# Patient Record
Sex: FEMALE | Race: WHITE | Employment: UNEMPLOYED | URBAN - METROPOLITAN AREA
[De-identification: names, ages, dates, MRNs, and addresses within clinical notes are randomized per-mention and may not be internally consistent; named-entity substitution may affect disease eponyms.]

---

## 2017-01-30 ENCOUNTER — TELEPHONE (OUTPATIENT)
Dept: OBGYN CLINIC | Age: 47
End: 2017-01-30

## 2017-01-30 NOTE — TELEPHONE ENCOUNTER
Pt called to obtain pap results from 7/27/2016. Results given per MD documentation. She verbalized understanding and asked to be connected with billing due to her receiving two bills for this procedure.

## 2017-03-20 ENCOUNTER — OFFICE VISIT (OUTPATIENT)
Dept: MIDWIFE SERVICES | Age: 47
End: 2017-03-20

## 2017-03-20 VITALS
HEIGHT: 62 IN | BODY MASS INDEX: 36.71 KG/M2 | HEART RATE: 90 BPM | WEIGHT: 199.5 LBS | SYSTOLIC BLOOD PRESSURE: 129 MMHG | DIASTOLIC BLOOD PRESSURE: 84 MMHG

## 2017-03-20 DIAGNOSIS — N93.0 POSTCOITAL BLEEDING: Primary | ICD-10-CM

## 2017-03-20 RX ORDER — WARFARIN 10 MG/1
TABLET ORAL
COMMUNITY
Start: 2015-03-13 | End: 2017-04-20
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
## 2017-04-17 ENCOUNTER — HOSPITAL ENCOUNTER (EMERGENCY)
Age: 47
Discharge: HOME | End: 2017-04-17
Attending: STUDENT IN AN ORGANIZED HEALTH CARE EDUCATION/TRAINING PROGRAM | Admitting: STUDENT IN AN ORGANIZED HEALTH CARE EDUCATION/TRAINING PROGRAM
Payer: MEDICAID

## 2017-04-17 ENCOUNTER — APPOINTMENT (OUTPATIENT)
Dept: GENERAL RADIOLOGY | Age: 47
End: 2017-04-17
Attending: NURSE PRACTITIONER
Payer: MEDICAID

## 2017-04-17 ENCOUNTER — APPOINTMENT (OUTPATIENT)
Dept: CT IMAGING | Age: 47
End: 2017-04-17
Attending: NURSE PRACTITIONER
Payer: MEDICAID

## 2017-04-17 VITALS
SYSTOLIC BLOOD PRESSURE: 103 MMHG | DIASTOLIC BLOOD PRESSURE: 64 MMHG | HEART RATE: 97 BPM | BODY MASS INDEX: 34.96 KG/M2 | RESPIRATION RATE: 18 BRPM | HEIGHT: 62 IN | OXYGEN SATURATION: 98 % | TEMPERATURE: 97.9 F | WEIGHT: 190 LBS

## 2017-04-17 DIAGNOSIS — S09.93XA FACIAL INJURY, INITIAL ENCOUNTER: ICD-10-CM

## 2017-04-17 DIAGNOSIS — R07.89 CHEST WALL PAIN: ICD-10-CM
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
## 2017-04-17 LAB
ALBUMIN SERPL BCP-MCNC: 3.9 G/DL (ref 3.5–5)
ALBUMIN/GLOB SERPL: 1.1 {RATIO} (ref 1.1–2.2)
ALP SERPL-CCNC: 49 U/L (ref 45–117)
ALT SERPL-CCNC: 22 U/L (ref 12–78)
ANION GAP BLD CALC-SCNC: 16 MMOL/L (ref 5–15)
AST SERPL W P-5'-P-CCNC: 19 U/L (ref 15–37)
BASOPHILS # BLD AUTO: 0 K/UL (ref 0–0.1)
BASOPHILS # BLD: 0 % (ref 0–1)
BILIRUB SERPL-MCNC: 0.3 MG/DL (ref 0.2–1)
BUN SERPL-MCNC: 11 MG/DL (ref 6–20)
BUN/CREAT SERPL: 15 (ref 12–20)
CALCIUM SERPL-MCNC: 8.5 MG/DL (ref 8.5–10.1)
CHLORIDE SERPL-SCNC: 105 MMOL/L (ref 97–108)
CO2 SERPL-SCNC: 20 MMOL/L (ref 21–32)
CREAT SERPL-MCNC: 0.72 MG/DL (ref 0.55–1.02)
EOSINOPHIL # BLD: 0.1 K/UL (ref 0–0.4)
EOSINOPHIL NFR BLD: 1 % (ref 0–7)
ERYTHROCYTE [DISTWIDTH] IN BLOOD BY AUTOMATED COUNT: 13.2 % (ref 11.5–14.5)
GLOBULIN SER CALC-MCNC: 3.7 G/DL (ref 2–4)
GLUCOSE SERPL-MCNC: 94 MG/DL (ref 65–100)
HCG UR QL: NEGATIVE
HCT VFR BLD AUTO: 40.3 % (ref 35–47)
HGB BLD-MCNC: 13.5 G/DL (ref 11.5–16)
INR PPP: 1.6 (ref 0.9–1.1)
LYMPHOCYTES # BLD AUTO: 24 % (ref 12–49)
LYMPHOCYTES # BLD: 2.5 K/UL (ref 0.8–3.5)
MCH RBC QN AUTO: 32.1 PG (ref 26–34)
MCHC RBC AUTO-ENTMCNC: 33.5 G/DL (ref 30–36.5)
MCV RBC AUTO: 95.7 FL (ref 80–99)
MONOCYTES # BLD: 0.8 K/UL (ref 0–1)
MONOCYTES NFR BLD AUTO: 8 % (ref 5–13)
NEUTS SEG # BLD: 7.1 K/UL (ref 1.8–8)
NEUTS SEG NFR BLD AUTO: 67 % (ref 32–75)
PLATELET # BLD AUTO: 495 K/UL (ref 150–400)
POTASSIUM SERPL-SCNC: 3.7 MMOL/L (ref 3.5–5.1)
PROT SERPL-MCNC: 7.6 G/DL (ref 6.4–8.2)
PROTHROMBIN TIME: 16.4 SEC (ref 9–11.1)
RBC # BLD AUTO: 4.21 M/UL (ref 3.8–5.2)
SODIUM SERPL-SCNC: 141 MMOL/L (ref 136–145)
TROPONIN I SERPL-MCNC: <0.04 NG/ML
WBC # BLD AUTO: 10.6 K/UL (ref 3.6–11)

## 2017-04-17 PROCEDURE — 85610 PROTHROMBIN TIME: CPT | Performed by: NURSE PRACTITIONER

## 2017-04-17 PROCEDURE — 74011250636 HC RX REV CODE- 250/636: Performed by: NURSE PRACTITIONER

## 2017-04-17 PROCEDURE — 85025 COMPLETE CBC W/AUTO DIFF WBC: CPT | Performed by: NURSE PRACTITIONER

## 2017-04-17 PROCEDURE — 80053 COMPREHEN METABOLIC PANEL: CPT | Performed by: NURSE PRACTITIONER

## 2017-04-17 PROCEDURE — 93005 ELECTROCARDIOGRAM TRACING: CPT

## 2017-04-17 PROCEDURE — 70486 CT MAXILLOFACIAL W/O DYE: CPT

## 2017-04-17 PROCEDURE — 72050 X-RAY EXAM NECK SPINE 4/5VWS: CPT

## 2017-04-17 PROCEDURE — 90471 IMMUNIZATION ADMIN: CPT

## 2017-04-17 PROCEDURE — 99284 EMERGENCY DEPT VISIT MOD MDM: CPT

## 2017-04-17 PROCEDURE — 84484 ASSAY OF TROPONIN QUANT: CPT | Performed by: NURSE PRACTITIONER

## 2017-04-17 PROCEDURE — 70450 CT HEAD/BRAIN W/O DYE: CPT

## 2017-04-17 PROCEDURE — 90715 TDAP VACCINE 7 YRS/> IM: CPT | Performed by: NURSE PRACTITIONER

## 2017-04-17 PROCEDURE — 36415 COLL VENOUS BLD VENIPUNCTURE: CPT | Performed by: NURSE PRACTITIONER

## 2017-04-17 PROCEDURE — 81025 URINE PREGNANCY TEST: CPT

## 2017-04-17 PROCEDURE — 71020 XR CHEST PA LAT: CPT

## 2017-04-17 RX ORDER — HYDROCODONE BITARTRATE AND ACETAMINOPHEN 5; 325 MG/1; MG/1
1 TABLET ORAL
Qty: 12 TAB | Refills: 0 | Status: SHIPPED | OUTPATIENT
Start: 2017-04-17 | End: 2017-04-20

## 2017-04-17 RX ADMIN — TETANUS TOXOID, REDUCED DIPHTHERIA TOXOID AND ACELLULAR PERTUSSIS VACCINE, ADSORBED 0.5 ML: 5; 2.5; 8; 8; 2.5 SUSPENSION INTRAMUSCULAR at 19:55
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
## 2017-04-18 LAB
ATRIAL RATE: 84 BPM
CALCULATED P AXIS, ECG09: -6 DEGREES
CALCULATED R AXIS, ECG10: -31 DEGREES
CALCULATED T AXIS, ECG11: 0 DEGREES
DIAGNOSIS, 93000: NORMAL
P-R INTERVAL, ECG05: 154 MS
Q-T INTERVAL, ECG07: 364 MS
QRS DURATION, ECG06: 72 MS
QTC CALCULATION (BEZET), ECG08: 430 MS
VENTRICULAR RATE, ECG03: 84 BPM
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
## 2017-04-20 ENCOUNTER — OFFICE VISIT (OUTPATIENT)
Dept: FAMILY MEDICINE CLINIC | Age: 47
End: 2017-04-20

## 2017-04-20 VITALS
WEIGHT: 199.8 LBS | DIASTOLIC BLOOD PRESSURE: 75 MMHG | BODY MASS INDEX: 36.77 KG/M2 | RESPIRATION RATE: 20 BRPM | SYSTOLIC BLOOD PRESSURE: 114 MMHG | HEART RATE: 78 BPM | TEMPERATURE: 98.4 F | HEIGHT: 62 IN

## 2017-04-20 DIAGNOSIS — R42 DIZZINESS: Primary | ICD-10-CM

## 2017-04-20 DIAGNOSIS — E03.9 ACQUIRED HYPOTHYROIDISM: ICD-10-CM

## 2017-04-20 DIAGNOSIS — E55.9 VITAMIN D DEFICIENCY: ICD-10-CM
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
## 2017-04-20 RX ORDER — WARFARIN SODIUM 5 MG/1
5 TABLET ORAL DAILY
COMMUNITY
End: 2017-06-27

## 2017-04-20 RX ORDER — WARFARIN 10 MG/1
10 TABLET ORAL DAILY
COMMUNITY
End: 2017-04-20

## 2017-04-20 RX ORDER — BUTALBITAL, ACETAMINOPHEN AND CAFFEINE 300; 40; 50 MG/1; MG/1; MG/1
CAPSULE ORAL AS NEEDED
COMMUNITY
Start: 2017-04-12 | End: 2017-06-27
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
## 2017-04-20 NOTE — PROGRESS NOTES
Chief Complaint   Patient presents with        on 4/17/17-ED visit follow up.     Dizziness     unsteady on feet

## 2017-04-20 NOTE — PATIENT INSTRUCTIONS
Vertigo: Care Instructions  Your Care Instructions  Vertigo is the feeling that you or your surroundings are moving when there is no actual movement. It is often described as a feeling of spinning, whirling, falling, or tilting. Vertigo may make you vomit or feel nauseated. You may have trouble standing or walking and may lose your balance. Vertigo is often related to an inner ear problem, but it can have other more serious causes. If vertigo continues, you may need more tests to find its cause. Follow-up care is a key part of your treatment and safety. Be sure to make and go to all appointments, and call your doctor if you are having problems. Its also a good idea to know your test results and keep a list of the medicines you take. How can you care for yourself at home? · Do not lie flat on your back. Prop yourself up slightly. This may reduce the spinning feeling. Keep your eyes open. · Move slowly so that you do not fall. · If your doctor recommends medicine, take it exactly as directed. · Do not drive while you are having vertigo. Certain exercises, called Stanford-Daroff exercises, can help decrease vertigo. To do Stanford-Daroff exercises:  · Sit on the edge of a bed or sofa and quickly lie down on the side that causes the worst vertigo. Lie on your side with your ear down. · Stay in this position for at least 30 seconds or until the vertigo goes away. · Sit up. If this causes vertigo, wait for it to stop. · Repeat the procedure on the other side. · Repeat this 10 times. Do these exercises 2 times a day until the vertigo is gone. When should you call for help? Call 911 anytime you think you may need emergency care. For example, call if:  · You passed out (lost consciousness). · You have symptoms of a stroke. These may include:  ¨ Sudden numbness, tingling, weakness, or loss of movement in your face, arm, or leg, especially on only one side of your body. ¨ Sudden vision changes.   ¨ Sudden trouble speaking. ¨ Sudden confusion or trouble understanding simple statements. ¨ Sudden problems with walking or balance. ¨ A sudden, severe headache that is different from past headaches. Call your doctor now or seek immediate medical care if:  · Vertigo occurs with a fever, a headache, or ringing in your ears. · You have new or increased nausea and vomiting. Watch closely for changes in your health, and be sure to contact your doctor if:  · Vertigo gets worse or happens more often. · Vertigo has not gotten better after 2 weeks. Where can you learn more? Go to http://guera-samreen.info/. Enter C426 in the search box to learn more about \"Vertigo: Care Instructions. \"  Current as of: July 29, 2016  Content Version: 11.2  © 8687-4907 [employer]. Care instructions adapted under license by [employer] (which disclaims liability or warranty for this information). If you have questions about a medical condition or this instruction, always ask your healthcare professional. [place] [address] any warranty or liability for your use of this information. Cawthorne Exercises for Vertigo: Care Instructions  Your Care Instructions  Simple exercises can help you regain your balance when you have vertigo. If you have Ménière's disease, benign paroxysmal positional vertigo (BPPV), or another inner ear problem, you may have vertigo off and on. Do these exercises first thing in the morning and before you go to bed. You might get dizzy when you first start them. If this happens, try to do them for at least 5 minutes. Do a group of exercises at a time, starting at the top of the list. It may take several weeks before you can do all the exercises without feeling dizzy. Follow-up care is a key part of your treatment and safety. Be sure to make and go to all appointments, and call your doctor if you are having problems.  It's also a good idea to know your test results and keep a list of the medicines you take. How can you care for yourself at home? Exercise 1  While sitting on the side of the bed and holding your head still:  · Look up as far as you can. · Look down as far as you can. · Look from side to side as far as you can. · Stretch your arm straight out in front of you. Focus on your index finger. Continue to focus on your finger while you bring it to your nose. Exercise 2  While sitting on the side of the bed:  · Bring your head as far back as you can. · Bring your head forward to touch your chin to your chest.  · Turn your head from side to side. · Do these exercises first with your eyes open. Then try with your eyes closed. Exercise 3  While sitting on the side of the bed:  · Shrug your shoulders straight upward, then relax them. · Bend over and try to touch the ground with your fingers. Then go back to a sitting position. · Toss a small ball from one hand to the other. Throw the ball higher than your eyes so you have to look up. Exercise 4  While standing (with someone close by if you feel uncomfortable):  · Repeat Exercise 1.  · Repeat Exercise 2.  · Pass a ball between your legs and above your head. · Sit down and then stand up. Repeat. Turn around in a [place] a different way each time you stand. · With someone close by to help you, try the above exercises with your eyes closed. Exercise 5  In a room that is cleared of obstacles:  · Walk to a corner of the room, turn to your right, and walk back to the starting point. Now, repeat and turn left. · Walk up and down a slope. Now try stairs. · While holding on to someone's arm, try these exercises with your eyes closed. When should you call for help? Watch closely for changes in your health, and be sure to contact your doctor if:  · Your vertigo gets worse. · You want more information about vertigo. · You want more information about exercises for vertigo. Where can you learn more?   Go to http://guera-samreen.info/. Enter E894 in the search box to learn more about \"Cawthorne Exercises for Vertigo: Care Instructions. \"  Current as of: July 29, 2016  Content Version: 11.2  © 0651-4969 Healthwise[employer]. Care instructions adapted under license by [employer] (which disclaims liability or warranty for this information). If you have questions about a medical condition or this instruction, always ask your healthcare professional. [place] [address] any warranty or liability for your use of this information.
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
## 2017-04-25 LAB
25(OH)D3+25(OH)D2 SERPL-MCNC: 28.6 NG/ML (ref 30–100)
T4 FREE SERPL DIALY-MCNC: 0.81 NG/DL
TSH SERPL-ACNC: 5.2 UU/ML

## 2017-04-29 ENCOUNTER — APPOINTMENT (OUTPATIENT)
Dept: CT IMAGING | Age: 47
End: 2017-04-29
Attending: EMERGENCY MEDICINE
Payer: MEDICAID

## 2017-04-29 ENCOUNTER — HOSPITAL ENCOUNTER (EMERGENCY)
Age: 47
Discharge: HOME | End: 2017-04-29
Attending: EMERGENCY MEDICINE
Payer: MEDICAID

## 2017-04-29 VITALS
TEMPERATURE: 97.8 F | HEIGHT: 62 IN | WEIGHT: 190 LBS | HEART RATE: 79 BPM | OXYGEN SATURATION: 99 % | DIASTOLIC BLOOD PRESSURE: 63 MMHG | BODY MASS INDEX: 34.96 KG/M2 | RESPIRATION RATE: 16 BRPM | SYSTOLIC BLOOD PRESSURE: 112 MMHG

## 2017-04-29 DIAGNOSIS — R51.9 ACUTE INTRACTABLE HEADACHE, UNSPECIFIED HEADACHE TYPE: Primary | ICD-10-CM

## 2017-04-29 LAB
HCG UR QL: NEGATIVE
INR BLD: 1.1 (ref 0.9–1.2)

## 2017-04-29 PROCEDURE — 96374 THER/PROPH/DIAG INJ IV PUSH: CPT

## 2017-04-29 PROCEDURE — 99284 EMERGENCY DEPT VISIT MOD MDM: CPT

## 2017-04-29 PROCEDURE — 70450 CT HEAD/BRAIN W/O DYE: CPT

## 2017-04-29 PROCEDURE — 74011250636 HC RX REV CODE- 250/636: Performed by: EMERGENCY MEDICINE

## 2017-04-29 PROCEDURE — 96375 TX/PRO/DX INJ NEW DRUG ADDON: CPT

## 2017-04-29 PROCEDURE — 81025 URINE PREGNANCY TEST: CPT

## 2017-04-29 PROCEDURE — 96361 HYDRATE IV INFUSION ADD-ON: CPT

## 2017-04-29 PROCEDURE — 85610 PROTHROMBIN TIME: CPT

## 2017-04-29 RX ORDER — PROCHLORPERAZINE EDISYLATE 5 MG/ML
10 INJECTION INTRAMUSCULAR; INTRAVENOUS
Status: COMPLETED | OUTPATIENT
Start: 2017-04-29 | End: 2017-04-29

## 2017-04-29 RX ORDER — KETOROLAC TROMETHAMINE 30 MG/ML
15 INJECTION, SOLUTION INTRAMUSCULAR; INTRAVENOUS
Status: COMPLETED | OUTPATIENT
Start: 2017-04-29 | End: 2017-04-29

## 2017-04-29 RX ORDER — DIPHENHYDRAMINE HYDROCHLORIDE 50 MG/ML
50 INJECTION, SOLUTION INTRAMUSCULAR; INTRAVENOUS
Status: COMPLETED | OUTPATIENT
Start: 2017-04-29 | End: 2017-04-29

## 2017-04-29 RX ADMIN — PROCHLORPERAZINE EDISYLATE 10 MG: 5 INJECTION INTRAMUSCULAR; INTRAVENOUS at 17:11

## 2017-04-29 RX ADMIN — SODIUM CHLORIDE 1000 ML: 900 INJECTION, SOLUTION INTRAVENOUS at 17:10

## 2017-04-29 RX ADMIN — DIPHENHYDRAMINE HYDROCHLORIDE 50 MG: 50 INJECTION, SOLUTION INTRAMUSCULAR; INTRAVENOUS at 17:11

## 2017-04-29 RX ADMIN — KETOROLAC TROMETHAMINE 15 MG: 30 INJECTION, SOLUTION INTRAMUSCULAR at 18:53

## 2017-04-29 NOTE — DISCHARGE INSTRUCTIONS
We hope that we have addressed all of your medical concerns. The examination and treatment you received in the Emergency Department were for an emergent problem and were not intended as complete care. It is important that you follow up with your healthcare provider(s) for ongoing care. If your symptoms worsen or do not improve as expected, and you are unable to reach your usual health care provider(s), you should return to the Emergency Department. Today's healthcare is undergoing tremendous change, and patient satisfaction surveys are one of the many tools to assess the quality of medical care. You may receive a survey from the [employer] organization regarding your experience in the Emergency Department. I hope that your experience has been completely positive, particularly the medical care that I provided. As such, please participate in the survey; anything less than excellent does not meet my expectations or intentions. [facility] and [facility] participate in nationally recognized quality of care measures. If your blood pressure is greater than 120/80, as reported below, we urge that you seek medical care to address the potential of high blood pressure, commonly known as hypertension. Hypertension can be hereditary or can be caused by certain medical conditions, pain, stress, or \"white coat syndrome. \"       Please make an appointment with your health care provider(s) for follow up of your Emergency Department visit. VITALS:   Patient Vitals for the past 8 hrs:   Temp Pulse Resp BP SpO2   04/29/17 1900 - - - 112/63 99 %   04/29/17 1830 - - - 110/66 98 %   04/29/17 1816 - 79 16 111/59 96 %   04/29/17 1608 97.8 °F (36.6 °C) 96 18 124/73 98 %          Thank you for allowing us to provide you with medical care today. We realize that you have many choices for your emergency care needs.   Please choose us in the future for any continued health care needs. Raymond Cosem DO    [facility], [facility].   Office: [phone]            Recent Results (from the past 24 hour(s))   POC INR    Collection Time: 04/29/17  5:14 PM   Result Value Ref Range    INR (POC) 1.1 <1.2     HCG URINE, QL. - POC    Collection Time: 04/29/17  6:13 PM   Result Value Ref Range    Pregnancy test,urine (POC) NEGATIVE  NEG         Ct Head Wo Cont    Result Date: 4/29/2017  INDICATION: HA.  on coumadin. EXAM: CT HEAD without contrast. CT dose reduction was achieved through use of a standardized protocol tailored for this examination and automatic exposure control for dose modulation. FINDINGS: Unenhanced CT Head is performed. The brain parenchyma is unremarkable in appearance for age, without evidence for infarct. There is no bleed, mass, shift, hydrocephalus or extra-axial fluid collection. Bone windows are unremarkable. IMPRESSION: No Intracranial Disease Evident on Head CT. Recurring Migraine Headache: Care Instructions  Your Care Instructions  Migraines are painful, throbbing headaches. They often start on one side of the head. They may cause nausea and vomiting and make you sensitive to light, sound, or smell. Some people may have only a few migraines throughout life. Others have them as often as several times a month. The goal of treatment is to reduce the number of migraines you have and relieve your symptoms. Even with treatment, you may continue to have migraines. You play an important role in dealing with your headaches. Work on avoiding things that seem to trigger your migraines. When you feel a headache coming on, act quickly to stop it before it gets worse. Follow-up care is a key part of your treatment and safety. Be sure to make and go to all appointments, and call your doctor if you are having problems. It's also a good idea to know your test results and keep a list of the medicines you take.   How can you care for yourself at home? · Do not drive if you have taken a prescription pain medicine. · Rest in a quiet, dark room until your headache is gone. Close your eyes and try to relax or go to sleep. Do not watch TV or read. · Put a cold, moist cloth or cold pack on the painful area for 10 to 20 minutes at a time. Put a thin cloth between the cold pack and your skin. · Have someone gently massage your neck and shoulders. · Take your medicines exactly as prescribed. Call your doctor if you think you are having a problem with your medicine. You will get more details on the specific medicines your doctor prescribes. To prevent migraines  · Keep a headache diary so you can figure out what triggers your headaches. Avoiding triggers may help you prevent headaches. Record when each headache began, how long it lasted, and what the pain was like. Use words like throbbing, aching, stabbing, or dull. Write down any other symptoms you had with the headache. These may include nausea, flashing lights or dark spots, or sensitivity to bright light or loud noise. Note if the headache occurred near your period. List anything that might have triggered the headache. Triggers may include certain foods (chocolate, cheese, wine) or odors, smoke, bright light, stress, or lack of sleep. · If your doctor has prescribed medicine for your migraines, take it as directed. You may have medicine that you take only when you get a migraine and medicine that you take all the time to help prevent migraines. ¨ If your doctor has prescribed medicine for when you get a headache, take it at the first sign of a migraine, unless your doctor has given you other instructions. ¨ If your doctor has prescribed medicine to prevent migraines, take it exactly as prescribed. Call your doctor if you think you are having a problem with your medicine. · Find healthy ways to deal with stress. Migraines are most common during or right after stressful times.  Take time to relax before and after you do something that has caused a migraine in the past.  · Try to keep your muscles relaxed by keeping good posture. Check your jaw, face, neck, and shoulder muscles for tension. Try to relax them. When sitting at a desk, change positions often. Stretch for 30 seconds each hour. · Get regular sleep and exercise. · Eat regular meals, and avoid foods and drinks that often trigger migraines. These include chocolate and alcohol, especially red wine and port. Chemicals used in food, such as aspartame and monosodium glutamate (MSG), also can trigger migraines. So can some food additives, such as those found in hot dogs, boone, cold cuts, aged cheeses, and pickled foods. · Limit caffeine by not drinking too much coffee, tea, or soda. Do not quit caffeine suddenly, because that can also give you migraines. · Do not smoke or allow others to smoke around you. If you need help quitting, talk to your doctor about stop-smoking programs and medicines. These can increase your chances of quitting for good. · If you are taking birth control pills or hormone therapy, talk to your doctor about whether they are triggering your migraines. When should you call for help? Call 911 anytime you think you may need emergency care. For example, call if:  · You have symptoms of a stroke. These may include:  ¨ Sudden numbness, tingling, weakness, or loss of movement in your face, arm, or leg, especially on only one side of your body. ¨ Sudden vision changes. ¨ Sudden trouble speaking. ¨ Sudden confusion or trouble understanding simple statements. ¨ Sudden problems with walking or balance. ¨ A sudden, severe headache that is different from past headaches. Call your doctor now or seek immediate medical care if:  · You develop a fever and a stiff neck. · You have new nausea and vomiting, or you cannot keep down food or liquids.   Watch closely for changes in your health, and be sure to contact your doctor if:  · You have a headache that does not get better within 1 or 2 days. · Your headaches get worse or happen more often. Where can you learn more? Go to http://guera-samreen.info/. Enter V975 in the search box to learn more about \"Recurring Migraine Headache: Care Instructions. \"  Current as of: October 14, 2016  Content Version: 11.2  © 2122-6601 "[employer]". Care instructions adapted under license by [employer] (which disclaims liability or warranty for this information). If you have questions about a medical condition or this instruction, always ask your healthcare professional. [address] any warranty or liability for your use of this information.
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
## 2017-04-30 DIAGNOSIS — E55.9 VITAMIN D DEFICIENCY: Primary | ICD-10-CM

## 2017-04-30 DIAGNOSIS — E03.9 ACQUIRED HYPOTHYROIDISM: ICD-10-CM

## 2017-04-30 RX ORDER — ERGOCALCIFEROL 1.25 MG/1
50000 CAPSULE ORAL 2 TIMES WEEKLY
Qty: 12 CAP | Refills: 0 | Status: SHIPPED | OUTPATIENT
Start: 2017-05-01 | End: 2017-06-10

## 2017-04-30 RX ORDER — LEVOTHYROXINE SODIUM 25 UG/1
25 TABLET ORAL
Qty: 30 TAB | Refills: 1 | Status: SHIPPED | OUTPATIENT
Start: 2017-04-30 | End: 2017-07-02 | Stop reason: SDUPTHER

## 2017-05-01 ENCOUNTER — PATIENT OUTREACH (OUTPATIENT)
Dept: FAMILY MEDICINE CLINIC | Age: 47
End: 2017-05-01

## 2017-05-01 NOTE — PROGRESS NOTES
[facility]  ED  Discharge Follow-Up        Patient listed on discharge [facility]) report on 17. Patient discharged from intractable Headache for intractable Headache. Panel Manager contacted the patient by telephone to perform post ED discharge assessment. Verified  and address with patient as identifiers. Provided introduction to self, and explanation of the Panel Manger role. Patient stated that she is doing better. Patient denies any headaches at this time. Patient also stated that she is follow up with hematology for her PT INR. Medication:  No new medication at discharge. Performed medication reconciliation with patient, and patient verbalizes understanding of administration of home medications. Discharge Instructions :  Reviewed discharge instructions with patient. Patient verbalizes understanding of discharge instructions and follow-up care. Goals        Post ED     Reduce ED Utilization            -Patient will make appt with PCP for non emergency appt. PCP/Specialist follow up: Patient is following up with Hematology for PT INR. Patient given an opportunity to ask questions. No other clinical/social/functional needs noted. The patient agrees to contact the PCP office for questions related to their healthcare. The patient expressed thanks, offered no additional questions and ended the call.

## 2017-05-17 ENCOUNTER — PATIENT OUTREACH (OUTPATIENT)
Dept: FAMILY MEDICINE CLINIC | Age: 47
End: 2017-05-17

## 2017-05-17 NOTE — PROGRESS NOTES
NN Follow-Up          Follow up call placed to patient. Patient discharged from [facility] on 17 for Headache. Panel Manager contacted the patient by telephone to perform post ED discharge follow up. Verified  and address with patient as identifiers. Provided introduction to self, and reason for calling. Patient stated that she is doing well. Patient denies any headaches at this time.

## 2017-05-31 ENCOUNTER — PATIENT OUTREACH (OUTPATIENT)
Dept: FAMILY MEDICINE CLINIC | Age: 47
End: 2017-05-31

## 2017-05-31 NOTE — PROGRESS NOTES
Panel Manager will resolve 4/29/17 ED/Hospital Episode. Patient has been contacted but decline  Follow up with PCP. No sign that patient has return to ED/Hospital in the last 30 days.

## 2017-05-31 NOTE — PROGRESS NOTES
Chief Complaint   Patient presents with    Vaginal Bleeding     c/o bleeding w/ intercourse last week. Only happened once but it was \"alot\"     Visit Vitals    /84    Pulse 90    Ht 5' 2\" (1.575 m)    Wt 199 lb 8 oz (90.5 kg)    LMP 02/28/2017 (Exact Date)    BMI 36.49 kg/m2     1. Have you been to the ER, urgent care clinic since your last visit? Hospitalized since your last visit? No    2. Have you seen or consulted any other health care providers outside of the [address] [place] [address] since your last visit? Include any pap smears or colon screening.  No Interval/Overnight Events:  Patient developed fever of 103.8 at 8pm, required pain meds (morphine at 11pm), feedings started- tolerating well.     VITAL SIGNS:  T(C): 38.1, Max: 39.9 (05-30 @ 20:00)  HR: 137 (98 - 138)  BP: 87/39 (70/31 - 108/66)  RR: 26 (14 - 46)  SpO2: 95% (94% - 100%)      ==============================RESPIRATORY===============================  [x ] Mechanical Ventilation: Mode: SIMV with PS, RR (14) , FiO2: 21, PEEP: 5, PS: 16, ITime: 0.5, MAP: 8, PIP: 21    Respiratory Medications:  buDESOnide   for Nebulization - Peds 0.25milliGRAM(s) Nebulizer every 12 hours  ALBUTerol  Intermittent Nebulization - Peds 2.5milliGRAM(s) Nebulizer every 4 hours PRN    [ ] Extubation Readiness Assessed  Comments: no acute events    ============================CARDIOVASCULAR=============================  Comments: mildly tachycardic during pain     ========================HEMATOLOGIC/ONCOLOGIC=========================  No issues   ===========================INFECTIOUS DISEASE============================  Antimicrobials/Immunologic Medications:  Febrile during the night   ceFAZolin  IV Intermittent - Peds 320milliGRAM(s) IV Intermittent every 8 hours      =====================FLUIDS/ELECTROLYTES/NUTRITION======================  I&O's Summary    I & Os for current day (as of 31 May 2017 06:55)  =============================================  IN: 694 ml / OUT: 347 ml / NET: 347 ml    Daily Weight k.5 (30 May 2017 10:07)        Diet:	[ ] Regular	[ ] Soft		[ ] Clears	[ ] NPO  .	[ ] Other:  .	[ ] NGT		[ ] NDT		[x ] GT		[ ] GJT    Gastrointestinal Medications:  vitamin A, D and C Oral Drops - Peds 1milliLiter(s) Oral daily  glycopyrrolate  Oral Liquid - Peds 160MICROGram(s) Oral every 12 hours    Comments: home diet: Enfamil 22kcal 145cc q4h (5 x day)    ===============================NEUROLOGY==============================  Facial paralysis-baseline    Neurologic Medications:  acetaminophen   Oral Liquid - Peds. 120milliGRAM(s) Oral every 6 hours PRN  oxyCODONE   Oral Liquid - Peds 0.95milliGRAM(s) Oral every 4 hours PRN  morphine  IV Intermittent - Peds 0.95milliGRAM(s) IV Intermittent every 4 hours PRN    Comments:    OTHER MEDICATIONS:  Endocrine/Metabolic Medications:     Genitourinary Medications:    Topical/Other Medications:  petrolatum, white/mineral oil Ophthalmic Ointment - Peds 1Application(s) Both EYES five times a day  polyvinyl alcohol 1.4%/povidone 0.6% Ophthalmic Solution - Peds 1Drop(s) Both EYES four times a day      ========================PATIENT CARE ACCESS DEVICES======================  [x] Peripheral IV right hand.       =============================PHYSICAL EXAM=============================  GENERAL: In no acute distress  RESPIRATORY: Lungs clear to auscultation bilaterally. Good aeration. No rales, rhonchi, retractions or wheezing. Effort even and unlabored.  CARDIOVASCULAR: Regular rate and rhythm. Normal S1/S2. No murmurs, rubs, or gallop. Capillary refill < 2 seconds. Distal pulses 2+ and equal.  ABDOMEN: Soft, non-distended. Bowel sounds present. No palpable hepatosplenomegaly.  SKIN: No rash.  EXTREMITIES: Warm and well perfused. No gross extremity deformities.  NEUROLOGIC: Alert and oriented. No acute change from baseline exam.

## 2017-06-13 DIAGNOSIS — E55.9 VITAMIN D DEFICIENCY: ICD-10-CM

## 2017-06-13 DIAGNOSIS — E03.9 ACQUIRED HYPOTHYROIDISM: ICD-10-CM

## 2017-06-19 ENCOUNTER — HOSPITAL ENCOUNTER (EMERGENCY)
Age: 47
Discharge: HOME | End: 2017-06-19
Attending: EMERGENCY MEDICINE
Payer: MEDICAID

## 2017-06-19 ENCOUNTER — APPOINTMENT (OUTPATIENT)
Dept: GENERAL RADIOLOGY | Age: 47
End: 2017-06-19
Attending: EMERGENCY MEDICINE
Payer: MEDICAID

## 2017-06-19 VITALS
HEIGHT: 62 IN | OXYGEN SATURATION: 99 % | BODY MASS INDEX: 35.33 KG/M2 | SYSTOLIC BLOOD PRESSURE: 136 MMHG | DIASTOLIC BLOOD PRESSURE: 83 MMHG | HEART RATE: 83 BPM | TEMPERATURE: 97.9 F | WEIGHT: 192 LBS | RESPIRATION RATE: 16 BRPM

## 2017-06-19 DIAGNOSIS — R79.1 ELEVATED INR: ICD-10-CM

## 2017-06-19 DIAGNOSIS — M25.561 ACUTE PAIN OF RIGHT KNEE: Primary | ICD-10-CM

## 2017-06-19 LAB — INR BLD: >6 (ref 0.9–1.2)

## 2017-06-19 PROCEDURE — 73562 X-RAY EXAM OF KNEE 3: CPT

## 2017-06-19 PROCEDURE — L1830 KO IMMOB CANVAS LONG PRE OTS: HCPCS

## 2017-06-19 PROCEDURE — 85610 PROTHROMBIN TIME: CPT

## 2017-06-19 PROCEDURE — 99283 EMERGENCY DEPT VISIT LOW MDM: CPT

## 2017-06-19 NOTE — ED PROVIDER NOTES
HPI Comments: Josette Lopez is a 56 y.o. female  who presents by private vehicle to ER with c/o Patient presents with:  Fall  Knee Pain  Patient reports right knee pain after fall at home. Patient is on coumadin, denies head injury or LOC. Patient reports she is scheduled to get a knee replacement next month. She specifically denies any fevers, chills, nausea, vomiting, chest pain, shortness of breath, headache, rash, diarrhea, abdominal pain, urinary/bowel changes, sweating or weight loss. PCP: Graciela Rivera MD   PMHx significant for: Past Medical History:  4/20/2017: Acquired hypothyroidism  8/10/2011: Advanced maternal age in pregnancy  No date: Anemia NEC      Comment: Taking Iron  9/18/2014: Bilateral knee pain  10/1/2011: Depression  No date: DVT (deep vein thrombosis) in pregnancy ([facility]  No date: Factor V Leiden ([facility]  11/9/2011: Genital herpes complicating pregnancy  84/1/2904: Genital herpes complicating pregnancy  40/1/8382: Genital herpes complicating pregnancy  No date: Genital herpes, unspecified  11/9/2011: [place] filter in place  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 ([facility]  11/9/2011: History of GBS (group B streptococcus) UTI, cu*  8/10/2011: History of pulmonary embolism  No date: HX OTHER MEDICAL      Comment: DVT  No date: HX OTHER MEDICAL      Comment: pulmonary emboli-[place] filter in place  No date: HX OTHER MEDICAL      Comment: Palpitations  No date: HX OTHER MEDICAL      Comment: Restless Leg Syndrome  No date: HX OTHER MEDICAL      Comment: Hyperemisis  No date: HX OTHER MEDICAL      Comment: MRSA-Right Arm, suprapubic region  8/10/2011: Maternal DVT (deep vein thrombosis), history of  No date: Migraine  No date: Migraine  2/8/2012: Migraine headache  No date:  Other ill-defined conditions      Comment: PE, DVT  4/27/12 neg HPV NEG: Pap smear for cervical cancer screening  No date: Postpartum depression  No date: Psychiatric problem      Comment: Depression  10/12/2011: Reflex sympathetic dystrophy of the leg  10/12/2011: Reflex sympathetic dystrophy of the leg  10/12/2011: Reflex sympathetic dystrophy of the leg  8/10/2011: Supervision of high-risk pregnancy with grand *  No date: Unspecified breast disorder      Comment: Mastitis  4/20/2017: Vitamin D deficiency   PSHx significant for: Past Surgical History:  No date: CARDIAC SURG PROCEDURE UNLIST      Comment: [place] filter. 2003: [facility] DIL [place] ENTRY  2006: HX CHOLECYSTECTOMY  Social Hx: Tobacco use: Smoking status: Never Smoker                                                              Smokeless status: Never Used                      ; EtOH use: The patient states she drinks 0 per week.; Illicit Drug use: Allergies:   -- Imitrex (Sumatriptan) -- Other (comments)    --  cramping   -- Reglan (Metoclopramide) -- Nausea Only and Swelling    There are no other complaints, changes or physical findings at this time. Patient is a 56 y.o. female presenting with fall and knee pain. The history is provided by the patient. Fall   The accident occurred 1 to 2 hours ago. The fall occurred while walking. She fell from a height of ground level. She landed on hard floor. The point of impact was the right knee. The pain is present in the right knee. The pain is at a severity of 4/10. The pain is mild. She was ambulatory at the scene. There was no entrapment after the fall. There was no drug use involved in the accident. There was no alcohol use involved in the accident. Pertinent negatives include no visual change, no fever, no numbness, no abdominal pain, no bowel incontinence, no nausea, no vomiting, no hematuria, no headaches, no extremity weakness, no hearing loss, no loss of consciousness, no tingling and no laceration. The symptoms are aggravated by standing and use of injured limb. She has tried nothing for the symptoms. Knee Pain    Pertinent negatives include no numbness and no tingling.         Past Medical History:   Diagnosis Date    Acquired hypothyroidism 4/20/2017    Advanced maternal age in pregnancy 8/10/2011    Anemia NEC     Taking Iron    Bilateral knee pain 9/18/2014    Depression 10/1/2011    DVT (deep vein thrombosis) [facility] ([facility]     [facility]     Genital herpes complicating pregnancy 86/0/7962    Genital herpes complicating pregnancy 21/7/9044    Genital herpes complicating pregnancy 63/8/4458    Genital herpes, unspecified     [place] filter in place 11/9/2011    Hereditary thrombophilia ([facility] 8/10/2011    History of GBS (group B streptococcus) UTI, currently pregnant 11/9/2011    History of pulmonary embolism 8/10/2011    HX OTHER MEDICAL     DVT    HX OTHER MEDICAL     pulmonary emboli-[place] filter in place    HX OTHER MEDICAL     Palpitations    HX OTHER MEDICAL     Restless Leg Syndrome    HX OTHER MEDICAL     Hyperemisis    HX OTHER MEDICAL     MRSA-Right Arm, suprapubic region    Maternal DVT (deep vein thrombosis), history of 8/10/2011    Migraine     Migraine     Migraine headache 2/8/2012    Other ill-defined conditions     PE, DVT    Pap smear for cervical cancer screening 4/27/12 neg HPV NEG    Postpartum depression     Psychiatric problem     Depression    Reflex sympathetic dystrophy of the leg 10/12/2011    Reflex sympathetic dystrophy of the leg 10/12/2011    Reflex sympathetic dystrophy of the leg 10/12/2011    Supervision of high-risk pregnancy with grand multiparity 8/10/2011    Unspecified breast disorder     Mastitis    Vitamin D deficiency 4/20/2017       Past Surgical History:   Procedure Laterality Date    CARDIAC SURG PROCEDURE UNLIST      [place] filter.     [facility] DIL [place] ENTRY  2003    HX CHOLECYSTECTOMY  2006         Family History:   Problem Relation Age of Onset    Stroke Father     Dementia Father     Hypertension Father     Seizures Father     Other Father      factor V mutation       Social History Social History    Marital status:      Spouse name: N/A    Number of children: N/A    Years of education: N/A     Occupational History    Not on file. Social History Main Topics    Smoking status: Never Smoker    Smokeless tobacco: Never Used    Alcohol use No    Drug use: No    Sexual activity: Yes     Partners: Male     Birth control/ protection: None     Other Topics Concern    Not on file     Social History Narrative         ALLERGIES: Imitrex [sumatriptan] and Reglan [metoclopramide]    Review of Systems   Constitutional: Negative. Negative for fever. HENT: Negative. Eyes: Negative. Respiratory: Negative. Cardiovascular: Negative. Gastrointestinal: Negative. Negative for abdominal pain, bowel incontinence, nausea and vomiting. Endocrine: Negative. Genitourinary: Negative. Negative for hematuria. Musculoskeletal: Positive for arthralgias. Negative for extremity weakness. Skin: Positive for color change. Allergic/Immunologic: Negative. Neurological: Negative. Negative for tingling, loss of consciousness, numbness and headaches. Hematological: Negative. Psychiatric/Behavioral: Negative. All other systems reviewed and are negative. Vitals:    06/19/17 1836   BP: 136/83   Pulse: 83   Resp: 16   Temp: 97.9 °F (36.6 °C)   SpO2: 99%   Weight: 87.1 kg (192 lb)   Height: 5' 2\" (1.575 m)            Physical Exam   Constitutional: She is oriented to person, place, and time. She appears well-developed and well-nourished. HENT:   Head: Normocephalic and atraumatic. Right Ear: External ear normal.   Left Ear: External ear normal.   Mouth/Throat: Oropharynx is clear and moist. No oropharyngeal exudate. Eyes: Conjunctivae and EOM are normal. Pupils are equal, round, and reactive to light. Right eye exhibits no discharge. Left eye exhibits no discharge. No scleral icterus. Neck: Normal range of motion. No tracheal deviation present.  No thyromegaly present. Cardiovascular: Normal rate, regular rhythm and normal heart sounds. No murmur heard. Pulmonary/Chest: Effort normal and breath sounds normal. No respiratory distress. She has no wheezes. She has no rales. She exhibits no tenderness. Abdominal: Soft. Bowel sounds are normal. She exhibits no distension. There is no tenderness. There is no rebound and no guarding. Musculoskeletal: Normal range of motion. She exhibits no edema. Right knee: She exhibits ecchymosis. Tenderness found. Legs:  Lymphadenopathy:     She has no cervical adenopathy. Neurological: She is alert and oriented to person, place, and time. No cranial nerve deficit. Coordination normal.   Skin: Skin is warm. No laceration noted. No erythema. Psychiatric: She has a normal mood and affect. Her behavior is normal. Judgment and thought content normal.   Nursing note and vitals reviewed. MDM  Number of Diagnoses or Management Options  Acute pain of right knee:   Elevated INR:   Diagnosis management comments: Assesment/Plan- 52 year old female with knee pain. Differential includes sprain vs fracture. Imaging reviewed with no acute findings. Patients INR is 6.0. Recommended patient hold coumadin for 2 days and follow up with PCP for recheck. Follow up with ortho for knee pain.        Amount and/or Complexity of Data Reviewed  Tests in the radiology section of CPT®: ordered and reviewed      ED Course       Procedures

## 2017-06-19 NOTE — ED NOTES
Bedside and Verbal shift change report given to [facility] [address] (oncoming nurse) by Silvio Fernandez (offgoing nurse). Report included the following information SBAR, Kardex, ED Summary and MAR.

## 2017-06-19 NOTE — DISCHARGE INSTRUCTIONS
High INR Test Result: Care Instructions  Your Care Instructions  You had a blood test to check how long it takes your blood to clot. This test is called a PT or prothrombin time test. The result of the test is called the INR level. A high INR level can happen when you take warfarin (Coumadin). Warfarin helps prevent blood clots. To do this, it slows the amount of time it takes for your blood to clot. This raises your INR level. The INR goal for people who take warfarin is usually from 2 to 3. A value higher than 3.5 increases the risk of bleeding problems. Many things can affect the way warfarin works. Some natural health products and other medicines can make warfarin work too well. That can raise the risk of bleeding. If you drink a lot of alcohol, that may raise your INR. And severe diarrhea or vomiting can also raise your INR. The best way to lower your INR will depend on several things. In some cases, the doctor may have you stop taking warfarin for a few days. You may also be given other medicines to take. You will need to be tested often to make sure your INR level is going down. You will also need to watch for signs of bleeding. The doctor has checked you carefully, but problems can develop later. If you notice any problems or new symptoms, get medical treatment right away. Follow-up care is a key part of your treatment and safety. Be sure to make and go to all appointments, and call your doctor if you are having problems. It's also a good idea to know your test results and keep a list of the medicines you take. How can you care for yourself at home? Be careful with medicines and foods  · Don't start or stop taking any medicines, vitamins, or natural remedies unless you first talk to your doctor. · Keep the amount of vitamin K in your diet about the same from day to day. Do not suddenly eat a lot more or a lot less food that is rich in vitamin K than you usually do.  Vitamin K affects how warfarin works and how your blood clots. · Limit your use of alcohol. Avoid bleeding by preventing falls  · Wear slippers or shoes with nonskid soles. · Remove throw rugs and clutter. · Rearrange furniture and electrical cords to keep them out of walking paths. · Keep stairways, porches, and outside walkways well lit. Use night-lights in hallways and bathrooms. · Be extra careful when you work with sharp tools or knives. When should you call for help? Call 911 anytime you think you may need emergency care. For example, call if:  · You have a sudden, severe headache that is different from past headaches. Call your doctor now or seek immediate medical care if:  · You have any abnormal bleeding, such as:  ¨ Nosebleeds. ¨ Vaginal bleeding that is different (heavier, more frequent, at a different time of the month) than what you are used to. ¨ Bloody or black stools, or rectal bleeding. ¨ Bloody or pink urine. Watch closely for changes in your health, and be sure to contact your doctor if you have any problems. Where can you learn more? Go to http://gueraBunchsamreen.info/. Enter C178 in the search box to learn more about \"High INR Test Result: Care Instructions. \"  Current as of: October 11, 2016  Content Version: 11.3  © 9775-8530 [employer]. Care instructions adapted under license by [employer] (which disclaims liability or warranty for this information). If you have questions about a medical condition or this instruction, always ask your healthcare professional. [place] [address] any warranty or liability for your use of this information. Knee Pain or Injury: Care Instructions  Your Care Instructions    Injuries are a common cause of knee problems. Sudden (acute) injuries may be caused by a direct blow to the knee. They can also be caused by abnormal twisting, bending, or falling on the knee.  Pain, bruising, or swelling may be severe, and may start within minutes of the injury. Overuse is another cause of knee pain. Other causes are climbing stairs, kneeling, and other activities that use the knee. Everyday wear and tear, especially as you get older, also can cause knee pain. Rest, along with home treatment, often relieves pain and allows your knee to heal. If you have a serious knee injury, you may need tests and treatment. Follow-up care is a key part of your treatment and safety. Be sure to make and go to all appointments, and call your doctor if you are having problems. It's also a good idea to know your test results and keep a list of the medicines you take. How can you care for yourself at home? · Be safe with medicines. Read and follow all instructions on the label. ¨ If the doctor gave you a prescription medicine for pain, take it as prescribed. ¨ If you are not taking a prescription pain medicine, ask your doctor if you can take an over-the-counter medicine. · Rest and protect your knee. Take a break from any activity that may cause pain. · Put ice or a cold pack on your knee for 10 to 20 minutes at a time. Put a thin cloth between the ice and your skin. · Prop up a sore knee on a pillow when you ice it or anytime you sit or lie down for the next 3 days. Try to keep it above the level of your heart. This will help reduce swelling. · If your knee is not swollen, you can put moist heat, a heating pad, or a warm cloth on your knee. · If your doctor recommends an elastic bandage, sleeve, or other type of support for your knee, wear it as directed. · Follow your doctor's instructions about how much weight you can put on your leg. Use a cane, crutches, or a walker as instructed. · Follow your doctor's instructions about activity during your healing process. If you can do mild exercise, slowly increase your activity. · Reach and stay at a healthy weight.  Extra weight can strain the joints, especially the knees and hips, and make the pain worse. Losing even a few pounds may help. When should you call for help? Call 911 anytime you think you may need emergency care. For example, call if:  · You have symptoms of a blood clot in your lung (called a pulmonary embolism). These may include:  ¨ Sudden chest pain. ¨ Trouble breathing. ¨ Coughing up blood. Call your doctor now or seek immediate medical care if:  · You have severe or increasing pain. · Your leg or foot turns cold or changes color. · You cannot stand or put weight on your knee. · Your knee looks twisted or bent out of shape. · You cannot move your knee. · You have signs of infection, such as:  ¨ Increased pain, swelling, warmth, or redness. ¨ Red streaks leading from the knee. ¨ Pus draining from a place on your knee. ¨ A fever. · You have signs of a blood clot in your leg (called a deep vein thrombosis), such as:  ¨ Pain in your calf, back of the knee, thigh, or groin. ¨ Redness and swelling in your leg or groin. Watch closely for changes in your health, and be sure to contact your doctor if:  · You have tingling, weakness, or numbness in your knee. · You have any new symptoms, such as swelling. · You have bruises from a knee injury that last longer than 2 weeks. · You do not get better as expected. Where can you learn more? Go to http://guera-samreen.info/. Enter K195 in the search box to learn more about \"Knee Pain or Injury: Care Instructions. \"  Current as of: March 20, 2017  Content Version: 11.3  © 1407-3120 [employer]. Care instructions adapted under license by [employer] (which disclaims liability or warranty for this information). If you have questions about a medical condition or this instruction, always ask your healthcare professional. [address] any warranty or liability for your use of this information. We hope that we have addressed all of your medical concerns.  The examination and treatment you received in the Emergency Department were for an emergent problem and were not intended as complete care. It is important that you follow up with your healthcare provider(s) for ongoing care. If your symptoms worsen or do not improve as expected, and you are unable to reach your usual health care provider(s), you should return to the Emergency Department. Today's healthcare is undergoing tremendous change, and patient satisfaction surveys are one of the many tools to assess the quality of medical care. You may receive a survey from the [facility] regarding your experience in the Emergency Department. I hope that your experience has been completely positive, particularly the medical care that I provided. As such, please participate in the survey; anything less than excellent does not meet my expectations or intentions. [facility] and [facility] participate in nationally recognized quality of care measures. If your blood pressure is greater than 120/80, as reported below, we urge that you seek medical care to address the potential of high blood pressure, commonly known as hypertension. Hypertension can be hereditary or can be caused by certain medical conditions, pain, stress, or \"white coat syndrome. \"       Please make an appointment with your health care provider(s) for follow up of your Emergency Department visit. VITALS:   Patient Vitals for the past 8 hrs:   Temp Pulse Resp BP SpO2   06/19/17 1836 97.9 °F (36.6 °C) 83 16 136/83 99 %          Thank you for allowing us to provide you with medical care today. We realize that you have many choices for your emergency care needs. Please choose us in the future for any continued health care needs. Darnell Queen, [address]: [phone]            Recent Results (from the past 24 hour(s))   POC INR    Collection Time: 06/19/17  7:27 PM   Result Value Ref Range    INR (POC) >6.0 (H) <1.2       Xr Knee Rt 3 V    Result Date: 6/19/2017  INDICATION: Ground-level fall with pain to right knee. Exam: AP, lateral, oblique views of the right knee. FINDINGS: There is no acute fracture-dislocation. There is moderate to severe narrowing of the medial tibiofemoral joint space. There are tricompartmental osteophytes. Bones are well-mineralized. Soft tissues are normal.     IMPRESSION: No acute fracture or dislocation.

## 2017-06-20 ENCOUNTER — PATIENT OUTREACH (OUTPATIENT)
Dept: FAMILY MEDICINE CLINIC | Age: 47
End: 2017-06-20

## 2017-06-20 NOTE — PROGRESS NOTES
[facility]  ED  Discharge Follow-Up        Patient listed on discharge [facility]) report on 6/19/17. Patient discharged from [facility] for right knee pain. Panel Manager contacted the patient by telephone to perform post ED discharge assessment. No answer, Message left requesting return call. Will continue to reach out to patient regarding ED visit.

## 2017-06-27 ENCOUNTER — OFFICE VISIT (OUTPATIENT)
Dept: FAMILY MEDICINE CLINIC | Age: 47
End: 2017-06-27

## 2017-06-27 VITALS
HEART RATE: 73 BPM | DIASTOLIC BLOOD PRESSURE: 85 MMHG | BODY MASS INDEX: 36.51 KG/M2 | TEMPERATURE: 98.4 F | WEIGHT: 198.4 LBS | RESPIRATION RATE: 20 BRPM | HEIGHT: 62 IN | SYSTOLIC BLOOD PRESSURE: 122 MMHG

## 2017-06-27 DIAGNOSIS — G89.29 CHRONIC LLQ PAIN: Primary | ICD-10-CM

## 2017-06-27 DIAGNOSIS — E03.9 ACQUIRED HYPOTHYROIDISM: ICD-10-CM

## 2017-06-27 DIAGNOSIS — E55.9 VITAMIN D DEFICIENCY: ICD-10-CM

## 2017-06-27 DIAGNOSIS — D68.2 FACTOR V DEFICIENCY (HCC): ICD-10-CM

## 2017-06-27 DIAGNOSIS — R10.32 CHRONIC LLQ PAIN: Primary | ICD-10-CM

## 2017-06-27 RX ORDER — WARFARIN 10 MG/1
10 TABLET ORAL DAILY
COMMUNITY
Start: 2017-06-19 | End: 2017-07-27

## 2017-06-27 RX ORDER — ERGOCALCIFEROL 1.25 MG/1
CAPSULE ORAL
COMMUNITY
Start: 2017-06-19 | End: 2017-07-05 | Stop reason: DRUGHIGH

## 2017-06-27 RX ORDER — VALACYCLOVIR HYDROCHLORIDE 1 G/1
TABLET, FILM COATED ORAL AS NEEDED
COMMUNITY
End: 2018-03-04

## 2017-06-27 NOTE — PROGRESS NOTES
Chief Complaint   Patient presents with    Thyroid Problem     hypothroid follow up    Vitamin D Deficiency     follow up

## 2017-06-27 NOTE — PROGRESS NOTES
HISTORY OF PRESENT ILLNESS  George Aldrich is a 56 y.o. female. HPI Comments: George Aldrich is 56y.o. year old female who presents today for follow up after being seen in the ER the 19th for knee pain due to a fall. Her INR was 6.0. Also, she is due to have her thyroid and vitamin D checked. She is scheduled with hematology about her INR and has seen her orthopedist and she is scheduled for bilateral knee replacements in July. The knee is better, but she still has knee pain due to her osteoarthritis. Thyroid Problem   The history is provided by the patient (see comments). Associated symptoms include abdominal pain. Abdominal Pain    The history is provided by the patient (LLQ). This is a chronic problem. Episode onset: about a year after her C/S in 8/2013. The problem occurs constantly. The problem has been gradually worsening. Associated with: previous C/S. Pain scale: up to a 7/10. Pertinent negatives include no fever, no diarrhea, no hematochezia, no melena, no nausea, no vomiting, no constipation, no dysuria and no frequency. Associated symptoms comments: Pain at the scar. . Exacerbated by: sitting up, sneezing, coughing. The pain is relieved by nothing. Past workup includes ultrasound. The patient's surgical history includes cholecystectomy. Review of Systems   Constitutional: Negative for chills and fever. Gastrointestinal: Positive for abdominal pain. Negative for blood in stool, constipation, diarrhea, hematochezia, melena, nausea and vomiting. Genitourinary: Negative for dysuria, frequency and urgency. Visit Vitals    /85 (BP 1 Location: Left arm, BP Patient Position: Sitting)    Pulse 73    Temp 98.4 °F (36.9 °C) (Oral)    Resp 20    Ht 5' 2\" (1.575 m)    Wt 198 lb 6.4 oz (90 kg)    LMP 06/19/2017    BMI 36.29 kg/m2     Physical Exam   Constitutional: She is oriented to person, place, and time. She appears well-developed and well-nourished. No distress. Cardiovascular: Normal rate, regular rhythm and normal heart sounds. Exam reveals no gallop and no friction rub. No murmur heard. Pulmonary/Chest: Effort normal and breath sounds normal. No respiratory distress. She has no wheezes. She has no rales. Abdominal: Soft. Normal appearance and bowel sounds are normal. She exhibits no distension. There is no hepatosplenomegaly. There is tenderness in the left upper quadrant. There is no rebound and no guarding. Neurological: She is alert and oriented to person, place, and time. Skin: Skin is warm and dry. She is not diaphoretic. Nursing note and vitals reviewed. ASSESSMENT and PLAN    ICD-10-CM ICD-9-CM    1. Chronic LLQ pain R10.32 789.04 CT ABD PELV W WO CONT    G89.29 338.29    2. Acquired hypothyroidism E03.9 244.9    3. Vitamin D deficiency E55.9 268.9    4. Factor V deficiency ([facility] D68.2 286.3         CT of abdomen and pelvis  She has lab slips for her vitamin D and thyroid and will get these done today  Anticoagulated due to Factor V deficiency, has follow up on this scheduled later today    Follow-up Disposition:  Return if symptoms worsen or fail to improve. Reviewed plan of care. Patient has provided input and agrees with goals.

## 2017-06-28 LAB
25(OH)D3+25(OH)D2 SERPL-MCNC: 43.3 NG/ML (ref 30–100)
TSH SERPL DL<=0.005 MIU/L-ACNC: 5.44 UIU/ML (ref 0.45–4.5)

## 2017-07-02 DIAGNOSIS — E03.9 ACQUIRED HYPOTHYROIDISM: ICD-10-CM

## 2017-07-02 RX ORDER — LEVOTHYROXINE SODIUM 50 UG/1
50 TABLET ORAL
Qty: 30 TAB | Refills: 1 | Status: SHIPPED | OUTPATIENT
Start: 2017-07-02 | End: 2017-09-29 | Stop reason: SDUPTHER

## 2017-07-03 ENCOUNTER — TELEPHONE (OUTPATIENT)
Dept: FAMILY MEDICINE CLINIC | Age: 47
End: 2017-07-03

## 2017-07-03 DIAGNOSIS — E55.9 VITAMIN D DEFICIENCY: Primary | ICD-10-CM

## 2017-07-03 NOTE — TELEPHONE ENCOUNTER
----- Message from Raven Watts sent at 7/3/2017 10:33 AM EDT -----  Regarding: Dr. Shiv Vargas telephone  Contact: [phone]  Pt is requesting a call back. Pt was suppose to receive a prescription for vitamin d but [facility] has not received it.  Pt's best contact number is [phone]

## 2017-07-05 ENCOUNTER — PATIENT OUTREACH (OUTPATIENT)
Dept: FAMILY MEDICINE CLINIC | Age: 47
End: 2017-07-05

## 2017-07-05 ENCOUNTER — HOSPITAL ENCOUNTER (OUTPATIENT)
Dept: PREADMISSION TESTING | Age: 47
Discharge: HOME | End: 2017-07-05
Payer: MEDICAID

## 2017-07-05 VITALS
TEMPERATURE: 98.7 F | RESPIRATION RATE: 18 BRPM | WEIGHT: 201.06 LBS | HEART RATE: 96 BPM | DIASTOLIC BLOOD PRESSURE: 80 MMHG | BODY MASS INDEX: 37 KG/M2 | OXYGEN SATURATION: 97 % | HEIGHT: 62 IN | SYSTOLIC BLOOD PRESSURE: 130 MMHG

## 2017-07-05 LAB
ABO + RH BLD: NORMAL
ALBUMIN SERPL BCP-MCNC: 4.1 G/DL (ref 3.5–5)
ALBUMIN/GLOB SERPL: 1.2 {RATIO} (ref 1.1–2.2)
ALP SERPL-CCNC: 53 U/L (ref 45–117)
ALT SERPL-CCNC: 19 U/L (ref 12–78)
ANION GAP BLD CALC-SCNC: 7 MMOL/L (ref 5–15)
APPEARANCE UR: CLEAR
APTT PPP: 49.5 SEC (ref 22.1–32.5)
AST SERPL W P-5'-P-CCNC: 13 U/L (ref 15–37)
BACTERIA URNS QL MICRO: NEGATIVE /HPF
BASOPHILS # BLD AUTO: 0 K/UL (ref 0–0.1)
BASOPHILS # BLD: 0 % (ref 0–1)
BILIRUB SERPL-MCNC: 0.2 MG/DL (ref 0.2–1)
BILIRUB UR QL: NEGATIVE
BLOOD GROUP ANTIBODIES SERPL: NORMAL
BUN SERPL-MCNC: 15 MG/DL (ref 6–20)
BUN/CREAT SERPL: 21 (ref 12–20)
CALCIUM SERPL-MCNC: 8.6 MG/DL (ref 8.5–10.1)
CHLORIDE SERPL-SCNC: 104 MMOL/L (ref 97–108)
CO2 SERPL-SCNC: 27 MMOL/L (ref 21–32)
COLOR UR: ABNORMAL
CREAT SERPL-MCNC: 0.7 MG/DL (ref 0.55–1.02)
CRP SERPL-MCNC: <0.29 MG/DL
EOSINOPHIL # BLD: 0.1 K/UL (ref 0–0.4)
EOSINOPHIL NFR BLD: 1 % (ref 0–7)
EPITH CASTS URNS QL MICRO: ABNORMAL /LPF
ERYTHROCYTE [DISTWIDTH] IN BLOOD BY AUTOMATED COUNT: 13.5 % (ref 11.5–14.5)
EST. AVERAGE GLUCOSE BLD GHB EST-MCNC: 111 MG/DL
GLOBULIN SER CALC-MCNC: 3.5 G/DL (ref 2–4)
GLUCOSE SERPL-MCNC: 84 MG/DL (ref 65–100)
GLUCOSE UR STRIP.AUTO-MCNC: NEGATIVE MG/DL
HBA1C MFR BLD: 5.5 % (ref 4.2–6.3)
HCT VFR BLD AUTO: 37.9 % (ref 35–47)
HGB BLD-MCNC: 12.9 G/DL (ref 11.5–16)
HGB UR QL STRIP: ABNORMAL
HYALINE CASTS URNS QL MICRO: ABNORMAL /LPF (ref 0–5)
INR PPP: 3.9 (ref 0.9–1.1)
KETONES UR QL STRIP.AUTO: NEGATIVE MG/DL
LEUKOCYTE ESTERASE UR QL STRIP.AUTO: NEGATIVE
LYMPHOCYTES # BLD AUTO: 37 % (ref 12–49)
LYMPHOCYTES # BLD: 3.4 K/UL (ref 0.8–3.5)
MCH RBC QN AUTO: 32.2 PG (ref 26–34)
MCHC RBC AUTO-ENTMCNC: 34 G/DL (ref 30–36.5)
MCV RBC AUTO: 94.5 FL (ref 80–99)
MONOCYTES # BLD: 0.9 K/UL (ref 0–1)
MONOCYTES NFR BLD AUTO: 9 % (ref 5–13)
NEUTS SEG # BLD: 5 K/UL (ref 1.8–8)
NEUTS SEG NFR BLD AUTO: 53 % (ref 32–75)
NITRITE UR QL STRIP.AUTO: NEGATIVE
PH UR STRIP: 7 [PH] (ref 5–8)
PLATELET # BLD AUTO: 456 K/UL (ref 150–400)
POTASSIUM SERPL-SCNC: 4.5 MMOL/L (ref 3.5–5.1)
PROT SERPL-MCNC: 7.6 G/DL (ref 6.4–8.2)
PROT UR STRIP-MCNC: NEGATIVE MG/DL
PROTHROMBIN TIME: 41.3 SEC (ref 9–11.1)
RBC # BLD AUTO: 4.01 M/UL (ref 3.8–5.2)
RBC #/AREA URNS HPF: ABNORMAL /HPF (ref 0–5)
SODIUM SERPL-SCNC: 138 MMOL/L (ref 136–145)
SP GR UR REFRACTOMETRY: 1.01 (ref 1–1.03)
SPECIMEN EXP DATE BLD: NORMAL
THERAPEUTIC RANGE,PTTT: ABNORMAL SECS (ref 58–77)
UA: UC IF INDICATED,UAUC: ABNORMAL
UROBILINOGEN UR QL STRIP.AUTO: 0.2 EU/DL (ref 0.2–1)
WBC # BLD AUTO: 9.4 K/UL (ref 3.6–11)
WBC URNS QL MICRO: ABNORMAL /HPF (ref 0–4)

## 2017-07-05 PROCEDURE — 81001 URINALYSIS AUTO W/SCOPE: CPT | Performed by: ORTHOPAEDIC SURGERY

## 2017-07-05 PROCEDURE — 85610 PROTHROMBIN TIME: CPT | Performed by: ORTHOPAEDIC SURGERY

## 2017-07-05 PROCEDURE — 36415 COLL VENOUS BLD VENIPUNCTURE: CPT | Performed by: ORTHOPAEDIC SURGERY

## 2017-07-05 PROCEDURE — 85730 THROMBOPLASTIN TIME PARTIAL: CPT | Performed by: ORTHOPAEDIC SURGERY

## 2017-07-05 PROCEDURE — 86140 C-REACTIVE PROTEIN: CPT | Performed by: ORTHOPAEDIC SURGERY

## 2017-07-05 PROCEDURE — 83036 HEMOGLOBIN GLYCOSYLATED A1C: CPT | Performed by: ORTHOPAEDIC SURGERY

## 2017-07-05 PROCEDURE — 80053 COMPREHEN METABOLIC PANEL: CPT | Performed by: ORTHOPAEDIC SURGERY

## 2017-07-05 PROCEDURE — 84466 ASSAY OF TRANSFERRIN: CPT | Performed by: ORTHOPAEDIC SURGERY

## 2017-07-05 PROCEDURE — 86900 BLOOD TYPING SEROLOGIC ABO: CPT | Performed by: ORTHOPAEDIC SURGERY

## 2017-07-05 PROCEDURE — 85025 COMPLETE CBC W/AUTO DIFF WBC: CPT | Performed by: ORTHOPAEDIC SURGERY

## 2017-07-05 RX ORDER — ACETAMINOPHEN 500 MG
2000 TABLET ORAL DAILY
COMMUNITY
End: 2018-03-04

## 2017-07-05 NOTE — TELEPHONE ENCOUNTER
Please let her know her vitamin D is great, so she does not need to take anymore prescription vitamin D. I would like for her to take 2000 units of over the counter vitamin D a day.

## 2017-07-05 NOTE — TELEPHONE ENCOUNTER
Pt stopped by office to check on status of refill if needed for vitamin D and stated she had vitamin D labs drawn at last visit but has not heard back regarding results. Please call to advise.

## 2017-07-05 NOTE — PERIOP NOTES
[facility]  PREOPERATIVE INSTRUCTIONS    Surgery Date:   07/24/17    Surgery arrival time given by surgeon: NO  (If [facility] staff will call you between 4pm - 8pm the day before surgery with your arrival time. If your surgery is on a Monday, we will call you the preceding Friday. Please call [phone] after 7pm if you did not receive your arrival time.)    1. Report  to the 2nd Floor Admitting Desk at the time you were told the night before surgery. Bring your insurance card, photo identification, and any copayment (if applicable). 2. You must have a responsible adult to drive you home and stay with you the first 24 hours after surgery if you are going home the same day of your surgery. 3. Nothing to eat or drink after midnight the night before surgery. This means NO water, gum, mints, coffee, juice, etc.    4. No alcoholic beverages 24 hours before and after your surgery. 5. If you are being admitted to the hospital,please leave personal belongings/luggage in your car until you have an assigned hospital room number. 6. The hospital discharge time is 12pm NOON. Your adult  should be here prior to the 12pm NOON discharge time. 7. NO Aspirin and/or any non-steroidal anti-inflammatory drugs (i.e. Ibuprofen, Naproxen, Advil, Aleve) as directed by your surgeon. You may take Tylenol. Stop herbal supplements 1 week prior to  surgery. 8. If you are currently taking Plavix, Coumadin,or any other blood-thinning/ anticoagulant medication contact your surgeon for instructions. 9. Wear comfortable clothes. Wear your glasses instead of contacts. Please leave all money, jewelry and valuables at home. No make up, particularly mascara or finger nail polish, the day of surgery. 10.  REMOVE ALL body piercings, rings,and jewelry and leave at home. Wear your hair loose or down.; no pony-tails, buns, or any metal hair clips.    11. If you shower the morning of surgery, please do not apply any lotions, powders, or deodorants afterwards. Do not shave any body area within 24 hours of your surgery. 12. Please follow all instructions to avoid any potential surgical cancellation. 13. Should your physical condition change, (i.e. fever, cold, flu, etc.) please notify your surgeon as soon as possible. 14. It is important to be on time. If a situation occurs where you may be delayed, please call:  [phone] / [phone] on the day of surgery. 15. The Preadmission Testing staff can be reached at [phone]. MEDICATIONS TO TAKE THE MORNING OF SURGERY WITH A SIP OF WATER:  NONE  Special instructions:  Use Chlorhexidine Care Fusion wash and sponges 3 days prior to surgery as instructed. Incentive spirometer given with instructions to practice at home and bring back to the hospital on the day of surgery. [facility] will contact you if your Hemoglobin A1C is greater than 7.5. Ensure/Glucerna  sample, nutritional information, and Ensure/Glucerna coupon given. Pain pamphlet and Call Don't Fall reminder reviewed with patient.  parking is complimentary Monday - Friday 7 am - 5 pm  Bring PTA Medication list day of surgery with the last doses taken documented   16. Do not bring medication bottles the day of surgery    The patient and spouse was contacted  in person. She  verbalize  understanding of all instructions does not  need reinforcement.
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
## 2017-07-05 NOTE — PROGRESS NOTES
NN Follow-Up          Follow up call placed to patient. Patient discharged from [facility] on 6/19/17 for right knee pain. Panel Manager contacted the patient by telephone to perform post ED discharge follow up. No answer, message left requesting return call. Patient has followed up with Dr. Dirk Gonzalez.

## 2017-07-06 ENCOUNTER — DOCUMENTATION ONLY (OUTPATIENT)
Dept: FAMILY MEDICINE CLINIC | Age: 47
End: 2017-07-06

## 2017-07-06 LAB
BACTERIA SPEC CULT: NORMAL
BACTERIA SPEC CULT: NORMAL
SERVICE CMNT-IMP: NORMAL

## 2017-07-06 NOTE — PROGRESS NOTES
Problem: Patient Education: Go to Patient Education Activity  Goal: Patient/Family Education  Outcome: Progressing Towards Goal  The patient attended the pre-operative joint replacement class. The content of the class, using a power-point presentation as well as visual demonstration was specific for patients undergoing total knee and total hip replacements. A pre-operative education booklet was provided to all patients. At the conclusion of class an opportunity was offered for any question or concerns the patient or family may have regarding their upcoming scheduled procedure. Patient and  attended class on 7/5/17, pt scheduled for bilat knee procedure. Specific education provided.

## 2017-07-07 LAB — TRANSFERRIN SERPL-MCNC: 281 MG/DL (ref 200–370)

## 2017-07-19 ENCOUNTER — PATIENT OUTREACH (OUTPATIENT)
Dept: FAMILY MEDICINE CLINIC | Age: 47
End: 2017-07-19

## 2017-07-19 NOTE — PROGRESS NOTES
Panel Manager will resolve 6/19/17 GITA. Patient has been contacted and has followed up with PCP. No sign that patient has return to ED/Hospital in the last 30 days.

## 2017-07-21 ENCOUNTER — ANESTHESIA EVENT (OUTPATIENT)
Dept: SURGERY | Age: 47
DRG: 302 | End: 2017-07-21
Payer: MEDICAID

## 2017-07-23 RX ORDER — ENOXAPARIN SODIUM 100 MG/ML
40 INJECTION SUBCUTANEOUS DAILY
Status: CANCELLED | OUTPATIENT
Start: 2017-07-24

## 2017-07-24 ENCOUNTER — HOSPITAL ENCOUNTER (INPATIENT)
Age: 47
LOS: 3 days | Discharge: NURSING, REHAB OR LONG-TERM CARE | DRG: 302 | End: 2017-07-27
Attending: ORTHOPAEDIC SURGERY | Admitting: ORTHOPAEDIC SURGERY
Payer: MEDICAID

## 2017-07-24 ENCOUNTER — APPOINTMENT (OUTPATIENT)
Dept: GENERAL RADIOLOGY | Age: 47
DRG: 302 | End: 2017-07-24
Attending: PHYSICIAN ASSISTANT
Payer: MEDICAID

## 2017-07-24 ENCOUNTER — ANESTHESIA (OUTPATIENT)
Dept: SURGERY | Age: 47
DRG: 302 | End: 2017-07-24
Payer: MEDICAID

## 2017-07-24 PROBLEM — M17.0 PRIMARY LOCALIZED OSTEOARTHRITIS OF KNEES, BILATERAL: Status: ACTIVE | Noted: 2017-07-24

## 2017-07-24 LAB
ABO + RH BLD: NORMAL
BLOOD GROUP ANTIBODIES SERPL: NORMAL
HCG UR QL: NEGATIVE
INR BLD: 1 (ref 0.9–1.2)
SPECIMEN EXP DATE BLD: NORMAL

## 2017-07-24 PROCEDURE — 77030031139 HC SUT VCRL2 J&J -A: Performed by: ORTHOPAEDIC SURGERY

## 2017-07-24 PROCEDURE — 77030033067 HC SUT PDO STRATFX SPIR J&J -B: Performed by: ORTHOPAEDIC SURGERY

## 2017-07-24 PROCEDURE — 81025 URINE PREGNANCY TEST: CPT

## 2017-07-24 PROCEDURE — 74011250636 HC RX REV CODE- 250/636: Performed by: ANESTHESIOLOGY

## 2017-07-24 PROCEDURE — 74011250636 HC RX REV CODE- 250/636

## 2017-07-24 PROCEDURE — 73560 X-RAY EXAM OF KNEE 1 OR 2: CPT

## 2017-07-24 PROCEDURE — 74011250636 HC RX REV CODE- 250/636: Performed by: ORTHOPAEDIC SURGERY

## 2017-07-24 PROCEDURE — 77030000032 HC CUF TRNQT ZIMM -B: Performed by: ORTHOPAEDIC SURGERY

## 2017-07-24 PROCEDURE — 77030020782 HC GWN BAIR PAWS FLX 3M -B

## 2017-07-24 PROCEDURE — 74011000272 HC RX REV CODE- 272: Performed by: ORTHOPAEDIC SURGERY

## 2017-07-24 PROCEDURE — 77030010507 HC ADH SKN DERMBND J&J -B: Performed by: ORTHOPAEDIC SURGERY

## 2017-07-24 PROCEDURE — 77030028907 HC WRP KNEE WO BGS SOLM -B

## 2017-07-24 PROCEDURE — 76060000067 HC AMB SURG ANES 3.5 TO 4 HR: Performed by: ORTHOPAEDIC SURGERY

## 2017-07-24 PROCEDURE — 74011250636 HC RX REV CODE- 250/636: Performed by: PHYSICIAN ASSISTANT

## 2017-07-24 PROCEDURE — 0SRD0J9 REPLACEMENT OF LEFT KNEE JOINT WITH SYNTHETIC SUBSTITUTE, CEMENTED, OPEN APPROACH: ICD-10-PCS | Performed by: ORTHOPAEDIC SURGERY

## 2017-07-24 PROCEDURE — 77030018822 HC SLV COMPR FT COVD -B

## 2017-07-24 PROCEDURE — 51798 US URINE CAPACITY MEASURE: CPT

## 2017-07-24 PROCEDURE — 77030003601 HC NDL NRV BLK BBMI -A

## 2017-07-24 PROCEDURE — 74011000250 HC RX REV CODE- 250

## 2017-07-24 PROCEDURE — 64447 NJX AA&/STRD FEMORAL NRV IMG: CPT

## 2017-07-24 PROCEDURE — 65270000029 HC RM PRIVATE

## 2017-07-24 PROCEDURE — C1713 ANCHOR/SCREW BN/BN,TIS/BN: HCPCS | Performed by: ORTHOPAEDIC SURGERY

## 2017-07-24 PROCEDURE — 74011000250 HC RX REV CODE- 250: Performed by: ANESTHESIOLOGY

## 2017-07-24 PROCEDURE — 74011250637 HC RX REV CODE- 250/637: Performed by: ORTHOPAEDIC SURGERY

## 2017-07-24 PROCEDURE — 0SRC0J9 REPLACEMENT OF RIGHT KNEE JOINT WITH SYNTHETIC SUBSTITUTE, CEMENTED, OPEN APPROACH: ICD-10-PCS | Performed by: ORTHOPAEDIC SURGERY

## 2017-07-24 PROCEDURE — 86900 BLOOD TYPING SEROLOGIC ABO: CPT | Performed by: ORTHOPAEDIC SURGERY

## 2017-07-24 PROCEDURE — 77030007866 HC KT SPN ANES BBMI -B

## 2017-07-24 PROCEDURE — 77030011943

## 2017-07-24 PROCEDURE — 74011250637 HC RX REV CODE- 250/637: Performed by: PHYSICIAN ASSISTANT

## 2017-07-24 PROCEDURE — 77030011265 HC ELECTRD BLD HEX COVD -A: Performed by: ORTHOPAEDIC SURGERY

## 2017-07-24 PROCEDURE — 77030018883 HC BLD SAW SAG4 STRY -B: Performed by: ORTHOPAEDIC SURGERY

## 2017-07-24 PROCEDURE — C1776 JOINT DEVICE (IMPLANTABLE): HCPCS | Performed by: ORTHOPAEDIC SURGERY

## 2017-07-24 PROCEDURE — 36415 COLL VENOUS BLD VENIPUNCTURE: CPT | Performed by: ORTHOPAEDIC SURGERY

## 2017-07-24 PROCEDURE — 77030020788: Performed by: ORTHOPAEDIC SURGERY

## 2017-07-24 PROCEDURE — 77030002933 HC SUT MCRYL J&J -A: Performed by: ORTHOPAEDIC SURGERY

## 2017-07-24 PROCEDURE — 64445 NJX AA&/STRD SCIATIC NRV IMG: CPT

## 2017-07-24 PROCEDURE — 77030018836 HC SOL IRR NACL ICUM -A: Performed by: ORTHOPAEDIC SURGERY

## 2017-07-24 PROCEDURE — 85610 PROTHROMBIN TIME: CPT

## 2017-07-24 PROCEDURE — 74011000258 HC RX REV CODE- 258: Performed by: ORTHOPAEDIC SURGERY

## 2017-07-24 PROCEDURE — 76210000035 HC AMBSU PH I REC 1 TO 1.5 HR: Performed by: ORTHOPAEDIC SURGERY

## 2017-07-24 PROCEDURE — 74011000250 HC RX REV CODE- 250: Performed by: ORTHOPAEDIC SURGERY

## 2017-07-24 PROCEDURE — 74011250637 HC RX REV CODE- 250/637: Performed by: ANESTHESIOLOGY

## 2017-07-24 PROCEDURE — 3E0T3CZ INTRODUCE REGIONAL ANESTH IN PERIPH NRV, PLEXI, PERC: ICD-10-PCS | Performed by: ANESTHESIOLOGY

## 2017-07-24 PROCEDURE — 77030011640 HC PAD GRND REM COVD -A: Performed by: ORTHOPAEDIC SURGERY

## 2017-07-24 PROCEDURE — 76030000024 HC AMB SURG 3.5 TO 4 HR INTENSV-TIER 1: Performed by: ORTHOPAEDIC SURGERY

## 2017-07-24 PROCEDURE — 77030010785: Performed by: ORTHOPAEDIC SURGERY

## 2017-07-24 DEVICE — CEMENT BNE SIMPLEX W/O GENT -- PK/10 ONLY: Type: IMPLANTABLE DEVICE | Site: KNEE | Status: FUNCTIONAL

## 2017-07-24 DEVICE — COMPONENT TOT KNEE HYBRID POROUS X3 TRIATHLON: Type: IMPLANTABLE DEVICE | Status: FUNCTIONAL

## 2017-07-24 RX ORDER — FENTANYL CITRATE 50 UG/ML
INJECTION, SOLUTION INTRAMUSCULAR; INTRAVENOUS AS NEEDED
Status: DISCONTINUED | OUTPATIENT
Start: 2017-07-24 | End: 2017-07-24 | Stop reason: HOSPADM

## 2017-07-24 RX ORDER — ACETAMINOPHEN 325 MG/1
650 TABLET ORAL EVERY 6 HOURS
Status: DISCONTINUED | OUTPATIENT
Start: 2017-07-24 | End: 2017-07-24 | Stop reason: SDUPTHER

## 2017-07-24 RX ORDER — SODIUM CHLORIDE 0.9 % (FLUSH) 0.9 %
5-10 SYRINGE (ML) INJECTION AS NEEDED
Status: DISCONTINUED | OUTPATIENT
Start: 2017-07-24 | End: 2017-07-24 | Stop reason: HOSPADM

## 2017-07-24 RX ORDER — SODIUM CHLORIDE 0.9 % (FLUSH) 0.9 %
5-10 SYRINGE (ML) INJECTION EVERY 8 HOURS
Status: DISCONTINUED | OUTPATIENT
Start: 2017-07-24 | End: 2017-07-24 | Stop reason: HOSPADM

## 2017-07-24 RX ORDER — AMOXICILLIN 250 MG
1 CAPSULE ORAL 2 TIMES DAILY
Status: DISCONTINUED | OUTPATIENT
Start: 2017-07-24 | End: 2017-07-27 | Stop reason: HOSPADM

## 2017-07-24 RX ORDER — DEXAMETHASONE SODIUM PHOSPHATE 4 MG/ML
10 INJECTION, SOLUTION INTRA-ARTICULAR; INTRALESIONAL; INTRAMUSCULAR; INTRAVENOUS; SOFT TISSUE ONCE
Status: DISCONTINUED | OUTPATIENT
Start: 2017-07-24 | End: 2017-07-24 | Stop reason: HOSPADM

## 2017-07-24 RX ORDER — NALOXONE HYDROCHLORIDE 0.4 MG/ML
0.4 INJECTION, SOLUTION INTRAMUSCULAR; INTRAVENOUS; SUBCUTANEOUS AS NEEDED
Status: DISCONTINUED | OUTPATIENT
Start: 2017-07-24 | End: 2017-07-27 | Stop reason: HOSPADM

## 2017-07-24 RX ORDER — OXYCODONE HYDROCHLORIDE 5 MG/1
5 TABLET ORAL
Status: DISCONTINUED | OUTPATIENT
Start: 2017-07-24 | End: 2017-07-26

## 2017-07-24 RX ORDER — MIDAZOLAM HYDROCHLORIDE 1 MG/ML
INJECTION, SOLUTION INTRAMUSCULAR; INTRAVENOUS AS NEEDED
Status: DISCONTINUED | OUTPATIENT
Start: 2017-07-24 | End: 2017-07-24 | Stop reason: HOSPADM

## 2017-07-24 RX ORDER — OXYCODONE HYDROCHLORIDE 5 MG/1
10 TABLET ORAL
Status: DISCONTINUED | OUTPATIENT
Start: 2017-07-24 | End: 2017-07-24 | Stop reason: HOSPADM

## 2017-07-24 RX ORDER — ACETAMINOPHEN 325 MG/1
975 TABLET ORAL ONCE
Status: COMPLETED | OUTPATIENT
Start: 2017-07-24 | End: 2017-07-24

## 2017-07-24 RX ORDER — OXYCODONE AND ACETAMINOPHEN 7.5; 325 MG/1; MG/1
1-2 TABLET ORAL
Qty: 70 TAB | Refills: 0 | Status: SHIPPED | OUTPATIENT
Start: 2017-07-24 | End: 2018-03-04

## 2017-07-24 RX ORDER — ONDANSETRON 2 MG/ML
4 INJECTION INTRAMUSCULAR; INTRAVENOUS
Status: ACTIVE | OUTPATIENT
Start: 2017-07-24 | End: 2017-07-25

## 2017-07-24 RX ORDER — LEVOTHYROXINE SODIUM 50 UG/1
50 TABLET ORAL
Status: DISCONTINUED | OUTPATIENT
Start: 2017-07-25 | End: 2017-07-27 | Stop reason: HOSPADM

## 2017-07-24 RX ORDER — ROPIVACAINE HYDROCHLORIDE 2 MG/ML
INJECTION, SOLUTION EPIDURAL; INFILTRATION; PERINEURAL AS NEEDED
Status: DISCONTINUED | OUTPATIENT
Start: 2017-07-24 | End: 2017-07-24 | Stop reason: HOSPADM

## 2017-07-24 RX ORDER — KETOROLAC TROMETHAMINE 30 MG/ML
15 INJECTION, SOLUTION INTRAMUSCULAR; INTRAVENOUS
Status: DISCONTINUED | OUTPATIENT
Start: 2017-07-24 | End: 2017-07-24 | Stop reason: HOSPADM

## 2017-07-24 RX ORDER — SODIUM CHLORIDE 9 MG/ML
125 INJECTION, SOLUTION INTRAVENOUS CONTINUOUS
Status: DISPENSED | OUTPATIENT
Start: 2017-07-24 | End: 2017-07-25

## 2017-07-24 RX ORDER — ENOXAPARIN SODIUM 100 MG/ML
30 INJECTION SUBCUTANEOUS EVERY 12 HOURS
Qty: 60 SYRINGE | Refills: 0 | Status: SHIPPED | OUTPATIENT
Start: 2017-07-24 | End: 2017-07-26

## 2017-07-24 RX ORDER — ONDANSETRON 2 MG/ML
4 INJECTION INTRAMUSCULAR; INTRAVENOUS AS NEEDED
Status: DISCONTINUED | OUTPATIENT
Start: 2017-07-24 | End: 2017-07-24 | Stop reason: HOSPADM

## 2017-07-24 RX ORDER — OXYCODONE HYDROCHLORIDE 5 MG/1
5 TABLET ORAL
Qty: 60 TAB | Refills: 0 | Status: SHIPPED | OUTPATIENT
Start: 2017-07-24 | End: 2018-03-04

## 2017-07-24 RX ORDER — CEFAZOLIN SODIUM IN 0.9 % NACL 2 G/50 ML
2 INTRAVENOUS SOLUTION, PIGGYBACK (ML) INTRAVENOUS EVERY 8 HOURS
Status: DISCONTINUED | OUTPATIENT
Start: 2017-07-24 | End: 2017-07-24 | Stop reason: SDUPTHER

## 2017-07-24 RX ORDER — LIDOCAINE HYDROCHLORIDE 10 MG/ML
0.1 INJECTION, SOLUTION EPIDURAL; INFILTRATION; INTRACAUDAL; PERINEURAL AS NEEDED
Status: DISCONTINUED | OUTPATIENT
Start: 2017-07-24 | End: 2017-07-24 | Stop reason: HOSPADM

## 2017-07-24 RX ORDER — DEXAMETHASONE SODIUM PHOSPHATE 4 MG/ML
10 INJECTION, SOLUTION INTRA-ARTICULAR; INTRALESIONAL; INTRAMUSCULAR; INTRAVENOUS; SOFT TISSUE ONCE
Status: COMPLETED | OUTPATIENT
Start: 2017-07-24 | End: 2017-07-24

## 2017-07-24 RX ORDER — PROPOFOL 10 MG/ML
INJECTION, EMULSION INTRAVENOUS
Status: DISCONTINUED | OUTPATIENT
Start: 2017-07-24 | End: 2017-07-24 | Stop reason: HOSPADM

## 2017-07-24 RX ORDER — ENOXAPARIN SODIUM 100 MG/ML
30 INJECTION SUBCUTANEOUS EVERY 12 HOURS
Status: DISCONTINUED | OUTPATIENT
Start: 2017-07-24 | End: 2017-07-26

## 2017-07-24 RX ORDER — CELECOXIB 100 MG/1
200 CAPSULE ORAL EVERY 12 HOURS
Status: DISCONTINUED | OUTPATIENT
Start: 2017-07-24 | End: 2017-07-27 | Stop reason: HOSPADM

## 2017-07-24 RX ORDER — FENTANYL CITRATE 50 UG/ML
25 INJECTION, SOLUTION INTRAMUSCULAR; INTRAVENOUS
Status: DISCONTINUED | OUTPATIENT
Start: 2017-07-24 | End: 2017-07-24 | Stop reason: HOSPADM

## 2017-07-24 RX ORDER — SODIUM CHLORIDE, SODIUM LACTATE, POTASSIUM CHLORIDE, CALCIUM CHLORIDE 600; 310; 30; 20 MG/100ML; MG/100ML; MG/100ML; MG/100ML
125 INJECTION, SOLUTION INTRAVENOUS CONTINUOUS
Status: DISCONTINUED | OUTPATIENT
Start: 2017-07-24 | End: 2017-07-24 | Stop reason: HOSPADM

## 2017-07-24 RX ORDER — ACETAMINOPHEN 325 MG/1
650 TABLET ORAL EVERY 6 HOURS
Status: DISCONTINUED | OUTPATIENT
Start: 2017-07-24 | End: 2017-07-27 | Stop reason: HOSPADM

## 2017-07-24 RX ORDER — CEFAZOLIN SODIUM IN 0.9 % NACL 2 G/50 ML
2 INTRAVENOUS SOLUTION, PIGGYBACK (ML) INTRAVENOUS EVERY 8 HOURS
Status: COMPLETED | OUTPATIENT
Start: 2017-07-24 | End: 2017-07-25

## 2017-07-24 RX ORDER — FACIAL-BODY WIPES
10 EACH TOPICAL DAILY PRN
Status: DISCONTINUED | OUTPATIENT
Start: 2017-07-25 | End: 2017-07-27 | Stop reason: HOSPADM

## 2017-07-24 RX ORDER — LIDOCAINE HYDROCHLORIDE 20 MG/ML
INJECTION, SOLUTION EPIDURAL; INFILTRATION; INTRACAUDAL; PERINEURAL AS NEEDED
Status: DISCONTINUED | OUTPATIENT
Start: 2017-07-24 | End: 2017-07-24 | Stop reason: HOSPADM

## 2017-07-24 RX ORDER — ONDANSETRON 8 MG/1
4 TABLET, ORALLY DISINTEGRATING ORAL
Qty: 30 TAB | Refills: 0 | Status: SHIPPED | OUTPATIENT
Start: 2017-07-24 | End: 2018-03-04

## 2017-07-24 RX ORDER — TRAMADOL HYDROCHLORIDE 50 MG/1
50 TABLET ORAL
Status: DISCONTINUED | OUTPATIENT
Start: 2017-07-24 | End: 2017-07-25

## 2017-07-24 RX ORDER — OXYCODONE HCL 20 MG/1
20 TABLET, FILM COATED, EXTENDED RELEASE ORAL EVERY 12 HOURS
Status: COMPLETED | OUTPATIENT
Start: 2017-07-24 | End: 2017-07-25

## 2017-07-24 RX ORDER — PROPOFOL 10 MG/ML
INJECTION, EMULSION INTRAVENOUS AS NEEDED
Status: DISCONTINUED | OUTPATIENT
Start: 2017-07-24 | End: 2017-07-24 | Stop reason: HOSPADM

## 2017-07-24 RX ORDER — ONDANSETRON 2 MG/ML
INJECTION INTRAMUSCULAR; INTRAVENOUS AS NEEDED
Status: DISCONTINUED | OUTPATIENT
Start: 2017-07-24 | End: 2017-07-24 | Stop reason: HOSPADM

## 2017-07-24 RX ORDER — PREGABALIN 75 MG/1
75 CAPSULE ORAL ONCE
Status: COMPLETED | OUTPATIENT
Start: 2017-07-24 | End: 2017-07-24

## 2017-07-24 RX ORDER — ENOXAPARIN SODIUM 100 MG/ML
140 INJECTION SUBCUTANEOUS DAILY
Status: ON HOLD | COMMUNITY
Start: 2017-07-21 | End: 2017-07-26

## 2017-07-24 RX ORDER — POLYETHYLENE GLYCOL 3350 17 G/17G
17 POWDER, FOR SOLUTION ORAL DAILY
Status: DISCONTINUED | OUTPATIENT
Start: 2017-07-25 | End: 2017-07-27 | Stop reason: HOSPADM

## 2017-07-24 RX ORDER — HYDROMORPHONE HYDROCHLORIDE 1 MG/ML
.5-1 INJECTION, SOLUTION INTRAMUSCULAR; INTRAVENOUS; SUBCUTANEOUS
Status: DISCONTINUED | OUTPATIENT
Start: 2017-07-24 | End: 2017-07-24 | Stop reason: HOSPADM

## 2017-07-24 RX ORDER — ACETAMINOPHEN 325 MG/1
650 TABLET ORAL
Status: DISCONTINUED | OUTPATIENT
Start: 2017-07-24 | End: 2017-07-27 | Stop reason: HOSPADM

## 2017-07-24 RX ORDER — SODIUM CHLORIDE 0.9 % (FLUSH) 0.9 %
5-10 SYRINGE (ML) INJECTION AS NEEDED
Status: DISCONTINUED | OUTPATIENT
Start: 2017-07-24 | End: 2017-07-27 | Stop reason: HOSPADM

## 2017-07-24 RX ORDER — DIPHENHYDRAMINE HYDROCHLORIDE 50 MG/ML
12.5 INJECTION, SOLUTION INTRAMUSCULAR; INTRAVENOUS
Status: ACTIVE | OUTPATIENT
Start: 2017-07-24 | End: 2017-07-25

## 2017-07-24 RX ORDER — OXYCODONE HYDROCHLORIDE 5 MG/1
10 TABLET ORAL
Status: DISCONTINUED | OUTPATIENT
Start: 2017-07-24 | End: 2017-07-26

## 2017-07-24 RX ORDER — FACIAL-BODY WIPES
10 EACH TOPICAL DAILY
Qty: 2 SUPPOSITORY | Refills: 0 | Status: SHIPPED | OUTPATIENT
Start: 2017-07-24 | End: 2018-03-04

## 2017-07-24 RX ORDER — HYDROMORPHONE HYDROCHLORIDE 1 MG/ML
0.5 INJECTION, SOLUTION INTRAMUSCULAR; INTRAVENOUS; SUBCUTANEOUS
Status: ACTIVE | OUTPATIENT
Start: 2017-07-24 | End: 2017-07-25

## 2017-07-24 RX ORDER — SODIUM CHLORIDE 0.9 % (FLUSH) 0.9 %
5-10 SYRINGE (ML) INJECTION EVERY 8 HOURS
Status: DISCONTINUED | OUTPATIENT
Start: 2017-07-24 | End: 2017-07-27 | Stop reason: HOSPADM

## 2017-07-24 RX ORDER — BUPIVACAINE HYDROCHLORIDE 7.5 MG/ML
INJECTION, SOLUTION EPIDURAL; RETROBULBAR AS NEEDED
Status: DISCONTINUED | OUTPATIENT
Start: 2017-07-24 | End: 2017-07-24 | Stop reason: HOSPADM

## 2017-07-24 RX ORDER — TRAMADOL HYDROCHLORIDE 50 MG/1
50 TABLET ORAL
Qty: 60 TAB | Refills: 0 | Status: SHIPPED | OUTPATIENT
Start: 2017-07-24 | End: 2018-03-04

## 2017-07-24 RX ORDER — CEFAZOLIN SODIUM IN 0.9 % NACL 2 G/50 ML
2 INTRAVENOUS SOLUTION, PIGGYBACK (ML) INTRAVENOUS ONCE
Status: COMPLETED | OUTPATIENT
Start: 2017-07-24 | End: 2017-07-24

## 2017-07-24 RX ADMIN — CELECOXIB 200 MG: 100 CAPSULE ORAL at 09:32

## 2017-07-24 RX ADMIN — BUPIVACAINE HYDROCHLORIDE 2.6 ML: 7.5 INJECTION, SOLUTION EPIDURAL; RETROBULBAR at 10:12

## 2017-07-24 RX ADMIN — CELECOXIB 200 MG: 100 CAPSULE ORAL at 20:48

## 2017-07-24 RX ADMIN — OXYCODONE HYDROCHLORIDE 10 MG: 5 TABLET ORAL at 21:03

## 2017-07-24 RX ADMIN — Medication 10 ML: at 20:49

## 2017-07-24 RX ADMIN — PROPOFOL 50 MCG/KG/MIN: 10 INJECTION, EMULSION INTRAVENOUS at 10:30

## 2017-07-24 RX ADMIN — CEFAZOLIN 2 G: 1 INJECTION, POWDER, FOR SOLUTION INTRAMUSCULAR; INTRAVENOUS; PARENTERAL at 17:48

## 2017-07-24 RX ADMIN — SODIUM CHLORIDE, SODIUM LACTATE, POTASSIUM CHLORIDE, AND CALCIUM CHLORIDE: 600; 310; 30; 20 INJECTION, SOLUTION INTRAVENOUS at 12:30

## 2017-07-24 RX ADMIN — MIDAZOLAM HYDROCHLORIDE 1 MG: 1 INJECTION, SOLUTION INTRAMUSCULAR; INTRAVENOUS at 10:09

## 2017-07-24 RX ADMIN — FENTANYL CITRATE 50 MCG: 50 INJECTION, SOLUTION INTRAMUSCULAR; INTRAVENOUS at 10:11

## 2017-07-24 RX ADMIN — PREGABALIN 75 MG: 75 CAPSULE ORAL at 09:31

## 2017-07-24 RX ADMIN — PROPOFOL 30 MG: 10 INJECTION, EMULSION INTRAVENOUS at 11:45

## 2017-07-24 RX ADMIN — ENOXAPARIN SODIUM 30 MG: 30 INJECTION SUBCUTANEOUS at 20:49

## 2017-07-24 RX ADMIN — CEFAZOLIN 2 G: 1 INJECTION, POWDER, FOR SOLUTION INTRAMUSCULAR; INTRAVENOUS; PARENTERAL at 10:37

## 2017-07-24 RX ADMIN — SODIUM CHLORIDE, SODIUM LACTATE, POTASSIUM CHLORIDE, AND CALCIUM CHLORIDE 125 ML/HR: 600; 310; 30; 20 INJECTION, SOLUTION INTRAVENOUS at 09:39

## 2017-07-24 RX ADMIN — LIDOCAINE HYDROCHLORIDE 40 MG: 20 INJECTION, SOLUTION EPIDURAL; INFILTRATION; INTRACAUDAL; PERINEURAL at 10:31

## 2017-07-24 RX ADMIN — FENTANYL CITRATE 50 MCG: 50 INJECTION, SOLUTION INTRAMUSCULAR; INTRAVENOUS at 09:40

## 2017-07-24 RX ADMIN — OXYCODONE HYDROCHLORIDE 20 MG: 20 TABLET, FILM COATED, EXTENDED RELEASE ORAL at 09:32

## 2017-07-24 RX ADMIN — ONDANSETRON 4 MG: 2 INJECTION INTRAMUSCULAR; INTRAVENOUS at 13:22

## 2017-07-24 RX ADMIN — FENTANYL CITRATE 50 MCG: 50 INJECTION, SOLUTION INTRAMUSCULAR; INTRAVENOUS at 09:42

## 2017-07-24 RX ADMIN — ROPIVACAINE HYDROCHLORIDE 20 ML: 2 INJECTION, SOLUTION EPIDURAL; INFILTRATION; PERINEURAL at 09:51

## 2017-07-24 RX ADMIN — MIDAZOLAM HYDROCHLORIDE 2 MG: 1 INJECTION, SOLUTION INTRAMUSCULAR; INTRAVENOUS at 09:40

## 2017-07-24 RX ADMIN — OXYCODONE HYDROCHLORIDE 10 MG: 5 TABLET ORAL at 17:56

## 2017-07-24 RX ADMIN — DOCUSATE SODIUM -SENNOSIDES 1 TABLET: 50; 8.6 TABLET, COATED ORAL at 17:48

## 2017-07-24 RX ADMIN — ROPIVACAINE HYDROCHLORIDE 30 ML: 2 INJECTION, SOLUTION EPIDURAL; INFILTRATION; PERINEURAL at 09:55

## 2017-07-24 RX ADMIN — LIDOCAINE HYDROCHLORIDE 0.1 ML: 10 INJECTION, SOLUTION EPIDURAL; INFILTRATION; INTRACAUDAL; PERINEURAL at 09:40

## 2017-07-24 RX ADMIN — MIDAZOLAM HYDROCHLORIDE 1 MG: 1 INJECTION, SOLUTION INTRAMUSCULAR; INTRAVENOUS at 09:42

## 2017-07-24 RX ADMIN — ACETAMINOPHEN 650 MG: 325 TABLET ORAL at 23:20

## 2017-07-24 RX ADMIN — SODIUM CHLORIDE 125 ML/HR: 900 INJECTION, SOLUTION INTRAVENOUS at 17:47

## 2017-07-24 RX ADMIN — ROPIVACAINE HYDROCHLORIDE 20 ML: 2 INJECTION, SOLUTION EPIDURAL; INFILTRATION; PERINEURAL at 10:00

## 2017-07-24 RX ADMIN — ROPIVACAINE HYDROCHLORIDE 30 ML: 2 INJECTION, SOLUTION EPIDURAL; INFILTRATION; PERINEURAL at 09:47

## 2017-07-24 RX ADMIN — PROPOFOL 10 MG: 10 INJECTION, EMULSION INTRAVENOUS at 10:34

## 2017-07-24 RX ADMIN — PROPOFOL 20 MG: 10 INJECTION, EMULSION INTRAVENOUS at 10:31

## 2017-07-24 RX ADMIN — MIDAZOLAM HYDROCHLORIDE 1 MG: 1 INJECTION, SOLUTION INTRAMUSCULAR; INTRAVENOUS at 09:53

## 2017-07-24 RX ADMIN — SODIUM CHLORIDE, SODIUM LACTATE, POTASSIUM CHLORIDE, AND CALCIUM CHLORIDE: 600; 310; 30; 20 INJECTION, SOLUTION INTRAVENOUS at 10:49

## 2017-07-24 RX ADMIN — ACETAMINOPHEN 975 MG: 325 TABLET ORAL at 09:31

## 2017-07-24 RX ADMIN — OXYCODONE HYDROCHLORIDE 20 MG: 20 TABLET, FILM COATED, EXTENDED RELEASE ORAL at 20:48

## 2017-07-24 RX ADMIN — ACETAMINOPHEN 650 MG: 325 TABLET ORAL at 17:48

## 2017-07-24 RX ADMIN — DEXAMETHASONE SODIUM PHOSPHATE 10 MG: 4 INJECTION, SOLUTION INTRAMUSCULAR; INTRAVENOUS at 10:22

## 2017-07-24 RX ADMIN — PROPOFOL 20 MG: 10 INJECTION, EMULSION INTRAVENOUS at 12:07

## 2017-07-24 NOTE — ANESTHESIA PROCEDURE NOTES
Peripheral Block    Start time: 7/24/2017 9:40 AM  End time: 7/24/2017 10:00 AM  Performed by: Gurjit Seo  Authorized by: Gurjit Seo       Pre-procedure: Indications: at surgeon's request and post-op pain management    Preanesthetic Checklist: patient identified, risks and benefits discussed, site marked, timeout performed, anesthesia consent given and patient being monitored    Timeout Time: 09:40          Block Type:   Block Type:  Popliteal and femoral single shot  Laterality:  Left and right  Monitoring:  Continuous pulse ox, frequent vital sign checks, heart rate and responsive to questions  Injection Technique:  Single shot  Procedures: ultrasound guided and nerve stimulator    Patient Position: supine  Prep: chlorhexidine    Location:  Lower thigh  Needle Type:  Stimuplex  Needle Gauge:  22 G  Needle Localization:  Nerve stimulator and ultrasound guidance  Medication Injected:  0.2%  ropivacaine  Volume (mL):  30    Assessment:  Number of attempts:  1  Injection Assessment:  Incremental injection every 5 mL, local visualized surrounding nerve on ultrasound, negative aspiration for blood, no paresthesia and no intravascular symptoms  Patient tolerance:  Patient tolerated the procedure well with no immediate complications  Popliteal block done under ultrasound guidance and nerve stimulation with incremental injection of Ropivacaine 0.2% 20 cc using a 21 G Stimuplex needle.

## 2017-07-24 NOTE — PERIOP NOTES
Preop:  Patient states that she tripped/fell 4 weeks ago, seen in ER for R knee pain bruising. States that she could \"feel her vein\" R medial calf/shin area, saw Hematologist who felt that she looked find for surgery, but suggested that Dr. Moy Evangelista be informed. Since then, \"vein has gone down\", shadow of a bruise noted, non tender, good perfusion/sensation RLE. Update to Dr. Moy Evangelista and to Dr. Ned Knapp, Anesthesiology.
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
## 2017-07-24 NOTE — OP NOTES
OPERATIVE REPORT     Admit Date: 7/24/2017  Admit Diagnosis: BILATERAL KNEE ARTHRITIS  Primary localized osteoarthritis of knees, bilateral  Date of Procedure: 7/24/2017   Preoperative Diagnosis: BILATERAL KNEE ARTHRITIS  Postoperative Diagnosis: BILATERAL KNEE ARTHRITIS    Procedure: Procedure(s):  BILATERAL TOTAL KNEE ARTHROPLASTY  (SPINAL FEMORAL SCIATIC)  Surgeon: Olga Washington MD  Assistant(s): Gareth Aguilar PA-C  Anesthesia: Spinal   Estimated Blood Loss:300cc  Specimens:   ID Type Source Tests Collected by Time Destination   1 : Bone and tissue from right and left total knee arthroplasty Knee Knee   Olga Washington MD 7/24/2017 1146 Discarded      Complications: None           INDICATIONS:   The patient is a 56 y.o., female who has complained of a long history of bilateral knee pain. The patient  has failed conservative treatment and presents for definitive operative care. Informed consent obtained including a discussion of the risks and benefits, which include, but are not limited to, bleeding, infection, neurovascular damage, wound complications, pain and stiffness in the knee, periprosthetic loosening, fracture dislocation and DVT, the patient consented for the procedure. DESCRIPTION OF PROCEDURE:   The proper limb was identified and signed in the preoperative holding area. All questions were answered. After adequate anesthesia, the left lower extremity was prepped and draped in sterile fashion. The patient was positioned supine on the operating room table. A mid-line parapatellar incision was used along with medial arthrotomy. Soft tissue were removed from the patellar fat pad and notch. The MCL was mobilized and the tibia subluxed. Using the tibial cutting guide set perpendicular to the mechanical axis and 0-3 degrees of slope the tibial cut was performed. The baseplate was sized with the appropriate rotation. The drill then keel were used through the guide for tibial preparation.  Residual osteophytes were removed. The femoral entry hole was drilled and the distal cutting block secured with 6 degrees of valgus. Soft tissues were protected and the distal cut was performed. A posterior referencing guide was used to establish external rotation based on the extension and flexion gaps. The 4-in-1 cutting guide was applied in the appropriate external rotation and sized appropriately. The posterior, champfer and anterior cuts were performed. Excess bone was removed and a lamina  was used to remove any posterior osteophytes from the medial and lateral recess. The posterior capsule and cut surfaces were injected with Marcaine 0.5%. The notch guide was applied and the notch was cut. Trials were placed and varus / valgus stability along with flexion and extension were evaluated. Finally, the patella was cut, peg holes drilled. Final trialing was performed with stable ligamentous exam, full extension / flexion and normal patellar tracking. Trial components were removed and the bone was copiously irrigated. A bone plug was fashioned and inserted in the entry reamer hole. The final implants were placed in the order or femur, cemented tibia and patella. The incision was irrigated and all excess debri removed from the knee. Cement was meticulously removed from around the tibia. The capsulotomy was closed w/ #2 Quill suture in a running fashion. The sub-cutaneous tissue was closed with a 0-Vicryl, 2-0 Vicryl and Staples. A sterile dressing was applied. The same procedure was performed on the right side. There were variables in the cuting guides and implants as noted below. The patient was awoken from anesthesia and taken to the recovery room in a stable condiition. OPERATIVE FINDINGS : Severe OA of L>R knees, Large osteophyte of the left knee    IMPLANTS :     Implant Name Type Inv.  Item Serial No.  Lot No. LRB No. Used Action   Triathlon x3 tibial bearing insert-ps   N/A [employer] [id number] Left 1 Implanted   Triathlon posterior stabilized femoral   N/A [employer] [id number] Left 1 Implanted   Triathlon total knee universal tibial baseplate   N/A [employer] [id number] Left 1 Implanted   Triathlon tritanium asymmetric patella    N/A [employer] [id number] Left 1 Implanted   CEMENT BNE SIMPLEX W/O GENT -- PK/10 ONLY - SN/A  CEMENT BNE SIMPLEX W/O GENT -- PK/10 ONLY N/A [employer] HOW [id number] Left 1 Implanted   CEMENT BNE SIMPLEX W/O GENT -- PK/10 ONLY - SN/A  CEMENT BNE SIMPLEX W/O GENT -- PK/10 ONLY N/A [employer] HOW [id number] Right 1 Implanted   Triathlon total knee universal tibial baseplate   N/A [employer] [id number] Right 1 Implanted   Triathlon posterior stabilized femoral   N/A [employer] [id number] Right 1 Implanted   Triathlon x3 tibial bearing insert-ps   N/A [employer] [id number] Right 1 Implanted   Triathlon tritanium asymmetric patella     N/A [employer] [id number] Right 1 Implanted       JUSTIFICATION FOR SURGICAL ASSISTANT:   Surgical Assistant, was requried and necessary in this case, to help with soft tissue retraction, extremity positioning, equiment management, implant management, and wound closure.     Meom Reyes MD

## 2017-07-24 NOTE — ANESTHESIA POSTPROCEDURE EVALUATION
Post-Anesthesia Evaluation and Assessment    Patient: Lyndon Aden MRN: [id number]  SSN: [id number]    YOB: 1970  Age: 56 y.o. Sex: female       Cardiovascular Function/Vital Signs  Visit Vitals    /62    Pulse 87    Temp 36.8 °C (98.3 °F)    Resp 13    Ht 5' 2\" (1.575 m)    Wt 89.7 kg (197 lb 12 oz)    SpO2 99%    BMI 36.17 kg/m2       Patient is status post spinal, regional anesthesia for Procedure(s):  BILATERAL TOTAL KNEE ARTHROPLASTY  (SPINAL FEMORAL SCIATIC). Nausea/Vomiting: None    Postoperative hydration reviewed and adequate. Pain:  Pain Scale 1: Numeric (0 - 10) (07/24/17 1445)  Pain Intensity 1: 0 (07/24/17 1445)   Managed    Neurological Status:   Neuro (WDL): Exceptions to WDL (07/24/17 1409)  Neuro  Neurologic State: Drowsy; Eyes open to stimulus (07/24/17 1409)  LUE Motor Response: Purposeful (07/24/17 1409)  LLE Motor Response: Pharmacologically paralyzed (07/24/17 1409)  RUE Motor Response: Purposeful (07/24/17 1409)  RLE Motor Response: Pharmacologically paralyzed (07/24/17 1409)   At baseline    Mental Status and Level of Consciousness: Arousable    Pulmonary Status:   O2 Device: Room air (07/24/17 1443)   Adequate oxygenation and airway patent    Complications related to anesthesia: None    Post-anesthesia assessment completed.  No concerns    Signed By: Sebastián Carnes MD     July 24, 2017

## 2017-07-24 NOTE — PERIOP NOTES
1507  Pt awake, VSS. Pt on bed, HOB 30. Pt off monitors for transport to room 424. Family informed. All belongings sent. TRANSFER - OUT REPORT:    Verbal report given to Darío Perez RN on Leobardo Pete  being transferred to  [phone] for routine post - op       Report consisted of patients Situation, Background, Assessment and   Recommendations(SBAR). Information from the following report(s) SBAR, OR Summary, Procedure Summary and MAR was reviewed with the receiving nurse. Lines:   Peripheral IV 07/24/17 Left Hand (Active)   Site Assessment Clean, dry, & intact 7/24/2017  2:06 PM   Phlebitis Assessment 0 7/24/2017  2:06 PM   Infiltration Assessment 0 7/24/2017  2:06 PM   Dressing Status Clean, dry, & intact 7/24/2017  2:06 PM   Dressing Type Transparent 7/24/2017  2:06 PM   Hub Color/Line Status Pink; Infusing 7/24/2017  2:06 PM        Opportunity for questions and clarification was provided.       Patient transported with:   Registered Nurse

## 2017-07-24 NOTE — PROGRESS NOTES
Physical Therapy:  Orders received and chart reviewed. Attempted to see patient at bedside. Patient currently receiving nursing care at bedside (straight cath) and unable to be seen. Patient with absent motor to bilateral LE. We will continue to follow and re-attempt later as able.   Thank you  Leanne Sagastume PT,DPT,NCS

## 2017-07-24 NOTE — IP AVS SNAPSHOT
Charla Cornell 
 
 
 [address] [facility] 
[phone] Patient: Pattie Aiken MRN: [id number] OPZ:[id number] You are allergic to the following Allergen Reactions Imitrex (Sumatriptan) Other (comments) Face flushed- felt like \" needles in face\" Reglan (Metoclopramide) Nausea Only Other (comments) Cramping \"everywhere\" Recent Documentation Height Weight Breastfeeding? BMI OB Status Smoking Status 1.575 m 93.7 kg No 37.77 kg/m2 Having regular periods Never Smoker Emergency Contacts Name Discharge Info Relation Home Work Mobile DEVAUGHN Chiang DISCHARGE CAREGIVER [3] Spouse [3] [phone] About your hospitalization You were admitted on:  July 24, 2017 You last received care in the:  [facility] 4M POST SURG ORT 2 You were discharged on:  July 27, 2017 Unit phone number:  [phone] Why you were hospitalized Your primary diagnosis was:  Not on File Your diagnoses also included:  Primary Localized Osteoarthritis Of Knees, Bilateral  
  
  
 
  
  
Providers Seen During Your Hospitalizations Provider Role Specialty Primary office phone Boom Rivera MD Attending Provider Orthopedic Surgery [phone] Your Primary Care Physician (PCP) Primary Care Physician Office Phone Office Fax Cosyforyou [phone] Follow-up Information Follow up With Details Comments Contact Info Zulema Victor MD   [address] [address] [facility] 
[phone] Current Discharge Medication List  
  
START taking these medications Dose & Instructions Dispensing Information Comments Morning Noon Evening Bedtime  
 bisacodyl 10 mg suppository Commonly known as:  DULCOLAX (BISACODYL) Your last dose was: Your next dose is:    
   
   
 Dose:  10 mg Insert 10 mg into rectum daily. Quantity:  2 Suppository Refills:  0 HYDROmorphone 2 mg tablet Commonly known as:  DILAUDID Your last dose was: Your next dose is:    
   
   
 Dose:  2-4 mg Take 1-2 Tabs by mouth every four (4) hours as needed for Pain. Max Daily Amount: 24 mg. Quantity:  80 Tab Refills:  0  
     
   
   
   
  
 ondansetron 8 mg disintegrating tablet Commonly known as:  ZOFRAN ODT Your last dose was: Your next dose is:    
   
   
 Dose:  4 mg Take 0.5 Tabs by mouth every eight (8) hours as needed for Nausea. Quantity:  30 Tab Refills:  0  
     
   
   
   
  
 oxyCODONE IR 5 mg immediate release tablet Commonly known as:  Kena Weston Your last dose was: Your next dose is:    
   
   
 Dose:  5 mg Take 1 Tab by mouth every eight (8) hours as needed for Pain. Max Daily Amount: 15 mg. Quantity:  60 Tab Refills:  0  
     
   
   
   
  
 oxyCODONE-acetaminophen 7.5-325 mg per tablet Commonly known as:  PERCOCET 7.5 Your last dose was: Your next dose is:    
   
   
 Dose:  1-2 Tab Take 1-2 Tabs by mouth every four (4) hours as needed for Pain. Max Daily Amount: 12 Tabs. For Post-op Pain Control Quantity:  70 Tab Refills:  0  
     
   
   
   
  
 traMADol 50 mg tablet Commonly known as:  ULTRAM  
   
Your last dose was: Your next dose is:    
   
   
 Dose:  50 mg Take 1 Tab by mouth every six (6) hours as needed for Pain. Max Daily Amount: 200 mg. Quantity:  60 Tab Refills:  0 CONTINUE these medications which have CHANGED Dose & Instructions Dispensing Information Comments Morning Noon Evening Bedtime  
 enoxaparin 100 mg/mL Commonly known as:  LOVENOX What changed:   
- how much to take - when to take this Your last dose was: Your next dose is:    
   
   
 Dose:  60 mg  
60 mg by SubCUTAneous route every twelve (12) hours. Quantity:  1 Syringe Refills:  0  
     
   
   
   
  
 warfarin 2 mg tablet Commonly known as:  COUMADIN What changed:   
- medication strength 
- how much to take 
- how to take this - when to take this 
- additional instructions Your last dose was: Your next dose is:    
   
   
 Provider at rehab facility to dose coumadin daily based on PT/INR level. INR Goal 1.8-2.2. Continue Lovenox until therapeutic. Quantity:  50 Tab Refills:  0 CONTINUE these medications which have NOT CHANGED Dose & Instructions Dispensing Information Comments Morning Noon Evening Bedtime  
 levothyroxine 50 mcg tablet Commonly known as:  synthroid Your last dose was: Your next dose is:    
   
   
 Dose:  50 mcg Take 1 Tab by mouth Daily (before breakfast). Quantity:  30 Tab Refills:  1 TYLENOL PM EXTRA STRENGTH PO Your last dose was: Your next dose is:    
   
   
 Dose:  1 Tab Take 1 Tab by mouth as needed. Refills:  0 VALTREX 1 gram tablet Generic drug:  valACYclovir Your last dose was: Your next dose is: Take  by mouth as needed. Refills:  0  
     
   
   
   
  
 VITAMIN D3 2,000 unit Cap capsule Generic drug:  Cholecalciferol (Vitamin D3) Your last dose was: Your next dose is:    
   
   
 Dose:  2000 Units Take 2,000 Units by mouth daily. Refills:  0 Where to Get Your Medications These medications were sent to [facility], [address] [address] Phone:  [phone]  
  enoxaparin 100 mg/mL Information on where to get these meds will be given to you by the nurse or doctor. ! Ask your nurse or doctor about these medications  
  bisacodyl 10 mg suppository HYDROmorphone 2 mg tablet ondansetron 8 mg disintegrating tablet  
 oxyCODONE IR 5 mg immediate release tablet  
 oxyCODONE-acetaminophen 7.5-325 mg per tablet  
 traMADol 50 mg tablet  
 warfarin 2 mg tablet Discharge Instructions Patient: Adrienne Pickard MRN: [id number]  SSN: [id number] PARTIAL KNEE REPLACEMENT DISCHARGE INSTRUCTIONS IMPORTANT NUMBERS  
 
OFFICE  
 [phone] or [phone]  
(Dr Chris Haley Emergency Contact) - [phone] SPECIAL INSTRUCTIONS :  
1. Full extension at the knee is the most important aspect of your range of motion. Avoid placing a pillow or bump behind the knee. Rather, place the heel up on a bump or pillow and allow gravity to help straighten the knee. 2. You may weight bear as tolerated on the knee and during the day you should bend the knee as much as possible. 3. Drainage from the incision more than 4 days from surgery is concerning. Contact my office if there is any question [phone] / [phone]. There may be some drainage from the pin sites over the first day. Wound Care/ Dressing Changes: DRESSING :  You may change your dressings after the second post-operative day as needed following discharge from the hospital.  It isnt necessary to apply antibiotic ointment to your incisions. Avoid scrubbing over the staples or cloth which may catch on the staples. Showering/ Bathing: If your incision is dry without drainage you may shower the third day following your discharge home. Your dressing may be removed for showering. You may get your incisions wet in the shower. Do not vigorously scrub your incision. Apply a clean, dry dressing after you have dried your incisions. Do not take a bath or get into a swimming pool or Woodpecker Educationuzzi until you follow up with Dr. Chris Haley. Do not soak your incision under water. If there is continued drainage or you are concerned contact Dr Becker Free office prior to showering.   
 
 
Diet: 
 You may advance to your regular diet as tolerated. Increase your clear liquid intake for the next 2-3 days. Medication: 
 
 
1. You will be given prescriptions for pain medication when you are discharged from the hospital.  Please use the medications as prescribed. Pain medications may cause constipation- Colace or Milk of Magnesia may be used as needed. Other possible side effects of pain medication are dizziness, headache, nausea, vomiting, and urinary retention. Discontinue the pain medication if you develop itching, rash, shortness of breath, or difficulties swallowing. If these symptoms become severe or arent relieved by discontinuing the medication, you should seek immediate medical attention. 2. Refills of pain medication are authorized during office hours only (8 AM- 5 PM  Monday thru Friday) 3. If you were prescribed Percocet/Oxycodone, Hydrocodone / Cindia Lund / Ed Genera or Dilaudid / Hydromorphone you must have a written prescription. These medications cannot be legally called into the pharmacy. 4. Do not take Tylenol/Acetaminophen in addition to your pain medication as most pain medications already contain acetaminophen. Do not exceed 4000mg of Tylenol/Acetaminophen per day. 5. You may resume the medication you were taking prior to your surgery. Pain medication may change the effects of any antidepressant medication you may be taking. If you have any questions about possible interactions between your regular medications and the discharge medications, you should consult the physician who prescribes your regular medications. 6. You will be discharged with prescriptions for additional pain medications (Tramadol or Toradol) and a medication for nausea and vomiting (Zofran or Phenergan). You only need to fill these prescriptions if the primary pain medication is not working or you experiencing post-op nausea.   
7. Continue the Aspirin (or other agent) for DVT prophylaxis for a total of 30 days from the day of surgery. 8. If you have constipation not improved by oral stool softeners then a Ducolax suppository should be purchased over the counter. This will be more effective than any oral alternative. Follow up appointment: 
 
Elisha Rosas should be seen 5-7 days following your surgery to ensure you are improving as scheduled. If you do not already have an appointment please call our office at [phone] for your follow up appointment. Physical Therapy / Nursing: 
 
Physical Therapy following surgery will be arranged at home the day following your discharge. They have specific instructions for rehab and wound care. You can begin outpatient therapy as soon as you feel comfortable leaving the home, usually at 2 weeks following surgery. Call my office for questions or concerns. Important Signs and Symptoms: 
 
If any of the following signs or symptoms occurs, you should contact Dr. Raz Echols office. Please be advised if a problem arises which you feel requires immediate medical attention or you are unable to contact Dr. Rza Echols office you should seek immediate medical attention at the ER or other health care facility you have access to. 
 
1. A sudden increase in swelling and/or redness or warmth at the area your surgery was performed which isnt relieved by rest, ice, and elevation. 2. Oral temperature greater than 101 degrees for 12 hours or more which isnt relieved by an increase in fluid intake and taking 2 Tylenol every 4-6 hours. 3. Excessive drainage from your incisions, or drainage which hasnt stopped by 72 hours after your surgery. 4. Fever, chills, shortness of breath, chest pain, nausea, vomiting or other signs and symptoms which are of concern to you. Call Dr Anu Hancock at [phone] (cell) after hours if there are any issues. During business hours contact the office at the above listed number. frequently asked questions ? What should I take for pain? o In general you will be discharged with three medications for pain (Percocet 7.5 / 325mg, Oxycodone 5mg and Tramadol). This may vary slightly depending on what you were taking in the hospital.  
? 1st Line  Percocet 1-2 tablets every 4-6 hrs (Or as directed). ? This is the first and only medication you need to take. Initially you may need 2 tablets every 4 hours, but as your pain subsides, this will taper to 1 tablet every 6 hours. ? 2nd Line  Oxycodone 1 every 8 hours (Or as directed), take these between Percocet doses if your pain is not below 4 / 10. This may be needed only for several days following your discharge. ? Oxycodone may be taken more frequently if needed 1-2 tablets every 4-6 hours if the pain is severe between Percocet doses. ? 3rd Line  Tramadol  Take this medication as directed if the Percocet and Oxycodone are not working. Once you taper off Percocet, this medication may also be used. ? 4th Line  Add Alleve 220mg every 12 hours or Motrin 400mg (200mg x 2) every 8 hours ? When should I call for advice regarding my pain? 
o After 12 hours on the above regimen, if nothing is working call the office ([phone] or [phone]) or call my cell after hours [phone]. 
? Can I get refills? 
o Narcotic refills are provided for the first 6 weeks following surgery. ? I will generally try to taper down to a single narcotic medication by your two week appointment. o Try Tylenol 650mg along with Alleve 220mg or Motrin 200mg during the majority of the day. ? Save the narcotic pain medications for physical therapy (1 hour prior) and before sleeping at night. ? Keep in mind you need to discontinue these medications prior to returning to driving. ? Is swelling normal? 
o Almost everyone has some degree of swelling following surgery. o Following hip and knee replacement surgery, swelling can be normal below the incision for the first 1-2 weeks. ? This swelling peaks around 5-7 days after surgery. ? You may have some bruising around the back of the thigh, calf and even into the foot. ? What should I do for the swelling? 
o Keep the limb elevated. o Apply compression socks (knee high for total knees and up to the mid-thigh for total hips.  
o Heat or ice may be applied, choose the modality that makes you the most comfortable. ? How long should I remain on blood thinners following surgery? 
o Thirty days ? When can I drive? 
o Once you have stopped using regular narcotic pain medications (Percocet, Lortab, etc.) and can safely apply the brakes without hesitation. ? When can I shower? o 72hours following surgery if the incision is dry. 
o No submersion of the incision, bathing or swimming for 14 days following surgery or until cleared by Dr Prasanna Valdez. ? What do I do with the dressing when I shower? 
o The ACE wrap can be removed in 3 days and the dressing taken down 
o The incision is sealed with Dermabond (Biologic glue) and except for wounds which are draining should be watertight. o A new dressing that covers the incision should be applied ? How active should I be following surgery? o Progress activities in moderation at your own pace.  
o Walk each day and set progressive goals with small increments (1st week  ½ block of walking, 2nd week  1 block, 3rd week  2 blocks, etc.) Please do not hesitate to call me at [phone] (cell phone) for questions following surgery - Lupe Lieberman MD 
 
 
 
Discharge Orders None Introducing [facility]! Caesar Holcomb introduces [employer] patient portal. Now you can access parts of your medical record, email your doctor's office, and request medication refills online. 1. In your internet browser, go to [email or web address] 2. Click on the First Time User? Click Here link in the Sign In box. You will see the New Member Sign Up page. 3. Enter your [facility] Access Code exactly as it appears below. You will not need to use this code after youve completed the sign-up process. If you do not sign up before the expiration date, you must request a new code. · [facility] Access Code: [email or web address] Expires: 9/17/2017  6:47 PM 
 
4. Enter the last four digits of your Social Security Number (xxxx) and Date of Birth (mm/dd/yyyy) as indicated and click Submit. You will be taken to the next sign-up page. 5. Create a [facility] ID. This will be your [facility] login ID and cannot be changed, so think of one that is secure and easy to remember. 6. Create a [facility] password. You can change your password at any time. 7. Enter your Password Reset Question and Answer. This can be used at a later time if you forget your password. 8. Enter your e-mail address. You will receive e-mail notification when new information is available in [address] 9. Click Sign Up. You can now view and download portions of your medical record. 10. Click the Download Summary menu link to download a portable copy of your medical information. If you have questions, please visit the Frequently Asked Questions section of the [employer] website. Remember, [facility] is NOT to be used for urgent needs. For medical emergencies, dial 911. Now available from your iPhone and Android! General Information Please provide this summary of care documentation to your next provider. Patient Signature:  ____________________________________________________________ Date:  ____________________________________________________________  
  
Zonia Miller Provider Signature:  ____________________________________________________________ Date:  ____________________________________________________________

## 2017-07-24 NOTE — ANESTHESIA PROCEDURE NOTES
Spinal Block    Start time: 7/24/2017 10:05 AM  End time: 7/24/2017 10:12 AM  Performed by: Hector Dumont  Authorized by: Ed Bergman     Pre-procedure:   Indications: at surgeon's request and primary anesthetic  Preanesthetic Checklist: patient identified, risks and benefits discussed, anesthesia consent, site marked, patient being monitored and timeout performed    Timeout Time: 10:05          Spinal Block:   Patient Position:  Seated  Prep Region:  Lumbar  Prep: DuraPrep      Location:  L3-4  Technique:  Single shot        Needle:   Needle Type:  Pencan  Needle Gauge:  25 G  Attempts:  2      Events: CSF confirmed, no blood with aspiration and no paresthesia        Assessment:  Insertion:  Uncomplicated  Patient tolerance:  Patient tolerated the procedure well with no immediate complications

## 2017-07-24 NOTE — ANESTHESIA PREPROCEDURE EVALUATION
Anesthetic History   No history of anesthetic complications            Review of Systems / Medical History  Patient summary reviewed and pertinent labs reviewed    Pulmonary  Within defined limits                 Neuro/Psych         Psychiatric history     Cardiovascular  Within defined limits                Exercise tolerance: >4 METS     GI/Hepatic/Renal     GERD           Endo/Other      Hypothyroidism  Arthritis     Other Findings   Comments: Factor V leiden def           Physical Exam    Airway  Mallampati: II  TM Distance: 4 - 6 cm  Neck ROM: normal range of motion   Mouth opening: Normal     Cardiovascular  Regular rate and rhythm,  S1 and S2 normal,  no murmur, click, rub, or gallop  Rhythm: regular  Rate: normal         Dental  No notable dental hx       Pulmonary  Breath sounds clear to auscultation               Abdominal  GI exam deferred       Other Findings            Anesthetic Plan    ASA: 2  Anesthesia type: spinal and regional - femoral single shot and popliteal fossa block      Post-op pain plan if not by surgeon: peripheral nerve block single      Anesthetic plan and risks discussed with: Patient

## 2017-07-24 NOTE — IP AVS SNAPSHOT
Tba Mir 
 
 
 [address] [place] [address] 
[phone] Patient: Ksenia Cooley MRN: [id number] NPC:5/95/1630 Current Discharge Medication List  
  
START taking these medications Dose & Instructions Dispensing Information Comments Morning Noon Evening Bedtime  
 bisacodyl 10 mg suppository Commonly known as:  DULCOLAX (BISACODYL) Your last dose was: Your next dose is:    
   
   
 Dose:  10 mg Insert 10 mg into rectum daily. Quantity:  2 Suppository Refills:  0 HYDROmorphone 2 mg tablet Commonly known as:  DILAUDID Your last dose was: Your next dose is:    
   
   
 Dose:  2-4 mg Take 1-2 Tabs by mouth every four (4) hours as needed for Pain. Max Daily Amount: 24 mg. Quantity:  80 Tab Refills:  0  
     
   
   
   
  
 ondansetron 8 mg disintegrating tablet Commonly known as:  ZOFRAN ODT Your last dose was: Your next dose is:    
   
   
 Dose:  4 mg Take 0.5 Tabs by mouth every eight (8) hours as needed for Nausea. Quantity:  30 Tab Refills:  0  
     
   
   
   
  
 oxyCODONE IR 5 mg immediate release tablet Commonly known as:  Dunnell Rice Your last dose was: Your next dose is:    
   
   
 Dose:  5 mg Take 1 Tab by mouth every eight (8) hours as needed for Pain. Max Daily Amount: 15 mg. Quantity:  60 Tab Refills:  0  
     
   
   
   
  
 oxyCODONE-acetaminophen 7.5-325 mg per tablet Commonly known as:  PERCOCET 7.5 Your last dose was: Your next dose is:    
   
   
 Dose:  1-2 Tab Take 1-2 Tabs by mouth every four (4) hours as needed for Pain. Max Daily Amount: 12 Tabs. For Post-op Pain Control Quantity:  70 Tab Refills:  0  
     
   
   
   
  
 traMADol 50 mg tablet Commonly known as:  ULTRAM  
   
Your last dose was:     
   
Your next dose is:    
   
   
 Dose:  50 mg  
 Take 1 Tab by mouth every six (6) hours as needed for Pain. Max Daily Amount: 200 mg. Quantity:  60 Tab Refills:  0 CONTINUE these medications which have CHANGED Dose & Instructions Dispensing Information Comments Morning Noon Evening Bedtime  
 enoxaparin 100 mg/mL Commonly known as:  LOVENOX What changed:   
- how much to take - when to take this Your last dose was: Your next dose is:    
   
   
 Dose:  60 mg  
60 mg by SubCUTAneous route every twelve (12) hours. Quantity:  1 Syringe Refills:  0  
     
   
   
   
  
 warfarin 2 mg tablet Commonly known as:  COUMADIN What changed:   
- medication strength 
- how much to take 
- how to take this - when to take this 
- additional instructions Your last dose was: Your next dose is:    
   
   
 Provider at rehab facility to dose coumadin daily based on PT/INR level. INR Goal 1.8-2.2. Continue Lovenox until therapeutic. Quantity:  50 Tab Refills:  0 CONTINUE these medications which have NOT CHANGED Dose & Instructions Dispensing Information Comments Morning Noon Evening Bedtime  
 levothyroxine 50 mcg tablet Commonly known as:  synthroid Your last dose was: Your next dose is:    
   
   
 Dose:  50 mcg Take 1 Tab by mouth Daily (before breakfast). Quantity:  30 Tab Refills:  1 TYLENOL PM EXTRA STRENGTH PO Your last dose was: Your next dose is:    
   
   
 Dose:  1 Tab Take 1 Tab by mouth as needed. Refills:  0 VALTREX 1 gram tablet Generic drug:  valACYclovir Your last dose was: Your next dose is: Take  by mouth as needed. Refills:  0  
     
   
   
   
  
 VITAMIN D3 2,000 unit Cap capsule Generic drug:  Cholecalciferol (Vitamin D3) Your last dose was: Your next dose is:    
   
   
 Dose:  2000 Units Take 2,000 Units by mouth daily. Refills:  0 Where to Get Your Medications These medications were sent to [facility], [address]  [address] [place] [address] Phone:  [phone]  
  enoxaparin 100 mg/mL Information on where to get these meds will be given to you by the nurse or doctor. ! Ask your nurse or doctor about these medications  
  bisacodyl 10 mg suppository HYDROmorphone 2 mg tablet  
 ondansetron 8 mg disintegrating tablet  
 oxyCODONE IR 5 mg immediate release tablet  
 oxyCODONE-acetaminophen 7.5-325 mg per tablet  
 traMADol 50 mg tablet  
 warfarin 2 mg tablet

## 2017-07-25 LAB
ANION GAP BLD CALC-SCNC: 9 MMOL/L (ref 5–15)
BUN SERPL-MCNC: 13 MG/DL (ref 6–20)
BUN/CREAT SERPL: 21 (ref 12–20)
CALCIUM SERPL-MCNC: 8.1 MG/DL (ref 8.5–10.1)
CHLORIDE SERPL-SCNC: 107 MMOL/L (ref 97–108)
CO2 SERPL-SCNC: 25 MMOL/L (ref 21–32)
CREAT SERPL-MCNC: 0.62 MG/DL (ref 0.55–1.02)
GLUCOSE SERPL-MCNC: 127 MG/DL (ref 65–100)
HGB BLD-MCNC: 9.4 G/DL (ref 11.5–16)
POTASSIUM SERPL-SCNC: 4.1 MMOL/L (ref 3.5–5.1)
SODIUM SERPL-SCNC: 141 MMOL/L (ref 136–145)

## 2017-07-25 PROCEDURE — 36415 COLL VENOUS BLD VENIPUNCTURE: CPT | Performed by: PHYSICIAN ASSISTANT

## 2017-07-25 PROCEDURE — 74011250637 HC RX REV CODE- 250/637: Performed by: PHYSICIAN ASSISTANT

## 2017-07-25 PROCEDURE — 97535 SELF CARE MNGMENT TRAINING: CPT

## 2017-07-25 PROCEDURE — 97110 THERAPEUTIC EXERCISES: CPT

## 2017-07-25 PROCEDURE — 97165 OT EVAL LOW COMPLEX 30 MIN: CPT

## 2017-07-25 PROCEDURE — 74011250636 HC RX REV CODE- 250/636: Performed by: PHYSICIAN ASSISTANT

## 2017-07-25 PROCEDURE — 74011250637 HC RX REV CODE- 250/637: Performed by: ANESTHESIOLOGY

## 2017-07-25 PROCEDURE — 97161 PT EVAL LOW COMPLEX 20 MIN: CPT

## 2017-07-25 PROCEDURE — 97530 THERAPEUTIC ACTIVITIES: CPT

## 2017-07-25 PROCEDURE — 80048 BASIC METABOLIC PNL TOTAL CA: CPT | Performed by: PHYSICIAN ASSISTANT

## 2017-07-25 PROCEDURE — 85018 HEMOGLOBIN: CPT | Performed by: PHYSICIAN ASSISTANT

## 2017-07-25 PROCEDURE — 97116 GAIT TRAINING THERAPY: CPT

## 2017-07-25 PROCEDURE — 74011250636 HC RX REV CODE- 250/636: Performed by: NURSE PRACTITIONER

## 2017-07-25 PROCEDURE — 74011250637 HC RX REV CODE- 250/637: Performed by: ORTHOPAEDIC SURGERY

## 2017-07-25 PROCEDURE — 65270000029 HC RM PRIVATE

## 2017-07-25 RX ORDER — TRAMADOL HYDROCHLORIDE 50 MG/1
50 TABLET ORAL
Status: DISCONTINUED | OUTPATIENT
Start: 2017-07-25 | End: 2017-07-27 | Stop reason: HOSPADM

## 2017-07-25 RX ADMIN — OXYCODONE HYDROCHLORIDE 10 MG: 5 TABLET ORAL at 19:42

## 2017-07-25 RX ADMIN — OXYCODONE HYDROCHLORIDE 10 MG: 5 TABLET ORAL at 12:48

## 2017-07-25 RX ADMIN — Medication 10 ML: at 17:58

## 2017-07-25 RX ADMIN — SODIUM CHLORIDE 500 ML: 900 INJECTION, SOLUTION INTRAVENOUS at 16:24

## 2017-07-25 RX ADMIN — LEVOTHYROXINE SODIUM 50 MCG: 0.05 TABLET ORAL at 06:13

## 2017-07-25 RX ADMIN — OXYCODONE HYDROCHLORIDE 20 MG: 20 TABLET, FILM COATED, EXTENDED RELEASE ORAL at 08:59

## 2017-07-25 RX ADMIN — CEFAZOLIN 2 G: 1 INJECTION, POWDER, FOR SOLUTION INTRAMUSCULAR; INTRAVENOUS; PARENTERAL at 08:59

## 2017-07-25 RX ADMIN — OXYCODONE HYDROCHLORIDE 10 MG: 5 TABLET ORAL at 03:15

## 2017-07-25 RX ADMIN — ACETAMINOPHEN 650 MG: 325 TABLET ORAL at 03:14

## 2017-07-25 RX ADMIN — ENOXAPARIN SODIUM 30 MG: 30 INJECTION SUBCUTANEOUS at 08:59

## 2017-07-25 RX ADMIN — SODIUM CHLORIDE 500 ML: 900 INJECTION, SOLUTION INTRAVENOUS at 10:12

## 2017-07-25 RX ADMIN — OXYCODONE HYDROCHLORIDE 20 MG: 20 TABLET, FILM COATED, EXTENDED RELEASE ORAL at 19:42

## 2017-07-25 RX ADMIN — Medication 10 ML: at 23:21

## 2017-07-25 RX ADMIN — DOCUSATE SODIUM -SENNOSIDES 1 TABLET: 50; 8.6 TABLET, COATED ORAL at 08:59

## 2017-07-25 RX ADMIN — TRAMADOL HYDROCHLORIDE 50 MG: 50 TABLET, FILM COATED ORAL at 17:57

## 2017-07-25 RX ADMIN — CELECOXIB 200 MG: 100 CAPSULE ORAL at 08:59

## 2017-07-25 RX ADMIN — Medication 10 ML: at 13:54

## 2017-07-25 RX ADMIN — CELECOXIB 200 MG: 100 CAPSULE ORAL at 19:41

## 2017-07-25 RX ADMIN — POLYETHYLENE GLYCOL 3350 17 G: 17 POWDER, FOR SOLUTION ORAL at 08:59

## 2017-07-25 RX ADMIN — CEFAZOLIN 2 G: 1 INJECTION, POWDER, FOR SOLUTION INTRAMUSCULAR; INTRAVENOUS; PARENTERAL at 00:25

## 2017-07-25 RX ADMIN — OXYCODONE HYDROCHLORIDE 10 MG: 5 TABLET ORAL at 22:56

## 2017-07-25 RX ADMIN — OXYCODONE HYDROCHLORIDE 10 MG: 5 TABLET ORAL at 00:08

## 2017-07-25 RX ADMIN — OXYCODONE HYDROCHLORIDE 5 MG: 5 TABLET ORAL at 15:47

## 2017-07-25 RX ADMIN — ACETAMINOPHEN 650 MG: 325 TABLET ORAL at 09:22

## 2017-07-25 RX ADMIN — Medication 10 ML: at 06:13

## 2017-07-25 RX ADMIN — ACETAMINOPHEN 650 MG: 325 TABLET ORAL at 15:47

## 2017-07-25 RX ADMIN — ACETAMINOPHEN 650 MG: 325 TABLET ORAL at 22:56

## 2017-07-25 RX ADMIN — Medication 10 ML: at 03:15

## 2017-07-25 RX ADMIN — ENOXAPARIN SODIUM 30 MG: 30 INJECTION SUBCUTANEOUS at 20:51

## 2017-07-25 RX ADMIN — OXYCODONE HYDROCHLORIDE 10 MG: 5 TABLET ORAL at 09:22

## 2017-07-25 RX ADMIN — DOCUSATE SODIUM -SENNOSIDES 1 TABLET: 50; 8.6 TABLET, COATED ORAL at 17:57

## 2017-07-25 RX ADMIN — OXYCODONE HYDROCHLORIDE 10 MG: 5 TABLET ORAL at 06:13
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
## 2017-07-25 NOTE — PROGRESS NOTES
Called Satya Ramirez NP concerning patient's hypotension after her afternoon physical therapy session. Bernabe Bucio is aware and has ordered via telephone 500cc bolus and to continue to monitor patient.

## 2017-07-25 NOTE — INTERDISCIPLINARY ROUNDS
ORTHO/MEDICAL INTERDISCIPLINARY ROUNDS    Patient Information    Name: Rj Daugherty  Age: 56 y.o. Admission Date: 7/24/2017  Surgery/Procedure: Procedure(s):  BILATERAL TOTAL KNEE ARTHROPLASTY  (SPINAL FEMORAL SCIATIC)  Attending Provider: Maria Del Carmen Chau MD  Surgeon: Jodie Sharma   Problem List: Active Problems:    Primary localized osteoarthritis of knees, bilateral (7/24/2017)      During rounds the following quality care indicators and evidence based practices were addressed :       PT/OT: Patient mobility - walked 5' with min to mod assist of 2. Bed Mobility Training  Supine to Sit: Modified independent, Additional time  Sit to Supine: Minimum assistance, Assist x2  Transfer Training  Sit to Stand: Moderate assistance, Additional time, Assist x2  Stand to Sit: Minimum assistance, Assist x2  Bed to Chair: Minimum assistance, Additional time, Assist x2      Gait Training  Assistive Device: Gait belt, Walker, rolling  Ambulation - Level of Assistance: Minimal assistance, Moderate assistance, Assist x2  Distance (ft): 5 Feet (ft)   Weight Bearing Status  Right Side Weight Bearing: As tolerated  Left Side Weight Bearing: As tolerated      Pain Assessment  Pain Intensity 1: 0 (07/25/17 0721)  Pain Location 1: Knee  Pain Intervention(s) 1: Medication (see MAR)  Patient Stated Pain Goal: 2    Pain meds: Oxycodone and OxyContin  Antibiotics completed:  Yes  Anticoagulation:  Coumadin and Lovenox    SCDs: in place  Bowels:     RRAT Score:      Readmit Risk Tool  Support Systems: Spouse/Significant Other/Partner, Child(deshawn)  Relationship with Primary Physician Group: Seen at least one time within the past 6 months    Discharge Management/Planning:    Readmit Risk Assessment Information:   Medium Risk            14       Total Score        3 Relationship with PCP    2 Patient Living Status    4 More than 1 Admission in calendar year    4 Patient Insurance is Medicare, Medicaid or Self Pay    1 Charlson Comorbidity Score        Criteria that do not apply:    Patient Length of Stay > 5         Readmit Risk Tool  Support Systems: Spouse/Significant Other/Partner, Child(deshawn)  Relationship with Primary Physician Group: Seen at least one time within the past 6 months    [place]: Azam Gonzalez    Anticipated Date of Discharge: Thursday    Interdisciplinary team rounds were held with the following team members: Nurse Practitioner, Case Management, Nursing, Pharmacy, and Rehab. See clinical pathway and/or care plan for interventions and desired outcomes.

## 2017-07-25 NOTE — PROGRESS NOTES
7/25/2017   CARE MANAGEMENT NOTE:  CM is following pt for initial discharge planning. EMR reviewed. CM met with pt and  ([phone]) at bedside to obtain hx for this needs assessment. Reportedly, pt resides with her  and children in a one story home with a total of five steps. PTA, pt was using a cane for more than a few steps, was indepn with ADLs, and drives although her car is inoperable and she can't drive a manual. 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 She does not have home healthcare currently. DME - has a cane. PCP is Dr. Tammie Manrique. CM notified Nurse Navigator, Justin Mathew of hospital admission. Pt desires to go to [facility] acute rehab. Await PT/OT evals and recommendations re: appropriate level of rehab.   Erica

## 2017-07-25 NOTE — PROGRESS NOTES
Problem: Mobility Impaired (Adult and Pediatric)  Goal: *Acute Goals and Plan of Care (Insert Text)  Physical Therapy Goals  Initiated 7/25/2017    1. Patient will move from supine to sit and sit to supine in bed with independence within 4 days. 2. Patient will perform sit to stand with minimal assistance/contact guard assist within 4 days. 3. Patient will ambulate with minimal assistance/contact guard assist for 25 feet with the least restrictive device within 4 days. 4. Patient will ascend/descend 4 stairs with 2 handrail(s) with minimal assistance/contact guard assist within 4 days. 5. Patient will perform home exercise program per protocol with independence within 4 days. 6. Patient will demonstrate AROM 0-90 degrees in operative joint within 4 days. PHYSICAL THERAPY TREATMENT  Patient: Sergo Nagel (65 y.o. female)  Date: 7/25/2017  Diagnosis: BILATERAL KNEE ARTHRITIS  Primary localized osteoarthritis of knees, bilateral <principal problem not specified>  Procedure(s) (LRB):  BILATERAL TOTAL KNEE ARTHROPLASTY  (SPINAL FEMORAL SCIATIC) (Bilateral) 1 Day Post-Op  Precautions: WBAT, Fall      ASSESSMENT:  Patient received in bed ready for PT. Several family and friends present. Patient performed all bed exercises including SAQ and SLR, 10 reps each one, min assist to LLE mostly. Left leg slightly weaker than RLE. Patient stood and ambulated 15 feet to bathroom, then 15 feet back with rolling walker +2 min assist. Patient sat in chair but BP with slow decline and patient reported dizziness (see doc flow sheets for values). Returned to bed +2 min assist for safety. Nursing notified. Patient is very motivated and good candidate for rehab.    Progression toward goals:  [X]      Improving appropriately and progressing toward goals  [ ]      Improving slowly and progressing toward goals  [ ]      Not making progress toward goals and plan of care will be adjusted       PLAN:  Patient continues to benefit from skilled intervention to address the above impairments. Continue treatment per established plan of care. Discharge Recommendations:  Rehab  Further Equipment Recommendations for Discharge:  None new. SUBJECTIVE:   \" I guess I am ready. \"      OBJECTIVE DATA SUMMARY:   Range of Motion:  AROM: Generally decreased, functional        RLE AROM  R Knee Flexion: 70  R Knee Extension: 5        LLE AROM  L Knee Flexion: 75  L Knee Extension: 10     Functional Mobility Training:  Bed Mobility:     Supine to Sit: Additional time;Modified independent  Sit to Supine: Additional time;Minimum assistance           Transfers:  Sit to Stand: Assist x2; Additional time; Moderate assistance  Stand to Sit: Additional time;Assist x2;Minimum assistance        Bed to Chair: Assist x2;Minimum assistance                    Ambulation/Gait Training:  Distance (ft): 30 Feet (ft) (seated rest care home)  Assistive Device: Gait belt;Walker, rolling  Ambulation - Level of Assistance: Minimal assistance (2nd person to handle IV)        Gait Abnormalities: Step to gait  Right Side Weight Bearing: As tolerated  Left Side Weight Bearing: As tolerated  Base of Support: Widened     Speed/Kymberly: Pace decreased (<100 feet/min)  Step Length: Left shortened;Right shortened                                        Therapeutic Exercises:   Exercises performed per protocol. See morning treatment note for description. Pain:  Pain Scale 1: Numeric (0 - 10)  Pain Intensity 1: 0  Pain Location 1: Knee  Pain Orientation 1: Left;Right  Activity Tolerance:   Decreased blood pressure and dizziness when up. Please refer to the flowsheet for vital signs taken during this treatment.   After treatment:   [ ] Patient left in no apparent distress sitting up in chair  [X] Patient left in no apparent distress in bed  [X] Call bell left within reach  [X] Nursing notified  [X] Caregiver present  [X] Bed alarm activated      COMMUNICATION/COLLABORATION:   The patients plan of care was discussed with: Occupational Therapist and Registered Nurse     Alexus Gillespie, PT   Time Calculation: 40 mins

## 2017-07-25 NOTE — PROGRESS NOTES
Bedside and Verbal shift change report given to April (oncoming nurse) by Bulmaro Paez (offgoing nurse). Report included the following information SBAR, Kardex and Recent Results.

## 2017-07-25 NOTE — PROGRESS NOTES
Bedside and Verbal shift change report given to Kofi Carpenter (oncoming nurse) by Annita Hurt (offgoing nurse). Report included the following information SBAR, Kardex, OR Summary, Intake/Output, MAR, Recent Results and Med Rec Status.

## 2017-07-25 NOTE — PROGRESS NOTES
Primary Nurse Jenni Noel RN and Fay Parmar RN performed a dual skin assessment on this patient. No impairment noted except dressings to bilateral knees. Kumar score is 18.

## 2017-07-25 NOTE — PROGRESS NOTES
TOTAL KNEE ARTHROPLASTY DAILY NOTE     ASSESSMENT / PLAN :   1. Pain Control : Good this am  2. Overnight Issues : none  3. Wound or incisional issue : WHSS  4. Therapy / Weight Bearing Recommendations : WBAT  5. DVT Prophylaxis : Mechanical and Lovenox 30mg BID, may restart Coumadin in [facility], continue Lovenox until therapeutic  6. Disposition : [facility] placement  7. Medical Concerns : History of PE / DVT  8. Comments : Stable this am       POD  1 Day Post-Op s/p Procedure(s):  BILATERAL TOTAL KNEE ARTHROPLASTY  (SPINAL FEMORAL SCIATIC)     SUBJECTIVE :     Patient notes the following issues : none. OBJECTIVE :     Patient Vitals for the past 12 hrs:   BP Temp Pulse Resp SpO2   07/25/17 0506 96/63 98.6 °F (37 °C) 77 15 97 %   07/24/17 2320 111/73 98 °F (36.7 °C) (!) 111 16 97 %   07/24/17 1932 113/77 98.2 °F (36.8 °C) (!) 112 16 97 %       Alert and oriented x3. LEFT KNEE  Examination of the left knee reveals that the dressing is clean, dry and intact. Motor Exam is intact and normal. Pt is not able to perform a straight leg raise. Sensation is intact to light touch. No calf pain. RIGHT KNEE  Examination of the right knee reveals that the dressing is clean, dry and intact. Motor Exam is intact and normal. Pt is not able to perform a straight leg raise. Sensation is intact to light touch. No calf pain.        Labs:  Recent Labs      07/25/17   0150  07/24/17   0918   HGB  9.4*   --    INR   --   1.0   NA  141   --    K  4.1   --    CL  107   --    CO2  25   --    BUN  13   --    CREA  0.62   --    GLU  127*   --       Patient mobility           Sarah Zaman MD  Cell [phone]  Nurse [address] [place] [address] [phone]  Medical Staff : Susan Choe / Vilma Chanel  Office : [phone] ext  [phone]

## 2017-07-25 NOTE — PROGRESS NOTES
Problem: Knee Replacement: Post-Op Day 1  Goal: *Optimal pain control at patients stated goal  Outcome: Progressing Towards Goal  Pt tolerating prn analgesia regime; stated pain goal = 3/4. Goal: *Discharge plan identified  Outcome: Progressing Towards Goal  Await Rehab recommendations for [facility] vs home;  Discharge potential for Thursday or Friday depending on disposition.

## 2017-07-26 LAB
ANION GAP BLD CALC-SCNC: 5 MMOL/L (ref 5–15)
BUN SERPL-MCNC: 16 MG/DL (ref 6–20)
BUN/CREAT SERPL: 28 (ref 12–20)
CALCIUM SERPL-MCNC: 7.7 MG/DL (ref 8.5–10.1)
CHLORIDE SERPL-SCNC: 106 MMOL/L (ref 97–108)
CO2 SERPL-SCNC: 28 MMOL/L (ref 21–32)
CREAT SERPL-MCNC: 0.58 MG/DL (ref 0.55–1.02)
GLUCOSE SERPL-MCNC: 127 MG/DL (ref 65–100)
HGB BLD-MCNC: 8.5 G/DL (ref 11.5–16)
POTASSIUM SERPL-SCNC: 3.8 MMOL/L (ref 3.5–5.1)
SODIUM SERPL-SCNC: 139 MMOL/L (ref 136–145)

## 2017-07-26 PROCEDURE — 97116 GAIT TRAINING THERAPY: CPT

## 2017-07-26 PROCEDURE — 85018 HEMOGLOBIN: CPT | Performed by: PHYSICIAN ASSISTANT

## 2017-07-26 PROCEDURE — 74011250637 HC RX REV CODE- 250/637: Performed by: NURSE PRACTITIONER

## 2017-07-26 PROCEDURE — 36415 COLL VENOUS BLD VENIPUNCTURE: CPT | Performed by: PHYSICIAN ASSISTANT

## 2017-07-26 PROCEDURE — 74011250637 HC RX REV CODE- 250/637: Performed by: PHYSICIAN ASSISTANT

## 2017-07-26 PROCEDURE — 65270000029 HC RM PRIVATE

## 2017-07-26 PROCEDURE — 97535 SELF CARE MNGMENT TRAINING: CPT

## 2017-07-26 PROCEDURE — 74011250637 HC RX REV CODE- 250/637: Performed by: ANESTHESIOLOGY

## 2017-07-26 PROCEDURE — 80048 BASIC METABOLIC PNL TOTAL CA: CPT | Performed by: PHYSICIAN ASSISTANT

## 2017-07-26 PROCEDURE — 74011250637 HC RX REV CODE- 250/637: Performed by: ORTHOPAEDIC SURGERY

## 2017-07-26 PROCEDURE — 97530 THERAPEUTIC ACTIVITIES: CPT

## 2017-07-26 PROCEDURE — 74011250636 HC RX REV CODE- 250/636: Performed by: NURSE PRACTITIONER

## 2017-07-26 PROCEDURE — 74011250636 HC RX REV CODE- 250/636: Performed by: ORTHOPAEDIC SURGERY

## 2017-07-26 PROCEDURE — 97110 THERAPEUTIC EXERCISES: CPT

## 2017-07-26 RX ORDER — OXYCODONE HYDROCHLORIDE 5 MG/1
10 TABLET ORAL
Status: DISCONTINUED | OUTPATIENT
Start: 2017-07-26 | End: 2017-07-27 | Stop reason: HOSPADM

## 2017-07-26 RX ORDER — HYDROMORPHONE HYDROCHLORIDE 2 MG/1
2 TABLET ORAL
Status: DISCONTINUED | OUTPATIENT
Start: 2017-07-26 | End: 2017-07-26

## 2017-07-26 RX ORDER — OXYCODONE HYDROCHLORIDE 5 MG/1
15 TABLET ORAL
Status: DISCONTINUED | OUTPATIENT
Start: 2017-07-26 | End: 2017-07-27 | Stop reason: HOSPADM

## 2017-07-26 RX ORDER — ENOXAPARIN SODIUM 100 MG/ML
60 INJECTION SUBCUTANEOUS EVERY 12 HOURS
Status: DISCONTINUED | OUTPATIENT
Start: 2017-07-26 | End: 2017-07-27 | Stop reason: HOSPADM

## 2017-07-26 RX ORDER — OXYCODONE HYDROCHLORIDE 5 MG/1
5 TABLET ORAL
Status: DISCONTINUED | OUTPATIENT
Start: 2017-07-26 | End: 2017-07-27

## 2017-07-26 RX ORDER — KETOROLAC TROMETHAMINE 30 MG/ML
30 INJECTION, SOLUTION INTRAMUSCULAR; INTRAVENOUS ONCE
Status: COMPLETED | OUTPATIENT
Start: 2017-07-26 | End: 2017-07-26

## 2017-07-26 RX ORDER — HYDROMORPHONE HYDROCHLORIDE 2 MG/1
4 TABLET ORAL
Status: DISCONTINUED | OUTPATIENT
Start: 2017-07-26 | End: 2017-07-26

## 2017-07-26 RX ORDER — ENOXAPARIN SODIUM 100 MG/ML
60 INJECTION SUBCUTANEOUS EVERY 12 HOURS
Qty: 1 SYRINGE | Refills: 0 | Status: SHIPPED | OUTPATIENT
Start: 2017-07-26 | End: 2018-03-04

## 2017-07-26 RX ORDER — SODIUM CHLORIDE 9 MG/ML
100 INJECTION, SOLUTION INTRAVENOUS CONTINUOUS
Status: DISCONTINUED | OUTPATIENT
Start: 2017-07-26 | End: 2017-07-27 | Stop reason: HOSPADM

## 2017-07-26 RX ORDER — HYDROMORPHONE HYDROCHLORIDE 2 MG/1
2-4 TABLET ORAL
Qty: 80 TAB | Refills: 0 | Status: SHIPPED
Start: 2017-07-26 | End: 2018-03-04

## 2017-07-26 RX ORDER — WARFARIN 2 MG/1
TABLET ORAL
Qty: 50 TAB | Refills: 0 | Status: SHIPPED
Start: 2017-07-26 | End: 2018-03-04

## 2017-07-26 RX ADMIN — DOCUSATE SODIUM -SENNOSIDES 1 TABLET: 50; 8.6 TABLET, COATED ORAL at 08:42

## 2017-07-26 RX ADMIN — Medication 10 ML: at 14:49

## 2017-07-26 RX ADMIN — HYDROMORPHONE HYDROCHLORIDE 4 MG: 2 TABLET ORAL at 19:43

## 2017-07-26 RX ADMIN — Medication 10 ML: at 05:59

## 2017-07-26 RX ADMIN — HYDROMORPHONE HYDROCHLORIDE 4 MG: 2 TABLET ORAL at 11:26

## 2017-07-26 RX ADMIN — CELECOXIB 200 MG: 100 CAPSULE ORAL at 08:42

## 2017-07-26 RX ADMIN — POLYETHYLENE GLYCOL 3350 17 G: 17 POWDER, FOR SOLUTION ORAL at 08:42

## 2017-07-26 RX ADMIN — ACETAMINOPHEN 650 MG: 325 TABLET ORAL at 05:59

## 2017-07-26 RX ADMIN — OXYCODONE HYDROCHLORIDE 10 MG: 5 TABLET ORAL at 23:17

## 2017-07-26 RX ADMIN — CELECOXIB 200 MG: 100 CAPSULE ORAL at 21:56

## 2017-07-26 RX ADMIN — ACETAMINOPHEN 650 MG: 325 TABLET ORAL at 10:20

## 2017-07-26 RX ADMIN — TRAMADOL HYDROCHLORIDE 50 MG: 50 TABLET, FILM COATED ORAL at 00:05

## 2017-07-26 RX ADMIN — LEVOTHYROXINE SODIUM 50 MCG: 0.05 TABLET ORAL at 05:59

## 2017-07-26 RX ADMIN — DOCUSATE SODIUM -SENNOSIDES 1 TABLET: 50; 8.6 TABLET, COATED ORAL at 18:17

## 2017-07-26 RX ADMIN — OXYCODONE HYDROCHLORIDE 10 MG: 5 TABLET ORAL at 02:20

## 2017-07-26 RX ADMIN — OXYCODONE HYDROCHLORIDE 10 MG: 5 TABLET ORAL at 05:59

## 2017-07-26 RX ADMIN — ACETAMINOPHEN 650 MG: 325 TABLET ORAL at 21:56

## 2017-07-26 RX ADMIN — ACETAMINOPHEN 650 MG: 325 TABLET ORAL at 15:29

## 2017-07-26 RX ADMIN — Medication 10 ML: at 21:56

## 2017-07-26 RX ADMIN — ENOXAPARIN SODIUM 60 MG: 60 INJECTION SUBCUTANEOUS at 21:56

## 2017-07-26 RX ADMIN — OXYCODONE HYDROCHLORIDE 10 MG: 5 TABLET ORAL at 08:42

## 2017-07-26 RX ADMIN — KETOROLAC TROMETHAMINE 30 MG: 30 INJECTION, SOLUTION INTRAMUSCULAR at 14:49

## 2017-07-26 RX ADMIN — SODIUM CHLORIDE 100 ML/HR: 900 INJECTION, SOLUTION INTRAVENOUS at 11:26

## 2017-07-26 RX ADMIN — ENOXAPARIN SODIUM 60 MG: 60 INJECTION SUBCUTANEOUS at 08:42

## 2017-07-26 RX ADMIN — TRAMADOL HYDROCHLORIDE 50 MG: 50 TABLET, FILM COATED ORAL at 18:17

## 2017-07-26 RX ADMIN — TRAMADOL HYDROCHLORIDE 50 MG: 50 TABLET, FILM COATED ORAL at 10:22

## 2017-07-26 RX ADMIN — HYDROMORPHONE HYDROCHLORIDE 4 MG: 2 TABLET ORAL at 15:29

## 2017-07-26 NOTE — PROGRESS NOTES
TOTAL KNEE ARTHROPLASTY DAILY NOTE     ASSESSMENT / PLAN :   1. Pain Control : Stable, some posterior pain in both knees  2. Overnight Issues : stable, moderate pain  3. Wound or incisional issue : WHSS both sides  4. Therapy / Weight Bearing Recommendations : WBAT  5. DVT Prophylaxis : Mechanical and Lovenox 30mg BID, will check Dr Farzana Coreas note, hematology consult if there is a question. She was on 135mg every day prior to surgery, at this point it is fine to increase to 60mg BID. 6. Disposition : [facility]  7. Medical Concerns : Stable  8. Comments : Mobilizing slowly        POD  2 Days Post-Op s/p Procedure(s):  BILATERAL TOTAL KNEE ARTHROPLASTY  (SPINAL FEMORAL SCIATIC)     SUBJECTIVE :     Patient notes the following issues : None. OBJECTIVE :     Patient Vitals for the past 12 hrs:   BP Temp Pulse Resp SpO2   07/26/17 0228 111/63 98 °F (36.7 °C) 88 18 -   07/25/17 2321 90/60 98 °F (36.7 °C) 91 20 98 %   07/25/17 2258 106/63 - 91 - -   07/25/17 2050 93/63 - - - -   07/25/17 1940 97/62 - - - 98 %       Alert and oriented x3. LEFT KNEE  Examination of the left knee reveals that the dressing is clean, dry and intact. Motor Exam is intact and normal. Pt is able to perform a straight leg raise. Sensation is intact to light touch. No calf pain. RIGHT KNEE  Examination of the right knee reveals that the dressing is clean, dry and intact. Motor Exam is intact and normal. Pt is able to perform a straight leg raise. Sensation is intact to light touch. No calf pain. Labs:  Recent Labs      07/26/17   0009   07/24/17   0918   HGB  8.5*   < >   --    INR   --    --   1.0   NA  139   < >   --    K  3.8   < >   --    CL  106   < >   --    CO2  28   < >   --    BUN  16   < >   --    CREA  0.58   < >   --    GLU  127*   < >   --     < > = values in this interval not displayed.       Patient mobility  Gait  Base of Support: Widened  Speed/Kymberly: Pace decreased (<100 feet/min)  Step Length: Left shortened, Right shortened  Gait Abnormalities: Step to gait  Ambulation - Level of Assistance: Minimal assistance (2nd person to handle IV)  Distance (ft): 30 Feet (ft) (seated rest skilled nursing)  Assistive Device: Gait belt, WalkerMelvin MD  Cell [phone]  Nurse [address] [place] [address] [phone]  Medical Staff : Tam Velez / Barbra Blanca  Office : [phone] ext  [phone]

## 2017-07-26 NOTE — PROGRESS NOTES
Problem: Mobility Impaired (Adult and Pediatric)  Goal: *Acute Goals and Plan of Care (Insert Text)  Physical Therapy Goals  Initiated 7/25/2017    1. Patient will move from supine to sit and sit to supine in bed with independence within 4 days. 2. Patient will perform sit to stand with minimal assistance/contact guard assist within 4 days. 3. Patient will ambulate with minimal assistance/contact guard assist for 25 feet with the least restrictive device within 4 days. 4. Patient will ascend/descend 4 stairs with 2 handrail(s) with minimal assistance/contact guard assist within 4 days. 5. Patient will perform home exercise program per protocol with independence within 4 days. 6. Patient will demonstrate AROM 0-90 degrees in operative joint within 4 days. PHYSICAL THERAPY TREATMENT  Patient: Regina Gibson (89 y.o. female)  Date: 7/26/2017  Diagnosis: BILATERAL KNEE ARTHRITIS  Primary localized osteoarthritis of knees, bilateral <principal problem not specified>  Procedure(s) (LRB):  BILATERAL TOTAL KNEE ARTHROPLASTY  (SPINAL FEMORAL SCIATIC) (Bilateral) 2 Days Post-Op  Precautions: WBAT, Fall      ASSESSMENT:  Pt received in bed, reporting 7/10 pain, decling OOB activity,secondary to recently returning to bed from [facility] transfer. Agreeable to perform thera ex. Review of HEP, x 10 reps each with active assist for SLR, heel slides and SAQ. Pt tearful throughout. Slightly improved flexion ROM vs. This morning session. Progression toward goals:  [ ]      Improving appropriately and progressing toward goals  [ ]      Improving slowly and progressing toward goals  [ ]      Not making progress toward goals and plan of care will be adjusted       PLAN:  Patient continues to benefit from skilled intervention to address the above impairments. Continue treatment per established plan of care.   Discharge Recommendations:  Inpatient Rehab  Further Equipment Recommendations for Discharge:  none       SUBJECTIVE: Patient stated I really hurt.       OBJECTIVE DATA SUMMARY:   Critical Behavior:  Neurologic State: Alert, Appropriate for age  Orientation Level: Oriented X4  Cognition: Appropriate for age attention/concentration  Safety/Judgement: Awareness of environment, Good awareness of safety precautions  Range of Motion:           RLE AROM  R Knee Flexion: 40  R Knee Extension: 5        LLE AROM  L Knee Flexion: 50  L Knee Extension: 5     Functional Mobility Training:  Bed Mobility:     Supine to Sit: Moderate assistance;Assist x2  Sit to Supine: Moderate assistance;Assist x2           Transfers:  Sit to Stand: Assist x2;Minimum assistance  Stand to Sit: Assist x2;Minimum assistance                             Balance:  Sitting: Intact  Standing: Intact; With support  Ambulation/Gait Training:  Distance (ft): 10 Feet (ft)  Assistive Device: Walker, rolling;Gait belt  Ambulation - Level of Assistance: Assist x2;Minimal assistance                 Base of Support: Widened     Speed/Kymberly: Slow                                  Stairs:            Therapeutic Exercises:     EXERCISE   Sets   Reps   Active Active Assist   Passive Self ROM   Comments   Ankle Pumps     [X]                                        [ ]                                        [ ]                                        [ ]                                            Quad Sets     [X]                                        [ ]                                        [ ]                                        [ ]                                            Hamstring Sets     [ ]                                        [ ]                                        [ ]                                        [ ]                                            Ruben Gonzalez     [ ]                                        [X]                                        [ ]                                        [ ]                                            Knee Extension Stretch       [ ]                                          [ ]                                          [ ]                                          [ ]                                            Marin Florian     [ ]                                        [X]                                        [ ]                                        [ ]                                            Jessica Navarro     [ ]                                        [ ]                                        [ ]                                        [ ]                                            Rocio Wilkinson     [ ]                                        [ ]                                        [ ]                                        [ ]                                            Anastasiya Dempsey     [ ]                                        [X]                                        [ ]                                        [ ]                                               Pain:  Pain Scale 1: Numeric (0 - 10)  Pain Intensity 1: 7  Pain Location 1: Knee  Pain Orientation 1: Anterior;Left;Right  Pain Description 1: Aching;Constant  Pain Intervention(s) 1: Medication (see MAR)  Activity Tolerance:   Good  Please refer to the flowsheet for vital signs taken during this treatment.   After treatment:   [ ] Patient left in no apparent distress sitting up in chair  [X] Patient left in no apparent distress in bed  [X] Call bell left within reach  [X] Nursing notified  [X] Caregiver present  [X] Bed alarm activated      COMMUNICATION/COLLABORATION:   The patients plan of care was discussed with: Registered Nurse     Jesus Carias   Time Calculation: 21 mins

## 2017-07-26 NOTE — PROGRESS NOTES
7/26/2017   CM ADDENDUM:  Pt was accepted to [address] [place] [address] however we are waiting for insurance authorization. Erica      7/26/2017  CARE MANAGEMENT NOTE:  CM reviewed EMR for clinical updates. Fuad Gaitan is following pt so await their final decision. If pt is accepted, an authorization from [facility] will be needed.   Erica

## 2017-07-26 NOTE — PROGRESS NOTES
Problem: Mobility Impaired (Adult and Pediatric)  Goal: *Acute Goals and Plan of Care (Insert Text)  Physical Therapy Goals  Initiated 7/25/2017    1. Patient will move from supine to sit and sit to supine in bed with independence within 4 days. 2. Patient will perform sit to stand with minimal assistance/contact guard assist within 4 days. 3. Patient will ambulate with minimal assistance/contact guard assist for 25 feet with the least restrictive device within 4 days. 4. Patient will ascend/descend 4 stairs with 2 handrail(s) with minimal assistance/contact guard assist within 4 days. 5. Patient will perform home exercise program per protocol with independence within 4 days. 6. Patient will demonstrate AROM 0-90 degrees in operative joint within 4 days. PHYSICAL THERAPY TREATMENT  Patient: Kari Nugent (89 y.o. female)  Date: 7/26/2017  Diagnosis: BILATERAL KNEE ARTHRITIS  Primary localized osteoarthritis of knees, bilateral <principal problem not specified>  Procedure(s) (LRB):  BILATERAL TOTAL KNEE ARTHROPLASTY  (SPINAL FEMORAL SCIATIC) (Bilateral) 2 Days Post-Op  Precautions: WBAT, Fall      ASSESSMENT:  Pt received in bed, agreeable to participate with physical therapy. Reporting 5/10 pain at rest. Mod A for bed mobility. Pt very reluctant to attempt knee flexion sitting EOB. Sit<>stand using RW with min A x2 with height of bed elevated. Gait training usign RW x10' demonstrate slow but steady gait. Pt requested to return to bed. Mod A for sit>supine to manage BLE. BP low, pt asymptomatic. Progression toward goals:  [ ]      Improving appropriately and progressing toward goals  [X]      Improving slowly and progressing toward goals  [ ]      Not making progress toward goals and plan of care will be adjusted       PLAN:  Patient continues to benefit from skilled intervention to address the above impairments. Continue treatment per established plan of care.   Discharge Recommendations:  Inpatient Rehab  Further Equipment Recommendations for Discharge:  none       SUBJECTIVE:   Patient stated I want my old knees back.       OBJECTIVE DATA SUMMARY:   Critical Behavior:  Neurologic State: Alert, Appropriate for age  Orientation Level: Oriented X4  Cognition: Appropriate for age attention/concentration  Safety/Judgement: Awareness of environment, Good awareness of safety precautions  Range of Motion:           RLE AROM  R Knee Flexion: 30  R Knee Extension: 5        LLE AROM  L Knee Flexion: 30  L Knee Extension: 5     Functional Mobility Training:  Bed Mobility:     Supine to Sit: Moderate assistance;Assist x2  Sit to Supine: Moderate assistance;Assist x2           Transfers:  Sit to Stand: Assist x2;Minimum assistance  Stand to Sit: Assist x2;Minimum assistance                             Balance:  Sitting: Intact  Standing: Intact; With support  Ambulation/Gait Training:  Distance (ft): 10 Feet (ft)  Assistive Device: Walker, rolling;Gait belt  Ambulation - Level of Assistance: Assist x2;Minimal assistance                 Base of Support: Widened     Speed/Kymberly: Slow                                  Stairs:            Therapeutic Exercises:     EXERCISE   Sets   Reps   Active Active Assist   Passive Self ROM   Comments   Ankle Pumps     [X]                                        [ ]                                        [ ]                                        [ ]                                            Quad Sets     [X]                                        [ ]                                        [ ]                                        [ ]                                            Hamstring Sets     [ ]                                        [ ]                                        [ ]                                        [ ]                                            Ladi Garcia     [ ]                                        [ ]                                        [ ] [ ]                                            Jef Olivo       [ ]                                          [ ]                                          [ ]                                          [ ]                                            Mari Calles     [ ]                                        [ ]                                        [ ]                                        [ ]                                            Dariusz Delaware     [ ]                                        [ ]                                        [ ]                                        [ ]                                            Leslie Bearden     [ ]                                        [ ]                                        [ ]                                        [ ]                                            Justin Lucas     [ ]                                        [ ]                                        [ ]                                        [ ]                                               Pain:  Pain Scale 1: Numeric (0 - 10)  Pain Intensity 1: 5           Pain Intervention(s) 1: Medication (see MAR)  Activity Tolerance:   Good  Please refer to the flowsheet for vital signs taken during this treatment.   After treatment:   [ ] Patient left in no apparent distress sitting up in chair  [X] Patient left in no apparent distress in bed  [X] Call bell left within reach  [X] Nursing notified  [X] Caregiver present  [ ] Bed alarm activated      COMMUNICATION/COLLABORATION:   The patients plan of care was discussed with: Registered Nurse     Jhonatan Norman PTA   Time Calculation: 26 mins

## 2017-07-26 NOTE — PROGRESS NOTES
Bedside and Verbal shift change report given to April (oncoming nurse) by Lukasz Sellers (offgoing nurse). Report included the following information SBAR, Kardex and Recent Results.

## 2017-07-26 NOTE — INTERDISCIPLINARY ROUNDS
ORTHO/MEDICAL INTERDISCIPLINARY ROUNDS    Patient Information    Name: De Theodore  Age: 56 y.o. Admission Date: 7/24/2017  Surgery/Procedure: Procedure(s):  BILATERAL TOTAL KNEE ARTHROPLASTY  (SPINAL FEMORAL SCIATIC)  Attending Provider: Martin Lewis MD  Surgeon: Misa Mrogan   Problem List: Active Problems:    Primary localized osteoarthritis of knees, bilateral (7/24/2017)      During rounds the following quality care indicators and evidence based practices were addressed :       PT/OT: Patient mobility - Not seen yet due to pain  Bed Mobility Training  Supine to Sit: Additional time, Modified independent  Sit to Supine: Additional time, Minimum assistance  Transfer Training  Sit to Stand: Assist x2, Additional time, Moderate assistance  Stand to Sit: Additional time, Assist x2, Minimum assistance  Bed to Chair: Assist x2, Minimum assistance      Gait Training  Assistive Device: Gait belt, Walker, rolling  Ambulation - Level of Assistance: Minimal assistance (2nd person to handle IV)  Distance (ft): 30 Feet (ft) (seated rest correction)   Weight Bearing Status  Right Side Weight Bearing: As tolerated  Left Side Weight Bearing: As tolerated      Pain Assessment  Pain Intensity 1: 5 (07/26/17 0300)  Pain Location 1: Knee  Pain Intervention(s) 1: Medication (see MAR)  Patient Stated Pain Goal: 3    Pain meds: oxycodone  Antibiotics completed:  Yes  Anticoagulation:  Lovenox 60mg bid    SCDs: in place  Bowels:     RRAT Score:      Readmit Risk Tool  Support Systems: Spouse/Significant Other/Partner, Child(deshawn)  Relationship with Primary Physician Group: Seen at least one time within the past 6 months    Discharge Management/Planning:    Readmit Risk Assessment Information:   Medium Risk            14       Total Score        3 Relationship with PCP    2 Patient Living Status    4 More than 1 Admission in calendar year    4 Patient Insurance is Medicare, Medicaid or Self Pay    1 Charlson Comorbidity Score Criteria that do not apply:    Patient Length of Stay > 5         Readmit Risk Tool  Support Systems: Spouse/Significant Other/Partner, Child(deshawn)  Relationship with Primary Physician Group: Seen at least one time within the past 6 months    [facility] consult    Anticipated Date of Discharge: tomorrow    Interdisciplinary team rounds were held with the following team members: Nurse Practitioner, Case Management, Nursing, Pharmacy, and Rehab. See clinical pathway and/or care plan for interventions and desired outcomes.

## 2017-07-26 NOTE — PROGRESS NOTES
Problem: Self Care Deficits Care Plan (Adult)  Goal: *Acute Goals and Plan of Care (Insert Text)  Occupational Therapy Goals  Initiated 7/25/2017    1. Patient will perform lower body dressing at minimal assistance using adaptive equipment PRN within 7 days. 2. Patient will perform toilet transfers at minimal assistance x1 using rolling walker within 7 days. 3. Patient will perform all aspects of toileting at supervision/set-up within 7 days. 4. Patient will tolerate greater than 10 minutes of standing without a rest break at minimal assistance using rolling walker during ADLs within 7 days. OCCUPATIONAL THERAPY TREATMENT  Patient: Camron Waller (44 y.o. female)  Date: 7/26/2017  Diagnosis: BILATERAL KNEE ARTHRITIS  Primary localized osteoarthritis of knees, bilateral <principal problem not specified>  Procedure(s) (LRB):  BILATERAL TOTAL KNEE ARTHROPLASTY  (SPINAL FEMORAL SCIATIC) (Bilateral) 2 Days Post-Op  Precautions: WBAT, Fall  Chart, occupational therapy assessment, plan of care, and goals were reviewed. ASSESSMENT:  Pt had been pre-medicated prior to OT however still verbalizing pain. She stated \"I feel like if I re-position my legs I will be better. \" RN assisting with pt getting to sit edge of bed. Once edge of bed educated pt as to use of reacher, dressing stick and long handle bath sponge. Pt sat approximately 3 minutes before wanting to return to supine. She than was further educated as to long handle shoe horn and sock aide. Pt educated as to benefits of pursed lip breathing during mobility to edge of bed and to help decrease discomfort. Pts  present for treatment. Recommend rehab.   Progression toward goals:  [ ]          Improving appropriately and progressing toward goals  [X]          Improving slowly and progressing toward goals  [ ]          Not making progress toward goals and plan of care will be adjusted       PLAN:  Patient continues to benefit from skilled intervention to address the above impairments. Continue treatment per established plan of care. Discharge Recommendations: Rehab  Further Equipment Recommendations for Discharge: None       SUBJECTIVE:   Patient stated I think if I move my legs I will feel better.       OBJECTIVE DATA SUMMARY:   Cognitive/Behavioral Status:  Neurologic State: Alert; Appropriate for age  Orientation Level: Oriented X4  Cognition: Appropriate for age attention/concentration           Functional Mobility and Transfers for ADLs:              Bed Mobility:      Pt assisted with both LE's to edge of bed and limited by pain. Transfers:      Not assessed. Balance:  Sitting: Intact  ADL Intervention:     Pt educated as to AE for bathing and dressing. Pt in considerable amount of pain during education so will need re-enforcement of education. Pain:  Pain Scale 1: Numeric (0 - 10)  Pain Intensity 1: 5           Pain Intervention(s) 1: Medication (see MAR)     Activity Tolerance:    Poor, pt limited by pain  Please refer to the flowsheet for vital signs taken during this treatment.   After treatment:   [ ]  Patient left in no apparent distress sitting up in chair  [X]  Patient left in no apparent distress in bed  [X]  Call bell left within reach  [X]  Nursing notified  [X]  Caregiver present  [ ]  Bed alarm activated      COMMUNICATION/COLLABORATION:   The patients plan of care was discussed with: Physical Therapy Assistant, Occupational Therapist and Registered Nurse     ANGELINA Mckeon  Time Calculation: 15 mins

## 2017-07-27 ENCOUNTER — HOSPITAL ENCOUNTER (OUTPATIENT)
Age: 47
Discharge: HOME | End: 2017-08-07
Attending: PHYSICAL MEDICINE & REHABILITATION | Admitting: PHYSICAL MEDICINE & REHABILITATION

## 2017-07-27 VITALS
OXYGEN SATURATION: 97 % | BODY MASS INDEX: 38 KG/M2 | WEIGHT: 206.5 LBS | SYSTOLIC BLOOD PRESSURE: 108 MMHG | HEART RATE: 94 BPM | RESPIRATION RATE: 18 BRPM | HEIGHT: 62 IN | TEMPERATURE: 98.2 F | DIASTOLIC BLOOD PRESSURE: 60 MMHG

## 2017-07-27 LAB — HGB BLD-MCNC: 8.8 G/DL (ref 11.5–16)

## 2017-07-27 PROCEDURE — 97116 GAIT TRAINING THERAPY: CPT

## 2017-07-27 PROCEDURE — 74011250637 HC RX REV CODE- 250/637: Performed by: ANESTHESIOLOGY

## 2017-07-27 PROCEDURE — 74011250636 HC RX REV CODE- 250/636: Performed by: PHYSICAL MEDICINE & REHABILITATION

## 2017-07-27 PROCEDURE — 74011250637 HC RX REV CODE- 250/637: Performed by: PHYSICAL MEDICINE & REHABILITATION

## 2017-07-27 PROCEDURE — 36415 COLL VENOUS BLD VENIPUNCTURE: CPT | Performed by: PHYSICIAN ASSISTANT

## 2017-07-27 PROCEDURE — 85018 HEMOGLOBIN: CPT | Performed by: PHYSICIAN ASSISTANT

## 2017-07-27 PROCEDURE — 74011250636 HC RX REV CODE- 250/636: Performed by: ORTHOPAEDIC SURGERY

## 2017-07-27 PROCEDURE — 74011250637 HC RX REV CODE- 250/637: Performed by: PHYSICIAN ASSISTANT

## 2017-07-27 PROCEDURE — 74011250637 HC RX REV CODE- 250/637: Performed by: ORTHOPAEDIC SURGERY

## 2017-07-27 PROCEDURE — 97530 THERAPEUTIC ACTIVITIES: CPT

## 2017-07-27 RX ORDER — AMOXICILLIN 250 MG
1 CAPSULE ORAL 2 TIMES DAILY
Status: DISCONTINUED | OUTPATIENT
Start: 2017-07-27 | End: 2017-08-07 | Stop reason: HOSPADM

## 2017-07-27 RX ORDER — POLYETHYLENE GLYCOL 3350 17 G/17G
17 POWDER, FOR SOLUTION ORAL DAILY
Status: DISCONTINUED | OUTPATIENT
Start: 2017-07-28 | End: 2017-08-07 | Stop reason: HOSPADM

## 2017-07-27 RX ORDER — VALACYCLOVIR HYDROCHLORIDE 500 MG/1
1000 TABLET, FILM COATED ORAL DAILY PRN
Status: DISCONTINUED | OUTPATIENT
Start: 2017-07-27 | End: 2017-08-07 | Stop reason: HOSPADM

## 2017-07-27 RX ORDER — LANOLIN ALCOHOL/MO/W.PET/CERES
3 CREAM (GRAM) TOPICAL
Status: DISCONTINUED | OUTPATIENT
Start: 2017-07-27 | End: 2017-07-28

## 2017-07-27 RX ORDER — ENOXAPARIN SODIUM 100 MG/ML
60 INJECTION SUBCUTANEOUS EVERY 12 HOURS
Status: DISCONTINUED | OUTPATIENT
Start: 2017-07-27 | End: 2017-08-04 | Stop reason: ALTCHOICE

## 2017-07-27 RX ORDER — OXYCODONE HYDROCHLORIDE 5 MG/1
15 TABLET ORAL
Status: DISPENSED | OUTPATIENT
Start: 2017-07-27 | End: 2017-08-01

## 2017-07-27 RX ORDER — ACETAMINOPHEN 325 MG/1
650 TABLET ORAL
Status: DISCONTINUED | OUTPATIENT
Start: 2017-07-27 | End: 2017-08-07 | Stop reason: HOSPADM

## 2017-07-27 RX ORDER — WARFARIN SODIUM 5 MG/1
10 TABLET ORAL ONCE
Status: COMPLETED | OUTPATIENT
Start: 2017-07-27 | End: 2017-07-27

## 2017-07-27 RX ORDER — OXYCODONE HYDROCHLORIDE 5 MG/1
5 TABLET ORAL
Status: ACTIVE | OUTPATIENT
Start: 2017-07-27 | End: 2017-08-01

## 2017-07-27 RX ORDER — OXYCODONE HYDROCHLORIDE 5 MG/1
10 TABLET ORAL
Status: DISPENSED | OUTPATIENT
Start: 2017-07-27 | End: 2017-08-01

## 2017-07-27 RX ORDER — ZOLPIDEM TARTRATE 5 MG/1
5 TABLET ORAL
Status: DISCONTINUED | OUTPATIENT
Start: 2017-07-27 | End: 2017-07-27 | Stop reason: HOSPADM

## 2017-07-27 RX ORDER — WARFARIN SODIUM 5 MG/1
10 TABLET ORAL EVERY EVENING
Status: DISCONTINUED | OUTPATIENT
Start: 2017-07-27 | End: 2017-07-27 | Stop reason: HOSPADM

## 2017-07-27 RX ORDER — ADHESIVE BANDAGE
30 BANDAGE TOPICAL DAILY PRN
Status: DISCONTINUED | OUTPATIENT
Start: 2017-07-27 | End: 2017-08-07 | Stop reason: HOSPADM

## 2017-07-27 RX ORDER — LEVOTHYROXINE SODIUM 50 UG/1
50 TABLET ORAL
Status: DISCONTINUED | OUTPATIENT
Start: 2017-07-28 | End: 2017-08-07 | Stop reason: HOSPADM

## 2017-07-27 RX ORDER — FACIAL-BODY WIPES
10 EACH TOPICAL DAILY PRN
Status: DISCONTINUED | OUTPATIENT
Start: 2017-07-27 | End: 2017-08-07 | Stop reason: HOSPADM

## 2017-07-27 RX ORDER — ONDANSETRON 4 MG/1
4 TABLET, ORALLY DISINTEGRATING ORAL
Status: DISCONTINUED | OUTPATIENT
Start: 2017-07-27 | End: 2017-08-07 | Stop reason: HOSPADM

## 2017-07-27 RX ADMIN — MAGNESIUM HYDROXIDE 30 ML: 400 SUSPENSION ORAL at 21:11

## 2017-07-27 RX ADMIN — ENOXAPARIN SODIUM 60 MG: 60 INJECTION SUBCUTANEOUS at 21:10

## 2017-07-27 RX ADMIN — MELATONIN TAB 3 MG 3 MG: 3 TAB at 21:10

## 2017-07-27 RX ADMIN — Medication 10 ML: at 06:56

## 2017-07-27 RX ADMIN — ENOXAPARIN SODIUM 60 MG: 60 INJECTION SUBCUTANEOUS at 09:00

## 2017-07-27 RX ADMIN — ACETAMINOPHEN 650 MG: 325 TABLET ORAL at 15:40

## 2017-07-27 RX ADMIN — OXYCODONE HYDROCHLORIDE 10 MG: 5 TABLET ORAL at 03:38

## 2017-07-27 RX ADMIN — LEVOTHYROXINE SODIUM 50 MCG: 0.05 TABLET ORAL at 06:56

## 2017-07-27 RX ADMIN — OXYCODONE HYDROCHLORIDE 10 MG: 5 TABLET ORAL at 15:40

## 2017-07-27 RX ADMIN — OXYCODONE HYDROCHLORIDE 15 MG: 5 TABLET ORAL at 18:52

## 2017-07-27 RX ADMIN — CELECOXIB 200 MG: 100 CAPSULE ORAL at 09:00

## 2017-07-27 RX ADMIN — OXYCODONE HYDROCHLORIDE 15 MG: 5 TABLET ORAL at 11:36

## 2017-07-27 RX ADMIN — WARFARIN SODIUM 10 MG: 5 TABLET ORAL at 18:52

## 2017-07-27 RX ADMIN — DOCUSATE SODIUM -SENNOSIDES 1 TABLET: 50; 8.6 TABLET, COATED ORAL at 21:10

## 2017-07-27 RX ADMIN — OXYCODONE HYDROCHLORIDE 10 MG: 5 TABLET ORAL at 07:50

## 2017-07-27 RX ADMIN — TRAMADOL HYDROCHLORIDE 50 MG: 50 TABLET, FILM COATED ORAL at 06:56

## 2017-07-27 RX ADMIN — ZOLPIDEM TARTRATE 5 MG: 5 TABLET ORAL at 00:58

## 2017-07-27 RX ADMIN — ACETAMINOPHEN 650 MG: 325 TABLET ORAL at 09:04

## 2017-07-27 RX ADMIN — DOCUSATE SODIUM -SENNOSIDES 1 TABLET: 50; 8.6 TABLET, COATED ORAL at 09:00

## 2017-07-27 RX ADMIN — ACETAMINOPHEN 650 MG: 325 TABLET ORAL at 03:38

## 2017-07-27 RX ADMIN — POLYETHYLENE GLYCOL 3350 17 G: 17 POWDER, FOR SOLUTION ORAL at 09:01

## 2017-07-27 NOTE — PROGRESS NOTES
Nurse handed patient a copy of discharge instructions which have been read and explained to her and family members at her side. New medications have not been discussed with her at this time. Awaiting clarification of Lovenox from Dr. Ricardo Thrasher before patient is transferred to [facility]. [facility] aware .

## 2017-07-27 NOTE — PROGRESS NOTES
Problem: Mobility Impaired (Adult and Pediatric)  Goal: *Acute Goals and Plan of Care (Insert Text)  Physical Therapy Goals  Initiated 7/25/2017    1. Patient will move from supine to sit and sit to supine in bed with independence within 4 days. 2. Patient will perform sit to stand with minimal assistance/contact guard assist within 4 days. 3. Patient will ambulate with minimal assistance/contact guard assist for 25 feet with the least restrictive device within 4 days. 4. Patient will ascend/descend 4 stairs with 2 handrail(s) with minimal assistance/contact guard assist within 4 days. 5. Patient will perform home exercise program per protocol with independence within 4 days. 6. Patient will demonstrate AROM 0-90 degrees in operative joint within 4 days. PHYSICAL THERAPY TREATMENT  Patient: Griffin Knox (14 y.o. female)  Date: 7/27/2017  Diagnosis: BILATERAL KNEE ARTHRITIS  Primary localized osteoarthritis of knees, bilateral <principal problem not specified>  Procedure(s) (LRB):  BILATERAL TOTAL KNEE ARTHROPLASTY  (SPINAL FEMORAL SCIATIC) (Bilateral) 3 Days Post-Op  Precautions: WBAT, Fall      ASSESSMENT:  Pt received in bed, sleepy but agreeable to participate with physical therapy. Reporting 5/10 pain at rest.Bed mobility with min A to manage BLE. Pt reluctant to flex knees sitting EOB. Sit<>stand using RW with CGA x2. Gait training using RW x 40' with CGAx2. Pt returned to chair Pt expressed increased pain with upright sitting but  agreeable to sit for 30 min, set up for breakfast. ROM in sitting as per flowsheet. Caregiver present throughout session. RN notified. Progression toward goals:  [ ]      Improving appropriately and progressing toward goals  [X]      Improving slowly and progressing toward goals  [ ]      Not making progress toward goals and plan of care will be adjusted       PLAN:  Patient continues to benefit from skilled intervention to address the above impairments.   Continue treatment per established plan of care. Discharge Recommendations:  Inpatient Rehab  Further Equipment Recommendations for Discharge:  none       SUBJECTIVE:   Patient stated i didn't sleep well last night.       OBJECTIVE DATA SUMMARY:   Critical Behavior:  Neurologic State: Alert  Orientation Level: Oriented X4  Cognition: Appropriate decision making, Appropriate for age attention/concentration, Appropriate safety awareness, Follows commands  Safety/Judgement: Awareness of environment, Good awareness of safety precautions  Range of Motion:           RLE AROM  R Knee Flexion: 50  R Knee Extension: 5        LLE AROM  L Knee Flexion: 50  L Knee Extension: 5     Functional Mobility Training:  Bed Mobility:     Supine to Sit: Minimum assistance              Transfers:  Sit to Stand: Contact guard assistance;Assist x2  Stand to Sit: Contact guard assistance;Assist x2                             Balance:  Sitting: Intact  Standing: Intact; With support  Ambulation/Gait Training:  Distance (ft): 40 Feet (ft)  Assistive Device: Walker, rolling;Gait belt           Gait Abnormalities: Step to gait; Decreased step clearance        Base of Support: Widened                                        Stairs:            Therapeutic Exercises:     EXERCISE   Sets   Reps   Active Active Assist   Passive Self ROM   Comments   Ankle Pumps     [X]                                        [ ]                                        [ ]                                        [ ]                                            Quad Sets     [X]                                        [ ]                                        [ ]                                        [ ]                                            Hamstring Sets     [ ]                                        [ ]                                        [ ]                                        [ ]                                            Beverley Bestyle     [ ] [ ]                                        [ ]                                        [ ]                                            Isela Barker       [ ]                                          [ ]                                          [ ]                                          [ ]                                            Lara Osman     [ ]                                        [X]                                        [ ]                                        [ ]                                            Lucrecia Purvis     [ ]                                        [ ]                                        [ ]                                        [ ]                                            Brando Villarreal     [ ]                                        [ ]                                        [ ]                                        [ ]                                            Straight Leg Raises     [ ]                                        [ ]                                        [ ]                                        [ ]                                               Pain:  Pain Scale 1: Numeric (0 - 10)  Pain Intensity 1: 8  Pain Location 1: Knee  Pain Orientation 1: Left;Right  Pain Description 1: Aching  Pain Intervention(s) 1: Medication (see MAR)  Activity Tolerance:   Good  Please refer to the flowsheet for vital signs taken during this treatment.   After treatment:   [X] Patient left in no apparent distress sitting up in chair  [ ] Patient left in no apparent distress in bed  [X] Call bell left within reach  [X] Nursing notified  [X] Caregiver present  [ ] Bed alarm activated      COMMUNICATION/COLLABORATION:   The patients plan of care was discussed with: Registered Nurse     Alpesh Bro   Time Calculation: 25 mins

## 2017-07-27 NOTE — DISCHARGE INSTRUCTIONS
Patient: Griffin Knox MRN: [id number]  SSN: [id number]            PARTIAL KNEE REPLACEMENT DISCHARGE INSTRUCTIONS     IMPORTANT NUMBERS     OFFICE    [phone] ext [phone] or [phone]    CELL   (Dr Lacie Fuentes Emergency Contact) - [phone]           SPECIAL INSTRUCTIONS :   1. Full extension at the knee is the most important aspect of your range of motion. Avoid placing a pillow or bump behind the knee. Rather, place the heel up on a bump or pillow and allow gravity to help straighten the knee. 2. You may weight bear as tolerated on the knee and during the day you should bend the knee as much as possible. 3. Drainage from the incision more than 4 days from surgery is concerning. Contact my office if there is any question [phone] / [phone]. There may be some drainage from the pin sites over the first day. Wound Care/ Dressing Changes:    DRESSING :  You may change your dressings after the second post-operative day as needed following discharge from the hospital.  It isnt necessary to apply antibiotic ointment to your incisions. Avoid scrubbing over the staples or cloth which may catch on the staples. Showering/ Bathing: If your incision is dry without drainage you may shower the third day following your discharge home. Your dressing may be removed for showering. You may get your incisions wet in the shower. Do not vigorously scrub your incision. Apply a clean, dry dressing after you have dried your incisions. Do not take a bath or get into a swimming pool or [facility] until you follow up with Dr. Lacie Fuentes. Do not soak your incision under water. If there is continued drainage or you are concerned contact Dr Kate Pompa office prior to showering. Diet:  You may advance to your regular diet as tolerated. Increase your clear liquid intake for the next 2-3 days. Medication:      1.  You will be given prescriptions for pain medication when you are discharged from the hospital. Please use the medications as prescribed. Pain medications may cause constipation- Colace or Milk of Magnesia may be used as needed. Other possible side effects of pain medication are dizziness, headache, nausea, vomiting, and urinary retention. Discontinue the pain medication if you develop itching, rash, shortness of breath, or difficulties swallowing. If these symptoms become severe or arent relieved by discontinuing the medication, you should seek immediate medical attention. 2. Refills of pain medication are authorized during office hours only (8 AM- 5 PM  Monday thru Friday)  3. If you were prescribed Percocet/Oxycodone, Hydrocodone / Ángel Acron / [place] or Dilaudid / Hydromorphone you must have a written prescription. These medications cannot be legally called into the pharmacy. 4. Do not take Tylenol/Acetaminophen in addition to your pain medication as most pain medications already contain acetaminophen. Do not exceed 4000mg of Tylenol/Acetaminophen per day. 5. You may resume the medication you were taking prior to your surgery. Pain medication may change the effects of any antidepressant medication you may be taking. If you have any questions about possible interactions between your regular medications and the discharge medications, you should consult the physician who prescribes your regular medications. 6. You will be discharged with prescriptions for additional pain medications (Tramadol or Toradol) and a medication for nausea and vomiting (Zofran or Phenergan). You only need to fill these prescriptions if the primary pain medication is not working or you experiencing post-op nausea. 7. Continue the Aspirin (or other agent) for DVT prophylaxis for a total of 30 days from the day of surgery. 8. If you have constipation not improved by oral stool softeners then a Ducolax suppository should be purchased over the counter. This will be more effective than any oral alternative.          Follow up appointment:    You should be seen 5-7 days following your surgery to ensure you are improving as scheduled. If you do not already have an appointment please call our office at [phone] for your follow up appointment. Physical Therapy / Nursing:    Physical Therapy following surgery will be arranged at home the day following your discharge. They have specific instructions for rehab and wound care. You can begin outpatient therapy as soon as you feel comfortable leaving the home, usually at 2 weeks following surgery. Call my office for questions or concerns. Important Signs and Symptoms:    If any of the following signs or symptoms occurs, you should contact Dr. Jammie Moran office. Please be advised if a problem arises which you feel requires immediate medical attention or you are unable to contact Dr. Jammie Moran office you should seek immediate medical attention at the ER or other health care facility you have access to.    1. A sudden increase in swelling and/or redness or warmth at the area your surgery was performed which isnt relieved by rest, ice, and elevation. 2. Oral temperature greater than 101 degrees for 12 hours or more which isnt relieved by an increase in fluid intake and taking 2 Tylenol every 4-6 hours. 3. Excessive drainage from your incisions, or drainage which hasnt stopped by 72 hours after your surgery. 4. Fever, chills, shortness of breath, chest pain, nausea, vomiting or other signs and symptoms which are of concern to you. Call Dr Baldev Hall at [phone] (cell) after hours if there are any issues. During business hours contact the office at the above listed number. frequently asked questions   What should I take for pain?  o In general you will be discharged with three medications for pain (Percocet 7.5 / 325mg, Oxycodone 5mg and Tramadol).  This may vary slightly depending on what you were taking in the hospital.   - 1st Line - Percocet 1-2 tablets every 4-6 hrs (Or as directed).  This is the first and only medication you need to take. Initially you may need 2 tablets every 4 hours, but as your pain subsides, this will taper to 1 tablet every 6 hours. - 2nd Line - Oxycodone 1 every 8 hours (Or as directed), take these between Percocet doses if your pain is not below 4 / 10. This may be needed only for several days following your discharge.  Oxycodone may be taken more frequently if needed 1-2 tablets every 4-6 hours if the pain is severe between Percocet doses. - 3rd Line - Tramadol - Take this medication as directed if the Percocet and Oxycodone are not working. Once you taper off Percocet, this medication may also be used. - 4th Line - Add Alleve 220mg every 12 hours or Motrin 400mg (200mg x 2) every 8 hours   When should I call for advice regarding my pain?  o After 12 hours on the above regimen, if nothing is working call the office ([phone] or [phone]) or call my cell after hours [phone].  Can I get refills?  o Narcotic refills are provided for the first 6 weeks following surgery. - I will generally try to taper down to a single narcotic medication by your two week appointment. o Try Tylenol 650mg along with Alleve 220mg or Motrin 200mg during the majority of the day. - Save the narcotic pain medications for physical therapy (1 hour prior) and before sleeping at night. - Keep in mind you need to discontinue these medications prior to returning to driving.  Is swelling normal?  o Almost everyone has some degree of swelling following surgery. o Following hip and knee replacement surgery, swelling can be normal below the incision for the first 1-2 weeks. - This swelling peaks around 5-7 days after surgery.   - You may have some bruising around the back of the thigh, calf and even into the foot.  What should I do for the swelling?  o Keep the limb elevated.    o Apply compression socks (knee high for total knees and up to the mid-thigh for total hips.   o Heat or ice may be applied, choose the modality that makes you the most comfortable.  How long should I remain on blood thinners following surgery?  o Thirty days   When can I drive?  o Once you have stopped using regular narcotic pain medications (Percocet, Lortab, etc.) and can safely apply the brakes without hesitation.  When can I shower? o 72hours following surgery if the incision is dry.  o No submersion of the incision, bathing or swimming for 14 days following surgery or until cleared by Dr Cheri Gaona.  What do I do with the dressing when I shower?  o The ACE wrap can be removed in 3 days and the dressing taken down  o The incision is sealed with Dermabond (Biologic glue) and except for wounds which are draining should be watertight.   o A new dressing that covers the incision should be applied   How active should I be following surgery?   o Progress activities in moderation at your own pace.   o Walk each day and set progressive goals with small increments (1st week - ½ block of walking, 2nd week - 1 block, 3rd week - 2 blocks, etc.)    Please do not hesitate to call me at [phone] (cell phone) for questions following surgery - Manish Villalobos MD

## 2017-07-27 NOTE — PROGRESS NOTES
Occupational Therapy Note: Attempted OT twice today both times pt having increased pain and declining activity. She verbalized she was up earlier to the bathroom and sat for 20 minutes and felt dizzy. Plan is to go to rehab later today.

## 2017-07-27 NOTE — PROGRESS NOTES
[facility] Dosing Services: 7/27/17    Consult for Warfarin Dosing by Pharmacy by Dr. Yovany Goins provided for this 56 y.o.  female , for indication of Orthopedic Surgery (VTE prophylaxis). Day of Therapy resumed (home dose 10 mg daily)  Dose to achieve an INR goal of 1.8- 2.2    Order entered for  Warfarin  10 (mg) ordered to be given today at 18:00. Significant drug interactions: celecoxib, enoxaparin  PT/INR Lab Results   Component Value Date/Time    INR 3.9 07/05/2017 03:34 PM    INR (POC) 1.0 07/24/2017 09:18 AM      Platelets Lab Results   Component Value Date/Time    PLATELET 075 53/35/3709 03:34 PM    Platelet cnt., External 482K 07/21/2011      H/H Lab Results   Component Value Date/Time    HGB 8.8 07/27/2017 05:33 AM    Hgb, External 13.2 02/01/2013        Pharmacy to follow daily and will provide subsequent Warfarin dosing based on clinical status.   Jordyn Kimbrough)  Contact information [phone]

## 2017-07-27 NOTE — PROGRESS NOTES
Called Dr. Jasmina Cerda cell phone concerning patient's discharge orders that are conflicting with prescriptions. [phone]. Kaley Jones No return call from Dr. Viky Hawkins. Call placed to his office left message on his nurses' voicemail. [phone] no return call.

## 2017-07-27 NOTE — PROGRESS NOTES
TOTAL KNEE ARTHROPLASTY DAILY NOTE     ASSESSMENT / PLAN :   1. Pain Control : Unstable, moderate continued pain  2. Overnight Issues : Moderate pain in the knee w/ mobilization to the [facility]  3. Wound or incisional issue : WHSS bilateral  4. Therapy / Weight Bearing Recommendations : WBAT  5. DVT Prophylaxis : Mechanical and Lovenox 60mg BID (history of Factor 5 Lieden Deficiency. We restart her Coumadin today. Bleeding post-op into the thigh ching may cause some ROM inhibition and pain with flexion. 6. Disposition : [facility]  7. Medical Concerns : Stable  8. Comments : Ready for discharge to [facility]       POD  3 Days Post-Op s/p Procedure(s):  BILATERAL TOTAL KNEE ARTHROPLASTY  (SPINAL FEMORAL SCIATIC)     SUBJECTIVE :     Patient notes the following issues : moderate pain. OBJECTIVE :     Patient Vitals for the past 12 hrs:   BP Temp Pulse Resp SpO2   07/26/17 2310 118/60 98.2 °F (36.8 °C) 100 18 100 %   07/26/17 1928 104/64 98.1 °F (36.7 °C) 95 18 99 %       Alert and oriented x3. LEFT KNEE  Examination of the left knee reveals that the dressing is clean, dry and intact. Motor Exam is intact and normal. Pt is not able to perform a straight leg raise. Sensation is intact to light touch. No calf pain. RIGHT KNEE  Examination of the right knee reveals that the dressing is clean, dry and intact. Motor Exam is intact and normal. Pt is not able to perform a straight leg raise. Sensation is intact to light touch. No calf pain. Labs:  Recent Labs      07/27/17   0533  07/26/17   0009   07/24/17   0918   HGB  8.8*  8.5*   < >   --    INR   --    --    --   1.0   NA   --   139   < >   --    K   --   3.8   < >   --    CL   --   106   < >   --    CO2   --   28   < >   --    BUN   --   16   < >   --    CREA   --   0.58   < >   --    GLU   --   127*   < >   --     < > = values in this interval not displayed.       Patient mobility  Gait  Base of Support: Widened  Speed/Kymberly: Slow  Step Length: Left shortened, Right shortened  Gait Abnormalities: Step to gait  Ambulation - Level of Assistance: Assist x2, Minimal assistance  Distance (ft): 10 Feet (ft)  Assistive Device: Walker, rolling, Gait belt        Christine Menjivar MD  Cell [phone]  Nurse [address] [place] [address] [phone]  Medical Staff : Mj Mccrary / Seamus Donaldson  Office : [phone] [facility]  [phone]

## 2017-07-27 NOTE — PROGRESS NOTES
7/27/2017  CARE MANAGEMENT NOTE:  Pt has been accepted to [facility] rehab and insurance authorization has been obtained. SBAR and room assignment will be given to RN to call report.   Erica

## 2017-07-27 NOTE — PROGRESS NOTES
Bedside and Verbal shift change report given to [address] (oncoming nurse) by TRENT Haro (offgoing nurse). Report given with SBAR, Kardex, OR Summary, Intake/Output, MAR and Recent Results.

## 2017-07-28 LAB
25(OH)D3 SERPL-MCNC: 33.3 NG/ML (ref 30–100)
ALBUMIN SERPL BCP-MCNC: 2.6 G/DL (ref 3.5–5)
ALBUMIN/GLOB SERPL: 0.7 {RATIO} (ref 1.1–2.2)
ALP SERPL-CCNC: 68 U/L (ref 45–117)
ALT SERPL-CCNC: 31 U/L (ref 12–78)
ANION GAP BLD CALC-SCNC: 4 MMOL/L (ref 5–15)
APPEARANCE UR: CLEAR
AST SERPL W P-5'-P-CCNC: 22 U/L (ref 15–37)
BACTERIA URNS QL MICRO: NEGATIVE /HPF
BASOPHILS # BLD AUTO: 0 K/UL (ref 0–0.1)
BASOPHILS # BLD: 0 % (ref 0–1)
BILIRUB SERPL-MCNC: 0.4 MG/DL (ref 0.2–1)
BILIRUB UR QL: NEGATIVE
BUN SERPL-MCNC: 8 MG/DL (ref 6–20)
BUN/CREAT SERPL: 15 (ref 12–20)
CALCIUM SERPL-MCNC: 8.5 MG/DL (ref 8.5–10.1)
CHLORIDE SERPL-SCNC: 104 MMOL/L (ref 97–108)
CO2 SERPL-SCNC: 30 MMOL/L (ref 21–32)
COLOR UR: NORMAL
CREAT SERPL-MCNC: 0.52 MG/DL (ref 0.55–1.02)
EOSINOPHIL # BLD: 0.4 K/UL (ref 0–0.4)
EOSINOPHIL NFR BLD: 4 % (ref 0–7)
EPITH CASTS URNS QL MICRO: NORMAL /LPF
ERYTHROCYTE [DISTWIDTH] IN BLOOD BY AUTOMATED COUNT: 13.9 % (ref 11.5–14.5)
GLOBULIN SER CALC-MCNC: 3.7 G/DL (ref 2–4)
GLUCOSE SERPL-MCNC: 103 MG/DL (ref 65–100)
GLUCOSE UR STRIP.AUTO-MCNC: NEGATIVE MG/DL
HCT VFR BLD AUTO: 25.6 % (ref 35–47)
HGB BLD-MCNC: 8.5 G/DL (ref 11.5–16)
HGB UR QL STRIP: NEGATIVE
HYALINE CASTS URNS QL MICRO: NORMAL /LPF (ref 0–5)
KETONES UR QL STRIP.AUTO: NEGATIVE MG/DL
LEUKOCYTE ESTERASE UR QL STRIP.AUTO: NEGATIVE
LYMPHOCYTES # BLD AUTO: 23 % (ref 12–49)
LYMPHOCYTES # BLD: 2.6 K/UL (ref 0.8–3.5)
MAGNESIUM SERPL-MCNC: 2.1 MG/DL (ref 1.6–2.4)
MCH RBC QN AUTO: 31.8 PG (ref 26–34)
MCHC RBC AUTO-ENTMCNC: 33.2 G/DL (ref 30–36.5)
MCV RBC AUTO: 95.9 FL (ref 80–99)
MONOCYTES # BLD: 1.3 K/UL (ref 0–1)
MONOCYTES NFR BLD AUTO: 11 % (ref 5–13)
NEUTS SEG # BLD: 7.1 K/UL (ref 1.8–8)
NEUTS SEG NFR BLD AUTO: 62 % (ref 32–75)
NITRITE UR QL STRIP.AUTO: NEGATIVE
PH UR STRIP: 7.5 [PH] (ref 5–8)
PHOSPHATE SERPL-MCNC: 4.1 MG/DL (ref 2.6–4.7)
PLATELET # BLD AUTO: 553 K/UL (ref 150–400)
POTASSIUM SERPL-SCNC: 4 MMOL/L (ref 3.5–5.1)
PROT SERPL-MCNC: 6.3 G/DL (ref 6.4–8.2)
PROT UR STRIP-MCNC: NEGATIVE MG/DL
RBC # BLD AUTO: 2.67 M/UL (ref 3.8–5.2)
RBC #/AREA URNS HPF: NORMAL /HPF (ref 0–5)
SODIUM SERPL-SCNC: 138 MMOL/L (ref 136–145)
SP GR UR REFRACTOMETRY: 1.01 (ref 1–1.03)
UROBILINOGEN UR QL STRIP.AUTO: 1 EU/DL (ref 0.2–1)
WBC # BLD AUTO: 11.4 K/UL (ref 3.6–11)
WBC URNS QL MICRO: NORMAL /HPF (ref 0–4)

## 2017-07-28 PROCEDURE — 87086 URINE CULTURE/COLONY COUNT: CPT | Performed by: PHYSICAL MEDICINE & REHABILITATION

## 2017-07-28 PROCEDURE — 82306 VITAMIN D 25 HYDROXY: CPT | Performed by: PHYSICAL MEDICINE & REHABILITATION

## 2017-07-28 PROCEDURE — 74011250637 HC RX REV CODE- 250/637: Performed by: PHYSICAL MEDICINE & REHABILITATION

## 2017-07-28 PROCEDURE — 83735 ASSAY OF MAGNESIUM: CPT | Performed by: PHYSICAL MEDICINE & REHABILITATION

## 2017-07-28 PROCEDURE — 36415 COLL VENOUS BLD VENIPUNCTURE: CPT | Performed by: PHYSICAL MEDICINE & REHABILITATION

## 2017-07-28 PROCEDURE — 80053 COMPREHEN METABOLIC PANEL: CPT | Performed by: PHYSICAL MEDICINE & REHABILITATION

## 2017-07-28 PROCEDURE — 74011250636 HC RX REV CODE- 250/636: Performed by: PHYSICAL MEDICINE & REHABILITATION

## 2017-07-28 PROCEDURE — 85025 COMPLETE CBC W/AUTO DIFF WBC: CPT | Performed by: PHYSICAL MEDICINE & REHABILITATION

## 2017-07-28 PROCEDURE — 81001 URINALYSIS AUTO W/SCOPE: CPT | Performed by: PHYSICAL MEDICINE & REHABILITATION

## 2017-07-28 PROCEDURE — 84100 ASSAY OF PHOSPHORUS: CPT | Performed by: PHYSICAL MEDICINE & REHABILITATION

## 2017-07-28 RX ORDER — TRAMADOL HYDROCHLORIDE 50 MG/1
100 TABLET ORAL 3 TIMES DAILY
Status: DISCONTINUED | OUTPATIENT
Start: 2017-07-28 | End: 2017-08-07 | Stop reason: HOSPADM

## 2017-07-28 RX ORDER — TRAZODONE HYDROCHLORIDE 50 MG/1
50 TABLET ORAL
Status: DISCONTINUED | OUTPATIENT
Start: 2017-07-28 | End: 2017-08-07 | Stop reason: HOSPADM

## 2017-07-28 RX ORDER — WARFARIN SODIUM 5 MG/1
10 TABLET ORAL EVERY EVENING
Status: COMPLETED | OUTPATIENT
Start: 2017-07-28 | End: 2017-07-28

## 2017-07-28 RX ORDER — LANOLIN ALCOHOL/MO/W.PET/CERES
3 CREAM (GRAM) TOPICAL
Status: DISCONTINUED | OUTPATIENT
Start: 2017-07-28 | End: 2017-08-07 | Stop reason: HOSPADM

## 2017-07-28 RX ADMIN — ACETAMINOPHEN 650 MG: 325 TABLET ORAL at 19:47

## 2017-07-28 RX ADMIN — ENOXAPARIN SODIUM 60 MG: 60 INJECTION SUBCUTANEOUS at 08:13

## 2017-07-28 RX ADMIN — OXYCODONE HYDROCHLORIDE 10 MG: 5 TABLET ORAL at 11:13

## 2017-07-28 RX ADMIN — OXYCODONE HYDROCHLORIDE 10 MG: 5 TABLET ORAL at 18:36

## 2017-07-28 RX ADMIN — DOCUSATE SODIUM -SENNOSIDES 1 TABLET: 50; 8.6 TABLET, COATED ORAL at 08:13

## 2017-07-28 RX ADMIN — TRAMADOL HYDROCHLORIDE 100 MG: 50 TABLET, FILM COATED ORAL at 21:37

## 2017-07-28 RX ADMIN — ACETAMINOPHEN 650 MG: 325 TABLET ORAL at 03:32

## 2017-07-28 RX ADMIN — WARFARIN SODIUM 10 MG: 5 TABLET ORAL at 17:28

## 2017-07-28 RX ADMIN — TRAZODONE HYDROCHLORIDE 50 MG: 50 TABLET ORAL at 22:45

## 2017-07-28 RX ADMIN — DOCUSATE SODIUM -SENNOSIDES 1 TABLET: 50; 8.6 TABLET, COATED ORAL at 21:35

## 2017-07-28 RX ADMIN — POLYETHYLENE GLYCOL 3350 17 G: 17 POWDER, FOR SOLUTION ORAL at 08:13

## 2017-07-28 RX ADMIN — ENOXAPARIN SODIUM 60 MG: 60 INJECTION SUBCUTANEOUS at 21:34

## 2017-07-28 RX ADMIN — OXYCODONE HYDROCHLORIDE 10 MG: 5 TABLET ORAL at 21:37

## 2017-07-28 RX ADMIN — TRAMADOL HYDROCHLORIDE 100 MG: 50 TABLET, FILM COATED ORAL at 17:28

## 2017-07-28 RX ADMIN — LEVOTHYROXINE SODIUM 50 MCG: 0.05 TABLET ORAL at 06:47

## 2017-07-28 RX ADMIN — MELATONIN 3 MG: 3 TAB ORAL at 22:45

## 2017-07-28 RX ADMIN — OXYCODONE HYDROCHLORIDE 10 MG: 5 TABLET ORAL at 08:13

## 2017-07-28 RX ADMIN — OXYCODONE HYDROCHLORIDE 15 MG: 5 TABLET ORAL at 14:43

## 2017-07-28 RX ADMIN — OXYCODONE HYDROCHLORIDE 15 MG: 5 TABLET ORAL at 01:00

## 2017-07-29 LAB
BACTERIA SPEC CULT: NORMAL
CC UR VC: NORMAL
SERVICE CMNT-IMP: NORMAL

## 2017-07-29 PROCEDURE — 74011250637 HC RX REV CODE- 250/637: Performed by: PHYSICAL MEDICINE & REHABILITATION

## 2017-07-29 PROCEDURE — 74011250636 HC RX REV CODE- 250/636: Performed by: PHYSICAL MEDICINE & REHABILITATION

## 2017-07-29 RX ORDER — WARFARIN SODIUM 5 MG/1
10 TABLET ORAL EVERY EVENING
Status: COMPLETED | OUTPATIENT
Start: 2017-07-29 | End: 2017-07-29

## 2017-07-29 RX ADMIN — ACETAMINOPHEN 650 MG: 325 TABLET ORAL at 00:16

## 2017-07-29 RX ADMIN — DOCUSATE SODIUM -SENNOSIDES 1 TABLET: 50; 8.6 TABLET, COATED ORAL at 21:36

## 2017-07-29 RX ADMIN — DOCUSATE SODIUM -SENNOSIDES 1 TABLET: 50; 8.6 TABLET, COATED ORAL at 09:12

## 2017-07-29 RX ADMIN — TRAMADOL HYDROCHLORIDE 100 MG: 50 TABLET, FILM COATED ORAL at 14:55

## 2017-07-29 RX ADMIN — MELATONIN 3 MG: 3 TAB ORAL at 21:36

## 2017-07-29 RX ADMIN — TRAMADOL HYDROCHLORIDE 100 MG: 50 TABLET, FILM COATED ORAL at 21:36

## 2017-07-29 RX ADMIN — OXYCODONE HYDROCHLORIDE 10 MG: 5 TABLET ORAL at 12:20

## 2017-07-29 RX ADMIN — ENOXAPARIN SODIUM 60 MG: 60 INJECTION SUBCUTANEOUS at 21:36

## 2017-07-29 RX ADMIN — TRAMADOL HYDROCHLORIDE 100 MG: 50 TABLET, FILM COATED ORAL at 06:18

## 2017-07-29 RX ADMIN — OXYCODONE HYDROCHLORIDE 10 MG: 5 TABLET ORAL at 19:29

## 2017-07-29 RX ADMIN — TRAZODONE HYDROCHLORIDE 50 MG: 50 TABLET ORAL at 21:36

## 2017-07-29 RX ADMIN — WARFARIN SODIUM 10 MG: 5 TABLET ORAL at 17:24

## 2017-07-29 RX ADMIN — OXYCODONE HYDROCHLORIDE 15 MG: 5 TABLET ORAL at 09:12

## 2017-07-29 RX ADMIN — OXYCODONE HYDROCHLORIDE 10 MG: 5 TABLET ORAL at 03:14

## 2017-07-29 RX ADMIN — OXYCODONE HYDROCHLORIDE 15 MG: 5 TABLET ORAL at 23:17

## 2017-07-29 RX ADMIN — OXYCODONE HYDROCHLORIDE 15 MG: 5 TABLET ORAL at 15:45

## 2017-07-29 RX ADMIN — OXYCODONE HYDROCHLORIDE 10 MG: 5 TABLET ORAL at 00:16

## 2017-07-29 RX ADMIN — ENOXAPARIN SODIUM 60 MG: 60 INJECTION SUBCUTANEOUS at 09:12

## 2017-07-29 RX ADMIN — OXYCODONE HYDROCHLORIDE 10 MG: 5 TABLET ORAL at 06:18

## 2017-07-29 RX ADMIN — ACETAMINOPHEN 650 MG: 325 TABLET ORAL at 03:14

## 2017-07-29 RX ADMIN — LEVOTHYROXINE SODIUM 50 MCG: 0.05 TABLET ORAL at 06:17

## 2017-07-30 PROCEDURE — 74011250636 HC RX REV CODE- 250/636: Performed by: PHYSICAL MEDICINE & REHABILITATION

## 2017-07-30 PROCEDURE — 74011250637 HC RX REV CODE- 250/637: Performed by: PHYSICAL MEDICINE & REHABILITATION

## 2017-07-30 RX ORDER — WARFARIN SODIUM 5 MG/1
10 TABLET ORAL EVERY EVENING
Status: COMPLETED | OUTPATIENT
Start: 2017-07-30 | End: 2017-07-30

## 2017-07-30 RX ADMIN — POLYETHYLENE GLYCOL 3350 17 G: 17 POWDER, FOR SOLUTION ORAL at 08:49

## 2017-07-30 RX ADMIN — OXYCODONE HYDROCHLORIDE 15 MG: 5 TABLET ORAL at 19:36

## 2017-07-30 RX ADMIN — TRAMADOL HYDROCHLORIDE 100 MG: 50 TABLET, FILM COATED ORAL at 21:42

## 2017-07-30 RX ADMIN — TRAMADOL HYDROCHLORIDE 100 MG: 50 TABLET, FILM COATED ORAL at 14:35

## 2017-07-30 RX ADMIN — OXYCODONE HYDROCHLORIDE 15 MG: 5 TABLET ORAL at 12:15

## 2017-07-30 RX ADMIN — LEVOTHYROXINE SODIUM 50 MCG: 0.05 TABLET ORAL at 06:17

## 2017-07-30 RX ADMIN — DOCUSATE SODIUM -SENNOSIDES 1 TABLET: 50; 8.6 TABLET, COATED ORAL at 08:49

## 2017-07-30 RX ADMIN — OXYCODONE HYDROCHLORIDE 10 MG: 5 TABLET ORAL at 08:48

## 2017-07-30 RX ADMIN — ENOXAPARIN SODIUM 60 MG: 60 INJECTION SUBCUTANEOUS at 21:42

## 2017-07-30 RX ADMIN — OXYCODONE HYDROCHLORIDE 10 MG: 5 TABLET ORAL at 23:19

## 2017-07-30 RX ADMIN — OXYCODONE HYDROCHLORIDE 15 MG: 5 TABLET ORAL at 06:17

## 2017-07-30 RX ADMIN — OXYCODONE HYDROCHLORIDE 10 MG: 5 TABLET ORAL at 03:20

## 2017-07-30 RX ADMIN — MELATONIN 3 MG: 3 TAB ORAL at 23:14

## 2017-07-30 RX ADMIN — WARFARIN SODIUM 10 MG: 5 TABLET ORAL at 17:15

## 2017-07-30 RX ADMIN — TRAZODONE HYDROCHLORIDE 50 MG: 50 TABLET ORAL at 23:13

## 2017-07-30 RX ADMIN — ENOXAPARIN SODIUM 60 MG: 60 INJECTION SUBCUTANEOUS at 08:47

## 2017-07-30 RX ADMIN — OXYCODONE HYDROCHLORIDE 10 MG: 5 TABLET ORAL at 16:25

## 2017-07-31 ENCOUNTER — PATIENT OUTREACH (OUTPATIENT)
Dept: FAMILY MEDICINE CLINIC | Age: 47
End: 2017-07-31

## 2017-07-31 LAB
ERYTHROCYTE [DISTWIDTH] IN BLOOD BY AUTOMATED COUNT: 13.5 % (ref 11.5–14.5)
HCT VFR BLD AUTO: 28.9 % (ref 35–47)
HGB BLD-MCNC: 9.4 G/DL (ref 11.5–16)
MCH RBC QN AUTO: 31.1 PG (ref 26–34)
MCHC RBC AUTO-ENTMCNC: 32.5 G/DL (ref 30–36.5)
MCV RBC AUTO: 95.7 FL (ref 80–99)
PLATELET # BLD AUTO: 681 K/UL (ref 150–400)
RBC # BLD AUTO: 3.02 M/UL (ref 3.8–5.2)
WBC # BLD AUTO: 11.8 K/UL (ref 3.6–11)

## 2017-07-31 PROCEDURE — 74011250636 HC RX REV CODE- 250/636: Performed by: PHYSICAL MEDICINE & REHABILITATION

## 2017-07-31 PROCEDURE — 85027 COMPLETE CBC AUTOMATED: CPT | Performed by: PHYSICAL MEDICINE & REHABILITATION

## 2017-07-31 PROCEDURE — 36415 COLL VENOUS BLD VENIPUNCTURE: CPT | Performed by: PHYSICAL MEDICINE & REHABILITATION

## 2017-07-31 PROCEDURE — 74011250637 HC RX REV CODE- 250/637: Performed by: PHYSICAL MEDICINE & REHABILITATION

## 2017-07-31 RX ORDER — ADHESIVE BANDAGE
30 BANDAGE TOPICAL ONCE
Status: DISPENSED | OUTPATIENT
Start: 2017-07-31 | End: 2017-08-01

## 2017-07-31 RX ORDER — WARFARIN SODIUM 5 MG/1
10 TABLET ORAL EVERY EVENING
Status: COMPLETED | OUTPATIENT
Start: 2017-07-31 | End: 2017-07-31

## 2017-07-31 RX ADMIN — OXYCODONE HYDROCHLORIDE 10 MG: 5 TABLET ORAL at 05:52

## 2017-07-31 RX ADMIN — ENOXAPARIN SODIUM 60 MG: 60 INJECTION SUBCUTANEOUS at 20:50

## 2017-07-31 RX ADMIN — LEVOTHYROXINE SODIUM 50 MCG: 0.05 TABLET ORAL at 05:53

## 2017-07-31 RX ADMIN — OXYCODONE HYDROCHLORIDE 10 MG: 5 TABLET ORAL at 22:34

## 2017-07-31 RX ADMIN — TRAMADOL HYDROCHLORIDE 100 MG: 50 TABLET, FILM COATED ORAL at 13:06

## 2017-07-31 RX ADMIN — DOCUSATE SODIUM -SENNOSIDES 1 TABLET: 50; 8.6 TABLET, COATED ORAL at 08:09

## 2017-07-31 RX ADMIN — TRAMADOL HYDROCHLORIDE 100 MG: 50 TABLET, FILM COATED ORAL at 20:50

## 2017-07-31 RX ADMIN — ENOXAPARIN SODIUM 60 MG: 60 INJECTION SUBCUTANEOUS at 08:09

## 2017-07-31 RX ADMIN — OXYCODONE HYDROCHLORIDE 15 MG: 5 TABLET ORAL at 16:01

## 2017-07-31 RX ADMIN — TRAZODONE HYDROCHLORIDE 50 MG: 50 TABLET ORAL at 20:01

## 2017-07-31 RX ADMIN — OXYCODONE HYDROCHLORIDE 15 MG: 5 TABLET ORAL at 02:23

## 2017-07-31 RX ADMIN — MELATONIN 3 MG: 3 TAB ORAL at 22:34

## 2017-07-31 RX ADMIN — POLYETHYLENE GLYCOL 3350 17 G: 17 POWDER, FOR SOLUTION ORAL at 08:09

## 2017-07-31 RX ADMIN — ACETAMINOPHEN 650 MG: 325 TABLET ORAL at 20:01

## 2017-07-31 RX ADMIN — WARFARIN SODIUM 10 MG: 5 TABLET ORAL at 17:14

## 2017-07-31 RX ADMIN — OXYCODONE HYDROCHLORIDE 15 MG: 5 TABLET ORAL at 09:29

## 2017-07-31 NOTE — DISCHARGE SUMMARY
Total Knee Replacement Home Discharge Summary    Patient ID:  Monika Gaffney  1970  56 y.o.  [id number]    Admit date: 7/24/2017    Discharge date and time: 7/27/2017  5:33 PM     Admitting Physician: Paul Ponce MD     Admission Diagnoses: BILATERAL KNEE ARTHRITIS  Primary localized osteoarthritis of knees, bilateral    Discharge Diagnoses: Active Problems:    Primary localized osteoarthritis of knees, bilateral (7/24/2017)        Surgeon: Goldy Yoon MD    HOSPITAL COURSE:  Monika Gaffney was admitted on7/24/2017 and underwent successful total knee arthroplasty. There were no complications intraoperatively and the patient was transferred to the orthopaedic floor in stable condition. Physical therapy and occupational therapy initiated their evaluation and treatment and continued to follow the patient until the patient was discharged. DVT prophylaxis was initiated on the day of surgery including; Pharmocologic prophylaxis and bilateral foot pumps. The incision site remained clean, dry, and intact. The patient remained neurovascularly intact. At the time of discharge, the patient was able to ambulate safely, go up and down stairs and had an understanding of the explicit discharge precautions and instructions following surgery. The patient had adequate oral pain control and good PO intake. Important in Hospital Events : Did well and mobilized slowly with PT, No acute events, moderate pain at times. Post Op complications: None    This patient is currently admitted, however, discharge medications can only be displayed within the current admission encounter.       Discharged to: [facility] rehab    Follow-up : Scheduled for 2 weeks post-op    Signed:  Paul Ponce MD  7/31/2017  7:03 PM

## 2017-08-01 PROCEDURE — 74011250636 HC RX REV CODE- 250/636: Performed by: PHYSICAL MEDICINE & REHABILITATION

## 2017-08-01 PROCEDURE — 74011250637 HC RX REV CODE- 250/637: Performed by: PHYSICAL MEDICINE & REHABILITATION

## 2017-08-01 RX ORDER — GUANFACINE HYDROCHLORIDE 1 MG/1
1 TABLET ORAL
Status: DISCONTINUED | OUTPATIENT
Start: 2017-08-02 | End: 2017-08-07 | Stop reason: HOSPADM

## 2017-08-01 RX ORDER — OXYCODONE HYDROCHLORIDE 5 MG/1
10 TABLET ORAL
Status: DISCONTINUED | OUTPATIENT
Start: 2017-08-01 | End: 2017-08-07 | Stop reason: HOSPADM

## 2017-08-01 RX ORDER — OXYCODONE HYDROCHLORIDE 5 MG/1
5 TABLET ORAL
Status: DISCONTINUED | OUTPATIENT
Start: 2017-08-01 | End: 2017-08-07 | Stop reason: HOSPADM

## 2017-08-01 RX ORDER — GUANFACINE HYDROCHLORIDE 1 MG/1
1 TABLET ORAL
Status: COMPLETED | OUTPATIENT
Start: 2017-08-01 | End: 2017-08-01

## 2017-08-01 RX ORDER — OXYCODONE HCL 20 MG/1
20 TABLET, FILM COATED, EXTENDED RELEASE ORAL EVERY 12 HOURS
Status: DISCONTINUED | OUTPATIENT
Start: 2017-08-01 | End: 2017-08-03

## 2017-08-01 RX ADMIN — OXYCODONE HYDROCHLORIDE 10 MG: 5 TABLET ORAL at 08:52

## 2017-08-01 RX ADMIN — ENOXAPARIN SODIUM 60 MG: 60 INJECTION SUBCUTANEOUS at 08:07

## 2017-08-01 RX ADMIN — OXYCODONE HYDROCHLORIDE 20 MG: 20 TABLET, FILM COATED, EXTENDED RELEASE ORAL at 17:55

## 2017-08-01 RX ADMIN — GUANFACINE HYDROCHLORIDE 1 MG: 1 TABLET ORAL at 16:58

## 2017-08-01 RX ADMIN — OXYCODONE HYDROCHLORIDE 10 MG: 5 TABLET ORAL at 01:40

## 2017-08-01 RX ADMIN — TRAMADOL HYDROCHLORIDE 100 MG: 50 TABLET, FILM COATED ORAL at 14:01

## 2017-08-01 RX ADMIN — OXYCODONE HYDROCHLORIDE 10 MG: 5 TABLET ORAL at 17:55

## 2017-08-01 RX ADMIN — ENOXAPARIN SODIUM 60 MG: 60 INJECTION SUBCUTANEOUS at 22:10

## 2017-08-01 RX ADMIN — ACETAMINOPHEN 650 MG: 325 TABLET ORAL at 22:10

## 2017-08-01 RX ADMIN — OXYCODONE HYDROCHLORIDE 10 MG: 5 TABLET ORAL at 11:52

## 2017-08-01 RX ADMIN — WARFARIN SODIUM 7.5 MG: 5 TABLET ORAL at 17:55

## 2017-08-01 RX ADMIN — OXYCODONE HYDROCHLORIDE 10 MG: 5 TABLET ORAL at 22:10

## 2017-08-01 RX ADMIN — MELATONIN 3 MG: 3 TAB ORAL at 22:10

## 2017-08-01 RX ADMIN — OXYCODONE HYDROCHLORIDE 10 MG: 5 TABLET ORAL at 14:53

## 2017-08-01 RX ADMIN — TRAMADOL HYDROCHLORIDE 100 MG: 50 TABLET, FILM COATED ORAL at 22:55

## 2017-08-01 RX ADMIN — ACETAMINOPHEN 650 MG: 325 TABLET ORAL at 01:42

## 2017-08-01 RX ADMIN — OXYCODONE HYDROCHLORIDE 10 MG: 5 TABLET ORAL at 05:53

## 2017-08-01 RX ADMIN — TRAZODONE HYDROCHLORIDE 50 MG: 50 TABLET ORAL at 22:10

## 2017-08-01 RX ADMIN — ACETAMINOPHEN 650 MG: 325 TABLET ORAL at 05:53

## 2017-08-01 RX ADMIN — TRAMADOL HYDROCHLORIDE 100 MG: 50 TABLET, FILM COATED ORAL at 08:00

## 2017-08-01 RX ADMIN — LEVOTHYROXINE SODIUM 50 MCG: 0.05 TABLET ORAL at 05:53

## 2017-08-02 PROCEDURE — 74011250636 HC RX REV CODE- 250/636: Performed by: PHYSICAL MEDICINE & REHABILITATION

## 2017-08-02 PROCEDURE — 74011250637 HC RX REV CODE- 250/637: Performed by: PHYSICAL MEDICINE & REHABILITATION

## 2017-08-02 RX ADMIN — OXYCODONE HYDROCHLORIDE 10 MG: 5 TABLET ORAL at 14:38

## 2017-08-02 RX ADMIN — TRAMADOL HYDROCHLORIDE 100 MG: 50 TABLET, FILM COATED ORAL at 14:38

## 2017-08-02 RX ADMIN — ENOXAPARIN SODIUM 60 MG: 60 INJECTION SUBCUTANEOUS at 09:04

## 2017-08-02 RX ADMIN — OXYCODONE HYDROCHLORIDE 20 MG: 20 TABLET, FILM COATED, EXTENDED RELEASE ORAL at 17:44

## 2017-08-02 RX ADMIN — ACETAMINOPHEN 650 MG: 325 TABLET ORAL at 10:41

## 2017-08-02 RX ADMIN — ENOXAPARIN SODIUM 60 MG: 60 INJECTION SUBCUTANEOUS at 21:56

## 2017-08-02 RX ADMIN — LEVOTHYROXINE SODIUM 50 MCG: 0.05 TABLET ORAL at 06:11

## 2017-08-02 RX ADMIN — OXYCODONE HYDROCHLORIDE 10 MG: 5 TABLET ORAL at 10:40

## 2017-08-02 RX ADMIN — TRAMADOL HYDROCHLORIDE 100 MG: 50 TABLET, FILM COATED ORAL at 09:04

## 2017-08-02 RX ADMIN — OXYCODONE HYDROCHLORIDE 20 MG: 20 TABLET, FILM COATED, EXTENDED RELEASE ORAL at 06:11

## 2017-08-02 RX ADMIN — OXYCODONE HYDROCHLORIDE 10 MG: 5 TABLET ORAL at 02:15

## 2017-08-02 RX ADMIN — ACETAMINOPHEN 650 MG: 325 TABLET ORAL at 19:42

## 2017-08-02 RX ADMIN — ACETAMINOPHEN 650 MG: 325 TABLET ORAL at 14:38

## 2017-08-02 RX ADMIN — ACETAMINOPHEN 650 MG: 325 TABLET ORAL at 06:11

## 2017-08-02 RX ADMIN — GUANFACINE HYDROCHLORIDE 1 MG: 1 TABLET ORAL at 21:57

## 2017-08-02 RX ADMIN — OXYCODONE HYDROCHLORIDE 10 MG: 5 TABLET ORAL at 06:11

## 2017-08-02 RX ADMIN — MELATONIN 3 MG: 3 TAB ORAL at 22:01

## 2017-08-02 RX ADMIN — ACETAMINOPHEN 650 MG: 325 TABLET ORAL at 02:15

## 2017-08-02 RX ADMIN — TRAMADOL HYDROCHLORIDE 100 MG: 50 TABLET, FILM COATED ORAL at 22:00

## 2017-08-02 RX ADMIN — ACETAMINOPHEN 650 MG: 325 TABLET ORAL at 23:49

## 2017-08-02 RX ADMIN — TRAZODONE HYDROCHLORIDE 50 MG: 50 TABLET ORAL at 21:57

## 2017-08-02 RX ADMIN — OXYCODONE HYDROCHLORIDE 10 MG: 5 TABLET ORAL at 19:43

## 2017-08-02 RX ADMIN — WARFARIN SODIUM 7.5 MG: 5 TABLET ORAL at 17:44

## 2017-08-02 RX ADMIN — OXYCODONE HYDROCHLORIDE 10 MG: 5 TABLET ORAL at 23:49

## 2017-08-03 LAB
ERYTHROCYTE [DISTWIDTH] IN BLOOD BY AUTOMATED COUNT: 13.5 % (ref 11.5–14.5)
HCT VFR BLD AUTO: 29.1 % (ref 35–47)
HGB BLD-MCNC: 9.3 G/DL (ref 11.5–16)
MCH RBC QN AUTO: 31 PG (ref 26–34)
MCHC RBC AUTO-ENTMCNC: 32 G/DL (ref 30–36.5)
MCV RBC AUTO: 97 FL (ref 80–99)
PLATELET # BLD AUTO: 704 K/UL (ref 150–400)
RBC # BLD AUTO: 3 M/UL (ref 3.8–5.2)
WBC # BLD AUTO: 10.2 K/UL (ref 3.6–11)

## 2017-08-03 PROCEDURE — 74011250637 HC RX REV CODE- 250/637: Performed by: PHYSICAL MEDICINE & REHABILITATION

## 2017-08-03 PROCEDURE — 74011250637 HC RX REV CODE- 250/637

## 2017-08-03 PROCEDURE — 85027 COMPLETE CBC AUTOMATED: CPT | Performed by: PHYSICAL MEDICINE & REHABILITATION

## 2017-08-03 PROCEDURE — 36415 COLL VENOUS BLD VENIPUNCTURE: CPT | Performed by: PHYSICAL MEDICINE & REHABILITATION

## 2017-08-03 PROCEDURE — 74011250636 HC RX REV CODE- 250/636: Performed by: PHYSICAL MEDICINE & REHABILITATION

## 2017-08-03 RX ORDER — WARFARIN SODIUM 5 MG/1
10 TABLET ORAL EVERY EVENING
Status: COMPLETED | OUTPATIENT
Start: 2017-08-03 | End: 2017-08-03

## 2017-08-03 RX ORDER — WARFARIN SODIUM 5 MG/1
TABLET ORAL
Status: COMPLETED
Start: 2017-08-03 | End: 2017-08-03

## 2017-08-03 RX ORDER — OXYCODONE HYDROCHLORIDE 15 MG/1
TABLET, FILM COATED, EXTENDED RELEASE ORAL
Status: COMPLETED
Start: 2017-08-03 | End: 2017-08-03

## 2017-08-03 RX ORDER — OXYCODONE HYDROCHLORIDE 15 MG/1
15 TABLET, FILM COATED, EXTENDED RELEASE ORAL EVERY 12 HOURS
Status: DISCONTINUED | OUTPATIENT
Start: 2017-08-03 | End: 2017-08-06

## 2017-08-03 RX ADMIN — OXYCODONE HYDROCHLORIDE 20 MG: 20 TABLET, FILM COATED, EXTENDED RELEASE ORAL at 05:56

## 2017-08-03 RX ADMIN — LEVOTHYROXINE SODIUM 50 MCG: 0.05 TABLET ORAL at 05:56

## 2017-08-03 RX ADMIN — MELATONIN 3 MG: 3 TAB ORAL at 21:47

## 2017-08-03 RX ADMIN — OXYCODONE HYDROCHLORIDE 15 MG: 15 TABLET, FILM COATED, EXTENDED RELEASE ORAL at 17:49

## 2017-08-03 RX ADMIN — WARFARIN SODIUM 10 MG: 5 TABLET ORAL at 17:49

## 2017-08-03 RX ADMIN — OXYCODONE HYDROCHLORIDE 10 MG: 5 TABLET ORAL at 13:08

## 2017-08-03 RX ADMIN — TRAMADOL HYDROCHLORIDE 100 MG: 50 TABLET, FILM COATED ORAL at 08:47

## 2017-08-03 RX ADMIN — OXYCODONE HYDROCHLORIDE 10 MG: 5 TABLET ORAL at 08:48

## 2017-08-03 RX ADMIN — OXYCODONE HYDROCHLORIDE 10 MG: 5 TABLET ORAL at 04:05

## 2017-08-03 RX ADMIN — ACETAMINOPHEN 650 MG: 325 TABLET ORAL at 08:48

## 2017-08-03 RX ADMIN — TRAMADOL HYDROCHLORIDE 100 MG: 50 TABLET, FILM COATED ORAL at 21:47

## 2017-08-03 RX ADMIN — ENOXAPARIN SODIUM 60 MG: 60 INJECTION SUBCUTANEOUS at 08:49

## 2017-08-03 RX ADMIN — TRAZODONE HYDROCHLORIDE 50 MG: 50 TABLET ORAL at 21:47

## 2017-08-03 RX ADMIN — OXYCODONE HYDROCHLORIDE 10 MG: 5 TABLET ORAL at 22:40

## 2017-08-03 RX ADMIN — ACETAMINOPHEN 650 MG: 325 TABLET ORAL at 13:08

## 2017-08-03 RX ADMIN — OXYCODONE HYDROCHLORIDE 10 MG: 5 TABLET ORAL at 17:45

## 2017-08-03 RX ADMIN — GUANFACINE HYDROCHLORIDE 1 MG: 1 TABLET ORAL at 21:47

## 2017-08-03 RX ADMIN — TRAMADOL HYDROCHLORIDE 100 MG: 50 TABLET, FILM COATED ORAL at 15:06

## 2017-08-03 RX ADMIN — ACETAMINOPHEN 650 MG: 325 TABLET ORAL at 17:45

## 2017-08-03 RX ADMIN — ACETAMINOPHEN 650 MG: 325 TABLET ORAL at 21:47

## 2017-08-03 RX ADMIN — ENOXAPARIN SODIUM 60 MG: 60 INJECTION SUBCUTANEOUS at 21:42

## 2017-08-03 RX ADMIN — ACETAMINOPHEN 650 MG: 325 TABLET ORAL at 04:05

## 2017-08-04 VITALS
WEIGHT: 195 LBS | HEIGHT: 62 IN | DIASTOLIC BLOOD PRESSURE: 66 MMHG | SYSTOLIC BLOOD PRESSURE: 111 MMHG | HEART RATE: 87 BPM | BODY MASS INDEX: 35.88 KG/M2

## 2017-08-04 PROCEDURE — 74011250637 HC RX REV CODE- 250/637: Performed by: PHYSICAL MEDICINE & REHABILITATION

## 2017-08-04 RX ORDER — WARFARIN SODIUM 5 MG/1
10 TABLET ORAL EVERY EVENING
Status: COMPLETED | OUTPATIENT
Start: 2017-08-04 | End: 2017-08-06

## 2017-08-04 RX ADMIN — OXYCODONE HYDROCHLORIDE 15 MG: 15 TABLET, FILM COATED, EXTENDED RELEASE ORAL at 17:34

## 2017-08-04 RX ADMIN — MELATONIN 3 MG: 3 TAB ORAL at 21:20

## 2017-08-04 RX ADMIN — OXYCODONE HYDROCHLORIDE 10 MG: 5 TABLET ORAL at 20:29

## 2017-08-04 RX ADMIN — LEVOTHYROXINE SODIUM 50 MCG: 0.05 TABLET ORAL at 05:54

## 2017-08-04 RX ADMIN — OXYCODONE HYDROCHLORIDE 10 MG: 5 TABLET ORAL at 02:10

## 2017-08-04 RX ADMIN — ACETAMINOPHEN 650 MG: 325 TABLET ORAL at 05:56

## 2017-08-04 RX ADMIN — OXYCODONE HYDROCHLORIDE 10 MG: 5 TABLET ORAL at 10:07

## 2017-08-04 RX ADMIN — ACETAMINOPHEN 650 MG: 325 TABLET ORAL at 02:10

## 2017-08-04 RX ADMIN — TRAMADOL HYDROCHLORIDE 100 MG: 50 TABLET, FILM COATED ORAL at 08:46

## 2017-08-04 RX ADMIN — TRAMADOL HYDROCHLORIDE 100 MG: 50 TABLET, FILM COATED ORAL at 21:20

## 2017-08-04 RX ADMIN — TRAZODONE HYDROCHLORIDE 50 MG: 50 TABLET ORAL at 21:20

## 2017-08-04 RX ADMIN — WARFARIN SODIUM 10 MG: 5 TABLET ORAL at 17:34

## 2017-08-04 RX ADMIN — OXYCODONE HYDROCHLORIDE 15 MG: 15 TABLET, FILM COATED, EXTENDED RELEASE ORAL at 05:56

## 2017-08-04 RX ADMIN — TRAMADOL HYDROCHLORIDE 100 MG: 50 TABLET, FILM COATED ORAL at 14:01

## 2017-08-04 RX ADMIN — ACETAMINOPHEN 650 MG: 325 TABLET ORAL at 10:07

## 2017-08-04 RX ADMIN — OXYCODONE HYDROCHLORIDE 10 MG: 5 TABLET ORAL at 14:01

## 2017-08-04 RX ADMIN — OXYCODONE HYDROCHLORIDE 10 MG: 5 TABLET ORAL at 05:55

## 2017-08-04 RX ADMIN — GUANFACINE HYDROCHLORIDE 1 MG: 1 TABLET ORAL at 21:20

## 2017-08-05 PROCEDURE — 74011250637 HC RX REV CODE- 250/637: Performed by: PHYSICAL MEDICINE & REHABILITATION

## 2017-08-05 RX ADMIN — MELATONIN 3 MG: 3 TAB ORAL at 21:01

## 2017-08-05 RX ADMIN — GUANFACINE HYDROCHLORIDE 1 MG: 1 TABLET ORAL at 21:00

## 2017-08-05 RX ADMIN — TRAMADOL HYDROCHLORIDE 100 MG: 50 TABLET, FILM COATED ORAL at 08:03

## 2017-08-05 RX ADMIN — TRAZODONE HYDROCHLORIDE 50 MG: 50 TABLET ORAL at 21:00

## 2017-08-05 RX ADMIN — OXYCODONE HYDROCHLORIDE 10 MG: 5 TABLET ORAL at 15:47

## 2017-08-05 RX ADMIN — OXYCODONE HYDROCHLORIDE 15 MG: 15 TABLET, FILM COATED, EXTENDED RELEASE ORAL at 18:19

## 2017-08-05 RX ADMIN — OXYCODONE HYDROCHLORIDE 15 MG: 15 TABLET, FILM COATED, EXTENDED RELEASE ORAL at 05:19

## 2017-08-05 RX ADMIN — ACETAMINOPHEN 650 MG: 325 TABLET ORAL at 00:42

## 2017-08-05 RX ADMIN — OXYCODONE HYDROCHLORIDE 10 MG: 5 TABLET ORAL at 05:51

## 2017-08-05 RX ADMIN — TRAMADOL HYDROCHLORIDE 100 MG: 50 TABLET, FILM COATED ORAL at 23:51

## 2017-08-05 RX ADMIN — OXYCODONE HYDROCHLORIDE 10 MG: 5 TABLET ORAL at 21:00

## 2017-08-05 RX ADMIN — WARFARIN SODIUM 10 MG: 5 TABLET ORAL at 17:35

## 2017-08-05 RX ADMIN — ACETAMINOPHEN 650 MG: 325 TABLET ORAL at 21:05

## 2017-08-05 RX ADMIN — LEVOTHYROXINE SODIUM 50 MCG: 0.05 TABLET ORAL at 05:20

## 2017-08-05 RX ADMIN — OXYCODONE HYDROCHLORIDE 10 MG: 5 TABLET ORAL at 10:51

## 2017-08-05 RX ADMIN — OXYCODONE HYDROCHLORIDE 10 MG: 5 TABLET ORAL at 00:42

## 2017-08-05 RX ADMIN — ACETAMINOPHEN 650 MG: 325 TABLET ORAL at 05:19

## 2017-08-05 RX ADMIN — TRAMADOL HYDROCHLORIDE 100 MG: 50 TABLET, FILM COATED ORAL at 15:44

## 2017-08-06 PROCEDURE — 74011250637 HC RX REV CODE- 250/637: Performed by: PHYSICAL MEDICINE & REHABILITATION

## 2017-08-06 RX ORDER — OXYCODONE HCL 10 MG/1
10 TABLET, FILM COATED, EXTENDED RELEASE ORAL EVERY 12 HOURS
Status: DISCONTINUED | OUTPATIENT
Start: 2017-08-06 | End: 2017-08-07 | Stop reason: HOSPADM

## 2017-08-06 RX ADMIN — TRAZODONE HYDROCHLORIDE 50 MG: 50 TABLET ORAL at 21:19

## 2017-08-06 RX ADMIN — TRAMADOL HYDROCHLORIDE 100 MG: 50 TABLET, FILM COATED ORAL at 08:55

## 2017-08-06 RX ADMIN — OXYCODONE HYDROCHLORIDE 10 MG: 10 TABLET, FILM COATED, EXTENDED RELEASE ORAL at 17:17

## 2017-08-06 RX ADMIN — OXYCODONE HYDROCHLORIDE 10 MG: 5 TABLET ORAL at 03:08

## 2017-08-06 RX ADMIN — OXYCODONE HYDROCHLORIDE 10 MG: 5 TABLET ORAL at 21:19

## 2017-08-06 RX ADMIN — MELATONIN 3 MG: 3 TAB ORAL at 21:19

## 2017-08-06 RX ADMIN — WARFARIN SODIUM 10 MG: 5 TABLET ORAL at 17:17

## 2017-08-06 RX ADMIN — LEVOTHYROXINE SODIUM 50 MCG: 0.05 TABLET ORAL at 05:32

## 2017-08-06 RX ADMIN — ACETAMINOPHEN 650 MG: 325 TABLET ORAL at 03:09

## 2017-08-06 RX ADMIN — OXYCODONE HYDROCHLORIDE 15 MG: 15 TABLET, FILM COATED, EXTENDED RELEASE ORAL at 05:32

## 2017-08-06 RX ADMIN — ACETAMINOPHEN 650 MG: 325 TABLET ORAL at 21:18

## 2017-08-06 RX ADMIN — TRAMADOL HYDROCHLORIDE 100 MG: 50 TABLET, FILM COATED ORAL at 17:17

## 2017-08-06 RX ADMIN — OXYCODONE HYDROCHLORIDE 10 MG: 5 TABLET ORAL at 08:59

## 2017-08-07 ENCOUNTER — PATIENT OUTREACH (OUTPATIENT)
Dept: FAMILY MEDICINE CLINIC | Age: 47
End: 2017-08-07

## 2017-08-07 LAB
ERYTHROCYTE [DISTWIDTH] IN BLOOD BY AUTOMATED COUNT: 13.6 % (ref 11.5–14.5)
HCT VFR BLD AUTO: 31.3 % (ref 35–47)
HGB BLD-MCNC: 10.1 G/DL (ref 11.5–16)
MCH RBC QN AUTO: 30.7 PG (ref 26–34)
MCHC RBC AUTO-ENTMCNC: 32.3 G/DL (ref 30–36.5)
MCV RBC AUTO: 95.1 FL (ref 80–99)
PLATELET # BLD AUTO: 754 K/UL (ref 150–400)
RBC # BLD AUTO: 3.29 M/UL (ref 3.8–5.2)
WBC # BLD AUTO: 9.3 K/UL (ref 3.6–11)

## 2017-08-07 PROCEDURE — 85027 COMPLETE CBC AUTOMATED: CPT | Performed by: PHYSICAL MEDICINE & REHABILITATION

## 2017-08-07 PROCEDURE — 74011250637 HC RX REV CODE- 250/637: Performed by: PHYSICAL MEDICINE & REHABILITATION

## 2017-08-07 PROCEDURE — 36415 COLL VENOUS BLD VENIPUNCTURE: CPT | Performed by: PHYSICAL MEDICINE & REHABILITATION

## 2017-08-07 RX ORDER — WARFARIN SODIUM 5 MG/1
10 TABLET ORAL EVERY EVENING
Status: DISCONTINUED | OUTPATIENT
Start: 2017-08-07 | End: 2017-08-07 | Stop reason: HOSPADM

## 2017-08-07 RX ADMIN — ONDANSETRON 4 MG: 4 TABLET, ORALLY DISINTEGRATING ORAL at 09:49

## 2017-08-07 RX ADMIN — LEVOTHYROXINE SODIUM 50 MCG: 0.05 TABLET ORAL at 05:22

## 2017-08-07 RX ADMIN — TRAMADOL HYDROCHLORIDE 100 MG: 50 TABLET, FILM COATED ORAL at 08:25

## 2017-08-07 RX ADMIN — OXYCODONE HYDROCHLORIDE 10 MG: 5 TABLET ORAL at 05:22

## 2017-08-07 RX ADMIN — OXYCODONE HYDROCHLORIDE 10 MG: 5 TABLET ORAL at 01:03

## 2017-08-07 RX ADMIN — OXYCODONE HYDROCHLORIDE 10 MG: 5 TABLET ORAL at 09:00

## 2017-08-07 RX ADMIN — OXYCODONE HYDROCHLORIDE 10 MG: 10 TABLET, FILM COATED, EXTENDED RELEASE ORAL at 05:22

## 2017-08-07 RX ADMIN — TRAMADOL HYDROCHLORIDE 100 MG: 50 TABLET, FILM COATED ORAL at 00:54

## 2017-08-07 NOTE — PROGRESS NOTES
NN Follow-Up          Follow up call placed to patient. Patient discharged from [facility] on 17 for Bilateral total Knee Arthroplasty. Panel Manager contacted the patient by telephone to perform post hospital discharge follow up. Verified  and address with patient as identifiers. Provided introduction to self, and reason for calling. Patient stated that she is doing great. Patient has finish therapy at [address] [place] [address] and will be discharged today. Patient does have a follow up scheduled with her surgeon but does not know the exact date at this time. Patient aware she should follow up with Dr. Bradley Schaefer when she is discharged.

## 2017-08-13 DIAGNOSIS — E03.9 ACQUIRED HYPOTHYROIDISM: ICD-10-CM

## 2017-08-21 ENCOUNTER — PATIENT OUTREACH (OUTPATIENT)
Dept: FAMILY MEDICINE CLINIC | Age: 47
End: 2017-08-21

## 2017-08-21 NOTE — PROGRESS NOTES
NN Follow-Up          Follow up call placed to patient. Patient discharged from [facility] on 7/27/17 and [facility] on 7/31/17 for Bilateral total Knee Arthroplasty. Panel Manager contacted the patient by telephone to perform post ED discharge follow up. No answer, message left requesting return call. Will continue to reach out to patient regarding discharge.

## 2017-08-28 ENCOUNTER — PATIENT OUTREACH (OUTPATIENT)
Dept: FAMILY MEDICINE CLINIC | Age: 47
End: 2017-08-28

## 2017-08-28 NOTE — PROGRESS NOTES
Panel Manager will resolve 7/24/17 GITA. No sign that patient has return to ED/Hospital in the last 30 days.

## 2017-09-29 DIAGNOSIS — E03.9 ACQUIRED HYPOTHYROIDISM: ICD-10-CM

## 2017-10-01 RX ORDER — LEVOTHYROXINE SODIUM 50 UG/1
TABLET ORAL
Qty: 30 TAB | Refills: 0 | Status: SHIPPED | OUTPATIENT
Start: 2017-10-01 | End: 2017-11-18 | Stop reason: SDUPTHER

## 2017-10-02 NOTE — TELEPHONE ENCOUNTER
Please call patient and let her know she is due to have her thyroid checked. I have printed a lab slip.

## 2017-11-18 ENCOUNTER — TELEPHONE (OUTPATIENT)
Dept: FAMILY MEDICINE CLINIC | Age: 47
End: 2017-11-18

## 2017-11-18 DIAGNOSIS — E03.9 ACQUIRED HYPOTHYROIDISM: ICD-10-CM

## 2017-11-18 RX ORDER — LEVOTHYROXINE SODIUM 50 UG/1
TABLET ORAL
Qty: 30 TAB | Refills: 0 | Status: SHIPPED | OUTPATIENT
Start: 2017-11-18 | End: 2018-03-04

## 2017-11-19 NOTE — TELEPHONE ENCOUNTER
Please call patient and remind them to have the thyroid labs I ordered in October done. I cannot continue to refill their medications until these have been done.

## 2017-11-20 NOTE — TELEPHONE ENCOUNTER
Called pt, and left a voice message, that he/she are due to have their labs drawn. Advised pt to have these labs drawn as soon as he/she are able or to call our office back and we will mail the lab slip to their home or if their are any questions or concerns.

## 2018-02-21 ENCOUNTER — TELEPHONE (OUTPATIENT)
Dept: FAMILY MEDICINE CLINIC | Age: 48
End: 2018-02-21

## 2018-03-04 ENCOUNTER — APPOINTMENT (OUTPATIENT)
Dept: GENERAL RADIOLOGY | Age: 48
DRG: 241 | End: 2018-03-04
Attending: PHYSICIAN ASSISTANT
Payer: MEDICAID

## 2018-03-04 ENCOUNTER — HOSPITAL ENCOUNTER (INPATIENT)
Age: 48
LOS: 4 days | Discharge: HOME | DRG: 241 | End: 2018-03-08
Attending: EMERGENCY MEDICINE | Admitting: FAMILY MEDICINE
Payer: MEDICAID

## 2018-03-04 ENCOUNTER — APPOINTMENT (OUTPATIENT)
Dept: CT IMAGING | Age: 48
DRG: 241 | End: 2018-03-04
Attending: PHYSICIAN ASSISTANT
Payer: MEDICAID

## 2018-03-04 DIAGNOSIS — K92.2 GASTROINTESTINAL HEMORRHAGE, UNSPECIFIED GASTROINTESTINAL HEMORRHAGE TYPE: Primary | ICD-10-CM

## 2018-03-04 DIAGNOSIS — Z86.711 HISTORY OF PULMONARY EMBOLISM: ICD-10-CM

## 2018-03-04 LAB
ALBUMIN SERPL-MCNC: 3.6 G/DL (ref 3.5–5)
ALBUMIN/GLOB SERPL: 1.2 {RATIO} (ref 1.1–2.2)
ALP SERPL-CCNC: 58 U/L (ref 45–117)
ALT SERPL-CCNC: 43 U/L (ref 12–78)
ANION GAP SERPL CALC-SCNC: 10 MMOL/L (ref 5–15)
APTT PPP: 30.1 SEC (ref 22.1–32)
AST SERPL-CCNC: 34 U/L (ref 15–37)
BASOPHILS # BLD: 0 K/UL (ref 0–0.1)
BASOPHILS NFR BLD: 1 % (ref 0–1)
BILIRUB SERPL-MCNC: <0.1 MG/DL (ref 0.2–1)
BUN SERPL-MCNC: 14 MG/DL (ref 6–20)
BUN/CREAT SERPL: 21 (ref 12–20)
CALCIUM SERPL-MCNC: 7.9 MG/DL (ref 8.5–10.1)
CHLORIDE SERPL-SCNC: 104 MMOL/L (ref 97–108)
CO2 SERPL-SCNC: 25 MMOL/L (ref 21–32)
CREAT SERPL-MCNC: 0.66 MG/DL (ref 0.55–1.02)
DIFFERENTIAL METHOD BLD: ABNORMAL
EOSINOPHIL # BLD: 0.1 K/UL (ref 0–0.4)
EOSINOPHIL NFR BLD: 1 % (ref 0–7)
ERYTHROCYTE [DISTWIDTH] IN BLOOD BY AUTOMATED COUNT: 13.5 % (ref 11.5–14.5)
GLOBULIN SER CALC-MCNC: 3 G/DL (ref 2–4)
GLUCOSE SERPL-MCNC: 88 MG/DL (ref 65–100)
HCG UR QL: NEGATIVE
HCT VFR BLD AUTO: 23.8 % (ref 35–47)
HCT VFR BLD AUTO: 28.1 % (ref 35–47)
HEMOCCULT STL QL: POSITIVE
HGB BLD-MCNC: 7.9 G/DL (ref 11.5–16)
HGB BLD-MCNC: 9.2 G/DL (ref 11.5–16)
IMM GRANULOCYTES # BLD: 0 K/UL (ref 0–0.04)
IMM GRANULOCYTES NFR BLD AUTO: 0 % (ref 0–0.5)
INR BLD: 2.1 (ref 0.9–1.2)
INR PPP: 2 (ref 0.9–1.1)
LYMPHOCYTES # BLD: 1.3 K/UL (ref 0.8–3.5)
LYMPHOCYTES NFR BLD: 22 % (ref 12–49)
MCH RBC QN AUTO: 32.1 PG (ref 26–34)
MCHC RBC AUTO-ENTMCNC: 32.7 G/DL (ref 30–36.5)
MCV RBC AUTO: 97.9 FL (ref 80–99)
MONOCYTES # BLD: 0.5 K/UL (ref 0–1)
MONOCYTES NFR BLD: 7 % (ref 5–13)
NEUTS SEG # BLD: 4.3 K/UL (ref 1.8–8)
NEUTS SEG NFR BLD: 70 % (ref 32–75)
NRBC # BLD: 0 K/UL (ref 0–0.01)
NRBC BLD-RTO: 0 PER 100 WBC
PLATELET # BLD AUTO: 326 K/UL (ref 150–400)
PMV BLD AUTO: 9.7 FL (ref 8.9–12.9)
POTASSIUM SERPL-SCNC: 3.7 MMOL/L (ref 3.5–5.1)
PROT SERPL-MCNC: 6.6 G/DL (ref 6.4–8.2)
PROTHROMBIN TIME: 20.7 SEC (ref 9–11.1)
RBC # BLD AUTO: 2.87 M/UL (ref 3.8–5.2)
SODIUM SERPL-SCNC: 139 MMOL/L (ref 136–145)
THERAPEUTIC RANGE,PTTT: NORMAL SECS (ref 58–77)
TROPONIN I SERPL-MCNC: <0.04 NG/ML
TSH SERPL DL<=0.05 MIU/L-ACNC: 5.08 UIU/ML (ref 0.36–3.74)
WBC # BLD AUTO: 6.2 K/UL (ref 3.6–11)

## 2018-03-04 PROCEDURE — 85610 PROTHROMBIN TIME: CPT | Performed by: PHYSICIAN ASSISTANT

## 2018-03-04 PROCEDURE — 74177 CT ABD & PELVIS W/CONTRAST: CPT

## 2018-03-04 PROCEDURE — 85610 PROTHROMBIN TIME: CPT

## 2018-03-04 PROCEDURE — 85730 THROMBOPLASTIN TIME PARTIAL: CPT | Performed by: PHYSICIAN ASSISTANT

## 2018-03-04 PROCEDURE — 65660000000 HC RM CCU STEPDOWN

## 2018-03-04 PROCEDURE — 94761 N-INVAS EAR/PLS OXIMETRY MLT: CPT

## 2018-03-04 PROCEDURE — 74011250636 HC RX REV CODE- 250/636: Performed by: STUDENT IN AN ORGANIZED HEALTH CARE EDUCATION/TRAINING PROGRAM

## 2018-03-04 PROCEDURE — 84443 ASSAY THYROID STIM HORMONE: CPT | Performed by: STUDENT IN AN ORGANIZED HEALTH CARE EDUCATION/TRAINING PROGRAM

## 2018-03-04 PROCEDURE — C9113 INJ PANTOPRAZOLE SODIUM, VIA: HCPCS | Performed by: PHYSICIAN ASSISTANT

## 2018-03-04 PROCEDURE — 74011250636 HC RX REV CODE- 250/636: Performed by: FAMILY MEDICINE

## 2018-03-04 PROCEDURE — 86923 COMPATIBILITY TEST ELECTRIC: CPT | Performed by: STUDENT IN AN ORGANIZED HEALTH CARE EDUCATION/TRAINING PROGRAM

## 2018-03-04 PROCEDURE — 85025 COMPLETE CBC W/AUTO DIFF WBC: CPT | Performed by: PHYSICIAN ASSISTANT

## 2018-03-04 PROCEDURE — 96374 THER/PROPH/DIAG INJ IV PUSH: CPT

## 2018-03-04 PROCEDURE — 86900 BLOOD TYPING SEROLOGIC ABO: CPT | Performed by: STUDENT IN AN ORGANIZED HEALTH CARE EDUCATION/TRAINING PROGRAM

## 2018-03-04 PROCEDURE — 99285 EMERGENCY DEPT VISIT HI MDM: CPT

## 2018-03-04 PROCEDURE — 84484 ASSAY OF TROPONIN QUANT: CPT | Performed by: PHYSICIAN ASSISTANT

## 2018-03-04 PROCEDURE — 85018 HEMOGLOBIN: CPT | Performed by: STUDENT IN AN ORGANIZED HEALTH CARE EDUCATION/TRAINING PROGRAM

## 2018-03-04 PROCEDURE — 81025 URINE PREGNANCY TEST: CPT

## 2018-03-04 PROCEDURE — 36415 COLL VENOUS BLD VENIPUNCTURE: CPT | Performed by: PHYSICIAN ASSISTANT

## 2018-03-04 PROCEDURE — 93005 ELECTROCARDIOGRAM TRACING: CPT

## 2018-03-04 PROCEDURE — 71046 X-RAY EXAM CHEST 2 VIEWS: CPT

## 2018-03-04 PROCEDURE — 74011636320 HC RX REV CODE- 636/320: Performed by: RADIOLOGY

## 2018-03-04 PROCEDURE — 80053 COMPREHEN METABOLIC PANEL: CPT | Performed by: PHYSICIAN ASSISTANT

## 2018-03-04 PROCEDURE — 82272 OCCULT BLD FECES 1-3 TESTS: CPT | Performed by: PHYSICIAN ASSISTANT

## 2018-03-04 PROCEDURE — 74011250636 HC RX REV CODE- 250/636: Performed by: PHYSICIAN ASSISTANT

## 2018-03-04 PROCEDURE — 74011250637 HC RX REV CODE- 250/637: Performed by: PHYSICIAN ASSISTANT

## 2018-03-04 RX ORDER — PROCHLORPERAZINE EDISYLATE 5 MG/ML
10 INJECTION INTRAMUSCULAR; INTRAVENOUS
Status: COMPLETED | OUTPATIENT
Start: 2018-03-04 | End: 2018-03-04

## 2018-03-04 RX ORDER — WARFARIN 10 MG/1
10 TABLET ORAL
COMMUNITY
End: 2018-03-08

## 2018-03-04 RX ORDER — WARFARIN SODIUM 5 MG/1
5 TABLET ORAL
COMMUNITY
End: 2018-03-08

## 2018-03-04 RX ORDER — PANTOPRAZOLE SODIUM 40 MG/10ML
40 INJECTION, POWDER, LYOPHILIZED, FOR SOLUTION INTRAVENOUS
Status: COMPLETED | OUTPATIENT
Start: 2018-03-04 | End: 2018-03-04

## 2018-03-04 RX ORDER — SODIUM CHLORIDE 9 MG/ML
125 INJECTION, SOLUTION INTRAVENOUS CONTINUOUS
Status: DISCONTINUED | OUTPATIENT
Start: 2018-03-04 | End: 2018-03-08

## 2018-03-04 RX ORDER — BUTALBITAL, ACETAMINOPHEN AND CAFFEINE 300; 40; 50 MG/1; MG/1; MG/1
1 CAPSULE ORAL
COMMUNITY
End: 2019-12-24 | Stop reason: SDUPTHER

## 2018-03-04 RX ORDER — MAGNESIUM SULFATE 1 G/100ML
1 INJECTION INTRAVENOUS ONCE
Status: COMPLETED | OUTPATIENT
Start: 2018-03-04 | End: 2018-03-05

## 2018-03-04 RX ORDER — SODIUM CHLORIDE 0.9 % (FLUSH) 0.9 %
5-10 SYRINGE (ML) INJECTION AS NEEDED
Status: DISCONTINUED | OUTPATIENT
Start: 2018-03-04 | End: 2018-03-08 | Stop reason: HOSPADM

## 2018-03-04 RX ORDER — ACETAMINOPHEN 325 MG/1
650 TABLET ORAL
Status: COMPLETED | OUTPATIENT
Start: 2018-03-04 | End: 2018-03-04

## 2018-03-04 RX ORDER — SODIUM CHLORIDE 0.9 % (FLUSH) 0.9 %
5-10 SYRINGE (ML) INJECTION EVERY 8 HOURS
Status: DISCONTINUED | OUTPATIENT
Start: 2018-03-04 | End: 2018-03-08 | Stop reason: HOSPADM

## 2018-03-04 RX ORDER — DIPHENHYDRAMINE HYDROCHLORIDE 50 MG/ML
25 INJECTION, SOLUTION INTRAMUSCULAR; INTRAVENOUS ONCE
Status: DISPENSED | OUTPATIENT
Start: 2018-03-04 | End: 2018-03-05

## 2018-03-04 RX ADMIN — Medication 10 ML: at 23:08

## 2018-03-04 RX ADMIN — PANTOPRAZOLE SODIUM 40 MG: 40 INJECTION, POWDER, FOR SOLUTION INTRAVENOUS at 19:49

## 2018-03-04 RX ADMIN — SODIUM CHLORIDE 125 ML/HR: 900 INJECTION, SOLUTION INTRAVENOUS at 21:37

## 2018-03-04 RX ADMIN — IOPAMIDOL 100 ML: 755 INJECTION, SOLUTION INTRAVENOUS at 18:51

## 2018-03-04 RX ADMIN — ACETAMINOPHEN 650 MG: 325 TABLET ORAL at 20:15

## 2018-03-04 RX ADMIN — MAGNESIUM SULFATE IN DEXTROSE 1 G: 10 INJECTION, SOLUTION INTRAVENOUS at 22:11

## 2018-03-04 RX ADMIN — PROCHLORPERAZINE EDISYLATE 10 MG: 5 INJECTION INTRAMUSCULAR; INTRAVENOUS at 22:11
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
## 2018-03-04 NOTE — IP AVS SNAPSHOT
303 [facility] 
 
 
 [address] 70 [facility] 
[phone] Patient: Paula Chung MRN: [id number] NRK:7/61/8424 About your hospitalization You were admitted on:  March 4, 2018 You last received care in the:  [facility] 4M POST SURG ORT 2 You were discharged on:  March 8, 2018 Why you were hospitalized Your primary diagnosis was:  Gi Bleed Your diagnoses also included:  Palpitations, History Of Pulmonary Embolism, Depression, [place] Filter In Place, Acquired Hypothyroidism, Vitamin D Deficiency, Factor V Deficiency (Hcc) Follow-up Information Follow up With Details Comments Contact Info Manasa Lockett Go on 3/13/2018 appt at 1: 15 pm ; please go to the [facility] location for appt. Continue to HOLD coumadin until seen by Dr Felipe Jennings MD Schedule an appointment as soon as possible for a visit in 1 day Gastroenterology follow-up [address] [place] [address] 
[phone] Nidhi Rm MD On 3/9/2018 @ 10:20 am for PCP Follow-up and Blood count Check [address] [address] [phone] Your Scheduled Appointments Friday March 09, 2018 10:40 AM EST TRANSITIONAL CARE MANAGEMENT with Nidhi Rm MD  
704 [facility] ([facility]) [address] [address] [phone] Discharge Orders None A check ender indicates which time of day the medication should be taken. My Medications START taking these medications Instructions Each Dose to Equal  
 Morning Noon Evening Bedtime  
 docusate sodium 100 mg capsule Commonly known as:  Thedore Dial Your last dose was:  Refused Your next dose is:  PM  
   
 Take 1 Cap by mouth two (2) times a day for 90 days. 100 mg  
    
   
   
  
   
  
 ferrous sulfate 325 mg (65 mg iron) tablet Your last dose was:  3/8/18  AM  
Your next dose is: This evening Take 1 Tab by mouth two (2) times daily (with meals). 325 mg  
    
   
   
  
   
  
 levothyroxine 50 mcg tablet Commonly known as:  SYNTHROID Your last dose was:  Given at 09:40 Take 1 Tab by mouth Daily (before breakfast). 50 mcg  
    
   
   
   
  
 pantoprazole 40 mg tablet Commonly known as:  PROTONIX Take 1 Tab by mouth daily. 40 mg CONTINUE taking these medications Instructions Each Dose to Equal  
 Morning Noon Evening Bedtime BOTOX INJECTION  
   
 by IntraMUSCular route. The patient receives injections every 3 months at the office of Dr. Damion Garcia -40 mg per capsule Generic drug:  butalbital-acetaminophen-caff Take 1 Cap by mouth every four (4) hours as needed for Pain. 1 Cap TYLENOL PM EXTRA STRENGTH PO Take 3 Tabs by mouth nightly. 3 Tab STOP taking these medications   
 warfarin 10 mg tablet Commonly known as:  COUMADIN  
   
  
 warfarin 5 mg tablet Commonly known as:  COUMADIN Where to Get Your Medications Information on where to get these meds will be given to you by the nurse or doctor. ! Ask your nurse or doctor about these medications  
  docusate sodium 100 mg capsule  
 ferrous sulfate 325 mg (65 mg iron) tablet  
 levothyroxine 50 mcg tablet  
 pantoprazole 40 mg tablet Discharge Instructions HOME DISCHARGE INSTRUCTIONS Tera Sousa / [id number] : 1970 Admission date: 3/4/2018 Discharge date: 3/8/2018 Please bring this form with you to show your care provider at your follow-up appointment. Primary care provider:  Nanci Lieberman MD 
 
Discharging provider:  Marina Schwab MD  - Family Medicine Resident Hattie Meyer MD- Attending, Family Medicine You have been admitted to the hospital with the following diagnoses: 
 
ACUTE DIAGNOSES: 
GI bleed 
anemia Jyoti Jah . . . . . . . . . . . . . . . . . . . . . . . . . . . . . . . . . . . . . . . . . . . . . . . . . . . . . . . . . . . . . . . . . . . . . . Jyoti Tyson FOLLOW-UP CARE RECOMMENDATIONS: 
 
Appointments Follow-up Information Follow up With Details Comments Contact Info Rosalee Holter Go on 3/13/2018 appt at 1: 15 pm ; please go to the [facility] location for appt. Continue to HOLD coumadin until seen by Dr Lonita Frankel Alphonse Agent, MD Schedule an appointment as soon as possible for a visit in 1 day Gastroenterology follow-up [address] [place] [address] 
[phone] Kiera Aceves MD On 3/9/2018 @ 10:20 am for PCP Follow-up and Blood count Check [address] [place] [id number] [phone] Follow-up tests needed: CBC Pending test results: At the time of your discharge the following test results are still pending: None. Please make sure you review these results with your outpatient follow-up provider(s). Specific symptoms to watch for: chest pain, shortness of breath, fever, chills, nausea, vomiting, diarrhea, change in mentation, falling, weakness, bleeding. DIET/what to eat:  Regular Diet ACTIVITY:  Activity as tolerated Wound care: None Equipment needed:  None What to do if new or unexpected symptoms occur? If you experience any of the above symptoms (or should other concerns or questions arise after discharge) please call your primary care physician. Return to the emergency room if you cannot get hold of your doctor. · It is very important that you keep your follow-up appointment(s). · Please bring discharge papers, medication list (and/or medication bottles) to your follow-up appointments for review by your outpatient provider(s).  
· Please check the list of medications and be sure it includes every medication (even non-prescription medications) that your provider wants you to take. · It is important that you take the medication exactly as they are prescribed. · Keep your medication in the bottles provided by the pharmacist and keep a list of the medication names, dosages, and times to be taken in your wallet. · Do not take other medications without consulting your doctor. · If you have any questions about your medications or other instructions, please talk to your nurse or care provider before you leave the hospital.  
 
Information obtained by:  
 
I understand that if any problems occur once I am at home I am to contact my physician. These instructions were explained to me and I had the opportunity to ask questions. I understand and acknowledge receipt of the instructions indicated above. Physician's or R.N.'s Signature                                                                  Date/Time Patient or Representative Signature                                                          Date/Time Introducing [facility]! [facility] introduces [employer] patient portal. Now you can access parts of your medical record, email your doctor's office, and request medication refills online. 1. In your internet browser, go to [email or web address] 2. Click on the First Time User? Click Here link in the Sign In box. You will see the New Member Sign Up page. 3. Enter your [employer] Access Code exactly as it appears below. You will not need to use this code after youve completed the sign-up process. If you do not sign up before the expiration date, you must request a new code. · [employer] Access Code: [email or web address] Expires: 6/2/2018  4:58 PM 
 
4. Enter the last four digits of your Social Security Number (xxxx) and Date of Birth (mm/dd/yyyy) as indicated and click Submit. You will be taken to the next sign-up page. 5. Create a [employer] ID. This will be your [employer] login ID and cannot be changed, so think of one that is secure and easy to remember. 6. Create a [employer] password. You can change your password at any time. 7. Enter your Password Reset Question and Answer. This can be used at a later time if you forget your password. 8. Enter your e-mail address. You will receive e-mail notification when new information is available in [address] 9. Click Sign Up. You can now view and download portions of your medical record. 10. Click the Download Summary menu link to download a portable copy of your medical information. If you have questions, please visit the Frequently Asked Questions section of the [employer] website. Remember, [employer] is NOT to be used for urgent needs. For medical emergencies, dial 911. Now available from your iPhone and Android! Providers Seen During Your Hospitalization Provider Specialty Primary office phone Estella Osman MD Emergency Medicine [phone] Oral Heads, DO Family Practice [phone] Cuba iVdal MD [facility] [phone] Immunizations Administered for This Admission Name Date Influenza Vaccine (Quad) PF 3/8/2018 Your Primary Care Physician (PCP) Primary Care Physician Office Phone Office Fax Boby Cm [phone] You are allergic to the following Allergen Reactions Imitrex (Sumatriptan) Other (comments) Face flushed- felt like \" needles in face\" Reglan (Metoclopramide) Nausea Only Other (comments) Cramping \"everywhere\" Recent Documentation Height Weight Breastfeeding? BMI OB Status Smoking Status 1.575 m 96.2 kg No 38.79 kg/m2 Having regular periods Never Smoker Emergency Contacts Name Discharge Info Relation Home Work Mobile DEVAUGHN Chiang DISCHARGE CAREGIVER [3] Spouse [3] [phone] Patient Belongings The following personal items are in your possession at time of discharge: 
  Dental Appliances: None  Visual Aid: Glasses, With patient      Home Medications: None   Jewelry: Ring (2 rings)  Clothing: Pants, Socks    Other Valuables: At bedside Please provide this summary of care documentation to your next provider. Signatures-by signing, you are acknowledging that this After Visit Summary has been reviewed with you and you have received a copy. Patient Signature:  ____________________________________________________________ Date:  ____________________________________________________________  
  
Emp[facility] Phenes Provider Signature:  ____________________________________________________________ Date:  ____________________________________________________________

## 2018-03-04 NOTE — IP AVS SNAPSHOT
[address] 
[phone] Patient: Richa Reddy MRN: [id number] MATT:4[birth year]38[birth year] A check ender indicates which time of day the medication should be taken. My Medications START taking these medications Instructions Each Dose to Equal  
 Morning Noon Evening Bedtime  
 docusate sodium 100 mg capsule Commonly known as:  Carletha Benne Your last dose was:  Refused Your next dose is:  PM  
   
 Take 1 Cap by mouth two (2) times a day for 90 days. 100 mg  
    
   
   
  
   
  
 ferrous sulfate 325 mg (65 mg iron) tablet Your last dose was:  3/8/18  AM  
Your next dose is: This evening Take 1 Tab by mouth two (2) times daily (with meals). 325 mg  
    
   
   
  
   
  
 levothyroxine 50 mcg tablet Commonly known as:  SYNTHROID Your last dose was:  Given at 09:40 Take 1 Tab by mouth Daily (before breakfast). 50 mcg  
    
   
   
   
  
 pantoprazole 40 mg tablet Commonly known as:  PROTONIX Take 1 Tab by mouth daily. 40 mg CONTINUE taking these medications Instructions Each Dose to Equal  
 Morning Noon Evening Bedtime BOTOX INJECTION  
   
 by IntraMUSCular route. The patient receives injections every 3 months at the office of Dr. Cinthya Reyes -40 mg per capsule Generic drug:  butalbital-acetaminophen-caff Take 1 Cap by mouth every four (4) hours as needed for Pain. 1 Cap TYLENOL PM EXTRA STRENGTH PO Take 3 Tabs by mouth nightly. 3 Tab STOP taking these medications   
 warfarin 10 mg tablet Commonly known as:  COUMADIN  
   
  
 warfarin 5 mg tablet Commonly known as:  COUMADIN Where to Get Your Medications Information on where to get these meds will be given to you by the nurse or doctor. ! Ask your nurse or doctor about these medications  
  docusate sodium 100 mg capsule  
 ferrous sulfate 325 mg (65 mg iron) tablet  
 levothyroxine 50 mcg tablet  
 pantoprazole 40 mg tablet

## 2018-03-05 LAB
ALBUMIN SERPL-MCNC: 2.7 G/DL (ref 3.5–5)
ALBUMIN/GLOB SERPL: 1 {RATIO} (ref 1.1–2.2)
ALP SERPL-CCNC: 40 U/L (ref 45–117)
ALT SERPL-CCNC: 30 U/L (ref 12–78)
ANION GAP SERPL CALC-SCNC: 9 MMOL/L (ref 5–15)
AST SERPL-CCNC: 22 U/L (ref 15–37)
ATRIAL RATE: 92 BPM
BASOPHILS # BLD: 0 K/UL (ref 0–0.1)
BASOPHILS # BLD: 0 K/UL (ref 0–0.1)
BASOPHILS NFR BLD: 0 % (ref 0–1)
BASOPHILS NFR BLD: 0 % (ref 0–1)
BILIRUB SERPL-MCNC: <0.1 MG/DL (ref 0.2–1)
BUN SERPL-MCNC: 19 MG/DL (ref 6–20)
BUN/CREAT SERPL: 39 (ref 12–20)
CALCIUM SERPL-MCNC: 7.2 MG/DL (ref 8.5–10.1)
CALCULATED P AXIS, ECG09: 23 DEGREES
CALCULATED R AXIS, ECG10: -29 DEGREES
CALCULATED T AXIS, ECG11: 36 DEGREES
CHLORIDE SERPL-SCNC: 106 MMOL/L (ref 97–108)
CO2 SERPL-SCNC: 24 MMOL/L (ref 21–32)
CREAT SERPL-MCNC: 0.49 MG/DL (ref 0.55–1.02)
DIAGNOSIS, 93000: NORMAL
DIFFERENTIAL METHOD BLD: ABNORMAL
DIFFERENTIAL METHOD BLD: ABNORMAL
EOSINOPHIL # BLD: 0.1 K/UL (ref 0–0.4)
EOSINOPHIL # BLD: 0.1 K/UL (ref 0–0.4)
EOSINOPHIL NFR BLD: 1 % (ref 0–7)
EOSINOPHIL NFR BLD: 1 % (ref 0–7)
ERYTHROCYTE [DISTWIDTH] IN BLOOD BY AUTOMATED COUNT: 13.7 % (ref 11.5–14.5)
ERYTHROCYTE [DISTWIDTH] IN BLOOD BY AUTOMATED COUNT: 15.1 % (ref 11.5–14.5)
GLOBULIN SER CALC-MCNC: 2.6 G/DL (ref 2–4)
GLUCOSE SERPL-MCNC: 94 MG/DL (ref 65–100)
HCT VFR BLD AUTO: 20.4 % (ref 35–47)
HCT VFR BLD AUTO: 22.3 % (ref 35–47)
HCT VFR BLD AUTO: 24.8 % (ref 35–47)
HCT VFR BLD AUTO: 25.9 % (ref 35–47)
HGB BLD-MCNC: 6.8 G/DL (ref 11.5–16)
HGB BLD-MCNC: 7.3 G/DL (ref 11.5–16)
HGB BLD-MCNC: 7.7 G/DL (ref 11.5–16)
HGB BLD-MCNC: 8.5 G/DL (ref 11.5–16)
IMM GRANULOCYTES # BLD: 0 K/UL (ref 0–0.04)
IMM GRANULOCYTES # BLD: 0 K/UL (ref 0–0.04)
IMM GRANULOCYTES NFR BLD AUTO: 0 % (ref 0–0.5)
IMM GRANULOCYTES NFR BLD AUTO: 0 % (ref 0–0.5)
INR PPP: 1.7 (ref 0.9–1.1)
INR PPP: 1.9 (ref 0.9–1.1)
LYMPHOCYTES # BLD: 1.4 K/UL (ref 0.8–3.5)
LYMPHOCYTES # BLD: 1.4 K/UL (ref 0.8–3.5)
LYMPHOCYTES NFR BLD: 24 % (ref 12–49)
LYMPHOCYTES NFR BLD: 29 % (ref 12–49)
MCH RBC QN AUTO: 30.7 PG (ref 26–34)
MCH RBC QN AUTO: 32.2 PG (ref 26–34)
MCHC RBC AUTO-ENTMCNC: 32.8 G/DL (ref 30–36.5)
MCHC RBC AUTO-ENTMCNC: 33.3 G/DL (ref 30–36.5)
MCV RBC AUTO: 93.5 FL (ref 80–99)
MCV RBC AUTO: 96.7 FL (ref 80–99)
MONOCYTES # BLD: 0.4 K/UL (ref 0–1)
MONOCYTES # BLD: 0.4 K/UL (ref 0–1)
MONOCYTES NFR BLD: 7 % (ref 5–13)
MONOCYTES NFR BLD: 7 % (ref 5–13)
NEUTS SEG # BLD: 3 K/UL (ref 1.8–8)
NEUTS SEG # BLD: 3.9 K/UL (ref 1.8–8)
NEUTS SEG NFR BLD: 62 % (ref 32–75)
NEUTS SEG NFR BLD: 68 % (ref 32–75)
NRBC # BLD: 0 K/UL (ref 0–0.01)
NRBC # BLD: 0 K/UL (ref 0–0.01)
NRBC BLD-RTO: 0 PER 100 WBC
NRBC BLD-RTO: 0 PER 100 WBC
P-R INTERVAL, ECG05: 160 MS
PLATELET # BLD AUTO: 258 K/UL (ref 150–400)
PLATELET # BLD AUTO: 266 K/UL (ref 150–400)
PMV BLD AUTO: 9.6 FL (ref 8.9–12.9)
PMV BLD AUTO: 9.7 FL (ref 8.9–12.9)
POTASSIUM SERPL-SCNC: 3.6 MMOL/L (ref 3.5–5.1)
PROT SERPL-MCNC: 5.3 G/DL (ref 6.4–8.2)
PROTHROMBIN TIME: 17.5 SEC (ref 9–11.1)
PROTHROMBIN TIME: 19.8 SEC (ref 9–11.1)
Q-T INTERVAL, ECG07: 364 MS
QRS DURATION, ECG06: 74 MS
QTC CALCULATION (BEZET), ECG08: 450 MS
RBC # BLD AUTO: 2.11 M/UL (ref 3.8–5.2)
RBC # BLD AUTO: 2.77 M/UL (ref 3.8–5.2)
SODIUM SERPL-SCNC: 139 MMOL/L (ref 136–145)
VENTRICULAR RATE, ECG03: 92 BPM
WBC # BLD AUTO: 4.8 K/UL (ref 3.6–11)
WBC # BLD AUTO: 5.8 K/UL (ref 3.6–11)

## 2018-03-05 PROCEDURE — 77030038269 HC DRN EXT URIN PURWCK BARD -A

## 2018-03-05 PROCEDURE — 0W3P8ZZ CONTROL BLEEDING IN GASTROINTESTINAL TRACT, VIA NATURAL OR ARTIFICIAL OPENING ENDOSCOPIC: ICD-10-PCS | Performed by: INTERNAL MEDICINE

## 2018-03-05 PROCEDURE — 77030003657 HC NDL SCLER BSC -B: Performed by: INTERNAL MEDICINE

## 2018-03-05 PROCEDURE — 80053 COMPREHEN METABOLIC PANEL: CPT | Performed by: STUDENT IN AN ORGANIZED HEALTH CARE EDUCATION/TRAINING PROGRAM

## 2018-03-05 PROCEDURE — C9113 INJ PANTOPRAZOLE SODIUM, VIA: HCPCS | Performed by: STUDENT IN AN ORGANIZED HEALTH CARE EDUCATION/TRAINING PROGRAM

## 2018-03-05 PROCEDURE — 77030010936 HC CLP LIG BSC -C: Performed by: INTERNAL MEDICINE

## 2018-03-05 PROCEDURE — 74011250636 HC RX REV CODE- 250/636: Performed by: STUDENT IN AN ORGANIZED HEALTH CARE EDUCATION/TRAINING PROGRAM

## 2018-03-05 PROCEDURE — 77030033269 HC SLV COMPR SCD KNE2 CARD -B

## 2018-03-05 PROCEDURE — 74011250636 HC RX REV CODE- 250/636: Performed by: FAMILY MEDICINE

## 2018-03-05 PROCEDURE — 02HV33Z INSERTION OF INFUSION DEVICE INTO SUPERIOR VENA CAVA, PERCUTANEOUS APPROACH: ICD-10-PCS | Performed by: RADIOLOGY

## 2018-03-05 PROCEDURE — 85018 HEMOGLOBIN: CPT | Performed by: STUDENT IN AN ORGANIZED HEALTH CARE EDUCATION/TRAINING PROGRAM

## 2018-03-05 PROCEDURE — 36415 COLL VENOUS BLD VENIPUNCTURE: CPT | Performed by: STUDENT IN AN ORGANIZED HEALTH CARE EDUCATION/TRAINING PROGRAM

## 2018-03-05 PROCEDURE — 99152 MOD SED SAME PHYS/QHP 5/>YRS: CPT

## 2018-03-05 PROCEDURE — 85025 COMPLETE CBC W/AUTO DIFF WBC: CPT | Performed by: STUDENT IN AN ORGANIZED HEALTH CARE EDUCATION/TRAINING PROGRAM

## 2018-03-05 PROCEDURE — 65660000000 HC RM CCU STEPDOWN

## 2018-03-05 PROCEDURE — 74011250636 HC RX REV CODE- 250/636: Performed by: INTERNAL MEDICINE

## 2018-03-05 PROCEDURE — P9016 RBC LEUKOCYTES REDUCED: HCPCS | Performed by: STUDENT IN AN ORGANIZED HEALTH CARE EDUCATION/TRAINING PROGRAM

## 2018-03-05 PROCEDURE — 3E0G8GC INTRODUCTION OF OTHER THERAPEUTIC SUBSTANCE INTO UPPER GI, VIA NATURAL OR ARTIFICIAL OPENING ENDOSCOPIC: ICD-10-PCS | Performed by: INTERNAL MEDICINE

## 2018-03-05 PROCEDURE — 76040000019: Performed by: INTERNAL MEDICINE

## 2018-03-05 PROCEDURE — 36430 TRANSFUSION BLD/BLD COMPNT: CPT

## 2018-03-05 PROCEDURE — 85610 PROTHROMBIN TIME: CPT | Performed by: STUDENT IN AN ORGANIZED HEALTH CARE EDUCATION/TRAINING PROGRAM

## 2018-03-05 PROCEDURE — 30233N1 TRANSFUSION OF NONAUTOLOGOUS RED BLOOD CELLS INTO PERIPHERAL VEIN, PERCUTANEOUS APPROACH: ICD-10-PCS | Performed by: STUDENT IN AN ORGANIZED HEALTH CARE EDUCATION/TRAINING PROGRAM

## 2018-03-05 PROCEDURE — 99153 MOD SED SAME PHYS/QHP EA: CPT

## 2018-03-05 RX ORDER — DEXTROMETHORPHAN/PSEUDOEPHED 2.5-7.5/.8
1.2 DROPS ORAL
Status: DISCONTINUED | OUTPATIENT
Start: 2018-03-05 | End: 2018-03-08 | Stop reason: HOSPADM

## 2018-03-05 RX ORDER — FENTANYL CITRATE 50 UG/ML
100 INJECTION, SOLUTION INTRAMUSCULAR; INTRAVENOUS
Status: DISCONTINUED | OUTPATIENT
Start: 2018-03-05 | End: 2018-03-08 | Stop reason: HOSPADM

## 2018-03-05 RX ORDER — ONDANSETRON 2 MG/ML
6 INJECTION INTRAMUSCULAR; INTRAVENOUS EVERY 12 HOURS
Status: DISCONTINUED | OUTPATIENT
Start: 2018-03-05 | End: 2018-03-08 | Stop reason: HOSPADM

## 2018-03-05 RX ORDER — NALOXONE HYDROCHLORIDE 0.4 MG/ML
0.4 INJECTION, SOLUTION INTRAMUSCULAR; INTRAVENOUS; SUBCUTANEOUS
Status: DISPENSED | OUTPATIENT
Start: 2018-03-05 | End: 2018-03-05

## 2018-03-05 RX ORDER — ATROPINE SULFATE 0.1 MG/ML
0.4 INJECTION INTRAVENOUS
Status: DISPENSED | OUTPATIENT
Start: 2018-03-05 | End: 2018-03-05

## 2018-03-05 RX ORDER — MIDAZOLAM HYDROCHLORIDE 1 MG/ML
.25-5 INJECTION, SOLUTION INTRAMUSCULAR; INTRAVENOUS
Status: DISCONTINUED | OUTPATIENT
Start: 2018-03-05 | End: 2018-03-08 | Stop reason: HOSPADM

## 2018-03-05 RX ORDER — PROCHLORPERAZINE EDISYLATE 5 MG/ML
10 INJECTION INTRAMUSCULAR; INTRAVENOUS
Status: DISCONTINUED | OUTPATIENT
Start: 2018-03-05 | End: 2018-03-05

## 2018-03-05 RX ORDER — SODIUM CHLORIDE 9 MG/ML
250 INJECTION, SOLUTION INTRAVENOUS AS NEEDED
Status: DISCONTINUED | OUTPATIENT
Start: 2018-03-05 | End: 2018-03-08 | Stop reason: HOSPADM

## 2018-03-05 RX ORDER — PROCHLORPERAZINE EDISYLATE 5 MG/ML
5 INJECTION INTRAMUSCULAR; INTRAVENOUS
Status: DISCONTINUED | OUTPATIENT
Start: 2018-03-05 | End: 2018-03-05

## 2018-03-05 RX ORDER — EPINEPHRINE 0.1 MG/ML
1 INJECTION INTRACARDIAC; INTRAVENOUS
Status: DISPENSED | OUTPATIENT
Start: 2018-03-05 | End: 2018-03-05

## 2018-03-05 RX ORDER — PROCHLORPERAZINE EDISYLATE 5 MG/ML
10 INJECTION INTRAMUSCULAR; INTRAVENOUS EVERY 6 HOURS
Status: DISCONTINUED | OUTPATIENT
Start: 2018-03-05 | End: 2018-03-08 | Stop reason: HOSPADM

## 2018-03-05 RX ORDER — FLUMAZENIL 0.1 MG/ML
0.2 INJECTION INTRAVENOUS
Status: DISPENSED | OUTPATIENT
Start: 2018-03-05 | End: 2018-03-05

## 2018-03-05 RX ORDER — SODIUM CHLORIDE 9 MG/ML
50 INJECTION, SOLUTION INTRAVENOUS CONTINUOUS
Status: DISPENSED | OUTPATIENT
Start: 2018-03-05 | End: 2018-03-05

## 2018-03-05 RX ADMIN — EPINEPHRINE 0.4 MG: 0.1 INJECTION, SOLUTION ENDOTRACHEAL; INTRACARDIAC; INTRAVENOUS at 19:18

## 2018-03-05 RX ADMIN — FENTANYL CITRATE 25 MCG: 50 INJECTION, SOLUTION INTRAMUSCULAR; INTRAVENOUS at 18:50

## 2018-03-05 RX ADMIN — SODIUM CHLORIDE 125 ML/HR: 900 INJECTION, SOLUTION INTRAVENOUS at 20:29

## 2018-03-05 RX ADMIN — FENTANYL CITRATE 25 MCG: 50 INJECTION, SOLUTION INTRAMUSCULAR; INTRAVENOUS at 18:52

## 2018-03-05 RX ADMIN — MIDAZOLAM HYDROCHLORIDE 2 MG: 1 INJECTION, SOLUTION INTRAMUSCULAR; INTRAVENOUS at 18:52

## 2018-03-05 RX ADMIN — Medication 10 ML: at 20:27

## 2018-03-05 RX ADMIN — SODIUM CHLORIDE 125 ML/HR: 900 INJECTION, SOLUTION INTRAVENOUS at 06:58

## 2018-03-05 RX ADMIN — PROCHLORPERAZINE EDISYLATE 10 MG: 5 INJECTION INTRAMUSCULAR; INTRAVENOUS at 23:05

## 2018-03-05 RX ADMIN — MIDAZOLAM HYDROCHLORIDE 2 MG: 1 INJECTION, SOLUTION INTRAMUSCULAR; INTRAVENOUS at 18:50

## 2018-03-05 RX ADMIN — Medication 10 ML: at 16:28

## 2018-03-05 RX ADMIN — SODIUM CHLORIDE 125 ML/HR: 900 INJECTION, SOLUTION INTRAVENOUS at 12:14

## 2018-03-05 RX ADMIN — MIDAZOLAM HYDROCHLORIDE 2 MG: 1 INJECTION, SOLUTION INTRAMUSCULAR; INTRAVENOUS at 18:54

## 2018-03-05 RX ADMIN — PROCHLORPERAZINE EDISYLATE 10 MG: 5 INJECTION INTRAMUSCULAR; INTRAVENOUS at 18:20

## 2018-03-05 RX ADMIN — PROCHLORPERAZINE EDISYLATE 10 MG: 5 INJECTION INTRAMUSCULAR; INTRAVENOUS at 11:26

## 2018-03-05 RX ADMIN — FENTANYL CITRATE 50 MCG: 50 INJECTION, SOLUTION INTRAMUSCULAR; INTRAVENOUS at 18:54

## 2018-03-05 RX ADMIN — Medication 10 ML: at 05:12

## 2018-03-05 RX ADMIN — PANTOPRAZOLE SODIUM 40 MG: 40 INJECTION, POWDER, FOR SOLUTION INTRAVENOUS at 20:27

## 2018-03-05 RX ADMIN — SODIUM CHLORIDE 500 ML: 9 INJECTION, SOLUTION INTRAVENOUS at 07:03

## 2018-03-05 RX ADMIN — ONDANSETRON 6 MG: 2 INJECTION INTRAMUSCULAR; INTRAVENOUS at 20:27

## 2018-03-05 RX ADMIN — MIDAZOLAM HYDROCHLORIDE 2 MG: 1 INJECTION, SOLUTION INTRAMUSCULAR; INTRAVENOUS at 18:48

## 2018-03-05 RX ADMIN — PANTOPRAZOLE SODIUM 40 MG: 40 INJECTION, POWDER, FOR SOLUTION INTRAVENOUS at 08:48

## 2018-03-05 NOTE — ED NOTES
Patient c/o headache at this time \" my neck doesn't like lying here and it's making my head sore\". This relayed to [facility].

## 2018-03-05 NOTE — ED NOTES
Patient refused Benadryl at this time for headache protocol, she relays \" that really amps me up\".

## 2018-03-05 NOTE — PERIOP NOTES
Mariel Stallworthnt  1970  [id number]    Situation:    Scheduled Procedure: Procedure(s):  ESOPHAGOGASTRODUODENOSCOPY (EGD)  Verbal report received from: Yadira Rich RN  Preoperative diagnosis: anemia    Background:    Procedure: Procedure(s):  ESOPHAGOGASTRODUODENOSCOPY (EGD)  Physician performing procedure; Dr. Delmer Jackson RN    NPO Status/Last PO Intake: 1100    Pregnancy Test: no  If yes, result: negative    Is the patient taking Blood Thinners: YES If yes, list: Coumadin and last taken : On hold  Is the patient diabetic:no       If yes, what was the last BS:    Time taken? Anything given? no           Does the patient have a Pacemaker/Defibrillator in place?: no   Does the patient need antibiotics before/during/after procedure: no   If the patient is having a colon, How much prep was drank? What were the Colon prep results? Does the patient have SCD in place:no   Is patient on CONTACT precautions:no        If yes, what kind of CONTACT precautions:     Assessment:  Are the vital signs stable prior to patient coming to ENDO?  yes  Is the patient alert/oriented and able to sign consent for the procedures:yes     Does the patient have a patient IV in place?  yes     Recommendation:  Family or Friend present no     Permission to share finding with Family or Friend yes    EGD planned for 1700 today

## 2018-03-05 NOTE — PROGRESS NOTES
Primary Nurse Meghana Brandt and José Miguel Singh RN performed a dual skin assessment on this patient No impairment noted  Kumar score is 21
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
## 2018-03-05 NOTE — PROGRESS NOTES
Problem: Falls - Risk of  Goal: *Absence of Falls  Document Rea Fall Risk and appropriate interventions in the flowsheet.    Outcome: Progressing Towards Goal  Fall Risk Interventions:                 Elimination Interventions: Call light in reach, Patient to call for help with toileting needs    History of Falls Interventions: Door open when patient unattended, Room close to nurse's station

## 2018-03-05 NOTE — CDMP QUERY
1.    Thank you for documenting symptomatic anemia in the setting of GI bleed, can this be further specified as acute blood loss anemia.     => Acute blood loss anemia in the setting of GI bleed (suspected possible PUD), as evidenced by hemoglobin drop from 9.2 to 7.9, being treated with transfusion of packed red blood cells, serial monitoring of hemoglobin levels  and GI consult for upcoming EGD  => Other explanation of clinical findings  => Unable to determine (no explanation for clinical findings)    The medical record reflects the following clinical findings, treatment, and risk factors. 57 y/o white female presents with complaints of bloody stools x 3 days. On admission her hemoglobin is 9.2, stool is positive for occult blood, repeat hemoglobin is 7.9 . She is treated with transfusion of 2 units of PRBC, serial monitoring of hemoglobin levels, and GI consult for upcoming EGD. Please clarify and document your clinical opinion in the progress notes and discharge summary including the definitive and/or presumptive diagnosis, (suspected or probable), related to the above clinical findings. Please include clinical findings supporting your diagnosis. Thanks for your time.     Erinn Ruff RN, BSN  [phone]

## 2018-03-05 NOTE — PROGRESS NOTES
Bedside and Verbal shift change report given to Kt Cabello RN (oncoming nurse) by Brandi Rios RN (offgoing nurse). Report included the following information SBAR, Kardex, MAR and Cardiac Rhythm NSR.     0710 - Patient resting in bed. Second unit of PRBC infusing. No complaints of pain. Family member at bedside. 0845 - Blood transfusion complete. No suspected transfusion reaction noted. Will monitor. 9019 - Patient resting in bed resting. Family member at bedside. INR sent to lab. 1015 - Patient complaining of right sided headache pain 8 out of 10. Family Practice notified. Waiting for orders. 1120 - Compazine added to [facility] by [facility] for patient's headache. 1126 - Compazine administered. 1220 - Headache pain 5 out of 10. Will monitor. 1300 - Patient in bed resting quietly. Family at bedside    1400 - Patient sleeping. Will monitor. 1610 - Bedside and Verbal shift change report given to Poonam Graf RN (oncoming nurse) by Kt Cabello RN (offgoing nurse). Report included the following information SBAR, Kardex, MAR and Cardiac Rhythm NSR.
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
## 2018-03-05 NOTE — PROGRESS NOTES
[address] [facility] [address] [address]   Office [phone]  Fax [phone]          Assessment and Plan     Reynaldo Moreno is a 56 y.o. female admitted for acute GI bleed. She has spent 1 night(s) in the hospital.    24 Hour Events: No acute events other than the decrease in Hgb and the start of blood transfusion. Acute GI bleed with sypmtomatic anemia in setting of long-term warfarin use for PE/DVT. Most likely UGI bleed from PUD (+GERD and NSAID use). Unlikely gastroenteritis (negative ROS, just subjective fever and possible sick contact). FOBT+. Hgb 9.2 -> 7.9 -> 6.8 (postop TKA on 7/31 Hgb 9.4, baseline near 11-12). Hemodynamically Stable. CT abd/pelvis unremarkable. - Continue IVF, NPO, strict I/Os  - Getting 2 units of pRBC (currently)  - Continue H/H q4h after 2 hr post transfusion H/H  - PT/INR daily, hold warfarin.   - PPI q12h  - F/u with GI consult, appreciate recs  - Consider gastroenteritis workup if negative EGD      Chronic progressive dysphagia to solid foods. Trop neg. No recent wt loss. 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 INR 2.1  - Hold warfarin for now  - PT/INR daily  - F/u hematology consult, apprecite recs.      Hypothyrodism. Pt run out of synthroid 2 month ago. TSH 5.0 (same as 6/2017)  - May Resume synthroid tomorrow after EGD.      Migraine headaches  - Tylenol prn.     Vitamin D deficiency. - Continue supplements     Obesity BMI Body mass index is 36.4 kg/(m^2). - Encouraging lifestyle modifications and further follow up outpatient.      FEN/GI - NPO. NS at 125 mL/hr. Activity - Ambulate with assistance  DVT prophylaxis - SCDs  GI prophylaxis - Protonix  Fall prophylaxis - Not indicated at this time. Disposition - Admit to Stepdown. Plan to d/c to Home. Consulting PT/OT/CM  Code Status - Full, discussed with patient / caregivers.   Next of Vignesh Álvarez Name and Contact    Patient Lynne Muhammad will be discussed Dr. Keanu Brown.    I appreciate the opportunity to participate in the care of this patient,  Sergo Cagle MD  Family Medicine Resident         Subjective / Objective     Subjective Pt just feeling tired. Unchanged from admission. No abdominal pain, no sob, no dizziness. No BM since 5pm.     Temp (24hrs), Av.7 °F (37.1 °C), Min:98 °F (36.7 °C), Max:99.1 °F (37.3 °C)     Objective:  General Appearance:  Comfortable, in no acute distress and not in pain. Vital signs: (most recent): Blood pressure 102/58, pulse 82, temperature 99.6 °F (37.6 °C), resp. rate 17, height 5' 2\" (1.575 m), weight 199 lb (90.3 kg), SpO2 97 %, not currently breastfeeding. Vital signs are normal.    Lungs:  Normal respiratory rate and normal effort. Breath sounds clear to auscultation. Heart: Normal rate. Regular rhythm. S1 normal and S2 normal.    Neurological: Patient is alert and oriented to person, place and time. Skin:  Warm and dry. Abdomen: Abdomen is soft and non-distended. Bowel sounds are normal.   There is no abdominal tenderness. There is no guarding. Pulses: Distal pulses are intact.        Respiratory:   O2 Device: Room air   I/O:       Inpatient Medications  Current Facility-Administered Medications   Medication Dose Route Frequency    0.9% sodium chloride infusion 250 mL  250 mL IntraVENous PRN    sodium chloride (NS) flush 5-10 mL  5-10 mL IntraVENous Q8H    sodium chloride (NS) flush 5-10 mL  5-10 mL IntraVENous PRN    pantoprazole (PROTONIX) 40 mg in 0 mL injection  40 mg IntraVENous Q12H    0.9% sodium chloride infusion  125 mL/hr IntraVENous CONTINUOUS    diphenhydrAMINE (BENADRYL) injection 25 mg  25 mg IntraVENous ONCE    influenza vaccine 2017-18 (3 yrs+)(PF) (FLUZONE QUAD/FLUARIX QUAD) injection 0.5 mL  0.5 mL IntraMUSCular PRIOR TO DISCHARGE     Allergies  Allergies   Allergen Reactions    Imitrex [Sumatriptan] Other (comments) Face flushed- felt like \" needles in face\"    Reglan [Metoclopramide] Nausea Only and Other (comments)     Cramping \"everywhere\"     CBC:  Recent Labs      03/05/18 0132 03/04/18 2108 03/04/18   1718   WBC  4.8   --   6.2   HGB  6.8*  7.9*  9.2*   HCT  20.4*  23.8*  28.1*   PLT  266   --   474     Metabolic Panel:  Recent Labs      03/05/18 0133 03/05/18 0132 03/04/18 2108 03/04/18   1825  03/04/18   1718   NA   --   139   --    --   139   K   --   3.6   --    --   3.7   CL   --   106   --    --   104   CO2   --   24   --    --   25   BUN   --   19   --    --   14   CREA   --   0.49*   --    --   0.66   GLU   --   94   --    --   88   CA   --   7.2*   --    --   7.9*   ALB   --   2.7*   --    --   3.6   SGOT   --   22   --    --   34   ALT   --   30   --    --   43   INR  1.9*   --   2.0*  2.1*   --      Imaging/procedures:    EKG:        For Billing    Chief Complaint   Patient presents with   [facility] Problems  Date Reviewed: 6/27/2017          Codes Class Noted POA    GI bleed ICD-10-CM: K92.2  ICD-9-CM: 578.9  3/4/2018 Unknown

## 2018-03-05 NOTE — ROUTINE PROCESS
TRANSFER - OUT REPORT:    Verbal report given to Nadira RN(name) on Juan Miranda  being transferred to [facility](unit) for routine progression of care       Report consisted of patients Situation, Background, Assessment and   Recommendations(SBAR). Information from the following report(s) SBAR, ED Summary and MAR was reviewed with the receiving nurse. Lines:   Peripheral IV 07/24/17 Left Hand (Active)       Peripheral IV 03/04/18 Right Antecubital (Active)   Site Assessment Clean, dry, & intact 3/4/2018  5:30 PM   Phlebitis Assessment 0 3/4/2018  5:30 PM   Infiltration Assessment 0 3/4/2018  5:30 PM   Dressing Status Clean, dry, & intact 3/4/2018  5:30 PM   Dressing Type Tape;Transparent 3/4/2018  5:30 PM   Hub Color/Line Status Pink;Flushed 3/4/2018  5:30 PM   Alcohol Cap Used No 3/4/2018  5:30 PM       Peripheral IV 03/04/18 Right Antecubital (Active)   Site Assessment Clean, dry, & intact 3/4/2018  9:36 PM   Phlebitis Assessment 0 3/4/2018  9:36 PM   Infiltration Assessment 0 3/4/2018  9:36 PM   Dressing Status Clean, dry, & intact 3/4/2018  9:36 PM   Dressing Type Transparent 3/4/2018  9:36 PM   Hub Color/Line Status Pink 3/4/2018  9:36 PM        Opportunity for questions and clarification was provided.       Patient transported with:   Monitor  Registered Nurse

## 2018-03-05 NOTE — PROGRESS NOTES
Bedside and Verbal shift change report given to Arias Hussein (oncoming nurse) by Elmira Morillo RN (offgoing nurse). Report included the following information SBAR, Kardex, Procedure Summary, Intake/Output, MAR, Accordion, Recent Results, Med Rec Status and Cardiac Rhythm sinus rhythm. 5:07 PM  Notified Family Practice that patient's hemoglobin came down to 7.3. Will need to be rechecked in 4 hours.

## 2018-03-05 NOTE — PROGRESS NOTES
3/5/2018  9:54 AM  Met with patient and daughter to introduce myself. Confirmed charted demographics correct. Patient lives in a single story home with  with 4 steps to enter. Patient drives and takes care of her own ADL's. [facility] had Home Health in past, doesn't remember the name. Patient does have walker for use at home. Patient had knee replacement July/2017 with Dr. Rita Zaman. Patient is on warfarin, sees Dr. Calvin Reynolds for Hem/ONC. She has her INR done the [facility] at [facility]. She uses [facility] at [facility] [address]. Patient follow with Dr. Charise Dandy for medical management issues. Notified Dr. Efra Haynes of patients admission to [facility]. Patients  will be able to transport patient home on discharge. Care Management Interventions  PCP Verified by CM:  Yes  Mode of Transport at Discharge: Self  Transition of Care Consult (CM Consult): Discharge Planning  Physical Therapy Consult: No  Occupational Therapy Consult: No  Speech Therapy Consult: No  Current Support Network: Lives with Spouse  Confirm Follow Up Transport: Family  Plan discussed with Pt/Family/Caregiver: Yes  Discharge Location  Discharge Placement: Home  [place], [place]

## 2018-03-05 NOTE — PROGRESS NOTES
[facility]: MED RECONCILIATION    Comments/Recommendations:   Patient is awake, alert, and knowledgeable about home medications   Verifies allergies  The patient has not taken her levothyroxine in months as she ran out of refills and needs to return to the doctor for more refills. The patient's last documented prescription refill for levothyroxine was 50 mcg daily on 11/19/17 for a 30 day supply. It is reported the patient has been stable on her current dose of warfarin  The patient missed two doses of her Botox for migraine in the fall and resumed her every three month therapy on 2/27/18. The patient's provider informed her it may take 6 months to a year for the Botox to become effective. The patient reports her migraines were previously controlled on Botox. Drug Drug interaction noted between butalbital and warfarin as concurrent use may result in decreased anticoagulant effectiveness due to increased warfarin metabolism    Medications added:     · Fioricet  · Botox    Medications removed:    · Bisacodyl  · Vitamin D  · Enoxaparin  · Hydromorphone  · Levothyroxine  · Ondansetron  · Oxycodone  · Tramadol  · Valacyclovir    Medications adjusted:    · Warfarin changed to 10 mg three days per week and 5 mg on all other days      Allergies: Imitrex [sumatriptan] and Reglan [metoclopramide]    Prior to Admission Medications:   Prior to Admission Medications   Prescriptions Last Dose Informant Patient Reported? Taking? ACETAMINOPHEN/DIPHENHYDRAMINE (TYLENOL PM EXTRA STRENGTH PO) 3/3/2018 at Unknown time  Yes Yes   Sig: Take 3 Tabs by mouth nightly. ONABOTULINUMTOXINA (BOTOX INJECTION) 2/27/2018  Yes Yes   Sig: by IntraMUSCular route.  The patient receives injections every 3 months at the office of Dr. Farnaz Jiang   butalbital-acetaminophen-caff (FIORICET) -40 mg per capsule 3/3/2018 at Unknown time  Yes Yes   Sig: Take 1 Cap by mouth every four (4) hours as needed for Pain.   warfarin (COUMADIN) 10 mg tablet 3/2/2018  Yes Yes   Sig: Take 10 mg by mouth three (3) days a week. The patient takes warfarin 10 mg on Monday, Wednesday, Friday   warfarin (COUMADIN) 5 mg tablet 3/3/2018  Yes Yes   Sig: Take 5 mg by mouth four (4) days a week.  The patient takes warfarin 5 mg on Tuesday, Thursday, Saturday, Sunday      Facility-Administered Medications: None      Thank you,

## 2018-03-05 NOTE — PROGRESS NOTES
23:00 Admitted this 57 y/o female pt. From ED, IV magnesium infusing well followed by ns at 125 ml/hr. Admission history taken. Assessment done as documented. 00:30 Pt. Children came to visit, one of her daughter staying. 01:30 Blood specimen sent. 02:35 Dr. Gerri Alas came to talk to pt. Current hgb =6.8 order to transfuse 2 units. 03:10 First unit PRBC infusing. BP dropped to SBP 80's. 04:00 Sleeping. 05:18 Pt. oral temp 99.1-99.9 Dr. Allison Knight notified, no other symptoms observed. 05:42 Ist unit of blood completed. No untoward signs and symptoms. 06:10 Dr. Allison Knight was in, aware of low bp on monitor pt. Complaint of headache, awaiting for new order. 06:15 Second unit of blood started. First 15 minutes vitals taken. 06:30 Dr. Allison Knight reminded of pt's request for pain medication. 07:15 Bedside report given to [facility].

## 2018-03-05 NOTE — PROGRESS NOTES
Patient NPO for EGD  This afternoon. Agree with this test due to c/o  Solid dysphagia. Will f/u in am, pending test results.

## 2018-03-05 NOTE — PROGRESS NOTES
[facility]. [facility] Residency [facility]       Resident Progress Note in Brief    S: Second set Hgb had dropped to 6.8. Pt says she is doing well (just sleeping). No dizziness, just feeling tired. No BM since 5pm. Patient seen and examined at bedside. O:  Visit Vitals    /46    Pulse 91    Temp 98 °F (36.7 °C)    Resp 15    Ht 5' 2\" (1.575 m)    Wt 199 lb (90.3 kg)    SpO2 98%    Breastfeeding No    BMI 36.4 kg/m2     Physical Examination:   General appearance - alert, well appearing, and in no distress  Chest - clear to auscultation, no wheezes, rales or rhonchi, symmetric air entry  Heart - normal rate, regular rhythm, normal S1, S2, no murmurs, rubs, clicks or gallops,   Abdomen - soft, nontender, nondistended, no masses or organomegaly  Neurological - alert, oriented, normal speech, no focal findings  Extremities - peripheral pulses normal, no pedal edema, no clubbing or cyanosis    A/P:   Symptomatic anemia with acute GI bleed. Hgb 6.8 (poa 9.2). Spoke with Dr Kimo Lindsey. Hemodynamically stable. No BM.   - Transfuse 2 units  - F/u 2 hr post transfusion H/H   - Will have Dr Erin Dumont see her first thing in the morning.  - Continue the rest of treatment as per H&P.      Angel Torres MD  Family Medicine Resident

## 2018-03-05 NOTE — PROGRESS NOTES
Problem: Falls - Risk of  Goal: *Absence of Falls  Document Rea Fall Risk and appropriate interventions in the flowsheet.   Outcome: Progressing Towards Goal  Fall Risk Interventions:                      History of Falls Interventions: Door open when patient unattended

## 2018-03-05 NOTE — ED NOTES
Bedside shift change report given to Tye Atkinson RN (oncoming nurse) by Rasta Louis (offgoing nurse). Report included the following information ED Summary. Patient settled into stretcher at this time. Assumed care of patient. Whiteboard updated with plan of care at this time. Plan of care consist of Protonix IV and admit per family practice, patient verbalized understanding of this plan. Call bell given to patient. Patient given blanket at this time. Requested of patient any needs at this time, relayed to call for any needs. Family at bedside with patient.

## 2018-03-05 NOTE — PROGRESS NOTES
[facility]   Senior Resident Admission Note    CC: Dark stools    HPI:  Danni Lopez is a 56 y.o. female with Factor V Leiden on chronic anticoagulation, hx of multiple PE and DVT, hypothyroidism, migraines who presents to the ER complaining of bloody stools. Reports onset of BRBPR on Thursday 3/1 into Friday, which thereafter became tarry. States that she has multiple BM's a day, last just PTA. Denies n/v, abd pain, fever, night sweats, unintentional weight loss. Endorses fatigue and dyspnea on exertion. Denies chest pain. No hx of NSAID use. Has increased tylenol PM use for migraines. Has never had a GI bleed before. Notes that over the past several years it has become increasingly more difficult to swallow solids without fluid. Has been taking ranitidine daily recently. In the ER, VS significant for HR 94, /60. Hgb 9.2, platelet 877, creatinine 0.66, trop < 0.04. FOBT positive. INR 2.1. EKG and CXR normal. CT abd pelv w contrast without acute abnormality. Chart reviewed. Patient seen, examined, and discussed with Dr. Myron Rinne (PGY-1). See his note for more details. Physical exam  Gen: alert, oriented, in no acute distress  CV: regular rhythm, normal rate, no m/r/g  Pulm: CTAB, normal work of breathing  GI: soft, non-distended, non-tender    A/P: 57 yo F with FVL on chronic warfarin who is being admitted for GI bleed. GI bleed  FOBT positive, Hgb 9.2 (baseline 10-12), VSS. CT abd pelv without acute abnl. Concern for UGI bleed, possibly PUD, given progressive dysphagia, tarry stools in setting of chronic anticoagulation. No recent NSAID use, denies EtOH.   - Admit to stepdown  - 2 large bore IV's, type and cross, hold 2 units PRBC ahead  - IV PPI, NPO now  - Serial H/H, INR tomorrow, hold warfarin  - IVF resuscitation  - GI consult: spoke with Dr. Kimo Lindsey who agrees with above and will have Dr. Erin Dumont see patient in the morning if patient continues to remain stable, intervention earlier if unstable    Dysphagia  Progressive with solids over the past few years. Taking OTC ranitidine daily. Has never had EGD or MBS.  - SLP consult; no MBS prior to EGD    Factor V Leiden, hx of DVT and PE  On chronic warfarin for hx of multiple venous thrombus events. Sinton filter in place. Sees Dr. Fabi Rodriguez at [facility] as an outpatient. Last INR as outpatient per patient therapeutic at 2.5 on 2/25. Goal INR 2-3.  - Hold warfarin  - Daily INR  - Heme/onc consult for anticoagulation options given GIB    Migraine  Has been requiring increased frequency of tylenol PM lately. - No NSAID d/t GIB  - Cannot tolerate imitrex, reglan  - Benadryl, mag, compazine now    Hypothyroidism  TSH 5.440 on 6/27/17. Patient admits not having taken synthroid for several weeks. - f/u TSH    I agree with remaining assessment and plan as documented in Dr. Domo Teran note.       Pt discussed with Dr. Gladis Singh (on-call attending physician)    Federica Anaya MD  Family Medicine Resident
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
## 2018-03-06 ENCOUNTER — APPOINTMENT (OUTPATIENT)
Dept: INTERVENTIONAL RADIOLOGY/VASCULAR | Age: 48
DRG: 241 | End: 2018-03-06
Attending: FAMILY MEDICINE
Payer: MEDICAID

## 2018-03-06 ENCOUNTER — APPOINTMENT (OUTPATIENT)
Dept: GENERAL RADIOLOGY | Age: 48
DRG: 241 | End: 2018-03-06
Attending: RADIOLOGY
Payer: MEDICAID

## 2018-03-06 LAB
ALBUMIN SERPL-MCNC: 2.3 G/DL (ref 3.5–5)
ALBUMIN/GLOB SERPL: 1 {RATIO} (ref 1.1–2.2)
ALP SERPL-CCNC: 38 U/L (ref 45–117)
ALT SERPL-CCNC: 34 U/L (ref 12–78)
ANION GAP SERPL CALC-SCNC: 10 MMOL/L (ref 5–15)
AST SERPL-CCNC: 28 U/L (ref 15–37)
BACTERIA SPEC CULT: NORMAL
BACTERIA SPEC CULT: NORMAL
BASOPHILS # BLD: 0 K/UL (ref 0–0.1)
BASOPHILS NFR BLD: 0 % (ref 0–1)
BILIRUB SERPL-MCNC: 0.2 MG/DL (ref 0.2–1)
BUN SERPL-MCNC: 17 MG/DL (ref 6–20)
BUN/CREAT SERPL: 37 (ref 12–20)
CALCIUM SERPL-MCNC: 6.7 MG/DL (ref 8.5–10.1)
CHLORIDE SERPL-SCNC: 108 MMOL/L (ref 97–108)
CO2 SERPL-SCNC: 22 MMOL/L (ref 21–32)
CREAT SERPL-MCNC: 0.46 MG/DL (ref 0.55–1.02)
DIFFERENTIAL METHOD BLD: ABNORMAL
EOSINOPHIL # BLD: 0 K/UL (ref 0–0.4)
EOSINOPHIL NFR BLD: 0 % (ref 0–7)
ERYTHROCYTE [DISTWIDTH] IN BLOOD BY AUTOMATED COUNT: 15.9 % (ref 11.5–14.5)
GLOBULIN SER CALC-MCNC: 2.3 G/DL (ref 2–4)
GLUCOSE SERPL-MCNC: 93 MG/DL (ref 65–100)
HCT VFR BLD AUTO: 20.1 % (ref 35–47)
HCT VFR BLD AUTO: 25.7 % (ref 35–47)
HCT VFR BLD AUTO: 25.9 % (ref 35–47)
HGB BLD-MCNC: 6.6 G/DL (ref 11.5–16)
HGB BLD-MCNC: 8.5 G/DL (ref 11.5–16)
HGB BLD-MCNC: 8.6 G/DL (ref 11.5–16)
IMM GRANULOCYTES # BLD: 0 K/UL (ref 0–0.04)
IMM GRANULOCYTES NFR BLD AUTO: 0 % (ref 0–0.5)
INR PPP: 1.6 (ref 0.9–1.1)
LYMPHOCYTES # BLD: 1.6 K/UL (ref 0.8–3.5)
LYMPHOCYTES NFR BLD: 23 % (ref 12–49)
MCH RBC QN AUTO: 30.8 PG (ref 26–34)
MCHC RBC AUTO-ENTMCNC: 32.8 G/DL (ref 30–36.5)
MCV RBC AUTO: 93.9 FL (ref 80–99)
MONOCYTES # BLD: 0.4 K/UL (ref 0–1)
MONOCYTES NFR BLD: 6 % (ref 5–13)
NEUTS SEG # BLD: 4.7 K/UL (ref 1.8–8)
NEUTS SEG NFR BLD: 70 % (ref 32–75)
NRBC # BLD: 0 K/UL (ref 0–0.01)
NRBC BLD-RTO: 0 PER 100 WBC
PLATELET # BLD AUTO: 231 K/UL (ref 150–400)
PMV BLD AUTO: 9.9 FL (ref 8.9–12.9)
POTASSIUM SERPL-SCNC: 3.7 MMOL/L (ref 3.5–5.1)
PROT SERPL-MCNC: 4.6 G/DL (ref 6.4–8.2)
PROTHROMBIN TIME: 16.5 SEC (ref 9–11.1)
RBC # BLD AUTO: 2.14 M/UL (ref 3.8–5.2)
SERVICE CMNT-IMP: NORMAL
SODIUM SERPL-SCNC: 140 MMOL/L (ref 136–145)
T4 FREE SERPL-MCNC: 1 NG/DL (ref 0.8–1.5)
WBC # BLD AUTO: 6.7 K/UL (ref 3.6–11)

## 2018-03-06 PROCEDURE — 74011250636 HC RX REV CODE- 250/636: Performed by: FAMILY MEDICINE

## 2018-03-06 PROCEDURE — 74011250636 HC RX REV CODE- 250/636: Performed by: STUDENT IN AN ORGANIZED HEALTH CARE EDUCATION/TRAINING PROGRAM

## 2018-03-06 PROCEDURE — 36430 TRANSFUSION BLD/BLD COMPNT: CPT

## 2018-03-06 PROCEDURE — 74011000250 HC RX REV CODE- 250: Performed by: STUDENT IN AN ORGANIZED HEALTH CARE EDUCATION/TRAINING PROGRAM

## 2018-03-06 PROCEDURE — 74011000250 HC RX REV CODE- 250: Performed by: RADIOLOGY

## 2018-03-06 PROCEDURE — C1894 INTRO/SHEATH, NON-LASER: HCPCS

## 2018-03-06 PROCEDURE — C9113 INJ PANTOPRAZOLE SODIUM, VIA: HCPCS | Performed by: STUDENT IN AN ORGANIZED HEALTH CARE EDUCATION/TRAINING PROGRAM

## 2018-03-06 PROCEDURE — 76937 US GUIDE VASCULAR ACCESS: CPT

## 2018-03-06 PROCEDURE — 85610 PROTHROMBIN TIME: CPT | Performed by: FAMILY MEDICINE

## 2018-03-06 PROCEDURE — 71045 X-RAY EXAM CHEST 1 VIEW: CPT

## 2018-03-06 PROCEDURE — C1751 CATH, INF, PER/CENT/MIDLINE: HCPCS

## 2018-03-06 PROCEDURE — 65660000000 HC RM CCU STEPDOWN

## 2018-03-06 PROCEDURE — 84439 ASSAY OF FREE THYROXINE: CPT | Performed by: STUDENT IN AN ORGANIZED HEALTH CARE EDUCATION/TRAINING PROGRAM

## 2018-03-06 PROCEDURE — 85025 COMPLETE CBC W/AUTO DIFF WBC: CPT | Performed by: FAMILY MEDICINE

## 2018-03-06 PROCEDURE — 36415 COLL VENOUS BLD VENIPUNCTURE: CPT | Performed by: FAMILY MEDICINE

## 2018-03-06 PROCEDURE — 80053 COMPREHEN METABOLIC PANEL: CPT | Performed by: FAMILY MEDICINE

## 2018-03-06 PROCEDURE — 85018 HEMOGLOBIN: CPT | Performed by: STUDENT IN AN ORGANIZED HEALTH CARE EDUCATION/TRAINING PROGRAM

## 2018-03-06 PROCEDURE — 77030038269 HC DRN EXT URIN PURWCK BARD -A

## 2018-03-06 PROCEDURE — P9016 RBC LEUKOCYTES REDUCED: HCPCS | Performed by: STUDENT IN AN ORGANIZED HEALTH CARE EDUCATION/TRAINING PROGRAM

## 2018-03-06 RX ORDER — LIDOCAINE HYDROCHLORIDE 10 MG/ML
5 INJECTION, SOLUTION EPIDURAL; INFILTRATION; INTRACAUDAL; PERINEURAL ONCE
Status: COMPLETED | OUTPATIENT
Start: 2018-03-06 | End: 2018-03-06

## 2018-03-06 RX ORDER — MORPHINE SULFATE 2 MG/ML
0.5 INJECTION, SOLUTION INTRAMUSCULAR; INTRAVENOUS ONCE
Status: DISCONTINUED | OUTPATIENT
Start: 2018-03-06 | End: 2018-03-06

## 2018-03-06 RX ORDER — SODIUM CHLORIDE 9 MG/ML
250 INJECTION, SOLUTION INTRAVENOUS AS NEEDED
Status: DISCONTINUED | OUTPATIENT
Start: 2018-03-06 | End: 2018-03-08 | Stop reason: HOSPADM

## 2018-03-06 RX ORDER — ACETAMINOPHEN 650 MG/1
975 SUPPOSITORY RECTAL
Status: DISCONTINUED | OUTPATIENT
Start: 2018-03-06 | End: 2018-03-08 | Stop reason: HOSPADM

## 2018-03-06 RX ORDER — SODIUM CHLORIDE 0.9 % (FLUSH) 0.9 %
5-10 SYRINGE (ML) INJECTION EVERY 8 HOURS
Status: DISCONTINUED | OUTPATIENT
Start: 2018-03-06 | End: 2018-03-08 | Stop reason: HOSPADM

## 2018-03-06 RX ADMIN — PHENYLEPHRINE HYDROCHLORIDE 30 MCG/MIN: 10 INJECTION INTRAVENOUS at 00:41

## 2018-03-06 RX ADMIN — PANTOPRAZOLE SODIUM 40 MG: 40 INJECTION, POWDER, FOR SOLUTION INTRAVENOUS at 09:22

## 2018-03-06 RX ADMIN — PROCHLORPERAZINE EDISYLATE 10 MG: 5 INJECTION INTRAMUSCULAR; INTRAVENOUS at 06:00

## 2018-03-06 RX ADMIN — SODIUM CHLORIDE 125 ML/HR: 900 INJECTION, SOLUTION INTRAVENOUS at 20:49

## 2018-03-06 RX ADMIN — SODIUM CHLORIDE 125 ML/HR: 900 INJECTION, SOLUTION INTRAVENOUS at 04:45

## 2018-03-06 RX ADMIN — PHENYLEPHRINE HYDROCHLORIDE 50 MCG/MIN: 10 INJECTION INTRAVENOUS at 01:45

## 2018-03-06 RX ADMIN — Medication 10 ML: at 09:35

## 2018-03-06 RX ADMIN — Medication 10 ML: at 13:14

## 2018-03-06 RX ADMIN — Medication 10 ML: at 04:49

## 2018-03-06 RX ADMIN — Medication 10 ML: at 05:02

## 2018-03-06 RX ADMIN — Medication 10 ML: at 15:30

## 2018-03-06 RX ADMIN — LEVOTHYROXINE SODIUM ANHYDROUS 37 MCG: 100 INJECTION, POWDER, LYOPHILIZED, FOR SOLUTION INTRAVENOUS at 09:22

## 2018-03-06 RX ADMIN — SODIUM CHLORIDE 125 ML/HR: 900 INJECTION, SOLUTION INTRAVENOUS at 12:20

## 2018-03-06 RX ADMIN — Medication 10 ML: at 00:49

## 2018-03-06 RX ADMIN — PANTOPRAZOLE SODIUM 40 MG: 40 INJECTION, POWDER, FOR SOLUTION INTRAVENOUS at 20:54

## 2018-03-06 RX ADMIN — PROCHLORPERAZINE EDISYLATE 10 MG: 5 INJECTION INTRAMUSCULAR; INTRAVENOUS at 18:04

## 2018-03-06 RX ADMIN — Medication 10 ML: at 05:54

## 2018-03-06 RX ADMIN — ONDANSETRON 6 MG: 2 INJECTION INTRAMUSCULAR; INTRAVENOUS at 09:22

## 2018-03-06 RX ADMIN — LIDOCAINE HYDROCHLORIDE 5 ML: 10 INJECTION, SOLUTION EPIDURAL; INFILTRATION; INTRACAUDAL; PERINEURAL at 08:39

## 2018-03-06 RX ADMIN — ONDANSETRON 6 MG: 2 INJECTION INTRAMUSCULAR; INTRAVENOUS at 20:53

## 2018-03-06 RX ADMIN — PHENYLEPHRINE HYDROCHLORIDE 10 MCG/MIN: 10 INJECTION INTRAVENOUS at 12:26

## 2018-03-06 RX ADMIN — PROCHLORPERAZINE EDISYLATE 10 MG: 5 INJECTION INTRAMUSCULAR; INTRAVENOUS at 11:13

## 2018-03-06 NOTE — ROUTINE PROCESS
Acknowledged central line placement order. Spoke with Guilherme Valera in regards to patient still needing central line. Patient still needs central line placement for poor peripheral access, pressors and blood transfusions. IR physician is not available at this time. Will place TLC when Dr. Ngoc Olson is available. Notified Guilherme Valera of all the above.

## 2018-03-06 NOTE — PROGRESS NOTES
Post Procedure Note:    Pt s/p EGD earlier this evening. Findings include actively bleeding blood vessel distal bulb of duodenum without  Ulcer and dieulafoy lesion of the duodenum that was treated with epi and clips placement. Patient tolerated the procedure well. Upon examination, patient did not have any complaints. Pt resting comfortably and reporting that she feels tired. O  Visit Vitals    /61 (BP 1 Location: Left arm, BP Patient Position: At rest)    Pulse 90    Temp 98.4 °F (36.9 °C)    Resp 21    Ht 5' 2\" (1.575 m)    Wt 205 lb 14.6 oz (93.4 kg)    SpO2 100%    Breastfeeding No    BMI 37.66 kg/m2       PE:   Gen: Patient resting comfortably, somnolent but arousable   Pulm: Clear to ausculation anteriorly  CV: RRR, no murmurs   Abdomen: Soft, non tender  Extremities: No edema       A/P     55 y/o F admitted for acute GI bleed s/p EGD today with results of actively bleeding blood vessel distal bulb of duodenum ( no ulceration)- dieulafoy lesion of the duodenum treated with epi and clips placement. EBL none. Pt tolerated the procedure well without any complications. - Pt somnolent but easily arousable most likely due to residual effects of anesthsia  - Hgb post procedure is 7.7 check H/H in 4 hours and transfuse 2 units for Hgb <7   - NPO, IV PPI BID, INR in the AM per GI recommendations:  - Closely monitor overnight   - Rest of the plan per AM note   I reviewed the patient's medical history, the resident's findings on physical examination, the patient's diagnoses, and treatment plan as documented in the resident note. I concur with the treatment plan as documented. Additional suggestions noted.

## 2018-03-06 NOTE — PERIOP NOTES
TRANSFER - OUT REPORT:    Verbal report given to YUE Quick (name) on Rojas Pale  being transferred to [facility] (unit) for routine post - op       Report consisted of patients Situation, Background, Assessment and   Recommendations(SBAR). Information from the following report(s) SBAR, Procedure Summary, MAR and Recent Results was reviewed with the receiving nurse. Lines:   Peripheral IV 07/24/17 Left Hand (Active)       Peripheral IV 03/04/18 Right Antecubital (Active)   Site Assessment Clean, dry, & intact 3/5/2018  5:27 PM   Phlebitis Assessment 0 3/5/2018  5:27 PM   Infiltration Assessment 0 3/5/2018  5:27 PM   Dressing Status Clean, dry, & intact 3/5/2018  5:27 PM   Dressing Type Transparent 3/5/2018  5:27 PM   Hub Color/Line Status Pink; Infusing 3/5/2018  5:27 PM   Alcohol Cap Used No 3/5/2018  5:27 PM       Peripheral IV 03/04/18 Right Antecubital (Active)   Site Assessment Clean, dry, & intact 3/5/2018  7:15 AM   Phlebitis Assessment 0 3/5/2018  7:15 AM   Infiltration Assessment 0 3/5/2018  7:15 AM   Dressing Status Clean, dry, & intact 3/5/2018  7:15 AM   Dressing Type Transparent;Tape 3/5/2018  7:15 AM   Hub Color/Line Status Pink; Infusing 3/5/2018  7:15 AM        Opportunity for questions and clarification was provided.       Patient transported with:   pt remains in [facility]

## 2018-03-06 NOTE — PROGRESS NOTES
[facility]   Daily Progress Note    Date: 3/6/2018    Assessment/Plan:     Norma Fraser is a 56 y.o. female with hx of PE/DVT/Factor V deficiency, migraine, OA s/p b/l TKA who is admitted for acute GI Bleed.     24 Hour Events: Hpyotensive shock overnight 2/2 blood loss. Started on Fletcher. PRBC transfusion initiated.     Hemorrhagic Shock 2/2 Acute GI bleed   - S/P EGD with active arterial bleed in duodenal bulb treated with clipping and epinephrine injection.  - Post procedure H/H of 6.6/20.1  - Transfusing 2u PRBCs, Continue maintenance IVF  - Follow H/H q4h, , Hold Warfarin  - BID IV PPI  - GI on consult. Appreciate recs  - NPO, strict I/Os      Chronic progressive dysphagia to solid foods  - Normal Esophagus on EGD  - Currently NPO.   - Speech on consult. Appreciate recs       Hx PE, DVT, Factor V Deficiency  - Wel[place] Filter in place  - Hold warfarin for now; resume once stable  - PT/INR daily - current INR 1.6  - Heme on consult, apprecite recs.       Hypothyroidism  - Medication non-compliance  - TSH 5.0. Free T4 pending  - Continue IV synthroid until tolerating PO      Migraine headaches  - Compazine, Tylenol prn.      Vitamin D deficiency. - Continue supplements      Obesity BMI Body mass index is 36.4 kg/(m^2). - Encouraging lifestyle modifications and further follow up outpatient.       FEN/GI - NPO. NS at 125 mL/hr. Activity - Ambulate with assistance  DVT prophylaxis - SCDs  GI prophylaxis - Protonix  Fall prophylaxis - Not indicated at this time. Disposition - Plan to d/c to Home. PT/OT/CM on consult  Code Status - Full, discussed with patient / caregivers. Next of Kin Name and Contact     CODE STATUS:  Full code    Patient to be seen and discussed with Dr. Belkys Johnson, MD  Family Medicine Resident         CC: Dark stools    Subjective: Hpyotensive shock overnight 2/2 blood loss. Started on Fletcher.  PRBC transfusion initiated.     Inpatient Medications  Current Facility-Administered Medications   Medication Dose Route Frequency    PHENYLephrine (OSEAS-SYNEPHRINE) 30 mg in 0.9% sodium chloride 250 mL infusion   mcg/min IntraVENous TITRATE    0.9% sodium chloride infusion 250 mL  250 mL IntraVENous PRN    0.9% sodium chloride infusion 250 mL  250 mL IntraVENous PRN    prochlorperazine (COMPAZINE) injection 10 mg  10 mg IntraVENous Q6H    midazolam (VERSED) injection 0.25-5 mg  0.25-5 mg IntraVENous Multiple    simethicone (MYLICON) 16NY/7.1RG oral drops 80 mg  1.2 mL Oral Multiple    fentaNYL citrate (PF) injection 100 mcg  100 mcg IntraVENous Multiple    ondansetron (ZOFRAN) injection 6 mg  6 mg IntraVENous Q12H    sodium chloride (NS) flush 5-10 mL  5-10 mL IntraVENous Q8H    sodium chloride (NS) flush 5-10 mL  5-10 mL IntraVENous PRN    pantoprazole (PROTONIX) 40 mg in 0 mL injection  40 mg IntraVENous Q12H    0.9% sodium chloride infusion  125 mL/hr IntraVENous CONTINUOUS    influenza vaccine 2017-18 (3 yrs+)(PF) (FLUZONE QUAD/FLUARIX QUAD) injection 0.5 mL  0.5 mL IntraMUSCular PRIOR TO DISCHARGE         Allergies  Allergies   Allergen Reactions    Imitrex [Sumatriptan] Other (comments)      Face flushed- felt like \" needles in face\"    Reglan [Metoclopramide] Nausea Only and Other (comments)     Cramping \"everywhere\"         Objective  Vitals:  Patient Vitals for the past 8 hrs:   Temp Pulse Resp BP SpO2   03/06/18 0600 - 80 19 112/73 97 %   03/06/18 0545 99.1 °F (37.3 °C) 77 19 115/68 98 %   03/06/18 0530 - 76 18 114/69 100 %   03/06/18 0515 99.1 °F (37.3 °C) 79 18 108/64 97 %   03/06/18 0500 99.2 °F (37.3 °C) 78 16 112/65 98 %   03/06/18 0436 98.3 °F (36.8 °C) 81 16 98/50 98 %   03/06/18 0423 98.3 °F (36.8 °C) 92 16 109/51 100 %   03/06/18 0359 98.6 °F (37 °C) 78 20 104/62 97 %   03/06/18 0315 - 82 18 97/56 97 %   03/06/18 0259 98.4 °F (36.9 °C) 82 19 101/55 96 %   03/06/18 0245 - 86 20 101/56 96 % 03/06/18 0230 - 88 19 101/56 97 %   03/06/18 0229 98.4 °F (36.9 °C) 88 20 101/56 98 %   03/06/18 0214 100 °F (37.8 °C) 79 18 96/57 -   03/06/18 0157 99.3 °F (37.4 °C) 81 16 99/56 99 %   03/06/18 0153 - 81 20 97/58 97 %   03/06/18 0145 - 87 - 98/55 -   03/06/18 0144 99.5 °F (37.5 °C) 94 20 98/55 -   03/06/18 0105 - - - (!) 86/42 -   03/06/18 0103 - 87 - (!) 88/43 -   03/06/18 0051 100.4 °F (38 °C) 89 16 90/51 97 %   03/06/18 0045 - 88 17 (!) 86/49 97 %   03/06/18 0030 - 91 21 (!) 82/40 98 %   03/06/18 0015 - 92 14 (!) 83/29 99 %   03/06/18 0000 - (!) 113 20 (!) 88/68 (!) 87 %   03/05/18 2345 - 87 16 105/69 100 %   03/05/18 2330 - 86 17 112/65 100 %   03/05/18 2320 - 84 - - -   03/05/18 2315 - 85 20 112/68 99 %   03/05/18 2245 - 95 25 101/64 98 %   03/05/18 2230 - 84 18 103/62 97 %   03/05/18 2215 - 90 21 100/62 98 %         I/O:    Intake/Output Summary (Last 24 hours) at 03/06/18 0611  Last data filed at 03/06/18 0449   Gross per 24 hour   Intake            587.5 ml   Output              200 ml   Net            387.5 ml     Last shift:    03/05 1901 - 03/06 0700  In: 287.5   Out: 200   Last 3 shifts:    03/04 0701 - 03/05 1900  In: 604.2   Out: -     Physical Exam:  General: No acute distress. Somnolent. Cooperative. Head: Normocephalic. Atraumatic. Eyes:  Mild conjunctival pallor. Sclera white. PERRL. Nose:  Septum midline. Mucosa pink. No drainage. Throat: Mucosa pink. Moist mucous membranes. No tonsillar exudates or erythema. Palate movement equal bilaterally. Respiratory: CTAB. No w/r/r/c.   Cardiovascular: RRR. Normal S1,S2. No m/r/g. Pulses 2+ throughout. GI: + bowel sounds. Nontender. No rebound tenderness or guarding. Nondistended   Extremities: No edema. No palpable cord. No tenderness.      Laboratory Data  Recent Results (from the past 12 hour(s))   HGB & HCT    Collection Time: 03/05/18  8:54 PM   Result Value Ref Range    HGB 7.7 (L) 11.5 - 16.0 g/dL    HCT 24.8 (L) 35.0 - 47.0 % METABOLIC PANEL, COMPREHENSIVE    Collection Time: 03/06/18 12:33 AM   Result Value Ref Range    Sodium 140 136 - 145 mmol/L    Potassium 3.7 3.5 - 5.1 mmol/L    Chloride 108 97 - 108 mmol/L    CO2 22 21 - 32 mmol/L    Anion gap 10 5 - 15 mmol/L    Glucose 93 65 - 100 mg/dL    BUN 17 6 - 20 MG/DL    Creatinine 0.46 (L) 0.55 - 1.02 MG/DL    BUN/Creatinine ratio 37 (H) 12 - 20      GFR est AA >60 >60 ml/min/1.73m2    GFR est non-AA >60 >60 ml/min/1.73m2    Calcium 6.7 (L) 8.5 - 10.1 MG/DL    Bilirubin, total 0.2 0.2 - 1.0 MG/DL    ALT (SGPT) 34 12 - 78 U/L    AST (SGOT) 28 15 - 37 U/L    Alk. phosphatase 38 (L) 45 - 117 U/L    Protein, total 4.6 (L) 6.4 - 8.2 g/dL    Albumin 2.3 (L) 3.5 - 5.0 g/dL    Globulin 2.3 2.0 - 4.0 g/dL    A-G Ratio 1.0 (L) 1.1 - 2.2     CBC WITH AUTOMATED DIFF    Collection Time: 03/06/18 12:33 AM   Result Value Ref Range    WBC 6.7 3.6 - 11.0 K/uL    RBC 2.14 (L) 3.80 - 5.20 M/uL    HGB 6.6 (L) 11.5 - 16.0 g/dL    HCT 20.1 (L) 35.0 - 47.0 %    MCV 93.9 80.0 - 99.0 FL    MCH 30.8 26.0 - 34.0 PG    MCHC 32.8 30.0 - 36.5 g/dL    RDW 15.9 (H) 11.5 - 14.5 %    PLATELET 013 915 - 653 K/uL    MPV 9.9 8.9 - 12.9 FL    NRBC 0.0 0  WBC    ABSOLUTE NRBC 0.00 0.00 - 0.01 K/uL    NEUTROPHILS 70 32 - 75 %    LYMPHOCYTES 23 12 - 49 %    MONOCYTES 6 5 - 13 %    EOSINOPHILS 0 0 - 7 %    BASOPHILS 0 0 - 1 %    IMMATURE GRANULOCYTES 0 0.0 - 0.5 %    ABS. NEUTROPHILS 4.7 1.8 - 8.0 K/UL    ABS. LYMPHOCYTES 1.6 0.8 - 3.5 K/UL    ABS. MONOCYTES 0.4 0.0 - 1.0 K/UL    ABS. EOSINOPHILS 0.0 0.0 - 0.4 K/UL    ABS. BASOPHILS 0.0 0.0 - 0.1 K/UL    ABS. IMM.  GRANS. 0.0 0.00 - 0.04 K/UL    DF AUTOMATED     PROTHROMBIN TIME + INR    Collection Time: 03/06/18 12:33 AM   Result Value Ref Range    INR 1.6 (H) 0.9 - 1.1      Prothrombin time 16.5 (H) 9.0 - 11.1 sec       Hospital Problems:  Hospital Problems  Date Reviewed: 6/27/2017          Codes Class Noted POA    * (Principal)GI bleed ICD-10-CM: K92.2  ICD-9-CM: 578.9  3/4/2018 Yes        Factor V deficiency ([facility] ICD-10-CM: D68.2  ICD-9-CM: 286.3  6/27/2017 Yes        Acquired hypothyroidism ICD-10-CM: E03.9  ICD-9-CM: 244.9  4/20/2017 Yes        Vitamin D deficiency ICD-10-CM: E55.9  ICD-9-CM: 268.9  4/20/2017 Yes        Eleanor filter in place ICD-10-CM: Z95.828  ICD-9-CM: V45.89  11/9/2011 Yes        Depression ICD-10-CM: F32.9  ICD-9-CM: 131  10/1/2011 Yes        Palpitations (Chronic) ICD-10-CM: R00.2  ICD-9-CM: 785.1  10/1/2011 Yes        History of pulmonary embolism ICD-10-CM: A01.906  ICD-9-CM: V12.55  8/10/2011 Yes            [address] Medicine Residency Attending Addendum:  I saw and evaluated the patient, performing the key elements of the service. I discussed the findings, assessment and plan with the resident and agree with the resident's findings and plan as documented in the resident's note.     The patient was seen on 3/6/2018   24 hour events noted,pt started on miguel  Reports no complaints this am    Vitals:    03/06/18 1100 03/06/18 1130 03/06/18 1230 03/06/18 1300   BP: 122/76 109/59 121/71 (!) 84/56   Pulse: 76 83 76 77   Resp:  22     Temp:  98.2 °F (36.8 °C)     SpO2: 97% 100%  95%   Weight:       Height:       LMP: 02/28/2018     Afebrile  MAP-89  Abdomen-soft,nttp  hgb-6.6      Assessment/Plan:  GI bleed-will transfuse 2prbc  Follow serial H and Hs  Appreciate GI consult and follow closely     Hospital Problems  Date Reviewed: 6/27/2017          Codes Class Noted POA    * (Principal)GI bleed ICD-10-CM: K92.2  ICD-9-CM: 578.9  3/4/2018 Yes        Factor V deficiency ([facility] ICD-10-CM: D68.2  ICD-9-CM: 286.3  6/27/2017 Yes        Acquired hypothyroidism ICD-10-CM: E03.9  ICD-9-CM: 244.9  4/20/2017 Yes        Vitamin D deficiency ICD-10-CM: E55.9  ICD-9-CM: 268.9  4/20/2017 Yes        Eleanor filter in place ICD-10-CM: Z95.828  ICD-9-CM: V45.89  11/9/2011 Yes        Depression ICD-10-CM: F32.9  ICD-9-CM: 559  10/1/2011 Yes Palpitations (Chronic) ICD-10-CM: R00.2  ICD-9-CM: 785.1  10/1/2011 Yes        History of pulmonary embolism ICD-10-CM: I82.501  ICD-9-CM: V12.55  8/10/2011 Yes

## 2018-03-06 NOTE — PROGRESS NOTES
Bedside and Verbal shift change report given to [address] (oncoming nurse) by Kartik Ashley RN (offgoing nurse). Report included the following information SBAR, Kardex, Procedure Summary, Intake/Output, MAR, Accordion, Recent Results, Med Rec Status and Cardiac Rhythm sinus rhythm.

## 2018-03-06 NOTE — CDMP QUERY
2.    Please clarify if this patient is (was) being treated/managed for:     => Hemorrhagic Shock in the setting of GI bleed and acute blood loss anemia due to actively bleeding Dieulafoy lesion of the duodenum being treated with transfusion of PRBC, and IV pressors.   => Other explanation of clinical findings  => Unable to determine (no explanation for clinical findings)    The medical record reflects the following clinical findings, treatment, and risk factors. 57 y/o female presents with bloody stools. EGD of 3/5 shows actively bleeding Dieulafoy lesion of the duodenum treated with Epi and clips. MD note of 3/6 0530 states \" Pt persists to be somnolent but easily arousable. SBPs have been persistently dropping to 80s with recent one 83/65 with MAP of 45 and repeat of 79/37 with MAP of 42 indicating the need for pressors. Therefore, order is placed for fletcher-synephrine and central line. Will monitor H/H and transfuse 2 units of pRBC for Hgb <7. Addendum: 01:00 Fletcher started peripheraally, currently at 40mcg/min being titrated per protocol, BP responding to pressor. Discussed with anesthesia that we'll hold off on central line for now  we just started on fletcher. Will reassess again for central line if there is a need for increase in pressors. H/H pending. Addendum: 05:26 Pt's Hgb dropped to 6.6 and 2 units of pRBCs ordered per GI recommendations. Patient currently on second bag of pRBC. BPs have been steady with SBPs of low hundreds currently on Fletcher @ 50 mcg/min. Pt complaining of headache 4/10 and compazine not helping much. \"  She is being treated with transfusion of PRBC 4 units, and IV Fletcher via central line. Please clarify and document your clinical opinion in the progress notes and discharge summary including the definitive and/or presumptive diagnosis, (suspected or probable), related to the above clinical findings. Please include clinical findings supporting your diagnosis. Thanks for your time.     Balwinder Downey Bret Kulkarni, BSN  [phone]

## 2018-03-06 NOTE — PROGRESS NOTES
Bedside and Verbal shift change report given to Fransisco Garg RN (oncoming nurse) by May Heredia RN (offgoing nurse). Report included the following information SBAR, Kardex, Intake/Output, MAR, Recent Results and Cardiac Rhythm SR.     ______________________________________________________________________________    5330  Second unit of blood completed. Fransisco Garg RN notified. 1 hr post vitals next at 0823. RN aware of 2 hour H/H blood draw. 0630  MD Manning states will discuss with day team medication for patient's headache. Patient informed. 0510  Patient complaining of headache. MD Manning notified, currently at bedside with patient. 0159  Blood administration initiated. Patient at this time, alert, resting quietly, denies pain or discomfort. Will continue to monitor. 0145  Phenylephrine drip increased to 50mcg/min for MAP <65.    0133  Notified MD Manning that patient's Hgb 6.6. Blood bank verified that units ready. Orders to be placed for blood per physician.    0103  Phenlyephrine drip increased to 40mcg. min for MAP <65.     0041  Phenylephrine drip started. Current BP 90/51. HR 89.    0015  BP 83/29. MD Manning at bedside. Patient is alert, oxygen saturation 98%, HR 86. MD states order for Phenylephrine at this time. ICU RN present, two IV's placed. H/H sent down to lab. Informed patient and patient daughter to not get out of bed at this time due to current medication administration; instructed to notify YUE. [place] in place. 0001  Informed MD that patient is reporting a mild headache and neck aches. MD states will review chart. 2130  Patient IV #20 RFA infiltrated. #20 RAC still patent with IVF infusing. RN and ICU RN made attempts for secondary IV placement, unsuccessful. MD aware, orders to ensure IVF running and possible discussion with morning physicians for better IV access. MD notified of 2054 H/H 7.7.    1949  Notified MD Ramos that patient is reporting nausea.  Orders to be placed by MD.   Also informed MD that MAP is <65, patient is asymptomatic, is drowsy s/p EGD procedure, and is reporting generalized abdominal pain. Per GI RN, abdominal injection of epinephrine administered during procedure, RN states pain is a common occurrence at site. Informed patient of this and instructed patient to notify RN for further symptoms. Orders received from MD Ramos to notify if MAP consistently <60. Will continue to monitor. 200  MD Molly at bedside, orders to keep NPO. OK for patient to have ice chips.     _____________________________________________________________________    Problem: Falls - Risk of  Goal: *Absence of Falls  Document Rea Fall Risk and appropriate interventions in the flowsheet.    Outcome: Progressing Towards Goal  Fall Risk Interventions:  Mobility Interventions: Bed/chair exit alarm, Patient to call before getting OOB         Medication Interventions: Bed/chair exit alarm, Patient to call before getting OOB, Teach patient to arise slowly    Elimination Interventions: Bed/chair exit alarm, Call light in reach, Patient to call for help with toileting needs, Toilet paper/wipes in reach, Toileting schedule/hourly rounds    History of Falls Interventions: Bed/chair exit alarm, Door open when patient unattended, Room close to nurse's station

## 2018-03-06 NOTE — PROGRESS NOTES
[facility]  [address] [facility], [address] [place] [address]  Flor Pellet: [phone]  F: [phone]      Reason for Visit:   Larwance Osler is a 56 y.o. female who is seen in consultation at the request of Dr. 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 Factor V def      History of Present Illness:     Ms Radha Haynes was admitted on 3/4/2018 from the ED when she presented with c/o bloody stools x 3 days progressing from bright red to black. Hx of factor V def assocciated with PE/DVT  and currently on coumadin. Hgb 6.8 Therefore was admitted for further eval and management. Ms Radha Haynes states has been on coumadin for 22 years for DVT and then 11 yrs ago had multiple PEs. Follows with Dr Walter Regalado at [facility]; was seen last week with normal INR. Seen monthly. Started having bloody stools on Thurs; with weakness. Family members at home with the flu. Thinks she might have run a fever. Still having dk stool this am. Shares that for yrs has noticed that food will get caught when she is swallowing. Denies any SOB. No family at bedside. Interval History:    Reports feeling OK; tired. Denies any pain or SOB.  and other family members at bedside.        Current Facility-Administered Medications   Medication Dose Route Frequency    PHENYLephrine (OSEAS-SYNEPHRINE) 30 mg in 0.9% sodium chloride 250 mL infusion   mcg/min IntraVENous TITRATE    0.9% sodium chloride infusion 250 mL  250 mL IntraVENous PRN    levothyroxine (SYNTHROID) injection 37 mcg  37 mcg IntraVENous Q24H    sodium chloride (NS) flush 5-10 mL  5-10 mL IntraVENous Q8H    acetaminophen (TYLENOL) suppository 975 mg  975 mg Rectal Q6H PRN    0.9% sodium chloride infusion 250 mL  250 mL IntraVENous PRN    prochlorperazine (COMPAZINE) injection 10 mg  10 mg IntraVENous Q6H    midazolam (VERSED) injection 0.25-5 mg  0.25-5 mg IntraVENous Multiple    simethicone (MYLICON) 40mg/0.6mL oral drops 80 mg  1.2 mL Oral Multiple    fentaNYL citrate (PF) injection 100 mcg  100 mcg IntraVENous Multiple    ondansetron (ZOFRAN) injection 6 mg  6 mg IntraVENous Q12H    sodium chloride (NS) flush 5-10 mL  5-10 mL IntraVENous Q8H    sodium chloride (NS) flush 5-10 mL  5-10 mL IntraVENous PRN    pantoprazole (PROTONIX) 40 mg in 0 mL injection  40 mg IntraVENous Q12H    0.9% sodium chloride infusion  125 mL/hr IntraVENous CONTINUOUS    influenza vaccine 2017-18 (3 yrs+)(PF) (FLUZONE QUAD/FLUARIX QUAD) injection 0.5 mL  0.5 mL IntraMUSCular PRIOR TO DISCHARGE      Allergies   Allergen Reactions    Imitrex [Sumatriptan] Other (comments)      Face flushed- felt like \" needles in face\"    Reglan [Metoclopramide] Nausea Only and Other (comments)     Cramping \"everywhere\"        Review of Systems: A complete review of systems was obtained, negative except as described above. Physical Exam:     Visit Vitals    /72    Pulse 75    Temp 98.2 °F (36.8 °C)    Resp 17    Ht 5' 2\" (1.575 m)    Wt 92 kg (202 lb 13.2 oz)    LMP 02/28/2018    SpO2 99%    Breastfeeding No    BMI 37.1 kg/m2       General: No distress  Eyes:  anicteric sclerae  HENT: Atraumatic with normal appearance of ears and nose; OP clear  Neck: Supple; no thyromegaly   Respiratory:  normal respiratory effort  CV: Normal rate, regular rhythm, no murmurs, no peripheral edema  GI: Soft, nontender, nondistended, no masses, no hepatomegaly, no splenomegaly  MS: . Digits without clubbing or cyanosis. Skin: No rashes, ecchymoses, or petechiae. Normal temperature, turgor, and texture. Neuro/Psych: Alert, oriented, appropriate affect, normal judgment/insight      Results:     Lab Results   Component Value Date/Time    WBC 6.7 03/06/2018 12:33 AM    HGB 8.6 (L) 03/06/2018 01:52 PM    HCT 25.7 (L) 03/06/2018 01:52 PM    PLATELET 332 81/71/7220 12:33 AM    MCV 93.9 03/06/2018 12:33 AM    ABS.  NEUTROPHILS 4.7 03/06/2018 12:33 AM Hemoglobin (POC) 11.9 12/04/2013 04:05 PM    Hematocrit (POC) 35 12/04/2013 04:05 PM    Hgb, External 13.2 02/01/2013    Hct, External 38.4 02/01/2013    Platelet cnt., External 482K 07/21/2011     Lab Results   Component Value Date/Time    Sodium 140 03/06/2018 12:33 AM    Potassium 3.7 03/06/2018 12:33 AM    Chloride 108 03/06/2018 12:33 AM    CO2 22 03/06/2018 12:33 AM    Glucose 93 03/06/2018 12:33 AM    BUN 17 03/06/2018 12:33 AM    Creatinine 0.46 (L) 03/06/2018 12:33 AM    GFR est AA >60 03/06/2018 12:33 AM    GFR est non-AA >60 03/06/2018 12:33 AM    Calcium 6.7 (L) 03/06/2018 12:33 AM    Sodium (POC) 142 12/04/2013 04:05 PM    Potassium (POC) 3.8 12/04/2013 04:05 PM    Chloride (POC) 107 12/04/2013 04:05 PM    Glucose (POC) 105 12/04/2013 04:05 PM    BUN (POC) 11 12/04/2013 04:05 PM    Creatinine (POC) 0.6 12/04/2013 04:05 PM    Calcium, ionized (POC) 1.16 12/04/2013 04:05 PM     Lab Results   Component Value Date/Time    Bilirubin, total 0.2 03/06/2018 12:33 AM    ALT (SGPT) 34 03/06/2018 12:33 AM    AST (SGOT) 28 03/06/2018 12:33 AM    Alk.  phosphatase 38 (L) 03/06/2018 12:33 AM    Protein, total 4.6 (L) 03/06/2018 12:33 AM    Albumin 2.3 (L) 03/06/2018 12:33 AM    Globulin 2.3 03/06/2018 12:33 AM     Lab Results   Component Value Date/Time    Ferritin 19 06/10/2016 01:14 PM    C-Reactive protein <0.29 07/05/2017 03:34 PM    TSH 5.08 (H) 03/04/2018 09:08 PM    Lipase 170 10/01/2011 01:11 AM    HIV 1/O/2 Abs <1.00 02/01/2013 10:30 AM    TSH, External 3.4 03/02/2011     Lab Results   Component Value Date/Time    INR 1.6 (H) 03/06/2018 12:33 AM    aPTT 30.1 03/04/2018 09:08 PM     Lab Results   Component Value Date/Time    HCG, Beta Chain, Quant, S <1 11/24/2014 10:36 AM    HCG, beta, QT <2 11/24/2014 10:40 AM     3/4/2018 XR CHEST  Impression: No acute process or change compared to the prior exam.    3/4/2018 CT ABD PELV W CONT  IMPRESSION:   There is no acute abnormality in the abdomen or pelvis. 3/5/2018   Findings:   Esophagus:normal  Stomach: Moderate size hiatal hernia, evidence of coffee ground seen and old blood clot seen in the fundus and body, no active bleeding seen. Mucosa in the body and fundus was partially visualized, mucosa in the antrum appeared within normal.  Duodenum/jejunum: Evidence of active bleeding seen at the junction of the distal bulb and second portion of the duodenum. After copious washing arterial spurting blood vessel was noted. No ulcer was seen. Mucosa in the bulb and second portion appeared within normal otherwise. 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 Treated with epinephrine and clips placement and hemostasis achieved. Moderate size hiatal hernia, no esophagitis or stricture seen    Assessment and Recommendations:   1. GI bleed/ dysphagia  Hemoccult positive in ED  GI consulted; EGD /EULOGIO (3/5) revealed active bleeding of distal bulb of duodenum ; treated with epi and 2 clips applied. Recommend holding anticoagulation until seen by primary hematologist, Dr Lonita Frankel. 2. Anemia, normocytic (s/p 4 units 2 PRBCs)  Secondary to GI bleed   Continue to monitor and transfuse for Hgb < 7       3. Hx of PE/DVT  Follows with Dr Lonita Frankel at [facility] for coumadin/INR checks. Follows monthly  Continue to hold anticoagulation until seen by primary hematologist.        Jassi Jiménez made with Dr Lonita Frankel on 3/13/2018 at 1:15 pm at the [address] [place] [address], [address] [place] [address] Reviewed with patient.       The above exam and treatment plan were reviewed with Dr Palma Montenegro    Signed By: Melany Bhakta NP

## 2018-03-06 NOTE — PERIOP NOTES
Pt stating pain 10/10 to abd. Made Dr. Binh Reyes aware. He states is is probably from the Epinepherine injections and that if the pain continues to get a KUB.

## 2018-03-06 NOTE — PROGRESS NOTES
[address] NP  [phone]           GI PROGRESS NOTE        NAME: Faheem Vera   :  1970   MRN:  [id number]       Subjective:   Pt reports no nausea, abdominal pain. Endorses dark colored diarrhea last night. Objective:   Received 2 Units PRBC overnight and Repeat CBC is Pending as of this note. VITALS:   Last 24hrs VS reviewed since prior progress note. Most recent are:  Visit Vitals    /69 (BP 1 Location: Left arm, BP Patient Position: At rest)    Pulse 78    Temp 99 °F (37.2 °C)    Resp 16    Ht 5' 2\" (1.575 m)    Wt 92 kg (202 lb 13.2 oz)    SpO2 98%    Breastfeeding No    BMI 37.1 kg/m2       Intake/Output Summary (Last 24 hours) at 18 08  Last data filed at 18 9023   Gross per 24 hour   Intake          5210.77 ml   Output              200 ml   Net          5010.77 ml       PHYSICAL EXAM:  General: Drowsy, in no acute distress    HEENT: Anicteric sclerae. Lungs:            CTA Bilaterally. Heart:  Regular  rhythm,    Abdomen: Soft, Non distended, Non tender.  (+)Bowel sounds, no HSM  Extremities: No c/c/e  Neurologic:  CN 2-12 gi, oriented X 3. No acute neurological distress   Psych:   Good insight. Not anxious nor agitated. Lab Data Reviewed:   Recent Labs      18   1104   WBC  6.7   --    --   5.8   HGB  6.6*  7.7*   < >  8.5*   HCT  20.1*  24.8*   < >  25.9*   PLT  231   --    --   258    < > = values in this interval not displayed.      Recent Labs      18   NA  140  139   K  3.7  3.6   CL  108  106   CO2  22  24   BUN  17  19   CREA  0.46*  0.49*   GLU  93  94   CA  6.7*  7.2*     Recent Labs      18   SGOT  28  22   AP  38*  40*   TP  4.6*  5.3*   ALB  2.3*  2.7*   GLOB  2.3  2.6       ________________________________________________________________________  Patient Active Problem List   Diagnosis Code    Maternal DVT (deep vein thrombosis), history of Z86.718, Z87.59    History of pulmonary embolism Z86.711    Depression F32.9    Palpitations R00.2    Reflex sympathetic dystrophy of the leg G90.529    Eleanor filter in place Z95.828    Migraine headache G43.909    Bilateral knee pain M25.561, M25.562    Postcoital and contact bleeding N93.0    Acquired hypothyroidism E03.9    Vitamin D deficiency E55.9    Factor V deficiency (HCC) D68.2    Primary localized osteoarthritis of knees, bilateral M17.0    GI bleed K92.2         Assessment and Plan:  Upper GI Bleed: S/P epi injection and clipping of Dieulafoy Lesion. She has rec'd 2 units PRBC overnight. Repeat HgB is pending.  - Continue to trend HgB. Transfuse to goal 7.  - Continue to monitor INR.  - Keep NPO for now. - Continue support with IVF and pressors as needed. Will continue to follow closely.        Signed By: Sindi Villalobos NP     3/6/2018  8:19 AM

## 2018-03-06 NOTE — PROCEDURES
Monika Barnes M.D.  [phone]           3/5/2018                EGD Operative Report  Rashaun Klein  :  1970  Kyle Schaefer Medical Record Number:  [id number]      Indication:  Melena/hematochezia, acute blood loss anemia     : Shelby Tracey MD    Referring Provider:  Saleem Romero MD      Anesthesia/Sedation:  Versed 8 mg IV and Fentanyl 100 mcg IV    Airway assessment: No airway problems anticipated    Pre-Procedural Exam:      Airway: clear, no airway problems anticipated  Heart: RRR, without gallops or rubs  Lungs: clear bilaterally without wheezes, crackles, or rhonchi  Abdomen: soft, nontender, nondistended, bowel sounds present  Mental Status: awake, alert and oriented to person, place and time       Procedure Details     After infomed consent was obtained for the procedure, with all risks and benefits of procedure explained the patient was taken to the endoscopy suite and placed in the left lateral decubitus position. Following sequential administration of sedation as per above, the endoscope was inserted into the mouth and advanced under direct vision to second portion of the duodenum. A careful inspection was made as the gastroscope was withdrawn, including a retroflexed view of the proximal stomach; findings and interventions are described below. Findings:   Esophagus:normal  Stomach: Moderate size hiatal hernia, evidence of coffee ground seen and old blood clot seen in the fundus and body, no active bleeding seen. Mucosa in the body and fundus was partially visualized, mucosa in the antrum appeared within normal.  Duodenum/jejunum: Evidence of active bleeding seen at the junction of the distal bulb and second portion of the duodenum. After copious washing arterial spurting blood vessel was noted. No ulcer was seen. Mucosa in the bulb and second portion appeared within normal otherwise.     Therapies:  A total of 4 ml of 1:10,000 epinephrine were injected around the vessel and bleeding ceased. Two resolution clips were then applied and excellent hemostasis achieved. Specimens: none           Complications:   None; patient tolerated the procedure well. EBL:  None. 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 Treated with epinephrine and clips placement and hemostasis achieved.                           Moderate size hiatal hernia, no esophagitis or stricture seen    Recommendations:    - Continue NPO  - Continue IV PPI BID  - Check hemoglobin now and transfuse 2 units if hemoglobin below 7  - Repeat labs including INR in am  - Will see in am    Dede Jean Baptiste MD

## 2018-03-06 NOTE — PROGRESS NOTES
Pt persists to be somnolent but easily arousable. SBPs have been persistently dropping to 80s with recent one 83/65 with MAP of 45 and repeat of 79/37 with MAP of 42 indicating the need for pressors. Therefore, order is placed for fletcher-synephrine and central line. Will monitor H/H and transfuse 2 units of pRBC for Hgb <7. Addendum: 01:00    Fletcher started peripheraally, currently at 40mcg/min being titrated per protocol, BP responding to pressor. Discussed with anesthesia that we'll hold off on central line for now  we just started on fletcher. Will reassess again for central line if there is a need for increase in pressors. H/H pending. Addendum: 05:26    Pt's Hgb dropped to 6.6 and 2 units of pRBCs ordered per GI recommendations. Patient currently on second bag of pRBC. BPs have been steady with SBPs of low hundreds currently on Fletcher @ 50 mcg/min. Pt complaining of headache 4/10 and compazine not helping much. Will hold off on giving morphine for now as her BPs have been on the lower side and avoiding NSAIDs due to GI bleed. I reviewed the patient's medical history, the resident's findings on physical examination, the patient's diagnoses, and treatment plan as documented in the resident note. I concur with the treatment plan as documented. Additional suggestions noted.

## 2018-03-06 NOTE — PROGRESS NOTES
Bedside and Verbal shift change report given to Alissa Galvan RN (oncoming nurse) by Zunilda Blankenship RN (offgoing nurse). Report included the following information SBAR, Kardex, MAR and Cardiac Rhythm NSR.     0720 - Patient resting in bed. Second unit of PRBC complete. Temp 99.2, HR 77, RR 16, /72, O2 98% on room air. Will monitor. 1200 - Patient complains of neck and head pain. Tylenol suppository added to [facility]. Patient refused Tylenol. Family Practice notified. 1220 - Black stool with bright red blood noted. Dr. Chente Saldaña of [facility] notified. No new orders given. Will monitor. 1528 - Phenylephrine stopped. MAP 89. Patient resting in bed. Continues to complain of head and neck pain. Family Practice aware. No new orders. Will monitor. 1645 - Blood pressure 103/71, heart rate 90. MAP 78. Will monitor. 200 - Dr. Walters Actruben in to see patient. Orders placed for patient to have sips of clear fluids. Encourage patient to have sips of water. Bedside and Verbal shift change report given to Michelle Mcallister RN (oncoming nurse) by Alissa Galvan RN (offgoing nurse). Report included the following information SBAR, Kardex, MAR and Cardiac Rhythm NSR.

## 2018-03-07 LAB
ALBUMIN SERPL-MCNC: 2.5 G/DL (ref 3.5–5)
ALBUMIN/GLOB SERPL: 1 {RATIO} (ref 1.1–2.2)
ALP SERPL-CCNC: 43 U/L (ref 45–117)
ALT SERPL-CCNC: 48 U/L (ref 12–78)
ANION GAP SERPL CALC-SCNC: 11 MMOL/L (ref 5–15)
AST SERPL-CCNC: 42 U/L (ref 15–37)
BASOPHILS # BLD: 0 K/UL (ref 0–0.1)
BASOPHILS NFR BLD: 0 % (ref 0–1)
BILIRUB SERPL-MCNC: 0.3 MG/DL (ref 0.2–1)
BUN SERPL-MCNC: 6 MG/DL (ref 6–20)
BUN/CREAT SERPL: 15 (ref 12–20)
CALCIUM SERPL-MCNC: 7.1 MG/DL (ref 8.5–10.1)
CHLORIDE SERPL-SCNC: 108 MMOL/L (ref 97–108)
CO2 SERPL-SCNC: 22 MMOL/L (ref 21–32)
CREAT SERPL-MCNC: 0.4 MG/DL (ref 0.55–1.02)
DIFFERENTIAL METHOD BLD: ABNORMAL
EOSINOPHIL # BLD: 0.2 K/UL (ref 0–0.4)
EOSINOPHIL NFR BLD: 3 % (ref 0–7)
ERYTHROCYTE [DISTWIDTH] IN BLOOD BY AUTOMATED COUNT: 15.9 % (ref 11.5–14.5)
GLOBULIN SER CALC-MCNC: 2.6 G/DL (ref 2–4)
GLUCOSE SERPL-MCNC: 80 MG/DL (ref 65–100)
HCT VFR BLD AUTO: 24.8 % (ref 35–47)
HCT VFR BLD AUTO: 25.3 % (ref 35–47)
HCT VFR BLD AUTO: 25.7 % (ref 35–47)
HGB BLD-MCNC: 8.2 G/DL (ref 11.5–16)
HGB BLD-MCNC: 8.3 G/DL (ref 11.5–16)
HGB BLD-MCNC: 8.5 G/DL (ref 11.5–16)
IMM GRANULOCYTES # BLD: 0 K/UL (ref 0–0.04)
IMM GRANULOCYTES NFR BLD AUTO: 1 % (ref 0–0.5)
INR PPP: 1.4 (ref 0.9–1.1)
LYMPHOCYTES # BLD: 2.1 K/UL (ref 0.8–3.5)
LYMPHOCYTES NFR BLD: 33 % (ref 12–49)
MAGNESIUM SERPL-MCNC: 1.8 MG/DL (ref 1.6–2.4)
MCH RBC QN AUTO: 30.6 PG (ref 26–34)
MCHC RBC AUTO-ENTMCNC: 33.1 G/DL (ref 30–36.5)
MCV RBC AUTO: 92.5 FL (ref 80–99)
MONOCYTES # BLD: 0.5 K/UL (ref 0–1)
MONOCYTES NFR BLD: 8 % (ref 5–13)
NEUTS SEG # BLD: 3.5 K/UL (ref 1.8–8)
NEUTS SEG NFR BLD: 56 % (ref 32–75)
NRBC # BLD: 0.02 K/UL (ref 0–0.01)
NRBC BLD-RTO: 0.3 PER 100 WBC
PHOSPHATE SERPL-MCNC: 2.4 MG/DL (ref 2.6–4.7)
PLATELET # BLD AUTO: 251 K/UL (ref 150–400)
PMV BLD AUTO: 9.5 FL (ref 8.9–12.9)
POTASSIUM SERPL-SCNC: 3.4 MMOL/L (ref 3.5–5.1)
PROT SERPL-MCNC: 5.1 G/DL (ref 6.4–8.2)
PROTHROMBIN TIME: 14.7 SEC (ref 9–11.1)
RBC # BLD AUTO: 2.68 M/UL (ref 3.8–5.2)
SODIUM SERPL-SCNC: 141 MMOL/L (ref 136–145)
WBC # BLD AUTO: 6.3 K/UL (ref 3.6–11)

## 2018-03-07 PROCEDURE — 84100 ASSAY OF PHOSPHORUS: CPT | Performed by: STUDENT IN AN ORGANIZED HEALTH CARE EDUCATION/TRAINING PROGRAM

## 2018-03-07 PROCEDURE — 74011000258 HC RX REV CODE- 258: Performed by: STUDENT IN AN ORGANIZED HEALTH CARE EDUCATION/TRAINING PROGRAM

## 2018-03-07 PROCEDURE — 74011000250 HC RX REV CODE- 250: Performed by: STUDENT IN AN ORGANIZED HEALTH CARE EDUCATION/TRAINING PROGRAM

## 2018-03-07 PROCEDURE — 74011250636 HC RX REV CODE- 250/636: Performed by: STUDENT IN AN ORGANIZED HEALTH CARE EDUCATION/TRAINING PROGRAM

## 2018-03-07 PROCEDURE — 85025 COMPLETE CBC W/AUTO DIFF WBC: CPT | Performed by: FAMILY MEDICINE

## 2018-03-07 PROCEDURE — 85018 HEMOGLOBIN: CPT | Performed by: STUDENT IN AN ORGANIZED HEALTH CARE EDUCATION/TRAINING PROGRAM

## 2018-03-07 PROCEDURE — 74011250637 HC RX REV CODE- 250/637: Performed by: STUDENT IN AN ORGANIZED HEALTH CARE EDUCATION/TRAINING PROGRAM

## 2018-03-07 PROCEDURE — 74011250636 HC RX REV CODE- 250/636: Performed by: FAMILY MEDICINE

## 2018-03-07 PROCEDURE — 85610 PROTHROMBIN TIME: CPT | Performed by: FAMILY MEDICINE

## 2018-03-07 PROCEDURE — 36415 COLL VENOUS BLD VENIPUNCTURE: CPT | Performed by: FAMILY MEDICINE

## 2018-03-07 PROCEDURE — C9113 INJ PANTOPRAZOLE SODIUM, VIA: HCPCS | Performed by: STUDENT IN AN ORGANIZED HEALTH CARE EDUCATION/TRAINING PROGRAM

## 2018-03-07 PROCEDURE — 65660000000 HC RM CCU STEPDOWN

## 2018-03-07 PROCEDURE — 80053 COMPREHEN METABOLIC PANEL: CPT | Performed by: FAMILY MEDICINE

## 2018-03-07 PROCEDURE — 83735 ASSAY OF MAGNESIUM: CPT | Performed by: STUDENT IN AN ORGANIZED HEALTH CARE EDUCATION/TRAINING PROGRAM

## 2018-03-07 RX ORDER — ACETAMINOPHEN 500 MG
1000 TABLET ORAL
Status: DISCONTINUED | OUTPATIENT
Start: 2018-03-07 | End: 2018-03-08 | Stop reason: HOSPADM

## 2018-03-07 RX ORDER — POTASSIUM CHLORIDE 14.9 MG/ML
10 INJECTION INTRAVENOUS EVERY 4 HOURS
Status: DISCONTINUED | OUTPATIENT
Start: 2018-03-07 | End: 2018-03-07

## 2018-03-07 RX ADMIN — SODIUM CHLORIDE 125 ML/HR: 900 INJECTION, SOLUTION INTRAVENOUS at 05:18

## 2018-03-07 RX ADMIN — Medication 10 ML: at 00:21

## 2018-03-07 RX ADMIN — ONDANSETRON 6 MG: 2 INJECTION INTRAMUSCULAR; INTRAVENOUS at 08:31

## 2018-03-07 RX ADMIN — PROCHLORPERAZINE EDISYLATE 10 MG: 5 INJECTION INTRAMUSCULAR; INTRAVENOUS at 06:57

## 2018-03-07 RX ADMIN — PROCHLORPERAZINE EDISYLATE 10 MG: 5 INJECTION INTRAMUSCULAR; INTRAVENOUS at 11:06

## 2018-03-07 RX ADMIN — Medication 10 ML: at 06:57

## 2018-03-07 RX ADMIN — ONDANSETRON 6 MG: 2 INJECTION INTRAMUSCULAR; INTRAVENOUS at 20:16

## 2018-03-07 RX ADMIN — PANTOPRAZOLE SODIUM 40 MG: 40 INJECTION, POWDER, FOR SOLUTION INTRAVENOUS at 20:16

## 2018-03-07 RX ADMIN — SODIUM CHLORIDE 10 MEQ: 900 INJECTION, SOLUTION INTRAVENOUS at 17:38

## 2018-03-07 RX ADMIN — Medication 10 ML: at 17:38

## 2018-03-07 RX ADMIN — Medication 10 ML: at 20:16

## 2018-03-07 RX ADMIN — SODIUM CHLORIDE 10 MEQ: 900 INJECTION, SOLUTION INTRAVENOUS at 07:48

## 2018-03-07 RX ADMIN — PROCHLORPERAZINE EDISYLATE 10 MG: 5 INJECTION INTRAMUSCULAR; INTRAVENOUS at 00:21

## 2018-03-07 RX ADMIN — PROCHLORPERAZINE EDISYLATE 10 MG: 5 INJECTION INTRAMUSCULAR; INTRAVENOUS at 23:06

## 2018-03-07 RX ADMIN — SODIUM CHLORIDE 10 MEQ: 900 INJECTION, SOLUTION INTRAVENOUS at 11:06

## 2018-03-07 RX ADMIN — SODIUM CHLORIDE: 900 INJECTION, SOLUTION INTRAVENOUS at 09:30

## 2018-03-07 RX ADMIN — LEVOTHYROXINE SODIUM ANHYDROUS 37 MCG: 100 INJECTION, POWDER, LYOPHILIZED, FOR SOLUTION INTRAVENOUS at 08:31

## 2018-03-07 RX ADMIN — ACETAMINOPHEN 1000 MG: 500 TABLET ORAL at 12:58

## 2018-03-07 RX ADMIN — Medication 10 ML: at 03:38

## 2018-03-07 RX ADMIN — PROCHLORPERAZINE EDISYLATE 10 MG: 5 INJECTION INTRAMUSCULAR; INTRAVENOUS at 17:38

## 2018-03-07 RX ADMIN — PANTOPRAZOLE SODIUM 40 MG: 40 INJECTION, POWDER, FOR SOLUTION INTRAVENOUS at 08:30

## 2018-03-07 NOTE — PROGRESS NOTES
[facility]   Daily Progress Note    Date: 3/7/2018    Assessment/Plan:     Best  is a 56 y.o. female with hx of PE/DVT/Factor V deficiency, migraine, OA s/p b/l TKA who is admitted for acute GI Bleed.     24 Hour Events: Hpyotensive episode overnight with BPs 78/44 (MAP of 50). Transiently on pressor support which was weaned in early AM.     Hemorrhagic Shock 2/2 Acute GI bleed   - S/P EGD with active arterial bleed in duodenal bulb treated with clipping and epinephrine injection.  - Post procedure H/H of 6.6/20.1  - S/P 2u PRBC Transfusion with HgB currently stable. -  Continue maintenance IVF and Follow H/H q6hrs  - Hold AC until outpatient Heme Follow-up  - BID IV PPI, Clear Liquid diet  - GI on consult. Appreciate recs  - NPO, strict I/Os      Chronic progressive dysphagia to solid foods  - Normal Esophagus on EGD  - Tolerating clear liquid diet  - Speech on consult. Appreciate recs       Hx PE, DVT, Factor V Deficiency  - Eleanor Filter in place  - Hold AC until outpatient Heme Follow-up  - PT/INR daily - current INR 1.4  - Heme on consult, apprecite recs.       Hypothyroidism  - Medication non-compliance  - TSH 5.0. Free T4 pending  - Continue IV synthroid until tolerating PO      Migraine headaches  - Compazine, Tylenol prn.      Vitamin D deficiency. - Continue supplements      Obesity BMI Body mass index is 36.4 kg/(m^2). - Encouraging lifestyle modifications and further follow up outpatient.       FEN/GI - NPO. NS at 125 mL/hr. Activity - Ambulate with assistance  DVT prophylaxis - SCDs  GI prophylaxis - Protonix  Fall prophylaxis - Not indicated at this time. Disposition - Plan to d/c to Home. PT/OT/CM on consult  Code Status - Full, discussed with patient / caregivers.   Next of Kin Name and Contact     CODE STATUS:  Full code    Patient to be seen and discussed with Dr. Michele Pandey MD  Family Medicine Resident         CC: Dark stools    Subjective: Hpyotensive episode overnight with BPs 78/44 (MAP of 50).  Transiently on pressor support which was weaned in early AM.    Inpatient Medications  Current Facility-Administered Medications   Medication Dose Route Frequency    potassium chloride 10 mEq in 0.9% sodium chloride 50 mL infusion  10 mEq IntraVENous Q4H    potassium phosphate 15 mmol in 0.9% sodium chloride 250 mL infusion   IntraVENous ONCE    PHENYLephrine (OSEAS-SYNEPHRINE) 30 mg in 0.9% sodium chloride 250 mL infusion   mcg/min IntraVENous TITRATE    0.9% sodium chloride infusion 250 mL  250 mL IntraVENous PRN    levothyroxine (SYNTHROID) injection 37 mcg  37 mcg IntraVENous Q24H    sodium chloride (NS) flush 5-10 mL  5-10 mL IntraVENous Q8H    acetaminophen (TYLENOL) suppository 975 mg  975 mg Rectal Q6H PRN    0.9% sodium chloride infusion 250 mL  250 mL IntraVENous PRN    prochlorperazine (COMPAZINE) injection 10 mg  10 mg IntraVENous Q6H    midazolam (VERSED) injection 0.25-5 mg  0.25-5 mg IntraVENous Multiple    simethicone (MYLICON) 03TU/9.3EM oral drops 80 mg  1.2 mL Oral Multiple    fentaNYL citrate (PF) injection 100 mcg  100 mcg IntraVENous Multiple    ondansetron (ZOFRAN) injection 6 mg  6 mg IntraVENous Q12H    sodium chloride (NS) flush 5-10 mL  5-10 mL IntraVENous Q8H    sodium chloride (NS) flush 5-10 mL  5-10 mL IntraVENous PRN    pantoprazole (PROTONIX) 40 mg in 0 mL injection  40 mg IntraVENous Q12H    0.9% sodium chloride infusion  125 mL/hr IntraVENous CONTINUOUS    influenza vaccine 2017-18 (3 yrs+)(PF) (FLUZONE QUAD/FLUARIX QUAD) injection 0.5 mL  0.5 mL IntraMUSCular PRIOR TO DISCHARGE         Allergies  Allergies   Allergen Reactions    Imitrex [Sumatriptan] Other (comments)      Face flushed- felt like \" needles in face\"    Reglan [Metoclopramide] Nausea Only and Other (comments)     Cramping \"everywhere\"         Objective  Vitals:  Patient Vitals for the past 8 hrs:   Temp Pulse Resp BP SpO2   03/07/18 1030 - 91 19 135/80 96 %   03/07/18 1000 - 100 23 121/83 -   03/07/18 0900 - 80 15 103/64 99 %   03/07/18 0830 - 76 14 109/66 -   03/07/18 0815 - 76 21 107/70 98 %   03/07/18 0745 - 90 19 123/63 99 %   03/07/18 0730 - 84 19 117/73 95 %   03/07/18 0715 98.1 °F (36.7 °C) 81 16 112/69 99 %   03/07/18 0700 - 94 16 108/63 99 %   03/07/18 0500 98.4 °F (36.9 °C) 75 15 105/67 98 %   03/07/18 0400 - 74 17 101/64 96 %   03/07/18 0334 - 81 - (!) 78/44 -         I/O:    Intake/Output Summary (Last 24 hours) at 03/07/18 1108  Last data filed at 03/07/18 0659   Gross per 24 hour   Intake          3254.88 ml   Output                0 ml   Net          3254.88 ml     Last shift:       Last 3 shifts:    03/05 1901 - 03/07 0700  In: 5507.3 [P.O.:120; I.V.:4795.2]  Out: 200     Physical Exam:  General: No acute distress. Somnolent. Cooperative. Head: Normocephalic. Atraumatic. Eyes:  Mild conjunctival pallor. Sclera white. PERRL. Nose:  Septum midline. Mucosa pink. No drainage. Throat: Mucosa pink. Moist mucous membranes. No tonsillar exudates or erythema. Palate movement equal bilaterally. Respiratory: CTAB. No w/r/r/c.   Cardiovascular: RRR. Normal S1,S2. No m/r/g. Pulses 2+ throughout. GI: + bowel sounds. Nontender. No rebound tenderness or guarding. Nondistended   Extremities: No edema. No palpable cord. No tenderness.      Laboratory Data  Recent Results (from the past 12 hour(s))   METABOLIC PANEL, COMPREHENSIVE    Collection Time: 03/07/18  2:09 AM   Result Value Ref Range    Sodium 141 136 - 145 mmol/L    Potassium 3.4 (L) 3.5 - 5.1 mmol/L    Chloride 108 97 - 108 mmol/L    CO2 22 21 - 32 mmol/L    Anion gap 11 5 - 15 mmol/L    Glucose 80 65 - 100 mg/dL    BUN 6 6 - 20 MG/DL    Creatinine 0.40 (L) 0.55 - 1.02 MG/DL    BUN/Creatinine ratio 15 12 - 20      GFR est AA >60 >60 ml/min/1.73m2    GFR est non-AA >60 >60 ml/min/1.73m2    Calcium 7.1 (L) 8.5 - 10.1 MG/DL    Bilirubin, total 0.3 0.2 - 1.0 MG/DL    ALT (SGPT) 48 12 - 78 U/L    AST (SGOT) 42 (H) 15 - 37 U/L    Alk. phosphatase 43 (L) 45 - 117 U/L    Protein, total 5.1 (L) 6.4 - 8.2 g/dL    Albumin 2.5 (L) 3.5 - 5.0 g/dL    Globulin 2.6 2.0 - 4.0 g/dL    A-G Ratio 1.0 (L) 1.1 - 2.2     CBC WITH AUTOMATED DIFF    Collection Time: 03/07/18  2:09 AM   Result Value Ref Range    WBC 6.3 3.6 - 11.0 K/uL    RBC 2.68 (L) 3.80 - 5.20 M/uL    HGB 8.2 (L) 11.5 - 16.0 g/dL    HCT 24.8 (L) 35.0 - 47.0 %    MCV 92.5 80.0 - 99.0 FL    MCH 30.6 26.0 - 34.0 PG    MCHC 33.1 30.0 - 36.5 g/dL    RDW 15.9 (H) 11.5 - 14.5 %    PLATELET 097 448 - 374 K/uL    MPV 9.5 8.9 - 12.9 FL    NRBC 0.3 (H) 0  WBC    ABSOLUTE NRBC 0.02 (H) 0.00 - 0.01 K/uL    NEUTROPHILS 56 32 - 75 %    LYMPHOCYTES 33 12 - 49 %    MONOCYTES 8 5 - 13 %    EOSINOPHILS 3 0 - 7 %    BASOPHILS 0 0 - 1 %    IMMATURE GRANULOCYTES 1 (H) 0.0 - 0.5 %    ABS. NEUTROPHILS 3.5 1.8 - 8.0 K/UL    ABS. LYMPHOCYTES 2.1 0.8 - 3.5 K/UL    ABS. MONOCYTES 0.5 0.0 - 1.0 K/UL    ABS. EOSINOPHILS 0.2 0.0 - 0.4 K/UL    ABS. BASOPHILS 0.0 0.0 - 0.1 K/UL    ABS. IMM.  GRANS. 0.0 0.00 - 0.04 K/UL    DF AUTOMATED     PROTHROMBIN TIME + INR    Collection Time: 03/07/18  2:09 AM   Result Value Ref Range    INR 1.4 (H) 0.9 - 1.1      Prothrombin time 14.7 (H) 9.0 - 11.1 sec   MAGNESIUM    Collection Time: 03/07/18  2:09 AM   Result Value Ref Range    Magnesium 1.8 1.6 - 2.4 mg/dL   PHOSPHORUS    Collection Time: 03/07/18  2:09 AM   Result Value Ref Range    Phosphorus 2.4 (L) 2.6 - 4.7 MG/DL   HGB & HCT    Collection Time: 03/07/18  8:42 AM   Result Value Ref Range    HGB 8.3 (L) 11.5 - 16.0 g/dL    HCT 25.3 (L) 35.0 - 47.0 %       Hospital Problems:  Hospital Problems  Date Reviewed: 6/27/2017          Codes Class Noted POA    * (Principal)GI bleed ICD-10-CM: K92.2  ICD-9-CM: 578.9  3/4/2018 Yes        Factor V deficiency ([facility] ICD-10-CM: H68.1  ICD-9-CM: 286.3  6/27/2017 Yes        Acquired hypothyroidism ICD-10-CM: E03.9  ICD-9-CM: 244.9  4/20/2017 Yes        Vitamin D deficiency ICD-10-CM: E55.9  ICD-9-CM: 268.9  4/20/2017 Yes        Eleanor filter in place ICD-10-CM: Z95.828  ICD-9-CM: V45.89  11/9/2011 Yes        Depression ICD-10-CM: F32.9  ICD-9-CM: 412  10/1/2011 Yes        Palpitations (Chronic) ICD-10-CM: R00.2  ICD-9-CM: 785.1  10/1/2011 Yes        History of pulmonary embolism ICD-10-CM: F74.258  ICD-9-CM: V12.55  8/10/2011 Yes            [address] Medicine Residency Attending Addendum:  I saw and evaluated the patient, performing the key elements of the service. I discussed the findings, assessment and plan with the resident and agree with the resident's findings and plan as documented in the resident's note. The patient was seen on 3/7/18  Had pressors last night but off now.   Otherwise no acute events    Vitals:    03/07/18 2352 03/08/18 0322 03/08/18 0703 03/08/18 0731   BP: 102/79 94/72     Pulse: 89 87 88 85   Resp: 14 16  16   Temp: 98.6 °F (37 °C) 98.3 °F (36.8 °C)  98.9 °F (37.2 °C)   SpO2: 97% 97%  95%   Weight:       Height:       LMP: 02/28/2018     Lungs-clear  Abdomen soft  hgb stab;le at 8.2      Assessment/Plan:   GI bleed-stable  Continue current care    Hospital Problems  Date Reviewed: 3/11/2018          Codes Class Noted POA    * (Principal)GI bleed ICD-10-CM: K92.2  ICD-9-CM: 578.9  3/4/2018 Yes        Factor V deficiency ([address] ICD-10-CM: D68.2  ICD-9-CM: 286.3  6/27/2017 Yes        Acquired hypothyroidism ICD-10-CM: E03.9  ICD-9-CM: 244.9  4/20/2017 Yes        Vitamin D deficiency ICD-10-CM: E55.9  ICD-9-CM: 268.9  4/20/2017 Yes        [place] filter in place ICD-10-CM: Z95.828  ICD-9-CM: V45.89  11/9/2011 Yes        Depression ICD-10-CM: F32.9  ICD-9-CM: 176  10/1/2011 Yes        Palpitations (Chronic) ICD-10-CM: R00.2  ICD-9-CM: 785.1  10/1/2011 Yes        History of pulmonary embolism ICD-10-CM: S78.364  ICD-9-CM: V12.55  8/10/2011 Yes

## 2018-03-07 NOTE — PROGRESS NOTES
1945 Bedside and Verbal shift change report given to Emily Pichardo RN (oncoming nurse) by Marie Power RN (offgoing nurse). Report included the following information SBAR, Kardex, MAR, Accordion and Cardiac Rhythm NSR/ST. Pt c/o leg pain and restlessness, requesting medication to help her sleep. 2013 Family practice made aware of pt's c/o pain and restless legs, also discussed recent BP.    0215 BP 88/51. Upon waking patient BP up to 101/56 with MAP of 65. Will continue to monitor for need for medication for hypotension. 6106 Neosynephrine gtt restarted at 10mcg/min due to low BP 78/44.    0400 BP back up to 101/64 with neosynephrine running at 10mcg/min.    0700 Bedside and Verbal shift change report given to Marie Power RN (oncoming nurse) by Emily Picahrdo RN (offgoing nurse). Report included the following information SBAR, Kardex, MAR, Accordion, Recent Results and Cardiac Rhythm NSR. Neosynephrine still running at 10mcg/min at this time.

## 2018-03-07 NOTE — PROGRESS NOTES
Spiritual Care Assessment/Progress Note  [address]      NAME: Best       MRN: [id number]  AGE: 56 y.o. SEX: female  Worship Affiliation: [id number]   Language: English     3/7/2018     Total Time (in minutes): 6     Spiritual Assessment begun in [facility] through conversation with:         [x]Patient        [x] Family    [] Friend(s)        Reason for Consult: Initial/Spiritual assessment, critical care     Spiritual beliefs: (Please include comment if needed)     [] Involved in a kym tradition/spiritual practice:     [x] Supported by a kym community:      [] Claims no spiritual orientation:      [] Seeking spiritual identity:           [] Adheres to an individual form of spirituality:      [] Not able to assess:                     Identified resources for coping:      [x] Prayer                  [] Devotional reading               [] Music                  [] Guided Imagery     [x] Family/friends                 [] Pet visits     [] Other:        Interventions offered during this visit: (See comments for more details)    Patient Interventions: Affirmation of emotions/emotional suffering, Affirmation of kym, Catharsis/review of pertinent events in supportive environment, Coping skills reviewed/reinforced, Iconic (affirming the presence of God/Higher Power), Initial/Spiritual assessment, Critical care, Life review/legacy, Normalization of emotional/spiritual concerns, Prayer (actual), Prayer (assurance of), Worship beliefs/image of God discussed     Family/Friend(s):  Affirmation of emotions/emotional suffering, Affirmation of kym, Catharsis/review of pertinent events in supportive environment, Coping skills reviewed/reinforced, Iconic (affirming the presence of God/Higher Power), Prayer (actual), Normalization of emotional/spiritual concerns, Worship beliefs/image of God discussed, Prayer (assurance of)     Plan of Care:     [x] Discuss Spiritual/Cultural needs [] Support AMD and/or advance care planning process      [] Support grieving process   [] Coordinate Rites/Rituals    [] Coordination with community clergy   [] No spiritual needs identified at this time   [] Detailed Plan of Care below (See Comments)  [] Make referral to Music Therapy  [] Make referral to Pet Therapy     [] Make referral to Addiction services  [] Make referral to [facility]  [] Make referral to Spiritual Care Partner  [] No future visits requested             Comments: [address] [place] [address] is visiting for an initial spiritual assessment with pt in [facility]. Pt and daughter were present at the time. Pt briefly processed her related medical concerns. She notes she has been coping reasonably well and is mostly concerned with being discharged home soon. She notes she is [place], not active in a [employer] currently, but maintains devotional practices, prayer and studies scripture with her family at home.  offered words of support, active listening, and prayer.      [address] Lisandro Venegas M.Div, M.S, Estefany Scott3 available at [address] [place] [phone])

## 2018-03-07 NOTE — PROGRESS NOTES
RN reports no dysphagia with clear liquids. Spoke with GI. No suspected issues with oral -pharyngeal swallow, so will defer evaluation at this time. GI to manage diet advancement and UGI issues.

## 2018-03-07 NOTE — PROGRESS NOTES
Problem: Falls - Risk of  Goal: *Absence of Falls  Document Rea Fall Risk and appropriate interventions in the flowsheet.    Outcome: Progressing Towards Goal  Fall Risk Interventions:  Mobility Interventions: Communicate number of staff needed for ambulation/transfer, Patient to call before getting OOB         Medication Interventions: Patient to call before getting OOB, Teach patient to arise slowly    Elimination Interventions: Call light in reach, Patient to call for help with toileting needs    History of Falls Interventions: Room close to nurse's station

## 2018-03-07 NOTE — PROGRESS NOTES
[address] NP  [phone]           GI PROGRESS NOTE        NAME: Elis Jurado   :  1970   MRN:  [id number]       Subjective:   \"I have a headache. \"  Pt reports no abdominal pain, nausea, or vomiting. She has not had a bowel movement overnight. Objective:       VITALS:   Last 24hrs VS reviewed since prior progress note. Most recent are:  Visit Vitals    /69 (BP 1 Location: Right arm, BP Patient Position: At rest)    Pulse 81    Temp 98.1 °F (36.7 °C)    Resp 16    Ht 5' 2\" (1.575 m)    Wt 96.2 kg (212 lb 1.3 oz)    SpO2 99%    Breastfeeding No    BMI 38.79 kg/m2       Intake/Output Summary (Last 24 hours) at 18 0911  Last data filed at 18 7854   Gross per 24 hour   Intake          3254.88 ml   Output                0 ml   Net          3254.88 ml       PHYSICAL EXAM:  General: Alert, in no acute distress    HEENT: Anicteric sclerae. Lungs:            CTA Bilaterally. Heart:  Regular  rhythm,    Abdomen: Soft, Non distended, Non tender.  (+)Bowel sounds, no HSM  Extremities: No c/c/e  Neurologic:  CN 2-12 gi, Alert and oriented X 3. No acute neurological distress   Psych:   Good insight. Not anxious nor agitated. Lab Data Reviewed:   Recent Labs      18   0842  18   0033   WBC   --   6.3   --   6.7   HGB  8.3*  8.2*   < >  6.6*   HCT  25.3*  24.8*   < >  20.1*   PLT   --   251   --   231    < > = values in this interval not displayed.      Recent Labs      18   0209  18   0033   NA  141  140   K  3.4*  3.7   CL  108  108   CO2  22  22   BUN  6  17   CREA  0.40*  0.46*   GLU  80  93   PHOS  2.4*   --    CA  7.1*  6.7*     Recent Labs      18   0209  18   0033   SGOT  42*  28   AP  43*  38*   TP  5.1*  4.6*   ALB  2.5*  2.3*   GLOB  2.6  2.3       ________________________________________________________________________  Patient Active Problem List   Diagnosis Code    Maternal DVT (deep vein thrombosis), history of Z86.718, Z87.59    History of pulmonary embolism Z86.711    Depression F32.9    Palpitations R00.2    Reflex sympathetic dystrophy of the leg G90.529    Eleanor filter in place Z95.828    Migraine headache G43.909    Bilateral knee pain M25.561, M25.562    Postcoital and contact bleeding N93.0    Acquired hypothyroidism E03.9    Vitamin D deficiency E55.9    Factor V deficiency (HCC) D68.2    Primary localized osteoarthritis of knees, bilateral M17.0    GI bleed K92.2         Assessment and Plan:  Upper GI bleed:  Status post clipping and epi injection of Dieulafoy Lesion. Hgb remains stable at 8.5 after blood transfusions yesterday. - Continue to trend Hgb. - Continue to monitor for s/s of active bleeding.  - Ok to advance to clear liquid diet. - Continue IV PPI BID    Ok to transfer out of ICU from GI standpoint. Will continue to follow along.        Signed By: Meghann Covington NP     3/7/2018  9:11 AM

## 2018-03-07 NOTE — PROGRESS NOTES
Bedside and Verbal shift change report given to Pierre Draper RN (oncoming nurse) by Dave Allen RN (offgoing nurse). Report included the following information SBAR, Kardex, [facility] and Cardiac Rhythm NSR.    0715 - Temprature 98.1, blood pressure 112/69, HR 75, RR 19, O2 96% on room air, MAP 80. Will monitor. Family member at bedside. 1030 - Encouraged patient to sit in bedside chair. Reminded patient that the nurse needs to measure her urine output. Patient verbalized understanding. 1050 - Patient sitting in bedside chair watching TV. Encourage to call for assistance. 1300 - Patient complaining of neck and head pain of 5 out of 10. Tylenol administered per MD orders. [id number] - 4th floor nurse unable to take report at this time. 7543 - TRANSFER - OUT REPORT:    Verbal report given to Cammy Chi RN(name) on Lord Marie  being transferred to 5th floor (unit) for routine progression of care       Report consisted of patients Situation, Background, Assessment and   Recommendations(SBAR). Information from the following report(s) SBAR, Kardex, [facility] and Cardiac Rhythm NSR was reviewed with the receiving nurse. Lines:   Triple Lumen RT IJ TLC 03/06/18 Right Internal jugular (Active)   Central Line Being Utilized Yes 3/7/2018  7:15 AM   Criteria for Appropriate Use Limited/no vessel suitable for conventional peripheral access 3/7/2018  7:15 AM   Site Assessment Clean, dry, & intact 3/7/2018  7:15 AM   Infiltration Assessment 0 3/7/2018  7:15 AM   Affected Extremity/Extremities Color distal to insertion site pink (or appropriate for race) 3/7/2018  7:15 AM   Date of Last Dressing Change 03/06/18 3/7/2018  7:15 AM   Dressing Status Clean, dry, & intact 3/7/2018  7:15 AM   Dressing Type Disk with Chlorhexadine gluconate (CHG) 3/7/2018  7:15 AM   Action Taken Open ports on tubing capped 3/7/2018  7:15 AM   Proximal Hub Color/Line Status Cap end changed; White; Infusing 3/7/2018  7:15 AM   Positive Blood Return (Medial Site) Yes 3/7/2018  7:15 AM   Medial Hub Color/Line Status Capped;Blue;Cap end changed 3/7/2018  7:15 AM   Positive Blood Return (Lateral Site) Yes 3/7/2018  7:15 AM   Distal Hub Color/Line Status Brown;Capped 3/7/2018  7:15 AM   Positive Blood Return (Site #3) Yes 3/7/2018  7:15 AM   Alcohol Cap Used Yes 3/7/2018  7:15 AM       Peripheral IV 03/06/18 Right Forearm (Active)   Site Assessment Clean, dry, & intact 3/7/2018  7:15 AM   Phlebitis Assessment 0 3/7/2018  7:15 AM   Infiltration Assessment 0 3/7/2018  7:15 AM   Dressing Status Clean, dry, & intact 3/7/2018  7:15 AM   Dressing Type Transparent;Tape 3/7/2018  7:15 AM   Hub Color/Line Status Pink;Capped;Flushed 3/7/2018  7:15 AM   Action Taken Open ports on tubing capped 3/7/2018  7:15 AM   Alcohol Cap Used Yes 3/7/2018  7:15 AM        Opportunity for questions and clarification was provided.       Patient transported with:   Monitor  Registered Nurse  Tech

## 2018-03-07 NOTE — PROGRESS NOTES
[facility] [place] [facility]  [address] [facility], [address]  Vannessa Carpiov: [phone]  F: [phone]      Reason for Visit:   Juwan Ramos is a 56 y.o. female who is seen in consultation at the request of Dr. 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 Factor V def      History of Present Illness:     Ms Lorna Turcios was admitted on 3/4/2018 from the ED when she presented with c/o bloody stools x 3 days progressing from bright red to black. Hx of factor V def assocciated with PE/DVT  and currently on coumadin. Hgb 6.8 Therefore was admitted for further eval and management. Ms Lorna Turcios states has been on coumadin for 22 years for DVT and then 11 yrs ago had multiple PEs. Follows with Dr Xena Gil at [facility]; was seen last week with normal INR. Seen monthly. Started having bloody stools on Thurs; with weakness. Family members at home with the flu. Thinks she might have run a fever. Still having dk stool this am. Shares that for yrs has noticed that food will get caught when she is swallowing. Denies any SOB. No family at bedside. Interval History:     Feeing better this am; continues with dry non productive cough; having some pain to neck area during the night. Tolerating diet. Family members at bedside.        Current Facility-Administered Medications   Medication Dose Route Frequency    potassium chloride 10 mEq in 0.9% sodium chloride 50 mL infusion  10 mEq IntraVENous Q4H    potassium phosphate 15 mmol in 0.9% sodium chloride 250 mL infusion   IntraVENous ONCE    PHENYLephrine (OSEAS-SYNEPHRINE) 30 mg in 0.9% sodium chloride 250 mL infusion   mcg/min IntraVENous TITRATE    0.9% sodium chloride infusion 250 mL  250 mL IntraVENous PRN    levothyroxine (SYNTHROID) injection 37 mcg  37 mcg IntraVENous Q24H    sodium chloride (NS) flush 5-10 mL  5-10 mL IntraVENous Q8H    acetaminophen (TYLENOL) suppository 975 mg  975 mg Rectal Q6H PRN  0.9% sodium chloride infusion 250 mL  250 mL IntraVENous PRN    prochlorperazine (COMPAZINE) injection 10 mg  10 mg IntraVENous Q6H    midazolam (VERSED) injection 0.25-5 mg  0.25-5 mg IntraVENous Multiple    simethicone (MYLICON) 00CZ/1.4ST oral drops 80 mg  1.2 mL Oral Multiple    fentaNYL citrate (PF) injection 100 mcg  100 mcg IntraVENous Multiple    ondansetron (ZOFRAN) injection 6 mg  6 mg IntraVENous Q12H    sodium chloride (NS) flush 5-10 mL  5-10 mL IntraVENous Q8H    sodium chloride (NS) flush 5-10 mL  5-10 mL IntraVENous PRN    pantoprazole (PROTONIX) 40 mg in 0 mL injection  40 mg IntraVENous Q12H    0.9% sodium chloride infusion  125 mL/hr IntraVENous CONTINUOUS    influenza vaccine 2017-18 (3 yrs+)(PF) (FLUZONE QUAD/FLUARIX QUAD) injection 0.5 mL  0.5 mL IntraMUSCular PRIOR TO DISCHARGE      Allergies   Allergen Reactions    Imitrex [Sumatriptan] Other (comments)      Face flushed- felt like \" needles in face\"    Reglan [Metoclopramide] Nausea Only and Other (comments)     Cramping \"everywhere\"        Review of Systems: A complete review of systems was obtained, negative except as described above. Physical Exam:     Visit Vitals    /69 (BP 1 Location: Right arm, BP Patient Position: At rest)    Pulse 81    Temp 98.1 °F (36.7 °C)    Resp 16    Ht 5' 2\" (1.575 m)    Wt 96.2 kg (212 lb 1.3 oz)    LMP 02/28/2018    SpO2 99%    Breastfeeding No    BMI 38.79 kg/m2       General: No distress  Eyes:  anicteric sclerae  HENT: Atraumatic with normal appearance of ears and nose; OP clear  Neck: Supple; no thyromegaly   Respiratory:  normal respiratory effort  CV: Normal rate, regular rhythm, no murmurs, no peripheral edema  GI: Soft, nontender, nondistended, no masses, no hepatomegaly, no splenomegaly  MS: . Digits without clubbing or cyanosis. Skin: No rashes, ecchymoses, or petechiae. Normal temperature, turgor, and texture.   Neuro/Psych: Alert, oriented, appropriate affect, normal judgment/insight      Results:     Lab Results   Component Value Date/Time    WBC 6.3 03/07/2018 02:09 AM    HGB 8.3 (L) 03/07/2018 08:42 AM    HCT 25.3 (L) 03/07/2018 08:42 AM    PLATELET 678 71/26/6318 02:09 AM    MCV 92.5 03/07/2018 02:09 AM    ABS. NEUTROPHILS 3.5 03/07/2018 02:09 AM    Hemoglobin (POC) 11.9 12/04/2013 04:05 PM    Hematocrit (POC) 35 12/04/2013 04:05 PM    Hgb, External 13.2 02/01/2013    Hct, External 38.4 02/01/2013    Platelet cnt., External 482K 07/21/2011     Lab Results   Component Value Date/Time    Sodium 141 03/07/2018 02:09 AM    Potassium 3.4 (L) 03/07/2018 02:09 AM    Chloride 108 03/07/2018 02:09 AM    CO2 22 03/07/2018 02:09 AM    Glucose 80 03/07/2018 02:09 AM    BUN 6 03/07/2018 02:09 AM    Creatinine 0.40 (L) 03/07/2018 02:09 AM    GFR est AA >60 03/07/2018 02:09 AM    GFR est non-AA >60 03/07/2018 02:09 AM    Calcium 7.1 (L) 03/07/2018 02:09 AM    Sodium (POC) 142 12/04/2013 04:05 PM    Potassium (POC) 3.8 12/04/2013 04:05 PM    Chloride (POC) 107 12/04/2013 04:05 PM    Glucose (POC) 105 12/04/2013 04:05 PM    BUN (POC) 11 12/04/2013 04:05 PM    Creatinine (POC) 0.6 12/04/2013 04:05 PM    Calcium, ionized (POC) 1.16 12/04/2013 04:05 PM     Lab Results   Component Value Date/Time    Bilirubin, total 0.3 03/07/2018 02:09 AM    ALT (SGPT) 48 03/07/2018 02:09 AM    AST (SGOT) 42 (H) 03/07/2018 02:09 AM    Alk.  phosphatase 43 (L) 03/07/2018 02:09 AM    Protein, total 5.1 (L) 03/07/2018 02:09 AM    Albumin 2.5 (L) 03/07/2018 02:09 AM    Globulin 2.6 03/07/2018 02:09 AM     Lab Results   Component Value Date/Time    Ferritin 19 06/10/2016 01:14 PM    C-Reactive protein <0.29 07/05/2017 03:34 PM    TSH 5.08 (H) 03/04/2018 09:08 PM    Lipase 170 10/01/2011 01:11 AM    HIV 1/O/2 Abs <1.00 02/01/2013 10:30 AM    TSH, External 3.4 03/02/2011     Lab Results   Component Value Date/Time    INR 1.4 (H) 03/07/2018 02:09 AM    aPTT 30.1 03/04/2018 09:08 PM     Lab Results Component Value Date/Time    HCG, Beta Chain, Quant, S <1 11/24/2014 10:36 AM    HCG, beta, QT <2 11/24/2014 10:40 AM     3/4/2018 XR CHEST  Impression: No acute process or change compared to the prior exam.    3/4/2018 CT ABD PELV W CONT  IMPRESSION:   There is no acute abnormality in the abdomen or pelvis. 3/5/2018   Findings:   Esophagus:normal  Stomach: Moderate size hiatal hernia, evidence of coffee ground seen and old blood clot seen in the fundus and body, no active bleeding seen. Mucosa in the body and fundus was partially visualized, mucosa in the antrum appeared within normal.  Duodenum/jejunum: Evidence of active bleeding seen at the junction of the distal bulb and second portion of the duodenum. After copious washing arterial spurting blood vessel was noted. No ulcer was seen. Mucosa in the bulb and second portion appeared within normal otherwise. 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 Treated with epinephrine and clips placement and hemostasis achieved. Moderate size hiatal hernia, no esophagitis or stricture seen    Assessment and Recommendations:   1. GI bleed/ dysphagia  Hemoccult positive in ED  GI consulted; EGD /EULOGIO (3/5) revealed active bleeding of distal bulb of duodenum ; treated with epi and 2 clips applied. Recommend holding anticoagulation until seen by primary hematologist, Dr Patrica Garrison. 2. Anemia, normocytic (s/p 4 units PRBCs)  Secondary to GI bleed   Hgb stable   Continue to monitor and transfuse for Hgb < 7       3. Hx of PE/DVT  Follows with Dr Patrica Garrison at [facility] for coumadin/INR checks.   Follows monthly  Continue to hold anticoagulation until seen by primary hematologist.        Eleazar Coffey made with Dr Patrica Garrison on 3/13/2018 at 1:15 [facility] [address] [facility] [place], [address] Reviewed with patient and placed in discharge summary. We will sign off but are available for any additional questions or concerns.      The above exam and treatment plan were reviewed with Dr Ari Barksdale    Signed By: Zheng Stanford NP

## 2018-03-07 NOTE — PROGRESS NOTES
3/7/2018  1:38 PM  Met with patient to discuss discharge needs. She stated the only need she had was to go home. Will continue to follow.   Tylor Holly, [address]

## 2018-03-08 ENCOUNTER — TELEPHONE (OUTPATIENT)
Dept: FAMILY MEDICINE CLINIC | Age: 48
End: 2018-03-08

## 2018-03-08 VITALS
OXYGEN SATURATION: 95 % | SYSTOLIC BLOOD PRESSURE: 94 MMHG | BODY MASS INDEX: 39.03 KG/M2 | WEIGHT: 212.08 LBS | DIASTOLIC BLOOD PRESSURE: 72 MMHG | RESPIRATION RATE: 16 BRPM | HEART RATE: 85 BPM | HEIGHT: 62 IN | TEMPERATURE: 98.9 F

## 2018-03-08 LAB
ABO + RH BLD: NORMAL
ALBUMIN SERPL-MCNC: 2.7 G/DL (ref 3.5–5)
ALBUMIN/GLOB SERPL: 0.9 {RATIO} (ref 1.1–2.2)
ALP SERPL-CCNC: 52 U/L (ref 45–117)
ALT SERPL-CCNC: 62 U/L (ref 12–78)
ANION GAP SERPL CALC-SCNC: 10 MMOL/L (ref 5–15)
AST SERPL-CCNC: 44 U/L (ref 15–37)
BASOPHILS # BLD: 0 K/UL (ref 0–0.1)
BASOPHILS NFR BLD: 0 % (ref 0–1)
BILIRUB SERPL-MCNC: 0.2 MG/DL (ref 0.2–1)
BLD PROD TYP BPU: NORMAL
BLOOD GROUP ANTIBODIES SERPL: NORMAL
BPU ID: NORMAL
BUN SERPL-MCNC: 7 MG/DL (ref 6–20)
BUN/CREAT SERPL: 14 (ref 12–20)
CALCIUM SERPL-MCNC: 7.5 MG/DL (ref 8.5–10.1)
CHLORIDE SERPL-SCNC: 108 MMOL/L (ref 97–108)
CO2 SERPL-SCNC: 23 MMOL/L (ref 21–32)
CREAT SERPL-MCNC: 0.5 MG/DL (ref 0.55–1.02)
CROSSMATCH RESULT,%XM: NORMAL
DIFFERENTIAL METHOD BLD: ABNORMAL
EOSINOPHIL # BLD: 0.2 K/UL (ref 0–0.4)
EOSINOPHIL NFR BLD: 3 % (ref 0–7)
ERYTHROCYTE [DISTWIDTH] IN BLOOD BY AUTOMATED COUNT: 16 % (ref 11.5–14.5)
GLOBULIN SER CALC-MCNC: 2.9 G/DL (ref 2–4)
GLUCOSE SERPL-MCNC: 113 MG/DL (ref 65–100)
HCT VFR BLD AUTO: 26 % (ref 35–47)
HGB BLD-MCNC: 8.5 G/DL (ref 11.5–16)
IMM GRANULOCYTES # BLD: 0.1 K/UL (ref 0–0.04)
IMM GRANULOCYTES NFR BLD AUTO: 1 % (ref 0–0.5)
LYMPHOCYTES # BLD: 2.4 K/UL (ref 0.8–3.5)
LYMPHOCYTES NFR BLD: 35 % (ref 12–49)
MCH RBC QN AUTO: 30.6 PG (ref 26–34)
MCHC RBC AUTO-ENTMCNC: 32.7 G/DL (ref 30–36.5)
MCV RBC AUTO: 93.5 FL (ref 80–99)
MONOCYTES # BLD: 0.6 K/UL (ref 0–1)
MONOCYTES NFR BLD: 8 % (ref 5–13)
NEUTS SEG # BLD: 3.8 K/UL (ref 1.8–8)
NEUTS SEG NFR BLD: 53 % (ref 32–75)
NRBC # BLD: 0 K/UL (ref 0–0.01)
NRBC BLD-RTO: 0 PER 100 WBC
PLATELET # BLD AUTO: 322 K/UL (ref 150–400)
PMV BLD AUTO: 9.1 FL (ref 8.9–12.9)
POTASSIUM SERPL-SCNC: 3.5 MMOL/L (ref 3.5–5.1)
PROT SERPL-MCNC: 5.6 G/DL (ref 6.4–8.2)
RBC # BLD AUTO: 2.78 M/UL (ref 3.8–5.2)
SODIUM SERPL-SCNC: 141 MMOL/L (ref 136–145)
SPECIMEN EXP DATE BLD: NORMAL
STATUS OF UNIT,%ST: NORMAL
UNIT DIVISION, %UDIV: 0
WBC # BLD AUTO: 7.1 K/UL (ref 3.6–11)

## 2018-03-08 PROCEDURE — 74011250636 HC RX REV CODE- 250/636: Performed by: FAMILY MEDICINE

## 2018-03-08 PROCEDURE — 74011250636 HC RX REV CODE- 250/636: Performed by: STUDENT IN AN ORGANIZED HEALTH CARE EDUCATION/TRAINING PROGRAM

## 2018-03-08 PROCEDURE — 74011000250 HC RX REV CODE- 250: Performed by: STUDENT IN AN ORGANIZED HEALTH CARE EDUCATION/TRAINING PROGRAM

## 2018-03-08 PROCEDURE — 97165 OT EVAL LOW COMPLEX 30 MIN: CPT

## 2018-03-08 PROCEDURE — 80053 COMPREHEN METABOLIC PANEL: CPT | Performed by: FAMILY MEDICINE

## 2018-03-08 PROCEDURE — 36415 COLL VENOUS BLD VENIPUNCTURE: CPT | Performed by: FAMILY MEDICINE

## 2018-03-08 PROCEDURE — 90686 IIV4 VACC NO PRSV 0.5 ML IM: CPT | Performed by: FAMILY MEDICINE

## 2018-03-08 PROCEDURE — 74011250637 HC RX REV CODE- 250/637: Performed by: STUDENT IN AN ORGANIZED HEALTH CARE EDUCATION/TRAINING PROGRAM

## 2018-03-08 PROCEDURE — 97161 PT EVAL LOW COMPLEX 20 MIN: CPT

## 2018-03-08 PROCEDURE — 85025 COMPLETE CBC W/AUTO DIFF WBC: CPT | Performed by: FAMILY MEDICINE

## 2018-03-08 PROCEDURE — C9113 INJ PANTOPRAZOLE SODIUM, VIA: HCPCS | Performed by: STUDENT IN AN ORGANIZED HEALTH CARE EDUCATION/TRAINING PROGRAM

## 2018-03-08 PROCEDURE — 97116 GAIT TRAINING THERAPY: CPT

## 2018-03-08 PROCEDURE — 90471 IMMUNIZATION ADMIN: CPT

## 2018-03-08 RX ORDER — LEVOTHYROXINE SODIUM 50 UG/1
50 TABLET ORAL
Qty: 14 TAB | Refills: 0 | Status: SHIPPED | OUTPATIENT
Start: 2018-03-08 | End: 2018-03-22 | Stop reason: SDUPTHER

## 2018-03-08 RX ORDER — DOCUSATE SODIUM 100 MG/1
100 CAPSULE, LIQUID FILLED ORAL 2 TIMES DAILY
Status: DISCONTINUED | OUTPATIENT
Start: 2018-03-08 | End: 2018-03-08 | Stop reason: HOSPADM

## 2018-03-08 RX ORDER — DOCUSATE SODIUM 100 MG/1
100 CAPSULE, LIQUID FILLED ORAL 2 TIMES DAILY
Qty: 90 CAP | Refills: 0 | Status: SHIPPED | OUTPATIENT
Start: 2018-03-08 | End: 2018-06-06

## 2018-03-08 RX ORDER — LANOLIN ALCOHOL/MO/W.PET/CERES
325 CREAM (GRAM) TOPICAL 2 TIMES DAILY WITH MEALS
Qty: 60 TAB | Refills: 0 | Status: ON HOLD | OUTPATIENT
Start: 2018-03-08 | End: 2018-11-29

## 2018-03-08 RX ORDER — PANTOPRAZOLE SODIUM 40 MG/1
40 TABLET, DELAYED RELEASE ORAL DAILY
Qty: 30 TAB | Refills: 0 | Status: ON HOLD | OUTPATIENT
Start: 2018-03-08 | End: 2018-11-29

## 2018-03-08 RX ORDER — LANOLIN ALCOHOL/MO/W.PET/CERES
1 CREAM (GRAM) TOPICAL 2 TIMES DAILY WITH MEALS
Status: DISCONTINUED | OUTPATIENT
Start: 2018-03-08 | End: 2018-03-08 | Stop reason: HOSPADM

## 2018-03-08 RX ADMIN — PANTOPRAZOLE SODIUM 40 MG: 40 INJECTION, POWDER, FOR SOLUTION INTRAVENOUS at 09:32

## 2018-03-08 RX ADMIN — ONDANSETRON 6 MG: 2 INJECTION INTRAMUSCULAR; INTRAVENOUS at 09:32

## 2018-03-08 RX ADMIN — Medication 10 ML: at 06:02

## 2018-03-08 RX ADMIN — LEVOTHYROXINE SODIUM ANHYDROUS 37 MCG: 100 INJECTION, POWDER, LYOPHILIZED, FOR SOLUTION INTRAVENOUS at 09:38

## 2018-03-08 RX ADMIN — FERROUS SULFATE TAB 325 MG (65 MG ELEMENTAL FE) 325 MG: 325 (65 FE) TAB at 09:36

## 2018-03-08 RX ADMIN — INFLUENZA VIRUS VACCINE 0.5 ML: 15; 15; 15; 15 SUSPENSION INTRAMUSCULAR at 11:59

## 2018-03-08 RX ADMIN — PROCHLORPERAZINE EDISYLATE 10 MG: 5 INJECTION INTRAMUSCULAR; INTRAVENOUS at 06:02

## 2018-03-08 NOTE — DISCHARGE INSTRUCTIONS
HOME DISCHARGE INSTRUCTIONS    Rojas Cameron / [id number] : 1970    Admission date: 3/4/2018 Discharge date: 3/8/2018     Please bring this form with you to show your care provider at your follow-up appointment. Primary care provider:  Elisha Varghese MD    Discharging provider:  Monique Sloan MD  - Family Medicine Resident  Estelle Leroy MD- Attending, Family Medicine     You have been admitted to the hospital with the following diagnoses:    ACUTE DIAGNOSES:  GI bleed  anemia  . . . . . . . . . . . . . . . . . . . . . . . . . . . . . . . . . . . . . . . . . . . . . . . . . . . . . . . . . . . . . . . . . . . . . . . [facility] FOLLOW-UP CARE RECOMMENDATIONS:    Appointments  Follow-up Information     Follow up With Details Comments [address] on 3/13/2018 appt at 1: 15 pm ; please go to the [facility] location for appt. Continue to HOLD coumadin until seen by Dr Aura Gao MD Schedule an appointment as soon as possible for a visit in 1 day Gastroenterology follow-up [address]       Odalis Cheney MD On 3/9/2018 @ 10:20 am for PCP Follow-up and Blood count Check [address] [place] [id number] [phone]             Follow-up tests needed: CBC    Pending test results: At the time of your discharge the following test results are still pending: None. Please make sure you review these results with your outpatient follow-up provider(s). Specific symptoms to watch for: chest pain, shortness of breath, fever, chills, nausea, vomiting, diarrhea, change in mentation, falling, weakness, bleeding. DIET/what to eat:  Regular Diet    ACTIVITY:  Activity as tolerated    Wound care: None    Equipment needed:  None    What to do if new or unexpected symptoms occur?     If you experience any of the above symptoms (or should other concerns or questions arise after discharge) please call your primary care physician. Return to the emergency room if you cannot get hold of your doctor. · It is very important that you keep your follow-up appointment(s). · Please bring discharge papers, medication list (and/or medication bottles) to your follow-up appointments for review by your outpatient provider(s). · Please check the list of medications and be sure it includes every medication (even non-prescription medications) that your provider wants you to take. · It is important that you take the medication exactly as they are prescribed. · Keep your medication in the bottles provided by the pharmacist and keep a list of the medication names, dosages, and times to be taken in your wallet. · Do not take other medications without consulting your doctor. · If you have any questions about your medications or other instructions, please talk to your nurse or care provider before you leave the hospital.     Information obtained by:     I understand that if any problems occur once I am at home I am to contact my physician. These instructions were explained to me and I had the opportunity to ask questions. I understand and acknowledge receipt of the instructions indicated above.                                                                                                                                                Physician's or R.N.'s Signature                                                                  Date/Time                                                                                                                                              Patient or Representative Signature                                                          Date/Time

## 2018-03-08 NOTE — PROGRESS NOTES
Sitting on side of bed eating lunch. No complaining of pain. Nurse handed scripts which have been read and explained to her . Patient received a copy of discharge instructions which have been read and explained to her. Dr. Xavier Giraldo to room and gave patient a script for Levothyroxine. Vebelized understanding.
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
## 2018-03-08 NOTE — TELEPHONE ENCOUNTER
[facility] called requesting an appointment for pt to be seen tomorrow for GI Bleed. Unfortunately,  there are no appointments available and they were offered an appointment for 3/13/18. They wanted pt to be seen tomorrow and would contact [phone] [address].

## 2018-03-08 NOTE — DISCHARGE SUMMARY
[facility] [address] [address]   Office [phone]  Fax [phone]       Discharge / Transfer / Off-Service Note     Name: Niki Brink MRN: [id number]  Sex: Female   YOB: 1970  Age: 56 y.o. PCP: Norma Trejo MD     Date of admission: 3/4/2018  Date of discharge/transfer: 3/8/2018    Attending physician at admission: Dr. Luiz Nolasco  Attending physician at discharge/transfer: Dr. Luiz Nolasco  Resident physician at discharge/transfer: Cecily Heredia MD     Consultants during hospitalization  Dr. Leeann Quijano - Gastroenterology  Dr. Amy Morfin - Heme-Onc     Admission diagnoses   GI bleed  anemia    Recommended follow-up after discharge  · PCP, Gastroenterology, Hematology     History of Present Illness  Niki Brink is a 56 y.o. female with hx of PE/DVT/Factor V deficiency, migraine, OA s/p b/l TKA who presents to the ER complaining of bloody diarrhea. Endorsed 5-6 bloody stools per day prior to admission. Since the first bloody BM, pt has had black tarry stools. Pt also endorsed reflux symptoms and chronic dry cough for the past 3-4 month and is taking Ranitidine daily. Pt endorsed taking tylenol PM for migraine headaches (4 in last 24hr). She denied any abdominal pain, fever/chills, dizziness, recent travel, new foods, n/v. Pt also endorses solid foods getting stuck down her esophagus and had been meaning to go see GI specialist. Pt mentions her kids being sick with n/v the past few days and endorses subjective fever. Pt just had recent b/l TKA in 7/31 with postop hgb of 9.4.      In the ER, vital signs were remarkable for mild tachycardia of 94. Labs were remarkable for FOBT+, anemia 9.2 (recent TKA by Dr Raphael Reza with postop Hgb 9.3 on 7/31, baseline prior is 12-13), INR 2.1 on warfarin. CXR was unremarkable. CT abd/pelvis w/ contrast showed no acute abnormalities.  Pt was treated with protonix, given tylenol for HA, and started on IVF with protocol for GI bleed (two bore IV, type/screen with blood product request).         Hospital course  Hemorrhagic Shock 2/2 Acute GI bleed   - EGD with active arterial bleed in duodenal bulb treated with clipping and epinephrine injection.  - S/P 4u PRBC Transfusions this admission.  - Pt on Warfarin AC for known Factor V Leiden deficiency. Also has Anabel filter in place  - Heme onc consulted and recommend holding all AC until outpatient Heme Follow-up  - Pt scheduled for hematology follow-up Dr. Luann Mojica on 03/13/2018 at 1:15pm. Pt aware of appt. - Continue Protonix 40mg daily until GI follow-up  - Discharged with Fe 325 bid and colace  - Pt advised to schedule GI follow after discharge      Hx PE, DVT, Factor V Deficiency  - Anabel Filter in place  - Hold AC until outpatient Heme Follow-up    Hypothyroidism  - Medication non-compliance  - TSH 5.0 (H) Free T4 - 1 (wnl) consistent with subclinical hypothyroidism  - Pt on Synthroid 50mcg prior to admission but reports medication non-compliance  - Continue synthroid 50mcg this admission. Discharge with 2 weeks worth until PCP follow-up      Chronic progressive dysphagia to solid foods  - Normal Esophagus on EGD  - Outpatient GI  follow-up if persistent       Migraine headaches  - Treated Compazine and Tylenol this admission      Vitamin D deficiency. - Continue supplements      Obesity BMI Body mass index is 36.4 kg/(m^2). - Encouraging lifestyle modifications and further follow up outpatient.         Physical exam at discharge:    [facility]. General Oriented to person, place, and time and well-developed. Appears well-nourished, no distress. Not diaphoretic. HENT Head Normocephalic and atraumatic. Eyes Conjunctivae are normal, no discharge. No scleral icterus. Nose Nose normal, clear turbinates. Oral Oropharynx is clear and moist. No oropharyngeal exudate. Neck No thyromegaly present. No cervical adenopathy. Cardio Normal rate, regular rhythm.  Exam reveals no gallop and no friction rub. No 2/5 Pansystolic flow murmur. No chest wall tenderness. Pulmonary Effort normal and breath sounds normal. No respiratory distress. No wheezes, no rales. Abdominal Soft. Bowel sounds normal. No distension. No tenderness.  Deferred. Extremities No edema of lower extremities. No tenderness. Distal pulses intact. Neurological Alert and oriented to person, place, and time. Dermatology Skin is warm and dry. No rash noted. No erythema or pallor. Psychiatric Affect and judgment normal.        Condition at discharge: Stable. Labs  Recent Labs      03/08/18   0234  03/07/18   1349  03/07/18   0842  03/07/18 0209 03/06/18   0033   WBC  7.1   --    --   6.3   --   6.7   HGB  8.5*  8.5*  8.3*  8.2*   < >  6.6*   HCT  26.0*  25.7*  25.3*  24.8*   < >  20.1*   PLT  322   --    --   251   --   231    < > = values in this interval not displayed. Recent Labs      03/08/18   0234  03/07/18 0209 03/06/18 0033   NA  141  141  140   K  3.5  3.4*  3.7   CL  108  108  108   CO2  23  22  22   BUN  7  6  17   CREA  0.50*  0.40*  0.46*   GLU  113*  80  93   CA  7.5*  7.1*  6.7*   MG   --   1.8   --    PHOS   --   2.4*   --      Recent Labs      03/08/18 0234 03/07/18 0209 03/06/18 0033   SGOT  44*  42*  28   ALT  62  48  34   AP  52  43*  38*   TBILI  0.2  0.3  0.2   TP  5.6*  5.1*  4.6*   ALB  2.7*  2.5*  2.3*   GLOB  2.9  2.6  2.3     Recent Labs      03/07/18   0209 03/06/18   0033   INR  1.4*  1.6*   PTP  14.7*  16.5*     Cultures  · None    Procedures / Diagnostic Studies  Imaging    Results from [place] encounter on 03/04/18   XR CHEST PORT   Narrative Portable AP chest at 9:00 demonstrates right IJ placement at the atriocaval  junction. There is no pneumothorax. Lungs are clear. Impression IMPRESSION: Right IJ central line in satisfactory position.     INTERPRETATION PROVIDED FOR COMPLIANCE ONLY AT NO CHARGE Results from [facility] encounter on 03/04/18   CT ABD PELV W CONT   Narrative EXAM:  CT ABD PELV W CONT    INDICATION: Bloody stools for 3 days. Abdominal pain. COMPARISON: CT chest 12/4/2013. TECHNIQUE: Helical CT of the abdomen  and pelvis  following the uneventful  intravenous administration of nonionic contrast.  Coronal and sagittal reformats  are performed. CT dose reduction was achieved through use of a standardized  protocol tailored for this examination and automatic exposure control for dose  modulation. FINDINGS:   The visualized lung bases demonstrate no mass or consolidation. The heart size  is normal. There is no pericardial or pleural effusion. The liver,, pancreas, and adrenal glands are normal. There is a stable lobulated  contour of the spleen. The kidneys are symmetric without hydronephrosis. The  gall bladder is surgically absent without intra- or extra-hepatic biliary  dilatation. There are no dilated bowel loops. The appendix is normal.  There are no  enlarged lymph nodes. There is no free fluid or free air. And IVC filter is  present. The aorta tapers without aneurysm. The urinary bladder is normal.  There is no pelvic mass. There is a tiny fat-containing umbilical hernia. The bony structures are  age-appropriate. Impression IMPRESSION:   There is no acute abnormality in the abdomen or pelvis. No procedure found.     Chronic diagnoses   Problem List as of 3/8/2018  Date Reviewed: 6/27/2017          Codes Class Noted - Resolved    * (Principal)GI bleed ICD-10-CM: K92.2  ICD-9-CM: 578.9  3/4/2018 - Present        Primary localized osteoarthritis of knees, bilateral ICD-10-CM: M17.0  ICD-9-CM: 715.16  7/24/2017 - Present        Factor V deficiency ([facility] [address] ICD-10-CM: D68.2  ICD-9-CM: 286.3  6/27/2017 - Present        Acquired hypothyroidism ICD-10-CM: E03.9  ICD-9-CM: 244.9  4/20/2017 - Present        Vitamin D deficiency ICD-10-CM: E55.9  ICD-9-CM: 268.9  4/20/2017 - Present        Postcoital and contact bleeding ICD-10-CM: N93.0  ICD-9-CM: 626.7  3/20/2017 - Present        Bilateral knee pain ICD-10-CM: M25.561, M25.562  ICD-9-CM: 719.46  9/18/2014 - Present        Migraine headache ICD-10-CM: U92.365  ICD-9-CM: 346.90  2/8/2012 - Present        [place] filter in place ICD-10-CM: Z95.828  ICD-9-CM: V45.89  11/9/2011 - Present        Reflex sympathetic dystrophy of the leg ICD-10-CM: G90.529  ICD-9-CM: 337.22  10/12/2011 - Present        Depression ICD-10-CM: F32.9  ICD-9-CM: 199  10/1/2011 - Present        Palpitations (Chronic) ICD-10-CM: R00.2  ICD-9-CM: 785.1  10/1/2011 - Present        Maternal DVT (deep vein thrombosis), history of ICD-10-CM: Z86.718, Z87.59  ICD-9-CM: V12.51  8/10/2011 - Present        History of pulmonary embolism ICD-10-CM: Z86.711  ICD-9-CM: V12.55  8/10/2011 - Present        RESOLVED: Nausea and vomiting in pregnancy ICD-10-CM: O21.9  ICD-9-CM: 643.90  7/1/2013 - 10/11/2013        RESOLVED: Mastitis ICD-10-CM: N61.0  ICD-9-CM: 611.0  11/28/2011 - 2/20/2012        RESOLVED: History of GBS (group B streptococcus) UTI, currently pregnant ICD-10-CM: O09.899, Z87.440  ICD-9-CM: V23.89  11/9/2011 - 2/20/2012        RESOLVED: Genital herpes complicating pregnancy IXT-14-WN: O98.319, A60.09  ICD-9-CM: 647.60, 054.10  11/9/2011 - 2/20/2012        RESOLVED: Hyperemesis gravidarum ICD-10-CM: O21.0  ICD-9-CM: 643.00  10/1/2011 - 11/9/2011        RESOLVED: Supervision of high-risk pregnancy with grand multiparity ICD-10-CM: O09.40  ICD-9-CM: V23.3  8/10/2011 - 2/20/2012        RESOLVED: Hereditary thrombophilia ([facility] [address] ICD-10-CM: C06.3  ICD-9-CM: 289.81  8/10/2011 - 2/20/2012    Overview Signed 8/10/2011  1:52 PM by Garcia Villagran     Factor V Leiden mutation             RESOLVED: Advanced maternal age in pregnancy ICD-10-CM: Ravin Lind  ICD-9-CM: 659.60  8/10/2011 - 2/20/2012              Discharge/Transfer Medications  Current Discharge Medication List      START taking these medications    Details   docusate sodium (COLACE) 100 mg capsule Take 1 Cap by mouth two (2) times a day for 90 days. Qty: 90 Cap, Refills: 0      ferrous sulfate 325 mg (65 mg iron) tablet Take 1 Tab by mouth two (2) times daily (with meals). Qty: 60 Tab, Refills: 0      pantoprazole (PROTONIX) 40 mg tablet Take 1 Tab by mouth daily. Qty: 30 Tab, Refills: 0      levothyroxine (SYNTHROID) 50 mcg tablet Take 1 Tab by mouth Daily (before breakfast). Qty: 14 Tab, Refills: 0         CONTINUE these medications which have NOT CHANGED    Details   butalbital-acetaminophen-caff (FIORICET) -40 mg per capsule Take 1 Cap by mouth every four (4) hours as needed for Pain. ONABOTULINUMTOXINA (BOTOX INJECTION) by IntraMUSCular route. The patient receives injections every 3 months at the office of Dr. Layla Kay      ACETAMINOPHEN/DIPHENHYDRAMINE (TYLENOL PM EXTRA STRENGTH PO) Take 3 Tabs by mouth nightly. STOP taking these medications       warfarin (COUMADIN) 10 mg tablet Comments:   Reason for Stopping:         warfarin (COUMADIN) 5 mg tablet Comments:   Reason for Stopping:                Diet:  Regular diet. Activity:  As tolerated    Disposition: Home or Self Care    Discharge instructions to patient/family  Please seek medical attention for any new or worsening symptoms particularly fever, chest pain, shortness of breath, abdominal pain, nausea, vomiting    Follow up plans/appointments  Follow-up Information     Follow up With Details Comments [address] on 3/13/2018 appt at 1: 15 pm ; please go to the [facility] location for appt.  Continue to HOLD coumadin until seen by Dr Ludin Parr MD Schedule an appointment as soon as possible for a visit in 1 day Gastroenterology follow-up [address] [facility]      Jarvis Toure MD On 3/9/2018 @ 10:20 am for PCP Follow-up and Blood count Check [address]             Kinsey Kuhn MD  Family Medicine Resident       For Billing    Chief Complaint   Patient presents with   [facility] Problems  Date Reviewed: 6/27/2017          Codes Class Noted POA    * (Principal)GI bleed ICD-10-CM: K92.2  ICD-9-CM: 578.9  3/4/2018 Yes        Factor V deficiency ([facility] [address] ICD-10-CM: D68.2  ICD-9-CM: 286.3  6/27/2017 Yes        Acquired hypothyroidism ICD-10-CM: E03.9  ICD-9-CM: 244.9  4/20/2017 Yes        Vitamin D deficiency ICD-10-CM: E55.9  ICD-9-CM: 268.9  4/20/2017 Yes        Orient filter in place ICD-10-CM: Z95.828  ICD-9-CM: V45.89  11/9/2011 Yes        Depression ICD-10-CM: F32.9  ICD-9-CM: 464  10/1/2011 Yes        Palpitations (Chronic) ICD-10-CM: R00.2  ICD-9-CM: 785.1  10/1/2011 Yes        History of pulmonary embolism ICD-10-CM: J63.424  ICD-9-CM: V12.55  8/10/2011 Yes             I saw and evaluated the patient, performing the key elements of the service. I discussed the findings, assessment and plan with the resident and agree with the resident's findings and plan as documented in the resident's note.   Pt seen 3/8/18

## 2018-03-08 NOTE — PROGRESS NOTES
Problem: Falls - Risk of  Goal: *Absence of Falls  Document Rea Fall Risk and appropriate interventions in the flowsheet.    Outcome: Progressing Towards Goal  Fall Risk Interventions:  Mobility Interventions: Bed/chair exit alarm, Patient to call before getting OOB         Medication Interventions: Patient to call before getting OOB, Teach patient to arise slowly    Elimination Interventions: Call light in reach, Patient to call for help with toileting needs    History of Falls Interventions: Room close to nurse's station
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
## 2018-03-08 NOTE — PROGRESS NOTES
[address] NP  [phone]           GI PROGRESS NOTE        NAME: Katina Draper   :  1970   MRN:  [id number]       Subjective: Brittny Galvan you going to spring me out of here today? \"  Pt reports no bowel movements overnight. Objective:     VITALS:   Last 24hrs VS reviewed since prior progress note. Most recent are:  Visit Vitals    BP 94/72 (BP 1 Location: Left arm, BP Patient Position: At rest)    Pulse 85    Temp 98.9 °F (37.2 °C)    Resp 16    Ht 5' 2\" (1.575 m)    Wt 96.2 kg (212 lb 1.3 oz)    SpO2 95%    Breastfeeding No    BMI 38.79 kg/m2       Intake/Output Summary (Last 24 hours) at 18 0948  Last data filed at 18 1737   Gross per 24 hour   Intake              240 ml   Output                0 ml   Net              240 ml       PHYSICAL EXAM:  General: Alert, in no acute distress    HEENT: Anicteric sclerae. Lungs:            CTA Bilaterally. Heart:  Regular  rhythm,    Abdomen: Soft, Non distended, Non tender.  (+)Bowel sounds, no HSM  Extremities: No c/c/e  Neurologic:  CN 2-12 gi, Alert and oriented X 3. No acute neurological distress   Psych:   Good insight. Not anxious nor agitated. Lab Data Reviewed:   Recent Labs      18   1349   18   0209   WBC  7.1   --    --   6.3   HGB  8.5*  8.5*   < >  8.2*   HCT  26.0*  25.7*   < >  24.8*   PLT  322   --    --   251    < > = values in this interval not displayed.      Recent Labs      18   0234  18   0209   NA  141  141   K  3.5  3.4*   CL  108  108   CO2  23  22   BUN  7  6   CREA  0.50*  0.40*   GLU  113*  80   PHOS   --   2.4*   CA  7.5*  7.1*     Recent Labs      184  18   0209   SGOT  44*  42*   AP  52  43*   TP  5.6*  5.1*   ALB  2.7*  2.5*   GLOB  2.9  2.6       ________________________________________________________________________  Patient Active Problem List   Diagnosis Code    Maternal DVT (deep vein thrombosis), history of Z86.718, Z87.59    History of pulmonary embolism Z86.711    Depression F32.9    Palpitations R00.2    Reflex sympathetic dystrophy of the leg G90.529    Eleanor filter in place Z95.828    Migraine headache G43.909    Bilateral knee pain M25.561, M25.562    Postcoital and contact bleeding N93.0    Acquired hypothyroidism E03.9    Vitamin D deficiency E55.9    Factor V deficiency (HCC) D68.2    Primary localized osteoarthritis of knees, bilateral M17.0    GI bleed K92.2         Assessment and Plan:  Upper GI Bleed:  Status post clipping of Dieulafoy Lesion. HgB Stable at 8.5  - Bleeding Resolved. - Continue PPI at discharge. - OK to resume anticoagulation. OK to discharge from GI standpoint. Instructed her to seek care immediately should she notice bleeding again.        Signed By: Debi Flores NP     3/8/2018  9:48 AM

## 2018-03-08 NOTE — PROGRESS NOTES
3/8/2018  10:47 AM  Pt discharge noted. CM reviewed EMR. No discharge service needs indicated. Pt's  will provide transport upon discharge.

## 2018-03-08 NOTE — PROGRESS NOTES
Bedside and Verbal shift change report given to [address] Bear Kimbrough (oncoming nurse) by TRENT Penn (offgoing nurse). Report given with SBAR, Kardex, OR Summary, Intake/Output, MAR and Recent Results.

## 2018-03-09 ENCOUNTER — OFFICE VISIT (OUTPATIENT)
Dept: FAMILY MEDICINE CLINIC | Age: 48
End: 2018-03-09

## 2018-03-09 VITALS
OXYGEN SATURATION: 99 % | TEMPERATURE: 97.7 F | HEART RATE: 101 BPM | WEIGHT: 202.4 LBS | DIASTOLIC BLOOD PRESSURE: 86 MMHG | HEIGHT: 62 IN | SYSTOLIC BLOOD PRESSURE: 129 MMHG | RESPIRATION RATE: 20 BRPM | BODY MASS INDEX: 37.25 KG/M2

## 2018-03-09 DIAGNOSIS — Z95.828 GREENFIELD FILTER IN PLACE: ICD-10-CM

## 2018-03-09 DIAGNOSIS — R05.9 COUGH: ICD-10-CM

## 2018-03-09 DIAGNOSIS — Z86.718 HISTORY OF DVT (DEEP VEIN THROMBOSIS): ICD-10-CM

## 2018-03-09 DIAGNOSIS — D50.0 ANEMIA DUE TO GI BLOOD LOSS: Primary | ICD-10-CM

## 2018-03-09 DIAGNOSIS — E03.9 ACQUIRED HYPOTHYROIDISM: ICD-10-CM

## 2018-03-09 DIAGNOSIS — D68.2 FACTOR V DEFICIENCY (HCC): ICD-10-CM

## 2018-03-09 RX ORDER — BENZONATATE 200 MG/1
200 CAPSULE ORAL
Qty: 30 CAP | Refills: 0 | Status: SHIPPED | OUTPATIENT
Start: 2018-03-09 | End: 2018-03-16

## 2018-03-09 NOTE — MR AVS SNAPSHOT
[address] [address] 
[phone] Patient: Jose M Goins MRN: [id number] HQE:[birth year] Visit Information Date & Time Provider Department Dept. Phone Encounter #  
 3/9/2018  2:20 PM Damir Salazar MD [address] [id number] Upcoming Health Maintenance Date Due  
 PAP AKA CERVICAL CYTOLOGY 7/27/2019 DTaP/Tdap/Td series (2 - Td) 4/17/2027 Allergies as of 3/9/2018  Review Complete On: 3/9/2018 By: Merry Diaz Severity Noted Reaction Type Reactions Imitrex [Sumatriptan]  04/08/2011    Other (comments) Face flushed- felt like \" needles in face\" Reglan [Metoclopramide]  07/05/2017    Nausea Only, Other (comments) Cramping \"everywhere\" Current Immunizations  Reviewed on 8/26/2013 Name Date Influenza Vaccine (Quad) PF 3/8/2018 11:59 AM  
 Tdap 4/17/2017  7:55 PM  
  
 Not reviewed this visit You Were Diagnosed With   
  
 Codes Comments Anemia due to GI blood loss    -  Primary ICD-10-CM: D50.0 ICD-9-CM: 280.0 Cough     ICD-10-CM: R05 ICD-9-CM: 528. 2 Vitals BP Pulse Temp Resp Height(growth percentile) Weight(growth percentile) 129/86 (BP 1 Location: Left arm, BP Patient Position: Sitting) (!) 101 97.7 °F (36.5 °C) (Oral) 20 5' 2\" (1.575 m) 202 lb 6.4 oz (91.8 kg) LMP SpO2 BMI OB Status Smoking Status 02/28/2018 99% 37.02 kg/m2 Having regular periods Never Smoker Vitals History BMI and BSA Data Body Mass Index Body Surface Area 37.02 kg/m 2 2 m 2 Preferred Pharmacy Pharmacy Name Phone [address] [place] [address] [phone] Your Updated Medication List  
  
   
This list is accurate as of 3/9/18  2:43 PM.  Always use your most recent med list.  
  
  
  
  
 benzonatate 200 mg capsule Commonly known as:  TESSALON  
 Take 1 Cap by mouth three (3) times daily as needed for Cough for up to 7 days. BOTOX INJECTION  
by IntraMUSCular route. The patient receives injections every 3 months at the office of Dr. Ezekiel Carrizales  
  
 docusate sodium 100 mg capsule Commonly known as:  Brian Jj Take 1 Cap by mouth two (2) times a day for 90 days. ferrous sulfate 325 mg (65 mg iron) tablet Take 1 Tab by mouth two (2) times daily (with meals). FIORICET -40 mg per capsule Generic drug:  butalbital-acetaminophen-caff Take 1 Cap by mouth every four (4) hours as needed for Pain.  
  
 levothyroxine 50 mcg tablet Commonly known as:  SYNTHROID Take 1 Tab by mouth Daily (before breakfast). pantoprazole 40 mg tablet Commonly known as:  PROTONIX Take 1 Tab by mouth daily. TYLENOL PM EXTRA STRENGTH PO Take 3 Tabs by mouth nightly. Prescriptions Sent to Pharmacy Refills  
 benzonatate (TESSALON) 200 mg capsule 0 Sig: Take 1 Cap by mouth three (3) times daily as needed for Cough for up to 7 days. Class: Normal  
 Pharmacy: [facility] DR MAURA HILL [address] [place] [address] #: [phone] Route: Oral  
  
We Performed the Following CBC WITH AUTOMATED DIFF [27724 CPT(R)] METABOLIC PANEL, COMPREHENSIVE [16074 CPT(R)] Patient Instructions Iron-Rich Diet: Care Instructions Your Care Instructions Your body needs iron to make hemoglobin. Hemoglobin is a substance in red blood cells that carries oxygen from the lungs to cells all through your body. If you do not get enough iron, your body makes fewer and smaller red blood cells. As a result, your body's cells may not get enough oxygen. Adult men need 8 milligrams of iron a day; adult women need 18 milligrams of iron a day. After menopause, women need 8 milligrams of iron a day. A pregnant woman needs 27 milligrams of iron a day.  Infants and young children have higher iron needs relative to their size than other age groups. People who have lost blood because of ulcers or heavy menstrual periods may become very low in iron and may develop anemia. Most people can get the iron their bodies need by eating enough of certain iron-rich foods. Your doctor may recommend that you take an iron supplement along with eating an iron-rich diet. Follow-up care is a key part of your treatment and safety. Be sure to make and go to all appointments, and call your doctor if you are having problems. It's also a good idea to know your test results and keep a list of the medicines you take. How can you care for yourself at home? · Make iron-rich foods a part of your daily diet. Iron-rich foods include: ¨ All meats, such as chicken, beef, lamb, pork, fish, and shellfish. Liver is especially high in iron. ¨ Leafy green vegetables. ¨ Raisins, peas, beans, lentils, barley, and eggs. ¨ Iron-fortified breakfast cereals. · Eat foods with vitamin C along with iron-rich foods. Vitamin C helps you absorb more iron from food. Drink a glass of orange juice or another citrus juice with your food. · Eat meat and vegetables or grains together. The iron in meat helps your body absorb the iron in other foods. Where can you learn more? Go to http://guera-samreen.info/. Enter [id number] in the search box to learn more about \"Iron-Rich Diet: Care Instructions. \" Current as of: May 12, 2017 Content Version: 11.4 © 6171-6899 Healthwise, Incorporated. Care instructions adapted under license by [employer] (which disclaims liability or warranty for this information). If you have questions about a medical condition or this instruction, always ask your healthcare professional. [address] [place] [address] any warranty or liability for your use of this information. Introducing [facility]! Candelaria Flores introduces OKKAM patient portal. Now you can access parts of your medical record, email your doctor's office, and request medication refills online. 1. In your internet browser, go to [email or web address] 2. Click on the First Time User? Click Here link in the Sign In box. You will see the New Member Sign Up page. 3. Enter your OKKAM Access Code exactly as it appears below. You will not need to use this code after youve completed the sign-up process. If you do not sign up before the expiration date, you must request a new code. · OKKAM Access Code: [email or web address] Expires: 6/2/2018  4:58 PM 
 
4. Enter the last four digits of your Social Security Number (xxxx) and Date of Birth (mm/dd/yyyy) as indicated and click Submit. You will be taken to the next sign-up page. 5. Create a OKKAM ID. This will be your OKKAM login ID and cannot be changed, so think of one that is secure and easy to remember. 6. Create a OKKAM password. You can change your password at any time. 7. Enter your Password Reset Question and Answer. This can be used at a later time if you forget your password. 8. Enter your e-mail address. You will receive e-mail notification when new information is available in [address] 9. Click Sign Up. You can now view and download portions of your medical record. 10. Click the Download Summary menu link to download a portable copy of your medical information. If you have questions, please visit the Frequently Asked Questions section of the OKKAM website. Remember, OKKAM is NOT to be used for urgent needs. For medical emergencies, dial 911. Now available from your iPhone and Android! Please provide this summary of care documentation to your next provider. Your primary care clinician is listed as Key Haynes. If you have any questions after today's visit, please call [phone].

## 2018-03-09 NOTE — PATIENT INSTRUCTIONS
Iron-Rich Diet: Care Instructions  Your Care Instructions    Your body needs iron to make hemoglobin. Hemoglobin is a substance in red blood cells that carries oxygen from the lungs to cells all through your body. If you do not get enough iron, your body makes fewer and smaller red blood cells. As a result, your body's cells may not get enough oxygen. Adult men need 8 milligrams of iron a day; adult women need 18 milligrams of iron a day. After menopause, women need 8 milligrams of iron a day. A pregnant woman needs 27 milligrams of iron a day. Infants and young children have higher iron needs relative to their size than other age groups. People who have lost blood because of ulcers or heavy menstrual periods may become very low in iron and may develop anemia. Most people can get the iron their bodies need by eating enough of certain iron-rich foods. Your doctor may recommend that you take an iron supplement along with eating an iron-rich diet. Follow-up care is a key part of your treatment and safety. Be sure to make and go to all appointments, and call your doctor if you are having problems. It's also a good idea to know your test results and keep a list of the medicines you take. How can you care for yourself at home? · Make iron-rich foods a part of your daily diet. Iron-rich foods include:  ¨ All meats, such as chicken, beef, lamb, pork, fish, and shellfish. Liver is especially high in iron. ¨ Leafy green vegetables. ¨ Raisins, peas, beans, lentils, barley, and eggs. ¨ Iron-fortified breakfast cereals. · Eat foods with vitamin C along with iron-rich foods. Vitamin C helps you absorb more iron from food. Drink a glass of orange juice or another citrus juice with your food. · Eat meat and vegetables or grains together. The iron in meat helps your body absorb the iron in other foods. Where can you learn more? Go to http://guera-samreen.info/.   Enter [id number] in the search box to learn more about \"Iron-Rich Diet: Care Instructions. \"  Current as of: May 12, 2017  Content Version: 11.4  © 6304-8282 Healthwise, [employer]. Care instructions adapted under license by [employer] (which disclaims liability or warranty for this information). If you have questions about a medical condition or this instruction, always ask your healthcare professional. [address] any warranty or liability for your use of this information.

## 2018-03-10 LAB
ALBUMIN SERPL-MCNC: 3.8 G/DL (ref 3.5–5.5)
ALBUMIN/GLOB SERPL: 1.6 {RATIO} (ref 1.2–2.2)
ALP SERPL-CCNC: 56 IU/L (ref 39–117)
ALT SERPL-CCNC: 34 IU/L (ref 0–32)
AST SERPL-CCNC: 18 IU/L (ref 0–40)
BASOPHILS # BLD AUTO: 0 X10E3/UL (ref 0–0.2)
BASOPHILS NFR BLD AUTO: 0 %
BILIRUB SERPL-MCNC: 0.2 MG/DL (ref 0–1.2)
BUN SERPL-MCNC: 8 MG/DL (ref 6–24)
BUN/CREAT SERPL: 14 (ref 9–23)
CALCIUM SERPL-MCNC: 9.1 MG/DL (ref 8.7–10.2)
CHLORIDE SERPL-SCNC: 104 MMOL/L (ref 96–106)
CO2 SERPL-SCNC: 21 MMOL/L (ref 18–29)
CREAT SERPL-MCNC: 0.58 MG/DL (ref 0.57–1)
EOSINOPHIL # BLD AUTO: 0.2 X10E3/UL (ref 0–0.4)
EOSINOPHIL NFR BLD AUTO: 3 %
ERYTHROCYTE [DISTWIDTH] IN BLOOD BY AUTOMATED COUNT: 15.8 % (ref 12.3–15.4)
GFR SERPLBLD CREATININE-BSD FMLA CKD-EPI: 111 ML/MIN/1.73
GFR SERPLBLD CREATININE-BSD FMLA CKD-EPI: 128 ML/MIN/1.73
GLOBULIN SER CALC-MCNC: 2.4 G/DL (ref 1.5–4.5)
GLUCOSE SERPL-MCNC: 90 MG/DL (ref 65–99)
HCT VFR BLD AUTO: 29.3 % (ref 34–46.6)
HGB BLD-MCNC: 9.3 G/DL (ref 11.1–15.9)
IMM GRANULOCYTES # BLD: 0.1 X10E3/UL (ref 0–0.1)
IMM GRANULOCYTES NFR BLD: 1 %
LYMPHOCYTES # BLD AUTO: 1.9 X10E3/UL (ref 0.7–3.1)
LYMPHOCYTES NFR BLD AUTO: 29 %
MCH RBC QN AUTO: 29.8 PG (ref 26.6–33)
MCHC RBC AUTO-ENTMCNC: 31.7 G/DL (ref 31.5–35.7)
MCV RBC AUTO: 94 FL (ref 79–97)
MONOCYTES # BLD AUTO: 0.7 X10E3/UL (ref 0.1–0.9)
MONOCYTES NFR BLD AUTO: 11 %
NEUTROPHILS # BLD AUTO: 3.7 X10E3/UL (ref 1.4–7)
NEUTROPHILS NFR BLD AUTO: 56 %
PLATELET # BLD AUTO: 488 X10E3/UL (ref 150–379)
POTASSIUM SERPL-SCNC: 4.5 MMOL/L (ref 3.5–5.2)
PROT SERPL-MCNC: 6.2 G/DL (ref 6–8.5)
RBC # BLD AUTO: 3.12 X10E6/UL (ref 3.77–5.28)
SODIUM SERPL-SCNC: 145 MMOL/L (ref 134–144)
WBC # BLD AUTO: 6.6 X10E3/UL (ref 3.4–10.8)

## 2018-03-11 NOTE — PROGRESS NOTES
Anemia improved though platelets now high post hospitalization for GI bleed. Will need to discuss with Heme/onc at appointment.
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
## 2018-03-22 ENCOUNTER — TELEPHONE (OUTPATIENT)
Dept: FAMILY MEDICINE CLINIC | Age: 48
End: 2018-03-22

## 2018-03-22 DIAGNOSIS — E03.9 ACQUIRED HYPOTHYROIDISM: Primary | ICD-10-CM

## 2018-03-22 RX ORDER — LEVOTHYROXINE SODIUM 50 UG/1
50 TABLET ORAL
Qty: 30 TAB | Refills: 0 | Status: SHIPPED | OUTPATIENT
Start: 2018-03-22 | End: 2018-04-25 | Stop reason: SDUPTHER

## 2018-03-22 NOTE — TELEPHONE ENCOUNTER
----- Message from [employer] sent at 3/22/2018  1:07 PM EDT -----  Regarding: Dr. Maeve Mckinnon  Pt is requesting to come in for blood work on 03/23/18 if poss.     Best contact number [phone]

## 2018-03-22 NOTE — TELEPHONE ENCOUNTER
Called & talked with patient & she has scheduled appointment with Dr. Jonas Coker for 4/3/18, and has taken her last thyroid medication today, she wants to know if she can have refill of this medication. She had TSH done on 3/4/18 while she was in the hospital, and wants lab slip to do another thyroid check before her appointment.

## 2018-03-26 ENCOUNTER — HOSPITAL ENCOUNTER (OUTPATIENT)
Dept: CT IMAGING | Age: 48
Discharge: HOME | End: 2018-03-26
Attending: ORTHOPAEDIC SURGERY
Payer: MEDICAID

## 2018-03-26 DIAGNOSIS — Z96.653 STATUS POST TOTAL BILATERAL KNEE REPLACEMENT: ICD-10-CM

## 2018-03-26 PROCEDURE — 73700 CT LOWER EXTREMITY W/O DYE: CPT

## 2018-04-03 ENCOUNTER — OFFICE VISIT (OUTPATIENT)
Dept: FAMILY MEDICINE CLINIC | Age: 48
End: 2018-04-03

## 2018-04-03 VITALS
TEMPERATURE: 97.9 F | WEIGHT: 203 LBS | RESPIRATION RATE: 18 BRPM | HEIGHT: 62 IN | SYSTOLIC BLOOD PRESSURE: 111 MMHG | BODY MASS INDEX: 37.36 KG/M2 | HEART RATE: 77 BPM | DIASTOLIC BLOOD PRESSURE: 73 MMHG

## 2018-04-03 DIAGNOSIS — R09.82 POST-NASAL DISCHARGE: ICD-10-CM

## 2018-04-03 DIAGNOSIS — J20.9 ACUTE BRONCHITIS, UNSPECIFIED ORGANISM: ICD-10-CM

## 2018-04-03 DIAGNOSIS — E03.9 ACQUIRED HYPOTHYROIDISM: Primary | ICD-10-CM

## 2018-04-03 DIAGNOSIS — R25.1 TREMOR: ICD-10-CM

## 2018-04-03 RX ORDER — WARFARIN SODIUM 5 MG/1
5 TABLET ORAL
COMMUNITY
Start: 2018-01-24

## 2018-04-03 RX ORDER — WARFARIN 10 MG/1
10 TABLET ORAL DAILY
COMMUNITY
Start: 2018-02-27

## 2018-04-03 RX ORDER — AZITHROMYCIN 500 MG/1
500 TABLET, FILM COATED ORAL DAILY
Qty: 3 TAB | Refills: 0 | Status: SHIPPED | OUTPATIENT
Start: 2018-04-03 | End: 2018-04-06

## 2018-04-03 NOTE — PROGRESS NOTES
Chief Complaint   Patient presents with    Hypothyroidism     f/u     1. Have you been to the ER, urgent care clinic since your last visit? Yes [facility] 03/2018 dx GI Bleed Hospitalized since your last visit? Yes 5 days for GI Bleed 03/2018     2. Have you seen or consulted any other health care providers outside of the [facility] since your last visit? Include any pap smears or colon screening.  No

## 2018-04-03 NOTE — PROGRESS NOTES
HISTORY OF PRESENT ILLNESS  Nilson Owen is a 53 y.o. female. HPI Comments: Nilson Owen is her for follow up on her hypothyroidism. She is due for a TSH. Also, she has had a productive cough for the past 4 months with green sputum. Occasional congestion. Thyroid Problem   The history is provided by the patient. Pertinent negatives include no chest pain, no headaches and no shortness of breath. Review of Systems   Constitutional: Negative for chills, fever, malaise/fatigue and weight loss. No weight gain   HENT: Negative for congestion, ear pain, sinus pain and sore throat. No voice deepening   Eyes: Negative for discharge and redness. Respiratory: Positive for cough and sputum production. Negative for hemoptysis, shortness of breath and wheezing. Cardiovascular: Negative for chest pain. Gastrointestinal: Negative for constipation and diarrhea. Skin:        No skin changes or hair loss   Neurological: Positive for tremors. Negative for headaches. Right handed tremor for several years   Psychiatric/Behavioral: Negative for depression. The patient is not nervous/anxious. Visit Vitals    /73    Pulse 77    Temp 97.9 °F (36.6 °C) (Oral)    Resp 18    Ht 5' 2\" (1.575 m)    Wt 203 lb (92.1 kg)    BMI 37.13 kg/m2     Physical Exam   Constitutional: She is oriented to person, place, and time. She appears well-developed and well-nourished. No distress. HENT:   Head: Normocephalic. Right Ear: Tympanic membrane, external ear and ear canal normal.   Left Ear: Tympanic membrane, external ear and ear canal normal.   Nose: Nose normal. Right sinus exhibits no maxillary sinus tenderness and no frontal sinus tenderness. Left sinus exhibits no maxillary sinus tenderness and no frontal sinus tenderness. Mouth/Throat: Uvula is midline, oropharynx is clear and moist and mucous membranes are normal.   Eyes: Right eye exhibits no discharge.  Left eye exhibits no discharge. Right conjunctiva is not injected. Left conjunctiva is not injected. Neck: No thyromegaly present. Cardiovascular: Normal rate, regular rhythm and normal heart sounds. Exam reveals no gallop and no friction rub. No murmur heard. Pulmonary/Chest: Effort normal and breath sounds normal. No respiratory distress. She has no wheezes. She has no rales. Lymphadenopathy:     She has no cervical adenopathy. Neurological: She is alert and oriented to person, place, and time. She has normal reflexes. Skin: Skin is warm and dry. She is not diaphoretic. No hair loss, skin with good turgor   Psychiatric: She has a normal mood and affect. Nursing note and vitals reviewed. ASSESSMENT and PLAN    ICD-10-CM ICD-9-CM    1. Acquired hypothyroidism E03.9 244.9    2. Acute bronchitis, unspecified organism J20.9 466.0 azithromycin (ZITHROMAX) 500 mg tab   3. Post-nasal discharge R09.82 473.9 Cetirizine (ZYRTEC) 10 mg cap   4. Tremor R25.1 781.0         Hypothyroidism, needs TSH, possible related tremor  Bronchitis, bacterial  Likely allergies  Patient already has lab slip for TSH and will have it done today  Rest, push fluids  Zithromax  Zyrtec prn    Follow-up Disposition:  Return if not better in 3 days. Reviewed plan of care. Patient has provided input and agrees with goals.

## 2018-04-04 LAB — TSH SERPL DL<=0.005 MIU/L-ACNC: 2.64 UIU/ML (ref 0.45–4.5)

## 2018-04-19 ENCOUNTER — TELEPHONE (OUTPATIENT)
Dept: FAMILY MEDICINE CLINIC | Age: 48
End: 2018-04-19

## 2018-04-19 NOTE — TELEPHONE ENCOUNTER
Pt called requesting results of thyroid labs,  was advised of normal results per Dr Isadora Moore and of lab letter being mailed to her home.   Eri

## 2018-04-25 DIAGNOSIS — E03.9 ACQUIRED HYPOTHYROIDISM: ICD-10-CM

## 2018-04-28 RX ORDER — LEVOTHYROXINE SODIUM 50 UG/1
TABLET ORAL
Qty: 30 TAB | Refills: 11 | Status: SHIPPED | OUTPATIENT
Start: 2018-04-28 | End: 2019-06-26 | Stop reason: SDUPTHER

## 2018-07-26 ENCOUNTER — HOSPITAL ENCOUNTER (OUTPATIENT)
Dept: CT IMAGING | Age: 48
Discharge: HOME | End: 2018-07-26
Attending: ORTHOPAEDIC SURGERY
Payer: MEDICAID

## 2018-07-26 DIAGNOSIS — M17.12 PRIMARY OSTEOARTHRITIS OF LEFT KNEE: ICD-10-CM

## 2018-07-26 PROCEDURE — 73700 CT LOWER EXTREMITY W/O DYE: CPT

## 2018-10-08 ENCOUNTER — HOSPITAL ENCOUNTER (OUTPATIENT)
Dept: NUCLEAR MEDICINE | Age: 48
Discharge: HOME | End: 2018-10-08
Attending: ORTHOPAEDIC SURGERY
Payer: MEDICAID

## 2018-10-08 DIAGNOSIS — Z96.653 STATUS POST BILATERAL KNEE REPLACEMENTS: ICD-10-CM

## 2018-10-08 PROCEDURE — 78300 BONE IMAGING LIMITED AREA: CPT

## 2018-11-23 ENCOUNTER — HOSPITAL ENCOUNTER (OUTPATIENT)
Dept: PREADMISSION TESTING | Age: 48
Discharge: HOME | End: 2018-11-23
Payer: MEDICAID

## 2018-11-23 VITALS
RESPIRATION RATE: 24 BRPM | HEIGHT: 62 IN | BODY MASS INDEX: 37.89 KG/M2 | HEART RATE: 80 BPM | OXYGEN SATURATION: 99 % | SYSTOLIC BLOOD PRESSURE: 115 MMHG | WEIGHT: 205.91 LBS | DIASTOLIC BLOOD PRESSURE: 71 MMHG | TEMPERATURE: 98.6 F

## 2018-11-23 LAB
ABO + RH BLD: NORMAL
ALBUMIN SERPL-MCNC: 4.1 G/DL (ref 3.5–5)
ALBUMIN/GLOB SERPL: 1.2 {RATIO} (ref 1.1–2.2)
ALP SERPL-CCNC: 59 U/L (ref 45–117)
ALT SERPL-CCNC: 16 U/L (ref 12–78)
ANION GAP SERPL CALC-SCNC: 10 MMOL/L (ref 5–15)
APPEARANCE UR: CLEAR
APTT PPP: 35 SEC (ref 22.1–32)
AST SERPL-CCNC: 12 U/L (ref 15–37)
BACTERIA URNS QL MICRO: NEGATIVE /HPF
BASOPHILS # BLD: 0.1 K/UL (ref 0–0.1)
BASOPHILS NFR BLD: 1 % (ref 0–1)
BILIRUB SERPL-MCNC: 0.4 MG/DL (ref 0.2–1)
BILIRUB UR QL: NEGATIVE
BLOOD GROUP ANTIBODIES SERPL: NORMAL
BUN SERPL-MCNC: 17 MG/DL (ref 6–20)
BUN/CREAT SERPL: 20 (ref 12–20)
CALCIUM SERPL-MCNC: 8.6 MG/DL (ref 8.5–10.1)
CHLORIDE SERPL-SCNC: 105 MMOL/L (ref 97–108)
CO2 SERPL-SCNC: 25 MMOL/L (ref 21–32)
COLOR UR: ABNORMAL
CREAT SERPL-MCNC: 0.85 MG/DL (ref 0.55–1.02)
CRP SERPL-MCNC: <0.29 MG/DL
DIFFERENTIAL METHOD BLD: ABNORMAL
EOSINOPHIL # BLD: 0.1 K/UL (ref 0–0.4)
EOSINOPHIL NFR BLD: 2 % (ref 0–7)
EPITH CASTS URNS QL MICRO: ABNORMAL /LPF
ERYTHROCYTE [DISTWIDTH] IN BLOOD BY AUTOMATED COUNT: 12.9 % (ref 11.5–14.5)
ERYTHROCYTE [SEDIMENTATION RATE] IN BLOOD: 14 MM/HR (ref 0–20)
EST. AVERAGE GLUCOSE BLD GHB EST-MCNC: 105 MG/DL
GLOBULIN SER CALC-MCNC: 3.4 G/DL (ref 2–4)
GLUCOSE SERPL-MCNC: 83 MG/DL (ref 65–100)
GLUCOSE UR STRIP.AUTO-MCNC: NEGATIVE MG/DL
HBA1C MFR BLD: 5.3 % (ref 4.2–6.3)
HCT VFR BLD AUTO: 39.3 % (ref 35–47)
HGB BLD-MCNC: 13 G/DL (ref 11.5–16)
HGB UR QL STRIP: ABNORMAL
HYALINE CASTS URNS QL MICRO: ABNORMAL /LPF (ref 0–5)
IMM GRANULOCYTES # BLD: 0 K/UL (ref 0–0.04)
IMM GRANULOCYTES NFR BLD AUTO: 0 % (ref 0–0.5)
INR PPP: 1.9 (ref 0.9–1.1)
KETONES UR QL STRIP.AUTO: NEGATIVE MG/DL
LEUKOCYTE ESTERASE UR QL STRIP.AUTO: NEGATIVE
LYMPHOCYTES # BLD: 2.1 K/UL (ref 0.8–3.5)
LYMPHOCYTES NFR BLD: 38 % (ref 12–49)
MCH RBC QN AUTO: 32.4 PG (ref 26–34)
MCHC RBC AUTO-ENTMCNC: 33.1 G/DL (ref 30–36.5)
MCV RBC AUTO: 98 FL (ref 80–99)
MONOCYTES # BLD: 0.5 K/UL (ref 0–1)
MONOCYTES NFR BLD: 9 % (ref 5–13)
NEUTS SEG # BLD: 2.8 K/UL (ref 1.8–8)
NEUTS SEG NFR BLD: 50 % (ref 32–75)
NITRITE UR QL STRIP.AUTO: NEGATIVE
NRBC # BLD: 0 K/UL (ref 0–0.01)
NRBC BLD-RTO: 0 PER 100 WBC
PH UR STRIP: 6 [PH] (ref 5–8)
PLATELET # BLD AUTO: 411 K/UL (ref 150–400)
PMV BLD AUTO: 10.1 FL (ref 8.9–12.9)
POTASSIUM SERPL-SCNC: 4.3 MMOL/L (ref 3.5–5.1)
PROT SERPL-MCNC: 7.5 G/DL (ref 6.4–8.2)
PROT UR STRIP-MCNC: NEGATIVE MG/DL
PROTHROMBIN TIME: 19.1 SEC (ref 9–11.1)
RBC # BLD AUTO: 4.01 M/UL (ref 3.8–5.2)
RBC #/AREA URNS HPF: ABNORMAL /HPF (ref 0–5)
SODIUM SERPL-SCNC: 140 MMOL/L (ref 136–145)
SP GR UR REFRACTOMETRY: 1.02 (ref 1–1.03)
SPECIMEN EXP DATE BLD: NORMAL
THERAPEUTIC RANGE,PTTT: ABNORMAL SECS (ref 58–77)
UA: UC IF INDICATED,UAUC: ABNORMAL
UROBILINOGEN UR QL STRIP.AUTO: 0.2 EU/DL (ref 0.2–1)
WBC # BLD AUTO: 5.5 K/UL (ref 3.6–11)
WBC URNS QL MICRO: ABNORMAL /HPF (ref 0–4)

## 2018-11-23 PROCEDURE — 93005 ELECTROCARDIOGRAM TRACING: CPT

## 2018-11-23 PROCEDURE — 81001 URINALYSIS AUTO W/SCOPE: CPT

## 2018-11-23 PROCEDURE — 85025 COMPLETE CBC W/AUTO DIFF WBC: CPT

## 2018-11-23 PROCEDURE — 85730 THROMBOPLASTIN TIME PARTIAL: CPT

## 2018-11-23 PROCEDURE — 83036 HEMOGLOBIN GLYCOSYLATED A1C: CPT

## 2018-11-23 PROCEDURE — 36415 COLL VENOUS BLD VENIPUNCTURE: CPT

## 2018-11-23 PROCEDURE — 86140 C-REACTIVE PROTEIN: CPT

## 2018-11-23 PROCEDURE — 80053 COMPREHEN METABOLIC PANEL: CPT

## 2018-11-23 PROCEDURE — 85610 PROTHROMBIN TIME: CPT

## 2018-11-23 PROCEDURE — 84466 ASSAY OF TRANSFERRIN: CPT

## 2018-11-23 PROCEDURE — 85652 RBC SED RATE AUTOMATED: CPT

## 2018-11-23 PROCEDURE — 86850 RBC ANTIBODY SCREEN: CPT

## 2018-11-23 RX ORDER — CHOLECALCIFEROL (VITAMIN D3) 125 MCG
2000 CAPSULE ORAL DAILY
COMMUNITY
End: 2020-03-23 | Stop reason: SDUPTHER

## 2018-11-23 RX ORDER — RANITIDINE 150 MG/1
150 TABLET, FILM COATED ORAL AS NEEDED
COMMUNITY
End: 2019-12-24
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
## 2018-11-23 NOTE — PERIOP NOTES
[address] [facility] [address], [address] [facility]                            MAIN OR                                  [phone]   MAIN PRE OP                          [phone]                                                                                AMBULATORY PRE OP          [phone]  PRE-ADMISSION TESTING    [phone]     Surgery Date:   Thursday 11/29/18         Is surgery arrival time given by surgeon? NO  If NO, [facility] staff will call you between 3 and 7pm the day before your surgery with your arrival time. (If your surgery is on a Monday, we will call you the Friday before.)    Call [phone] after 7pm Monday-Friday if you did not receive your arrival time. INSTRUCTIONS BEFORE YOUR SURGERY   When You  Arrive   Arrive at the [address]nd [address] [facility] on the day of your surgery  Have your insurance card, photo ID, and any copayment (if needed)     Food   and   Drink   NO food or drink after midnight the night before surgery    This means NO water, gum, mints, coffee, juice, etc.  No alcohol (beer, wine, liquor) 24 hours before and after surgery     Medications to   TAKE   Morning of Surgery   MEDICATIONS TO TAKE THE MORNING OF SURGERY WITH A SIP OF WATER:    Levothyroxine, ranitidine if needed     Medications  To  STOP      7 days before surgery    Non-Steroidal anti-inflammatory Drugs (NSAID's): for example, Ibuprofen (Advil, Motrin), Naproxen (Aleve)   Aspirin, if taking for pain    Herbal supplements, vitamins, and fish oil   Other:  (Pain medications not listed above, including Tylenol may be taken)   Blood  Thinners    If you take  Aspirin, Plavix, Coumadin, or any blood-thinning or anti-blood clot medicine, talk to the doctor who prescribed the medications for pre-operative instructions - as directed by Dr. Anson Rodgers.      Bathing Clothing  Jewelry  Valuables       If you shower the morning of surgery, please do not apply anything to your skin (lotions, powders, deodorant, or makeup, especially mascara)   Follow all special bath instructions (for total joint replacement, spine and bowel surgeries)   Do not shave or trim anywhere 24 hours before surgery   Wear your hair loose or down; no pony-tails, buns, or metal hair clips   Wear loose, comfortable, clean clothes   Wear glasses instead of contacts   Leave money, valuables, and jewelry, including body piercings, at home     Going Home       or Spending the Night    SAME-DAY SURGERY: You must have a responsible adult drive you home and stay with you 24 hours after surgery   ADMITS: If your doctor is keeping you into the hospital after surgery, leave personal belongings/luggage in your car until you have a hospital room number. Hospital discharge time is 12 noon  Drivers must be here before 12 noon unless you are told differently   Special Instructions   16. Special Instructions:  · Use Chlorhexidine Care Fusion wash and sponges 3 days prior to surgery as instructed. · Incentive spirometer given with instructions to practice at home and bring back to the hospital on the day of surgery. · [facility] will contact you if your Hemoglobin A1C is greater than 7.5. · Ensure/Glucerna  sample, nutritional information, and Ensure/Glucerna coupon given. · Pain pamphlet and Call Don't Fall reminder reviewed with patient. ·  parking is complimentary Monday - Friday 7 am - 5 pm  · Bring PTA Medication list day of surgery with the last doses taken documented   · Do not bring medication bottles the day of surgery       Follow all instructions so your surgery wont be cancelled. Please, be on time. If a situation occurs and you are delayed the day of surgery, call [phone] or          [phone]. If your physical condition changes (like a fever, cold, flu, etc.) call your surgeon. The patient was contacted  in person.    The patient verbalizes understanding of all instructions and does not  need reinforcement.

## 2018-11-24 LAB
BACTERIA SPEC CULT: NORMAL
BACTERIA SPEC CULT: NORMAL
SERVICE CMNT-IMP: NORMAL

## 2018-11-25 LAB
ATRIAL RATE: 79 BPM
CALCULATED P AXIS, ECG09: -2 DEGREES
CALCULATED R AXIS, ECG10: -23 DEGREES
CALCULATED T AXIS, ECG11: -16 DEGREES
DIAGNOSIS, 93000: NORMAL
P-R INTERVAL, ECG05: 150 MS
Q-T INTERVAL, ECG07: 410 MS
QRS DURATION, ECG06: 82 MS
QTC CALCULATION (BEZET), ECG08: 470 MS
TRANSFERRIN SERPL-MCNC: 261 MG/DL (ref 200–370)
VENTRICULAR RATE, ECG03: 79 BPM

## 2018-11-26 NOTE — PROGRESS NOTES
All labs and EKG reviewed. All WNL except for PT/INR which is to be expected r/t coumadin use. DOS PT/INR placed under signed and held.

## 2018-11-27 NOTE — PERIOP NOTES
Called Dr. Marie Brito for last OVN. Received and coumadin plan included. Per note patient given hard copy of plan from Dr. Marie Brito office.

## 2018-11-28 ENCOUNTER — ANESTHESIA EVENT (OUTPATIENT)
Dept: SURGERY | Age: 48
DRG: 302 | End: 2018-11-28
Payer: MEDICAID

## 2018-11-29 ENCOUNTER — HOSPITAL ENCOUNTER (INPATIENT)
Age: 48
LOS: 1 days | Discharge: HOME | DRG: 302 | End: 2018-11-30
Attending: ORTHOPAEDIC SURGERY | Admitting: ORTHOPAEDIC SURGERY
Payer: MEDICAID

## 2018-11-29 ENCOUNTER — ANESTHESIA (OUTPATIENT)
Dept: SURGERY | Age: 48
DRG: 302 | End: 2018-11-29
Payer: MEDICAID

## 2018-11-29 ENCOUNTER — APPOINTMENT (OUTPATIENT)
Dept: GENERAL RADIOLOGY | Age: 48
DRG: 302 | End: 2018-11-29
Attending: PHYSICIAN ASSISTANT
Payer: MEDICAID

## 2018-11-29 DIAGNOSIS — Z96.659 LOOSENING OF KNEE JOINT PROSTHESIS, SEQUELA: Primary | ICD-10-CM

## 2018-11-29 DIAGNOSIS — T84.038S LOOSENING OF KNEE JOINT PROSTHESIS, SEQUELA: Primary | ICD-10-CM

## 2018-11-29 PROBLEM — T84.018A FAILED TOTAL KNEE ARTHROPLASTY (HCC): Status: ACTIVE | Noted: 2018-11-29

## 2018-11-29 LAB
HCG UR QL: NEGATIVE
INR BLD: 1.1 (ref 0.9–1.2)

## 2018-11-29 PROCEDURE — 74011000250 HC RX REV CODE- 250: Performed by: ORTHOPAEDIC SURGERY

## 2018-11-29 PROCEDURE — 77030018836 HC SOL IRR NACL ICUM -A: Performed by: ORTHOPAEDIC SURGERY

## 2018-11-29 PROCEDURE — 77030020782 HC GWN BAIR PAWS FLX 3M -B

## 2018-11-29 PROCEDURE — 73560 X-RAY EXAM OF KNEE 1 OR 2: CPT

## 2018-11-29 PROCEDURE — 0SPW0JZ REMOVAL OF SYNTHETIC SUBSTITUTE FROM LEFT KNEE JOINT, TIBIAL SURFACE, OPEN APPROACH: ICD-10-PCS | Performed by: ORTHOPAEDIC SURGERY

## 2018-11-29 PROCEDURE — 85610 PROTHROMBIN TIME: CPT

## 2018-11-29 PROCEDURE — 74011250637 HC RX REV CODE- 250/637: Performed by: ANESTHESIOLOGY

## 2018-11-29 PROCEDURE — 76060000065 HC AMB SURG ANES 2.5 TO 3 HR: Performed by: ORTHOPAEDIC SURGERY

## 2018-11-29 PROCEDURE — 77030000032 HC CUF TRNQT ZIMM -B: Performed by: ORTHOPAEDIC SURGERY

## 2018-11-29 PROCEDURE — 77030020262 HC SOL INJ SOD CL 0.9% 100ML: Performed by: ORTHOPAEDIC SURGERY

## 2018-11-29 PROCEDURE — 77030007866 HC KT SPN ANES BBMI -B

## 2018-11-29 PROCEDURE — 74011250636 HC RX REV CODE- 250/636: Performed by: PHYSICIAN ASSISTANT

## 2018-11-29 PROCEDURE — 81025 URINE PREGNANCY TEST: CPT

## 2018-11-29 PROCEDURE — 74011250636 HC RX REV CODE- 250/636: Performed by: ANESTHESIOLOGY

## 2018-11-29 PROCEDURE — 76210000035 HC AMBSU PH I REC 1 TO 1.5 HR: Performed by: ORTHOPAEDIC SURGERY

## 2018-11-29 PROCEDURE — C1776 JOINT DEVICE (IMPLANTABLE): HCPCS | Performed by: ORTHOPAEDIC SURGERY

## 2018-11-29 PROCEDURE — 77030020263 HC SOL INJ SOD CL0.9% LFCR 1000ML: Performed by: ORTHOPAEDIC SURGERY

## 2018-11-29 PROCEDURE — 77030020788: Performed by: ORTHOPAEDIC SURGERY

## 2018-11-29 PROCEDURE — 77030031139 HC SUT VCRL2 J&J -A: Performed by: ORTHOPAEDIC SURGERY

## 2018-11-29 PROCEDURE — 76030000022 HC AMB SURG 2.5 TO 3 HR INTENSV-TIER 1: Performed by: ORTHOPAEDIC SURGERY

## 2018-11-29 PROCEDURE — 77030011640 HC PAD GRND REM COVD -A: Performed by: ORTHOPAEDIC SURGERY

## 2018-11-29 PROCEDURE — 87205 SMEAR GRAM STAIN: CPT

## 2018-11-29 PROCEDURE — 64447 NJX AA&/STRD FEMORAL NRV IMG: CPT

## 2018-11-29 PROCEDURE — 74011000272 HC RX REV CODE- 272: Performed by: ORTHOPAEDIC SURGERY

## 2018-11-29 PROCEDURE — 74011250637 HC RX REV CODE- 250/637: Performed by: ORTHOPAEDIC SURGERY

## 2018-11-29 PROCEDURE — 74011250636 HC RX REV CODE- 250/636

## 2018-11-29 PROCEDURE — 77030018822 HC SLV COMPR FT COVD -B

## 2018-11-29 PROCEDURE — 77030006773 HC BLD SAW OSC BRSM -A: Performed by: ORTHOPAEDIC SURGERY

## 2018-11-29 PROCEDURE — 0SRW0J9 REPLACEMENT OF LEFT KNEE JOINT, TIBIAL SURFACE WITH SYNTHETIC SUBSTITUTE, CEMENTED, OPEN APPROACH: ICD-10-PCS | Performed by: ORTHOPAEDIC SURGERY

## 2018-11-29 PROCEDURE — 77030010785: Performed by: ORTHOPAEDIC SURGERY

## 2018-11-29 PROCEDURE — 77030028907 HC WRP KNEE WO BGS SOLM -B

## 2018-11-29 PROCEDURE — 77030016653 HC BUR OVL4 STRY -B: Performed by: ORTHOPAEDIC SURGERY

## 2018-11-29 PROCEDURE — 77030033067 HC SUT PDO STRATFX SPIR J&J -B: Performed by: ORTHOPAEDIC SURGERY

## 2018-11-29 PROCEDURE — 77030013708 HC HNDPC SUC IRR PULS STRY –B: Performed by: ORTHOPAEDIC SURGERY

## 2018-11-29 PROCEDURE — 65270000029 HC RM PRIVATE

## 2018-11-29 PROCEDURE — 74011000250 HC RX REV CODE- 250

## 2018-11-29 PROCEDURE — 64445 NJX AA&/STRD SCIATIC NRV IMG: CPT

## 2018-11-29 PROCEDURE — C1713 ANCHOR/SCREW BN/BN,TIS/BN: HCPCS | Performed by: ORTHOPAEDIC SURGERY

## 2018-11-29 PROCEDURE — 77030032490 HC SLV COMPR SCD KNE COVD -B: Performed by: ORTHOPAEDIC SURGERY

## 2018-11-29 PROCEDURE — 74011250637 HC RX REV CODE- 250/637: Performed by: PHYSICIAN ASSISTANT

## 2018-11-29 PROCEDURE — 77030003601 HC NDL NRV BLK BBMI -A

## 2018-11-29 PROCEDURE — 87075 CULTR BACTERIA EXCEPT BLOOD: CPT

## 2018-11-29 DEVICE — TIBIAL BEARING INSERT - PS
Type: IMPLANTABLE DEVICE | Site: KNEE | Status: FUNCTIONAL
Brand: TRIATHLON

## 2018-11-29 DEVICE — TIBIAL AUGMENT HALF BLOCK
Type: IMPLANTABLE DEVICE | Site: KNEE | Status: FUNCTIONAL
Brand: TRIATHLON

## 2018-11-29 DEVICE — CEMENT BNE SIMPLEX W/GENT -- 10/PK: Type: IMPLANTABLE DEVICE | Site: KNEE | Status: FUNCTIONAL

## 2018-11-29 DEVICE — UNIVERSAL TIBIAL BASEPLATE
Type: IMPLANTABLE DEVICE | Site: KNEE | Status: FUNCTIONAL
Brand: TRIATHLON

## 2018-11-29 DEVICE — CEMENTED STEM
Type: IMPLANTABLE DEVICE | Site: KNEE | Status: FUNCTIONAL
Brand: TRIATHLON

## 2018-11-29 RX ORDER — SODIUM CHLORIDE 0.9 % (FLUSH) 0.9 %
5-10 SYRINGE (ML) INJECTION AS NEEDED
Status: DISCONTINUED | OUTPATIENT
Start: 2018-11-29 | End: 2018-11-29

## 2018-11-29 RX ORDER — ONDANSETRON 2 MG/ML
4 INJECTION INTRAMUSCULAR; INTRAVENOUS
Status: DISPENSED | OUTPATIENT
Start: 2018-11-29 | End: 2018-11-30

## 2018-11-29 RX ORDER — PREGABALIN 75 MG/1
75 CAPSULE ORAL ONCE
Status: COMPLETED | OUTPATIENT
Start: 2018-11-29 | End: 2018-11-29

## 2018-11-29 RX ORDER — CEFAZOLIN SODIUM/WATER 2 G/20 ML
2 SYRINGE (ML) INTRAVENOUS ONCE
Status: DISCONTINUED | OUTPATIENT
Start: 2018-11-29 | End: 2018-11-29

## 2018-11-29 RX ORDER — KETOROLAC TROMETHAMINE 30 MG/ML
30 INJECTION, SOLUTION INTRAMUSCULAR; INTRAVENOUS EVERY 6 HOURS
Status: DISCONTINUED | OUTPATIENT
Start: 2018-11-29 | End: 2018-11-30 | Stop reason: HOSPADM

## 2018-11-29 RX ORDER — LEVOTHYROXINE SODIUM 50 UG/1
50 TABLET ORAL
Status: DISCONTINUED | OUTPATIENT
Start: 2018-11-30 | End: 2018-11-30 | Stop reason: HOSPADM

## 2018-11-29 RX ORDER — ENOXAPARIN SODIUM 100 MG/ML
90 INJECTION SUBCUTANEOUS EVERY 12 HOURS
Status: DISCONTINUED | OUTPATIENT
Start: 2018-11-30 | End: 2018-11-30 | Stop reason: HOSPADM

## 2018-11-29 RX ORDER — OXYCODONE HYDROCHLORIDE 5 MG/1
10 TABLET ORAL
Status: DISCONTINUED | OUTPATIENT
Start: 2018-11-29 | End: 2018-11-29 | Stop reason: HOSPADM

## 2018-11-29 RX ORDER — OXYCODONE HYDROCHLORIDE 5 MG/1
5 TABLET ORAL
Status: DISCONTINUED | OUTPATIENT
Start: 2018-11-29 | End: 2018-11-30 | Stop reason: HOSPADM

## 2018-11-29 RX ORDER — MIDAZOLAM HYDROCHLORIDE 1 MG/ML
INJECTION, SOLUTION INTRAMUSCULAR; INTRAVENOUS AS NEEDED
Status: DISCONTINUED | OUTPATIENT
Start: 2018-11-29 | End: 2018-11-29 | Stop reason: HOSPADM

## 2018-11-29 RX ORDER — GABAPENTIN 100 MG/1
100 CAPSULE ORAL
Qty: 120 CAP | Refills: 1 | Status: SHIPPED | OUTPATIENT
Start: 2018-11-29 | End: 2019-09-30

## 2018-11-29 RX ORDER — FAMOTIDINE 20 MG/1
20 TABLET, FILM COATED ORAL
Status: DISCONTINUED | OUTPATIENT
Start: 2018-11-29 | End: 2018-11-30 | Stop reason: HOSPADM

## 2018-11-29 RX ORDER — LIDOCAINE HYDROCHLORIDE 10 MG/ML
0.1 INJECTION, SOLUTION EPIDURAL; INFILTRATION; INTRACAUDAL; PERINEURAL AS NEEDED
Status: DISCONTINUED | OUTPATIENT
Start: 2018-11-29 | End: 2018-11-29

## 2018-11-29 RX ORDER — LEVOTHYROXINE SODIUM 50 UG/1
50 TABLET ORAL
Status: DISCONTINUED | OUTPATIENT
Start: 2018-11-30 | End: 2018-11-29

## 2018-11-29 RX ORDER — AMOXICILLIN 250 MG
1 CAPSULE ORAL 2 TIMES DAILY
Status: DISCONTINUED | OUTPATIENT
Start: 2018-11-29 | End: 2018-11-30 | Stop reason: HOSPADM

## 2018-11-29 RX ORDER — ONDANSETRON 8 MG/1
4 TABLET, ORALLY DISINTEGRATING ORAL
Qty: 30 TAB | Refills: 0 | Status: SHIPPED | OUTPATIENT
Start: 2018-11-29 | End: 2019-09-30

## 2018-11-29 RX ORDER — CEFAZOLIN SODIUM 1 G/3ML
INJECTION, POWDER, FOR SOLUTION INTRAMUSCULAR; INTRAVENOUS AS NEEDED
Status: DISCONTINUED | OUTPATIENT
Start: 2018-11-29 | End: 2018-11-29 | Stop reason: HOSPADM

## 2018-11-29 RX ORDER — SODIUM CHLORIDE 0.9 % (FLUSH) 0.9 %
5-10 SYRINGE (ML) INJECTION EVERY 8 HOURS
Status: DISCONTINUED | OUTPATIENT
Start: 2018-11-30 | End: 2018-11-30 | Stop reason: HOSPADM

## 2018-11-29 RX ORDER — ROPIVACAINE HYDROCHLORIDE 2 MG/ML
INJECTION, SOLUTION EPIDURAL; INFILTRATION; PERINEURAL AS NEEDED
Status: DISCONTINUED | OUTPATIENT
Start: 2018-11-29 | End: 2018-11-29 | Stop reason: HOSPADM

## 2018-11-29 RX ORDER — LIDOCAINE HYDROCHLORIDE 10 MG/ML
INJECTION, SOLUTION EPIDURAL; INFILTRATION; INTRACAUDAL; PERINEURAL
Status: DISCONTINUED
Start: 2018-11-29 | End: 2018-11-29

## 2018-11-29 RX ORDER — IBUPROFEN 800 MG/1
800 TABLET ORAL
Qty: 50 TAB | Refills: 2 | Status: SHIPPED | OUTPATIENT
Start: 2018-11-29 | End: 2019-09-30

## 2018-11-29 RX ORDER — ACETAMINOPHEN 325 MG/1
975 TABLET ORAL ONCE
Status: COMPLETED | OUTPATIENT
Start: 2018-11-29 | End: 2018-11-29

## 2018-11-29 RX ORDER — SODIUM CHLORIDE 0.9 % (FLUSH) 0.9 %
5-10 SYRINGE (ML) INJECTION AS NEEDED
Status: DISCONTINUED | OUTPATIENT
Start: 2018-11-29 | End: 2018-11-30 | Stop reason: HOSPADM

## 2018-11-29 RX ORDER — PROPOFOL 10 MG/ML
INJECTION, EMULSION INTRAVENOUS AS NEEDED
Status: DISCONTINUED | OUTPATIENT
Start: 2018-11-29 | End: 2018-11-29 | Stop reason: HOSPADM

## 2018-11-29 RX ORDER — OXYCODONE HCL 20 MG/1
20 TABLET, FILM COATED, EXTENDED RELEASE ORAL ONCE
Status: COMPLETED | OUTPATIENT
Start: 2018-11-29 | End: 2018-11-29

## 2018-11-29 RX ORDER — ONDANSETRON 2 MG/ML
4 INJECTION INTRAMUSCULAR; INTRAVENOUS AS NEEDED
Status: DISCONTINUED | OUTPATIENT
Start: 2018-11-29 | End: 2018-11-29 | Stop reason: HOSPADM

## 2018-11-29 RX ORDER — SODIUM CHLORIDE 0.9 % (FLUSH) 0.9 %
5-10 SYRINGE (ML) INJECTION EVERY 8 HOURS
Status: DISCONTINUED | OUTPATIENT
Start: 2018-11-29 | End: 2018-11-29

## 2018-11-29 RX ORDER — POLYETHYLENE GLYCOL 3350 17 G/17G
17 POWDER, FOR SOLUTION ORAL DAILY
Status: DISCONTINUED | OUTPATIENT
Start: 2018-11-30 | End: 2018-11-30 | Stop reason: HOSPADM

## 2018-11-29 RX ORDER — KETOROLAC TROMETHAMINE 30 MG/ML
15 INJECTION, SOLUTION INTRAMUSCULAR; INTRAVENOUS
Status: COMPLETED | OUTPATIENT
Start: 2018-11-29 | End: 2018-11-29

## 2018-11-29 RX ORDER — ACETAMINOPHEN 500 MG
500-1000 TABLET ORAL
Qty: 60 TAB | Refills: 0 | Status: SHIPPED | OUTPATIENT
Start: 2018-11-29 | End: 2019-10-09

## 2018-11-29 RX ORDER — CELECOXIB 100 MG/1
100 CAPSULE ORAL ONCE
Status: COMPLETED | OUTPATIENT
Start: 2018-11-29 | End: 2018-11-29

## 2018-11-29 RX ORDER — CEFAZOLIN SODIUM/WATER 2 G/20 ML
2 SYRINGE (ML) INTRAVENOUS EVERY 8 HOURS
Status: COMPLETED | OUTPATIENT
Start: 2018-11-29 | End: 2018-11-30

## 2018-11-29 RX ORDER — DIPHENHYDRAMINE HCL 25 MG
50 CAPSULE ORAL
Status: DISCONTINUED | OUTPATIENT
Start: 2018-11-29 | End: 2018-11-30 | Stop reason: HOSPADM

## 2018-11-29 RX ORDER — HYDROMORPHONE HYDROCHLORIDE 1 MG/ML
.25-1 INJECTION, SOLUTION INTRAMUSCULAR; INTRAVENOUS; SUBCUTANEOUS
Status: DISCONTINUED | OUTPATIENT
Start: 2018-11-29 | End: 2018-11-29 | Stop reason: HOSPADM

## 2018-11-29 RX ORDER — CELECOXIB 100 MG/1
200 CAPSULE ORAL 2 TIMES DAILY
Status: DISCONTINUED | OUTPATIENT
Start: 2018-11-30 | End: 2018-11-30

## 2018-11-29 RX ORDER — PANTOPRAZOLE SODIUM 40 MG/1
40 TABLET, DELAYED RELEASE ORAL DAILY
Status: DISCONTINUED | OUTPATIENT
Start: 2018-11-30 | End: 2018-11-29

## 2018-11-29 RX ORDER — ACETAMINOPHEN 325 MG/1
650 TABLET ORAL EVERY 6 HOURS
Status: DISCONTINUED | OUTPATIENT
Start: 2018-11-29 | End: 2018-11-30 | Stop reason: HOSPADM

## 2018-11-29 RX ORDER — LANOLIN ALCOHOL/MO/W.PET/CERES
325 CREAM (GRAM) TOPICAL 2 TIMES DAILY WITH MEALS
Status: DISCONTINUED | OUTPATIENT
Start: 2018-11-29 | End: 2018-11-29

## 2018-11-29 RX ORDER — FENTANYL CITRATE 50 UG/ML
INJECTION, SOLUTION INTRAMUSCULAR; INTRAVENOUS AS NEEDED
Status: DISCONTINUED | OUTPATIENT
Start: 2018-11-29 | End: 2018-11-29 | Stop reason: HOSPADM

## 2018-11-29 RX ORDER — BUPIVACAINE HYDROCHLORIDE 5 MG/ML
INJECTION, SOLUTION EPIDURAL; INTRACAUDAL AS NEEDED
Status: DISCONTINUED | OUTPATIENT
Start: 2018-11-29 | End: 2018-11-29 | Stop reason: HOSPADM

## 2018-11-29 RX ORDER — NALOXONE HYDROCHLORIDE 0.4 MG/ML
0.4 INJECTION, SOLUTION INTRAMUSCULAR; INTRAVENOUS; SUBCUTANEOUS AS NEEDED
Status: DISCONTINUED | OUTPATIENT
Start: 2018-11-29 | End: 2018-11-30 | Stop reason: HOSPADM

## 2018-11-29 RX ORDER — EPHEDRINE SULFATE 50 MG/ML
INJECTION, SOLUTION INTRAVENOUS AS NEEDED
Status: DISCONTINUED | OUTPATIENT
Start: 2018-11-29 | End: 2018-11-29 | Stop reason: HOSPADM

## 2018-11-29 RX ORDER — ENOXAPARIN SODIUM 100 MG/ML
30 INJECTION SUBCUTANEOUS EVERY 12 HOURS
Qty: 60 SYRINGE | Refills: 0 | Status: SHIPPED | OUTPATIENT
Start: 2018-11-29 | End: 2019-10-09

## 2018-11-29 RX ORDER — SODIUM CHLORIDE, SODIUM LACTATE, POTASSIUM CHLORIDE, CALCIUM CHLORIDE 600; 310; 30; 20 MG/100ML; MG/100ML; MG/100ML; MG/100ML
125 INJECTION, SOLUTION INTRAVENOUS CONTINUOUS
Status: DISCONTINUED | OUTPATIENT
Start: 2018-11-29 | End: 2018-11-29 | Stop reason: HOSPADM

## 2018-11-29 RX ORDER — OXYCODONE HYDROCHLORIDE 5 MG/1
5-10 TABLET ORAL
Qty: 42 TAB | Refills: 0 | Status: SHIPPED | OUTPATIENT
Start: 2018-11-29 | End: 2019-09-30

## 2018-11-29 RX ORDER — SODIUM CHLORIDE 9 MG/ML
125 INJECTION, SOLUTION INTRAVENOUS CONTINUOUS
Status: DISPENSED | OUTPATIENT
Start: 2018-11-29 | End: 2018-11-30

## 2018-11-29 RX ORDER — RANITIDINE 150 MG/1
800 TABLET, FILM COATED ORAL AS NEEDED
Status: DISCONTINUED | OUTPATIENT
Start: 2018-11-29 | End: 2018-11-29 | Stop reason: CLARIF

## 2018-11-29 RX ORDER — FACIAL-BODY WIPES
10 EACH TOPICAL DAILY PRN
Status: DISCONTINUED | OUTPATIENT
Start: 2018-12-01 | End: 2018-11-30 | Stop reason: HOSPADM

## 2018-11-29 RX ORDER — FENTANYL CITRATE 50 UG/ML
25 INJECTION, SOLUTION INTRAMUSCULAR; INTRAVENOUS
Status: DISCONTINUED | OUTPATIENT
Start: 2018-11-29 | End: 2018-11-29 | Stop reason: HOSPADM

## 2018-11-29 RX ORDER — POVIDONE-IODINE 10 %
SOLUTION, NON-ORAL TOPICAL AS NEEDED
Status: DISCONTINUED | OUTPATIENT
Start: 2018-11-29 | End: 2018-11-29 | Stop reason: HOSPADM

## 2018-11-29 RX ORDER — OXYCODONE HYDROCHLORIDE 5 MG/1
10 TABLET ORAL
Status: DISCONTINUED | OUTPATIENT
Start: 2018-11-29 | End: 2018-11-30 | Stop reason: HOSPADM

## 2018-11-29 RX ORDER — TRAMADOL HYDROCHLORIDE 50 MG/1
50 TABLET ORAL
Qty: 40 TAB | Refills: 0 | Status: SHIPPED | OUTPATIENT
Start: 2018-11-29 | End: 2019-09-30

## 2018-11-29 RX ORDER — PROPOFOL 10 MG/ML
INJECTION, EMULSION INTRAVENOUS
Status: DISCONTINUED | OUTPATIENT
Start: 2018-11-29 | End: 2018-11-29 | Stop reason: HOSPADM

## 2018-11-29 RX ORDER — BUTALBITAL, ACETAMINOPHEN AND CAFFEINE 50; 325; 40 MG/1; MG/1; MG/1
1 TABLET ORAL
Status: DISCONTINUED | OUTPATIENT
Start: 2018-11-29 | End: 2018-11-30 | Stop reason: HOSPADM

## 2018-11-29 RX ORDER — SODIUM CHLORIDE, SODIUM LACTATE, POTASSIUM CHLORIDE, CALCIUM CHLORIDE 600; 310; 30; 20 MG/100ML; MG/100ML; MG/100ML; MG/100ML
100 INJECTION, SOLUTION INTRAVENOUS CONTINUOUS
Status: DISCONTINUED | OUTPATIENT
Start: 2018-11-29 | End: 2018-11-29

## 2018-11-29 RX ORDER — DIPHENHYDRAMINE HYDROCHLORIDE 50 MG/ML
12.5 INJECTION, SOLUTION INTRAMUSCULAR; INTRAVENOUS
Status: ACTIVE | OUTPATIENT
Start: 2018-11-29 | End: 2018-11-30

## 2018-11-29 RX ORDER — SODIUM CHLORIDE 0.9 % (FLUSH) 0.9 %
5-10 SYRINGE (ML) INJECTION AS NEEDED
Status: DISCONTINUED | OUTPATIENT
Start: 2018-11-29 | End: 2018-11-29 | Stop reason: HOSPADM

## 2018-11-29 RX ORDER — HYDROMORPHONE HYDROCHLORIDE 2 MG/ML
0.5 INJECTION, SOLUTION INTRAMUSCULAR; INTRAVENOUS; SUBCUTANEOUS
Status: DISPENSED | OUTPATIENT
Start: 2018-11-29 | End: 2018-11-30

## 2018-11-29 RX ORDER — DIPHENHYDRAMINE HYDROCHLORIDE 50 MG/ML
12.5 INJECTION, SOLUTION INTRAMUSCULAR; INTRAVENOUS AS NEEDED
Status: DISCONTINUED | OUTPATIENT
Start: 2018-11-29 | End: 2018-11-29 | Stop reason: HOSPADM

## 2018-11-29 RX ADMIN — CEFAZOLIN SODIUM 2 G: 1 INJECTION, POWDER, FOR SOLUTION INTRAMUSCULAR; INTRAVENOUS at 12:22

## 2018-11-29 RX ADMIN — PROPOFOL 50 MG: 10 INJECTION, EMULSION INTRAVENOUS at 12:17

## 2018-11-29 RX ADMIN — CELECOXIB 100 MG: 100 CAPSULE ORAL at 10:56

## 2018-11-29 RX ADMIN — MIDAZOLAM HYDROCHLORIDE 1 MG: 1 INJECTION, SOLUTION INTRAMUSCULAR; INTRAVENOUS at 11:45

## 2018-11-29 RX ADMIN — LIDOCAINE HYDROCHLORIDE 0.1 ML: 10 INJECTION, SOLUTION EPIDURAL; INFILTRATION; INTRACAUDAL; PERINEURAL at 10:52

## 2018-11-29 RX ADMIN — Medication 5 ML: at 15:22

## 2018-11-29 RX ADMIN — BUTALBITAL, ACETAMINOPHEN AND CAFFEINE 1 TABLET: 50; 325; 40 TABLET ORAL at 22:40

## 2018-11-29 RX ADMIN — FENTANYL CITRATE 50 MCG: 50 INJECTION, SOLUTION INTRAMUSCULAR; INTRAVENOUS at 11:22

## 2018-11-29 RX ADMIN — OXYCODONE HYDROCHLORIDE 20 MG: 20 TABLET, FILM COATED, EXTENDED RELEASE ORAL at 10:56

## 2018-11-29 RX ADMIN — SODIUM CHLORIDE, SODIUM LACTATE, POTASSIUM CHLORIDE, AND CALCIUM CHLORIDE: 600; 310; 30; 20 INJECTION, SOLUTION INTRAVENOUS at 13:51

## 2018-11-29 RX ADMIN — ONDANSETRON 4 MG: 2 INJECTION INTRAMUSCULAR; INTRAVENOUS at 17:08

## 2018-11-29 RX ADMIN — EPHEDRINE SULFATE 10 MG: 50 INJECTION, SOLUTION INTRAVENOUS at 13:13

## 2018-11-29 RX ADMIN — MIDAZOLAM HYDROCHLORIDE 2 MG: 1 INJECTION, SOLUTION INTRAMUSCULAR; INTRAVENOUS at 11:22

## 2018-11-29 RX ADMIN — PREGABALIN 75 MG: 75 CAPSULE ORAL at 10:56

## 2018-11-29 RX ADMIN — EPHEDRINE SULFATE 10 MG: 50 INJECTION, SOLUTION INTRAVENOUS at 13:05

## 2018-11-29 RX ADMIN — Medication 5 ML: at 15:21

## 2018-11-29 RX ADMIN — ROPIVACAINE HYDROCHLORIDE 20 ML: 2 INJECTION, SOLUTION EPIDURAL; INFILTRATION; PERINEURAL at 11:30

## 2018-11-29 RX ADMIN — Medication 2 G: at 20:07

## 2018-11-29 RX ADMIN — MIDAZOLAM HYDROCHLORIDE 1 MG: 1 INJECTION, SOLUTION INTRAMUSCULAR; INTRAVENOUS at 12:11

## 2018-11-29 RX ADMIN — ACETAMINOPHEN 975 MG: 325 TABLET, FILM COATED ORAL at 10:56

## 2018-11-29 RX ADMIN — KETOROLAC TROMETHAMINE 15 MG: 30 INJECTION, SOLUTION INTRAMUSCULAR at 15:22

## 2018-11-29 RX ADMIN — SODIUM CHLORIDE 125 ML/HR: 9 INJECTION, SOLUTION INTRAVENOUS at 16:00

## 2018-11-29 RX ADMIN — PROPOFOL 100 MCG/KG/MIN: 10 INJECTION, EMULSION INTRAVENOUS at 12:17

## 2018-11-29 RX ADMIN — KETOROLAC TROMETHAMINE 30 MG: 30 INJECTION, SOLUTION INTRAMUSCULAR at 20:11

## 2018-11-29 RX ADMIN — SODIUM CHLORIDE, SODIUM LACTATE, POTASSIUM CHLORIDE, AND CALCIUM CHLORIDE 100 ML/HR: 600; 310; 30; 20 INJECTION, SOLUTION INTRAVENOUS at 10:52

## 2018-11-29 RX ADMIN — ACETAMINOPHEN 650 MG: 325 TABLET, FILM COATED ORAL at 16:54

## 2018-11-29 RX ADMIN — HYDROMORPHONE HYDROCHLORIDE 0.5 MG: 1 INJECTION, SOLUTION INTRAMUSCULAR; INTRAVENOUS; SUBCUTANEOUS at 15:21

## 2018-11-29 RX ADMIN — EPHEDRINE SULFATE 10 MG: 50 INJECTION, SOLUTION INTRAVENOUS at 13:38

## 2018-11-29 RX ADMIN — BUPIVACAINE HYDROCHLORIDE 2.4 ML: 5 INJECTION, SOLUTION EPIDURAL; INTRACAUDAL at 11:50

## 2018-11-29 RX ADMIN — HYDROMORPHONE HYDROCHLORIDE 0.5 MG: 2 INJECTION, SOLUTION INTRAMUSCULAR; INTRAVENOUS; SUBCUTANEOUS at 18:00

## 2018-11-29 RX ADMIN — MIDAZOLAM HYDROCHLORIDE 1 MG: 1 INJECTION, SOLUTION INTRAMUSCULAR; INTRAVENOUS at 11:42

## 2018-11-29 RX ADMIN — ROPIVACAINE HYDROCHLORIDE 20 ML: 2 INJECTION, SOLUTION EPIDURAL; INFILTRATION; PERINEURAL at 11:27

## 2018-11-29 NOTE — PROGRESS NOTES
4th floor Charge RN notified [facility] RN) of admission and awaiting receiving RN Erum Reyes ) call-back for room 407.

## 2018-11-29 NOTE — OP NOTES
OPERATIVE REPORT     Admit Date: 11/29/2018  Admit Diagnosis: Failed total knee arthroplasty ([facility] [T84.[address], [address]    Date of Procedure: 11/29/2018   Preoperative Diagnosis: FAILED LEFT TOTAL KNEE REPLACEMENT  Postoperative Diagnosis: FAILED LEFT TOTAL KNEE REPLACEMENT    Procedure: Procedure(s):  REVISION LET TOTAL KNEE REPLACEMENT  Surgeon: Carole Fuentes MD  Assistant(s): Vashti Jones PA-C  Anesthesia: Spinal   Estimated Blood Loss: 550cc  Specimens:   ID Type Source Tests Collected by Time Destination   1 : left knee aspirate  Joint Fluid Joint, Knee CULTURE, ANAEROBIC AND AEROBIC Jessica Sheppard MD 11/29/2018 1308 Microbiology   2 : left knee upper femur Tissue Knee, left AEROBIC/ANAEROBIC CULTURE Jessica Sheppard MD 11/29/2018 1310 Microbiology   3 : left knee tibial baseplate Tissue Knee, left AEROBIC/ANAEROBIC CULTURE Jessica Sheppard MD 11/29/2018 1315 Microbiology   4 : left knee tibia Tissue Knee, left AEROBIC/ANAEROBIC CULTURE Jessica Sheppard MD 11/29/2018 1325 Microbiology      Complications: None          INDICATIONS:     The patient is a 53 y.o., female who has a 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 The patient underwent the appropriate preoperative labs and was indicated for surgery. Informed consent obtained including a discussion of the risks and benefits, which include, but are not limited to, bleeding, infection, neurovascular damage, wound complications, pain and stiffness in the knee, periprosthetic loosening, fracture dislocation and DVT, the patient consented for the procedure. DESCRIPTION OF PROCEDURE:     The proper limb was identified and signed in the preoperative holding area. All questions were answered. After adequate anesthesia, the left lower extremity was prepped and draped in sterile fashion. The patient was positioned supine on the operating room table.  Using the old incision a mid-line parapatellar incision was used along with medial arthrotomy. Excess or residual scar was excised if non-viable. The medial and lateral gutters were debrided of scar and tissue. Soft tissue was removed from around the patellar component and notch. The MCL was mobilized and the tibia subluxed. The tibial poly-ethylene was removed. Osteotomes and a saw were used to disrupt the tibial baseplate which was removed. The free tibial guide was used to make the final tibial cut with the appropriate valgus and slope. The femur was found to be completely stable, no evidence of loosening. Final debridement of all devitalized tissue was performed. Using sequential reaming the canal for the tibia was reamed to the final stem size, obtaining good overall cortical chatter. The tibial keel was also placed. A trial for the tibia was assembled and placed. The appropriate polyethylene was selected to balance both the flexion and extension gaps. Soft tissue balancing was done as needed. There was good overall balance to both the flexion and extension gaps. Final components were selected and assembled. All bony surfaces were washed and the limb was exsanguinated. The cement was applied to the tibia. The stem of the tibia was cemented in place. This was allowed to harden. The polyethylene was impacted in place and the knee brought out into full extension. Cement was removed and the knee ranged to verify alignment and tracking. The capsulotomy was closed w/ 0-vicryl suture and #2 Q-will suture. The sub-cutaneous tissue was closed with a 2-0 Vicryl and sutures. A sterile dressing was applied. The patient was awoken from anesthesia and taken to the recovery room in a stable condiition. OPERATIVE FINDINGS : Stable femur and no obvious loosening of the tibia. IMPLANTS :   Implant Name Type Inv.  Item Serial No.  Lot No. LRB No. Used Action   CEMENT BNE SIMPLEX W/GENT -- 10/PK - SNA Cement CEMENT BNE SIMPLEX W/GENT -- 10/PK NA [employer] HOWM [id number] Left 1 Implanted   BASEPLT TIB UNIV TRIATHLN 3 --  - SNA Joint Component BASEPLT TIB UNIV TRIATHLN 3 --  NA [employer] [id number] Left 1 Implanted   BASEPLT TIB HALF SZ 3 ML/LR -- TRIATHLON AUG 5MM TI - SNA Joint Component BASEPLT TIB HALF SZ 3 ML/LR -- TRIATHLON AUG 5MM TI NA [employer] [id number] Left 1 Implanted   BASEPLT TIB HALF SZ 3 MR/LL -- TRIATHLON AUG 5MM - SNA Joint Component BASEPLT TIB HALF SZ 3 MR/LL -- TRIATHLON AUG 5MM NA [employer] [id number] Left 1 Implanted   STEM TRICEMENTED [id number] -- TRIATHALON - SNA Joint Component STEM TRICEMENTED [id number] -- TRIATHALON NA [employer] HOWM [id number] Left 1 Implanted       JUSTIFICATION FOR SURGICAL ASSISTANT:   Surgical Assistant, was requried and necessary in this case, to help with soft tissue retraction, extremity positioning, equiment management, implant management, and wound closure.     Samantha Turner MD

## 2018-11-29 NOTE — PROGRESS NOTES
PT checked on patient. Still lacking full sensory and motor control following spinal anesthesia. Left her exercise handout and encouraged ankle pumps as soon as she is able. She has history of DVT and PE. Will see early AM tomorrow. Nursing aware.

## 2018-11-29 NOTE — ANESTHESIA POSTPROCEDURE EVALUATION
Procedure(s):  REVISION LET TOTAL KNEE REPLACEMENT.     Anesthesia Post Evaluation      Multimodal analgesia: multimodal analgesia used between 6 hours prior to anesthesia start to PACU discharge  Patient location during evaluation: bedside  Patient participation: complete - patient participated  Level of consciousness: awake  Pain management: adequate  Airway patency: patent  Anesthetic complications: no  Cardiovascular status: acceptable  Respiratory status: acceptable  Hydration status: acceptable        Visit Vitals  /71 (BP 1 Location: Left arm, BP Patient Position: At rest)   Pulse 87   Temp 36.4 °C (97.5 °F)   Resp 17   Ht 5' 2\" (1.575 m)   Wt 93.8 kg (206 lb 12.7 oz)   SpO2 97%   BMI 37.82 kg/m²

## 2018-11-29 NOTE — ANESTHESIA PROCEDURE NOTES
Peripheral Block    Start time: 11/29/2018 11:22 AM  End time: 11/29/2018 11:30 AM  Performed by: Maury Jean CRNA  Authorized by: Soto Lomeli MD       Pre-procedure: Indications: at surgeon's request and post-op pain management    Preanesthetic Checklist: patient identified, risks and benefits discussed, site marked, timeout performed, anesthesia consent given and patient being monitored    Timeout Time: 11:22          Block Type:   Block Type:  Popliteal and femoral single shot  Laterality:  Left  Monitoring:  Continuous pulse ox, frequent vital sign checks, heart rate and responsive to questions  Injection Technique:  Single shot  Procedures: ultrasound guided and nerve stimulator    Patient Position: supine  Prep: chlorhexidine    Location:  Upper thigh  Needle Type:  Stimuplex  Needle Gauge:  21 G  Needle Localization:  Nerve stimulator and ultrasound guidance    Assessment:  Number of attempts:  1  Injection Assessment:  Incremental injection every 5 mL, local visualized surrounding nerve on ultrasound, negative aspiration for blood, no paresthesia and no intravascular symptoms  Patient tolerance:  Patient tolerated the procedure well with no immediate complications  Popliteal block done under ultrasound guidance and nerve stimulation with incremental injection of Ropivacaine 0.2% 20 cc using a 21 G Stimuplex needle.

## 2018-11-29 NOTE — ANESTHESIA PROCEDURE NOTES
Spinal Block    Start time: 11/29/2018 11:42 AM  End time: 11/29/2018 11:50 AM  Performed by: Nita Wells CRNA  Authorized by: Han Francisco MD     Pre-procedure:   Indications: at surgeon's request and primary anesthetic  Preanesthetic Checklist: patient identified, risks and benefits discussed, anesthesia consent, site marked, patient being monitored and timeout performed    Timeout Time: 11:42          Spinal Block:   Patient Position:  Seated  Prep Region:  Lumbar  Prep: DuraPrep      Location:  L3-4  Technique:  Single shot        Needle:   Needle Type:  Pencan  Needle Gauge:  25 G  Attempts:  1      Events: CSF confirmed, no blood with aspiration and no paresthesia        Assessment:  Insertion:  Uncomplicated  Patient tolerance:  Patient tolerated the procedure well with no immediate complications

## 2018-11-29 NOTE — ANESTHESIA PREPROCEDURE EVALUATION
Anesthetic History   No history of anesthetic complications            Review of Systems / Medical History  Patient summary reviewed, nursing notes reviewed and pertinent labs reviewed    Pulmonary  Within defined limits                 Neuro/Psych         Psychiatric history     Cardiovascular  Within defined limits                Exercise tolerance: >4 METS     GI/Hepatic/Renal     GERD           Endo/Other      Hypothyroidism  Arthritis     Other Findings   Comments: Factor V leiden def  History of dvt, PE, james filter           Physical Exam    Airway  Mallampati: II  TM Distance: 4 - 6 cm  Neck ROM: normal range of motion   Mouth opening: Normal     Cardiovascular  Regular rate and rhythm,  S1 and S2 normal,  no murmur, click, rub, or gallop  Rhythm: regular  Rate: normal         Dental  No notable dental hx       Pulmonary  Breath sounds clear to auscultation               Abdominal  GI exam deferred       Other Findings            Anesthetic Plan    ASA: 3  Anesthesia type: spinal - femoral single shot and popliteal fossa block      Post-op pain plan if not by surgeon: peripheral nerve block single      Anesthetic plan and risks discussed with: Patient

## 2018-11-29 NOTE — PROGRESS NOTES
POST ANESTHESIA CARE    DISCHARGE / TRANSFER NOTE    Maribel Maciel was   transfered   via   Bed   to    hospital room 407   . Patient was escorted by   nurse      . Patient verbalized   appreciation and was very pleased with care received    throughout their stay. Patient was discharged in     pleasant mood . Pain at discharge/transfer was    2-3 /10. Discharge, medication and follow-up instructions were verbalized as understood prior to discharge  (if applicable for same-day procedures being discharged.)    All personal belongings have been returned to patient, and patient/family verbally confirm receiving belongings as all present. TRANSFER - OUT REPORT:    PHONE  report given at 3:59 PM to   [address]  receiving   Maribel Maciel   and being transferred to    [facility]    for routine post - op  Following Spinal for Procedure(s) (LRB):  REVISION LET TOTAL KNEE REPLACEMENT (Left) by  Agnieszka Horn MD.    Patient Situation, Background, Assessment and Recommendations (SBAR) was provided and included information from the following SBAR report(s) (SBAR, OR Summary, MAR, Recent Results and Med Rec Status) which were reviewed with the receiving nurse. Lines:   Peripheral IV 11/29/18 Right Forearm (Active)   Site Assessment Clean, dry, & intact 11/29/2018  3:05 PM   Phlebitis Assessment 0 11/29/2018  3:05 PM   Infiltration Assessment 0 11/29/2018  3:05 PM   Dressing Status Clean, dry, & intact 11/29/2018  3:05 PM   Dressing Type Transparent;Tape 11/29/2018  3:05 PM   Hub Color/Line Status Patent; Infusing 11/29/2018  3:05 PM   Alcohol Cap Used Yes 11/29/2018 10:30 AM      Also Reviewed (checked if applicable):   [] NO ADDITIONAL REVIEWS  [] PCA Drug and Settings     [x] Cold Therapy with extra gels     [] On-Q @  ml/hr   [] Drains x       [] Significant Wound care/devices (e.g. Wound vac, etc.)     [] Holding pt in PACU awaiting bed availability - additional care provided and eMAR review  [] Vent Settings      [] Critical Care Drips     [x] OTHER: Reviewed PT/INR and Factor V status re: Lovenox Stop/ReStart plan    Contact Precautions: There are currently no Active Isolations  Patient transported with:  Registered Nurse    Interval SBAR report was completed at 4:43 PM covering any changes to patient condition since last communication with receiving RN. Additional Information: Family at bedside, NAD, VSS. After report, opportunity for questions and clarification was provided.     Signed: Reema MCN RN-BC

## 2018-11-30 VITALS
DIASTOLIC BLOOD PRESSURE: 76 MMHG | HEART RATE: 80 BPM | RESPIRATION RATE: 16 BRPM | SYSTOLIC BLOOD PRESSURE: 127 MMHG | HEIGHT: 62 IN | WEIGHT: 206.79 LBS | TEMPERATURE: 97.8 F | BODY MASS INDEX: 38.05 KG/M2 | OXYGEN SATURATION: 97 %

## 2018-11-30 LAB
ANION GAP SERPL CALC-SCNC: 10 MMOL/L (ref 5–15)
BUN SERPL-MCNC: 15 MG/DL (ref 6–20)
BUN/CREAT SERPL: 21 (ref 12–20)
CALCIUM SERPL-MCNC: 8.3 MG/DL (ref 8.5–10.1)
CHLORIDE SERPL-SCNC: 107 MMOL/L (ref 97–108)
CO2 SERPL-SCNC: 24 MMOL/L (ref 21–32)
CREAT SERPL-MCNC: 0.71 MG/DL (ref 0.55–1.02)
GLUCOSE SERPL-MCNC: 102 MG/DL (ref 65–100)
HGB BLD-MCNC: 10.7 G/DL (ref 11.5–16)
POTASSIUM SERPL-SCNC: 4.1 MMOL/L (ref 3.5–5.1)
SODIUM SERPL-SCNC: 141 MMOL/L (ref 136–145)

## 2018-11-30 PROCEDURE — 97110 THERAPEUTIC EXERCISES: CPT

## 2018-11-30 PROCEDURE — 90686 IIV4 VACC NO PRSV 0.5 ML IM: CPT | Performed by: ORTHOPAEDIC SURGERY

## 2018-11-30 PROCEDURE — 90471 IMMUNIZATION ADMIN: CPT

## 2018-11-30 PROCEDURE — 74011250636 HC RX REV CODE- 250/636: Performed by: ORTHOPAEDIC SURGERY

## 2018-11-30 PROCEDURE — 74011250637 HC RX REV CODE- 250/637: Performed by: PHYSICIAN ASSISTANT

## 2018-11-30 PROCEDURE — 97165 OT EVAL LOW COMPLEX 30 MIN: CPT | Performed by: OCCUPATIONAL THERAPIST

## 2018-11-30 PROCEDURE — 77030027138 HC INCENT SPIROMETER -A

## 2018-11-30 PROCEDURE — 74011250636 HC RX REV CODE- 250/636: Performed by: PHYSICIAN ASSISTANT

## 2018-11-30 PROCEDURE — 94762 N-INVAS EAR/PLS OXIMTRY CONT: CPT

## 2018-11-30 PROCEDURE — 97116 GAIT TRAINING THERAPY: CPT

## 2018-11-30 PROCEDURE — 85018 HEMOGLOBIN: CPT

## 2018-11-30 PROCEDURE — 36415 COLL VENOUS BLD VENIPUNCTURE: CPT

## 2018-11-30 PROCEDURE — 97161 PT EVAL LOW COMPLEX 20 MIN: CPT

## 2018-11-30 PROCEDURE — 74011250637 HC RX REV CODE- 250/637: Performed by: NURSE PRACTITIONER

## 2018-11-30 PROCEDURE — 77010033678 HC OXYGEN DAILY

## 2018-11-30 PROCEDURE — 80048 BASIC METABOLIC PNL TOTAL CA: CPT

## 2018-11-30 PROCEDURE — 97535 SELF CARE MNGMENT TRAINING: CPT | Performed by: OCCUPATIONAL THERAPIST

## 2018-11-30 RX ORDER — CEPHALEXIN 250 MG/1
500 CAPSULE ORAL ONCE
Status: COMPLETED | OUTPATIENT
Start: 2018-11-30 | End: 2018-11-30

## 2018-11-30 RX ORDER — IBUPROFEN 800 MG/1
800 TABLET ORAL
Status: DISCONTINUED | OUTPATIENT
Start: 2018-11-30 | End: 2018-11-30 | Stop reason: HOSPADM

## 2018-11-30 RX ADMIN — BUTALBITAL, ACETAMINOPHEN AND CAFFEINE 1 TABLET: 50; 325; 40 TABLET ORAL at 04:17

## 2018-11-30 RX ADMIN — OXYCODONE HYDROCHLORIDE 10 MG: 5 TABLET ORAL at 01:37

## 2018-11-30 RX ADMIN — ACETAMINOPHEN 650 MG: 325 TABLET, FILM COATED ORAL at 17:28

## 2018-11-30 RX ADMIN — Medication 2 G: at 11:00

## 2018-11-30 RX ADMIN — Medication 10 ML: at 04:16

## 2018-11-30 RX ADMIN — LEVOTHYROXINE SODIUM 50 MCG: 50 TABLET ORAL at 06:58

## 2018-11-30 RX ADMIN — KETOROLAC TROMETHAMINE 30 MG: 30 INJECTION, SOLUTION INTRAMUSCULAR at 04:07

## 2018-11-30 RX ADMIN — IBUPROFEN 800 MG: 800 TABLET ORAL at 15:56

## 2018-11-30 RX ADMIN — OXYCODONE HYDROCHLORIDE 10 MG: 5 TABLET ORAL at 17:48

## 2018-11-30 RX ADMIN — ENOXAPARIN SODIUM 90 MG: 100 INJECTION SUBCUTANEOUS at 09:06

## 2018-11-30 RX ADMIN — HYDROMORPHONE HYDROCHLORIDE 0.5 MG: 2 INJECTION, SOLUTION INTRAMUSCULAR; INTRAVENOUS; SUBCUTANEOUS at 04:17

## 2018-11-30 RX ADMIN — HYDROMORPHONE HYDROCHLORIDE 0.5 MG: 2 INJECTION, SOLUTION INTRAMUSCULAR; INTRAVENOUS; SUBCUTANEOUS at 09:06

## 2018-11-30 RX ADMIN — OXYCODONE HYDROCHLORIDE 10 MG: 5 TABLET ORAL at 10:59

## 2018-11-30 RX ADMIN — OXYCODONE HYDROCHLORIDE 10 MG: 5 TABLET ORAL at 07:56

## 2018-11-30 RX ADMIN — Medication 2 G: at 04:07

## 2018-11-30 RX ADMIN — INFLUENZA VIRUS VACCINE 0.5 ML: 15; 15; 15; 15 SUSPENSION INTRAMUSCULAR at 17:27

## 2018-11-30 RX ADMIN — CEPHALEXIN 500 MG: 250 CAPSULE ORAL at 11:39

## 2018-11-30 RX ADMIN — OXYCODONE HYDROCHLORIDE 10 MG: 5 TABLET ORAL at 15:00

## 2018-11-30 RX ADMIN — ACETAMINOPHEN 650 MG: 325 TABLET, FILM COATED ORAL at 10:59

## 2018-11-30 RX ADMIN — KETOROLAC TROMETHAMINE 30 MG: 30 INJECTION, SOLUTION INTRAMUSCULAR at 09:06

## 2018-11-30 NOTE — DISCHARGE INSTRUCTIONS
Nutrition Recommendations for Discharge:             Continue Oral Nutrition Supplements at discharge:   Ensure High Protein for Muscle Health or similar product  as needed for 30 days unless otherwise directed by your Primary Care Physician. This product can be purchased at your   local grocery store, pharmacy and/or online. Georga Riedel, RD                                                                               TOTAL KNEE DISCHARGE INSTRUCTIONS    Patient: Sriram Crawford MRN: [id number]  SSN: [id number]                Please take the time to review the following instructions before you leave the hospital and use them as guidelines during your recovery from surgery. If you have any questions you may contact my office at [phone]  After business hours or during the weekend you can contact me at [phone] (cell phone) for emergency's. Please use the office number during regular business hours. SPECIAL INSTRUCTIONS :   1. Full extension at the knee is the most important aspect of your range of motion. Avoid placing a pillow or bump behind the knee. Rather, place the heel up on a bump or pillow and allow gravity to help straighten the knee. 2. You may weight bear as tolerated on the knee and during the day you should bend the knee as much as possible. 3. Drainage from the incision more than 4 days from surgery is concerning. Contact my office if there is any question  ext [phone]. Wound Care/ Dressing Changes:    DRESSING :     Aquacel or Silverlon Dressings : This may be removed 7 days after the date of your surgery. If there is no drainage, then a simple dressing may be used or no dressing at all. Other dressing options can be purchased over the counter at a local pharmacy or medical supply vendor. A porous adhesive dressing such as pictured above can be purchased at a local [employer] or [employer].  You only need to keep the incision covered for 7 days after showers. A dressing may be used for longer if there are issues with clothing clinging to the incision. Showering/ Bathing: You may shower with the aquacel dressing in place. This is left in place for 7 days following discharge from the hospital. If your incision is dry without drainage you may shower following your discharge home. After 7 days your aquacel dressing should be removed for showering. It is fine to have water run over the incision. Do not vigorously scrub your incision. Apply a clean, dry dressing after you have dried your incision. Do not take a bath or get into a swimming pool / [employer] until you follow up with Dr. Di Kocher. Do not soak your incision under water. If there is continued drainage or you are concerned contact Dr Heather Griffiths office prior to showering [phone] ext [phone]. Diet:  You may advance to your regular diet as tolerated. Increase your clear liquid intake for the next 2-3 days. Medication:      1. You will be given prescriptions for pain medication when you are discharged from the hospital. The side effects of these medications can be substantial and the narcotic medications are not mandatory. You may substitute these medications with Tylenol or Alleve / Motrin. 2.  Please use the medications as prescribed. Pain medications may cause constipation- Colace twice daily and Miralax one scoop daily while taking the narcotic medication should help prevent constipation. Please discuss with your local pharmacist regarding increasing this dosage if constipation persists. Other possible side effects of pain medication are dizziness, headache, nausea, vomiting, and urinary retention. Discontinue the pain medication if you develop itching, rash, shortness of breath, or difficulties swallowing. If these symptoms become severe or are not relieved by discontinuing the medication, you should seek immediate medical attention.   3. Refills of pain medication are authorized during office hours only (8 AM- 5 PM  Monday thru Friday). Many of these medication will require you or a family member to pick-up a physical prescription at the office. 4. Medications other than antiinflammatories will not be called into the pharmacy after business hours. 5. You may resume the medication(s) you were taking prior to your surgery. Narcotics may change the effects of some antidepressant medication(s). If you have any questions about possible interactions between your regular medications and the pain medication, you should ask the pharmacist or contact the prescribing physician. 6. If you have constipation which is not improved by oral stool softeners then a Ducolax suppository should be purchased over the counter. 7. Continue the Lovenox until your INR is in the therapeutic range as directed per your hematologist. Resume your home dose of Coumadin and follow your hematologist's instruction in regards to dose adjustments. Follow up appointment:    -Please call our office at [phone] for your follow up appointment. This should be scheduled 14 days following the date of surgery.   -Please keep your scheduled follow-up with your hematologist for early next week. This is very important for the management of your blood thinners. Physical Therapy / Nursing:    Physical Therapy following surgery will be arranged. Please call Dr. Brigida Granger office with any questions. Returning to work:    Normal return to work is 3-12 weeks following total knee replacement. Depending on your progression following surgery and specific job duties you may take longer for a full return to work. DRIVING    You should not return to driving until you are off all narcotic pain medications and able to safely and quickly apply the brakes. This is normally 3-4 weeks for left knees and 4-6 weeks for right knees.      Important Signs and Symptoms:    If any of the following signs or symptoms occur, you should contact Dr. Palma Camacho office. Please be advised if a problem arises which you feel requires immediate medical attention or you are unable to contact Dr. Palma Camacho office you should seek immediate medical attention at the ER or other health care facility you have access to.    1. A sudden increase in swelling and/or redness or warmth at the area your surgery was performed which isnt relieved by rest, ice, and elevation. 2. Oral temperature greater than 101 degrees for 12 hours or more which isnt relieved by an increase in fluid intake and taking 2 Tylenol every 4-6 hours. 3. Excessive drainage from your incisions, or drainage which hasnt stopped by 72 hours after your surgery. 4. Fever, chills, shortness of breath, chest pain, nausea, vomiting or other signs and symptoms which are of concern to you. frequently asked questions   What should I take for pain?  o In general you will be discharged with three medications for pain (Extra Strength Tylenol, Oxycodone 5mg and Tramadol). This may vary slightly depending on what you were taking in the hospital. USE ICE regularly, this is the most important modality for controlling pain. 1st Line - Oxycodone 1 (5mg) - 2 (10mg) every 4 hours (Or as directed), take these between Percocet doses if your pain is not below 4 / 10. This may be needed only for several days following your discharge. Extra Strength Tylenol 1-2 tablets (500-1000mg) every 4-6 hrs. 2nd Line - OTC tylenol (acetaminophen) 1000 mg every 6 hours, not to exceed 4,000 mg in any 24 hour period. Ibuprofen 800 mg every 6-8 hours, not to exceed 3 doses in any 24 hour period. Scheduled Gabapentin 1 tablet (100 mg) in the morning with breakfast and 3 tablets (300 mg) at night before bed.       When should I call for advice regarding my pain?  o After 12 hours on the above regimen, if nothing is working call the office ([phone]) or call my cell after hours [phone].  Can I get refills?  o Narcotic refills are provided for the first 6 weeks following surgery. o Try Tylenol 650mg along with Alleve 220mg or Motrin 200mg during the majority of the day. - Save the narcotic pain medications for physical therapy (1 hour prior) and before sleeping at night. - Keep in mind you need to discontinue these medications prior to returning to driving.  Is swelling normal?  o Almost everyone has some degree of swelling following surgery. o Following hip and knee replacement surgery, swelling can be normal below the incision for the first 1-2 weeks. - This swelling peaks around 5-7 days after surgery.   - You may have some bruising around the back of the thigh, calf and even into the foot.  What should I do for the swelling?  o Keep the limb elevated. o Apply compression socks (knee high for total knees and up to the mid-thigh for total hips.   o Heat or ice may be applied, choose the modality that makes you the most comfortable.  How long should I remain on blood thinners following surgery?  o Thirty days   When can I drive?  o Once you have stopped using regular narcotic pain medications (Percocet, Lortab, etc.) and can safely apply the brakes without hesitation.  When can I shower? o 72hours following surgery if the incision is dry.  o No submersion of the incision, bathing or swimming for 14 days following surgery or until cleared by Dr Annie Conway.  What do I do with the dressing when I shower?  o The dressing can be removed. o The incision is sealed with Dermabond (Biologic glue) and except for wounds which are draining should be watertight.  How active should I be following surgery?   o Progress activities in moderation at your own pace.   o Walk each day and set progressive goals with small increments (1st week - ½ block of walking, 2nd week - 1 block, 3rd week - 2 blocks, etc.)    Please do not hesitate to call me at [phone] (cell phone) for questions following surgery - MD Luther Gomez MD  Cell [phone]  Meghann Almendarez PA-C  Cell [phone]  Medical Staff : Unruly Bee, [address] [facility], [address] [facility] [place] ext [phone]
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
## 2018-11-30 NOTE — PROGRESS NOTES
TOTAL KNEE REVISION ARTHROPLASTY DAILY NOTE ASSESSMENT / PLAN :  
1. Pain Control : acceptable 2. Overnight Issues : none 3. Wound or incisional issue : [facility] 4. Therapy / Weight Bearing Recommendations : WBAT 5. DVT Prophylaxis : Mechanical and Lovenox and home on Coumadin 6. Disposition : Home 7. Medical Concerns : Factor Pradeep Boning Deficiency 8. Comments : Stable, home today POD  1 Day Post-Op s/p Procedure(s): REVISION LET TOTAL KNEE REPLACEMENT SUBJECTIVE :  
 
Patient notes the following issues : moderate pain. OBJECTIVE :  
 
Vitals:  
 11/29/18 1810 11/29/18 1932 11/29/18 2339 11/30/18 7100 BP: 110/62 105/69 114/75 107/71 Pulse: 89 97 84 95 Resp: 17 18 18 18 Temp: 98.3 °F (36.8 °C) 97.9 °F (36.6 °C) 97.7 °F (36.5 °C) 97.9 °F (36.6 °C) SpO2: 98% 98% 98% 98% Weight:      
Height:      
 
 
 
 
Alert and oriented x3. Examination of the left knee reveals that the dressing is clean, dry and intact. Motor Exam is intact and normal. Pt is able to perform a straight leg raise. Sensation is intact to light touch. No calf pain. Labs: 
Recent Labs 11/30/18 
0429 11/29/18 
1053 HGB 10.7*  --   
INR  --  1.1 CULTURES : 
Lab Results Component Value Date/Time Specimen Description: URINE 02/02/2013 03:42 PM  
 Specimen Description: NASAL 02/18/2012 03:00 AM  
 Specimen Description: URINE 10/01/2011 04:05 AM  
 Specimen Description: URINE 06/02/2011 09:15 AM  
 Specimen Description: URINE 02/05/2010 06:30 PM  
 
Lab Results Component Value Date/Time  Culture result: PENDING 11/29/2018 01:26 PM  
 Culture result: PENDING 11/29/2018 01:15 PM  
 Culture result: PENDING 11/29/2018 01:10 PM  
 Culture result: PENDING 11/29/2018 01:08 PM  
 Culture result: MRSA NOT PRESENT 11/23/2018 11:33 AM  
 Culture result:  11/23/2018 11:33 AM  
      Screening of patient nares for MRSA is for surveillance purposes and, if positive, to facilitate isolation considerations in high risk settings. It is not intended for automatic decolonization interventions per se as regimens are not sufficiently effective to warrant routine use. Patient mobility MICROBIOLOGY :  
All Micro Results Procedure Component Value Units Date/Time Simón Iron STAIN [[id number]] Collected:  11/29/18 1315 Order Status:  Completed Specimen:  Knee  Updated:  11/29/18 2229 Special Requests: LEFT KNEE TIBIA  
  GRAM STAIN OCCASIONAL WBCS SEEN     
   NO ORGANISMS SEEN Culture result: PENDING  
 CULTURE, ANAEROBIC [[id number]] Collected:  11/29/18 1315 Order Status:  Completed Updated:  11/29/18 2139 CULTURE, ANAEROBIC [[id number]] Collected:  11/29/18 1326 Order Status:  Completed Updated:  11/29/18 2138 Renu Medico [[id number]] Collected:  11/29/18 1310 Order Status:  Completed Updated:  11/29/18 2138 Renu Medico [[id number]] Collected:  11/29/18 1308 Order Status:  Completed Updated:  11/29/18 2138 CULTURE, TISSUE [place] [[id number]] Collected:  11/29/18 1310 Order Status:  Completed Specimen:  Knee  Updated:  11/29/18 2120 Special Requests: NO SPECIAL REQUESTS     
  GRAM STAIN RARE WBCS SEEN     
   NO ORGANISMS SEEN Culture result: PENDING  
 CULTURE, TISSUE [place] [[id number]] Collected:  11/29/18 1326 Order Status:  Completed Specimen:  Knee  Updated:  11/29/18 2119 Special Requests: NO SPECIAL REQUESTS     
  GRAM STAIN OCCASIONAL WBCS SEEN     
   NO ORGANISMS SEEN Culture result: PENDING  
 CULTURE, BODY FLUID [place] [[id number]] Collected:  11/29/18 1308 Order Status:  Completed Specimen:  Knee  Updated:  11/29/18 2117 Special Requests: NO SPECIAL REQUESTS     
  GRAM STAIN OCCASIONAL WBCS SEEN     
   NO ORGANISMS SEEN Culture result: PENDING  
 CULTURE, TISSUE [place] [[id number]] Collected:  11/29/18 1315 Order Status:  Canceled Updated:  11/29/18 1730 Gayle Govea MD 
Cell [phone] Nelson Cowan PA-C Cell [phone] Office : [phone] Medical Staff : Yamila Traore, [address] [place] [address] ext [phone] Sofi Cole, [address] [facility],[address]

## 2018-11-30 NOTE — PROGRESS NOTES
Occupational Therapy consult received and acknowledged. Chart reviewed. Patient presents s/p L TKR. Evaluation deferred at this time, will see later this afternoon at which time patient will have mobility to fully participate in functional task mobility and ADL evaluation. Thank you.  
 
Rocio Schultz, OTR/L

## 2018-11-30 NOTE — PROGRESS NOTES
Problem: Mobility Impaired (Adult and Pediatric) Goal: *Acute Goals and Plan of Care (Insert Text) Physical Therapy Goals Initiated 11/30/2018 1. Patient will move from supine to sit and sit to supine  in bed with modified independence within 4 days. 2. Patient will perform sit to stand with modified independence within 4 days. 3. Patient will ambulate with modified independence for 250 feet with the least restrictive device within 4 days. 4. Patient will ascend/descend 2 stairs with 0 handrail(s) with minimal assistance/contact guard assist within 4 days. 5. Patient will perform home exercise program per protocol with modified independence within 4 days. 6. Patient will demonstrate AROM 0-90 degrees in operative joint within 4 days. physical Therapy TREATMENT Patient: Tosin Echols (22 y.o. female) Date: 11/30/2018 Diagnosis: Failed total knee arthroplasty ([facility] [address] [T84.018A, Z96.659] <principal problem not specified> Procedure(s) (LRB): 
REVISION LET TOTAL KNEE REPLACEMENT (Left) 1 Day Post-Op Precautions: Fall, WBAT Chart, physical therapy assessment, plan of care and goals were reviewed. ASSESSMENT: 
Patient able to increase ambulation distance to 200 feet with RW. Patient able to negotiate 4 steps with 1 hand rail with hand held assist of therapist to mimic her home set up (she will have family to assist). Reviewed supine exercises, patient with increased pain so did not perform all of exercise sheet- nursing notified for pain medication. Patient is cleared to discharge home with family to assist, use of RW with all upright activity. Patient to follow up with outpatient. Progression toward goals: 
[]      Improving appropriately and progressing toward goals [x]      Improving slowly and progressing toward goals 
[]      Not making progress toward goals and plan of care will be adjusted PLAN: 
Patient continues to benefit from skilled intervention to address the above impairments. Continue treatment per established plan of care. Discharge Recommendations:  Outpatient Further Equipment Recommendations for Discharge: Owns RW SUBJECTIVE:  
 
 
OBJECTIVE DATA SUMMARY:  
Range of Motion: 
AROM: Generally decreased, functional 
PROM: Generally decreased, functional 
  
  
  
  
LLE AROM 
L Knee Flexion: 75 L Knee Extension: 15 Functional Mobility Training: 
Bed Mobility: 
Rolling: Supervision Supine to Sit: Supervision Scooting: Supervision Transfers: 
Sit to Stand: Supervision Stand to Sit: Supervision Bed to Chair: Contact guard assistance Ambulation/Gait Training: 
Distance (ft): 200 Feet (ft) Assistive Device: Walker, rolling;Gait belt Ambulation - Level of Assistance: Supervision Gait Description (WDL): Exceptions to [facility] Stairs: 
Number of Stairs Trained: 4 Stairs - Level of Assistance: Contact guard assistance Rail Use: Left Therapeutic Exercises:  
Exercises performed per protocol. See morning treatment note for description. Pain: 
  
  
  
  
  
  
Activity Tolerance: No complications with upright activity Please refer to the flowsheet for vital signs taken during this treatment. After treatment:  
[] Patient left in no apparent distress sitting up in chair 
[x] Patient left in no apparent distress in bed 
[x] Call bell left within reach [x] Nursing notified 
[] Caregiver present [x] Bed alarm activated COMMUNICATION/COLLABORATION:  
The patients plan of care was discussed with: Registered Nurse Azra Brown PT, DPT Time Calculation: 17 mins

## 2018-11-30 NOTE — PROGRESS NOTES
11/30/2018 
3:59 PM 
Reason for Admission:   Elective - Left total knee RRAT Score:     14 Do you (patient/family) have any concerns for transition/discharge? Transport to outpatient appt. Plan for utilizing home health:     Outpatient PT Likelihood of readmission? Yellow Transition of Care Plan:       
 
CM met with pt for assessment. Demographics and PCP were confirmed. Pt is a 53year old,  female who lives in a private residence with her , 9 children, and 13 dogs (4 exterior steps, 2 interior steps). PTA, pt was able to complete ADLs without the use of DME. Pt has RW and cane to assist during recovery. Pt has prescription drug coverage. Pt has prior [place] but is unaware of provider. Pt prefers outpatient PT. CM educated pt on Medicaid transport benefit which would assist her to her appts. No additional discharge service needs indicated. Ben Lomas MA Care Management Interventions PCP Verified by CM: Yes(Shruthi. NN notified. ) Palliative Care Criteria Met (RRAT>21 & CHF Dx)?: No 
Mode of Transport at Discharge: Other (see comment) MyChart Signup: No 
Discharge Durable Medical Equipment: No 
Physical Therapy Consult: Yes Occupational Therapy Consult: Yes Speech Therapy Consult: No 
Current Support Network: Lives with Spouse Confirm Follow Up Transport: Family Plan discussed with Pt/Family/Caregiver: Yes Discharge Location Discharge Placement: Home with outpatient services
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
## 2018-12-01 NOTE — PROGRESS NOTES
Discussed discharge instructions with patient and her . They verbalized understanding. Seven prescriptions given.

## 2018-12-03 ENCOUNTER — PATIENT OUTREACH (OUTPATIENT)
Dept: FAMILY MEDICINE CLINIC | Age: 48
End: 2018-12-03

## 2018-12-03 NOTE — PROGRESS NOTES
Hospital Discharge Follow-Up Date/Time:  12/3/2018 3:50 PM 
 
Patient was admitted to [facility] on 18 and discharged on 18 for REVISION LET TOTAL KNEE REPLACEMENT. The physician discharge summary was available at the time of outreach. Patient was contacted within 2 business days of discharge. Top Challenges reviewed with the provider REVISION LET TOTAL KNEE REPLACEMENT Method of communication with provider :none Inpatient RRAT score: 14 Was this a readmission? no  
Patient stated reason for the readmission: NA 
 
Nurse Navigator (NN) contacted the patient by telephone to perform post hospital discharge assessment. Verified name and  with patient as identifiers. Provided introduction to self, and explanation of the Nurse Navigator role. Reviewed discharge instructions and red flags with patient who verbalized understanding. Patient given an opportunity to ask questions and does not have any further questions or concerns at this time. The patient agrees to contact the PCP office for questions related to their healthcare. NN provided contact information for future reference. Disease Specific:   N/A Summary of patient's top problems: 1. REVISION LEFT TOTAL KNEE REPLACEMENT Home Health orders at discharge: NO Home Health company: NA 
Date of initial visit:NA Durable Medical Equipment ordered/company: NA 
Durable Medical Equipment received: NA Barriers to care? NA Advance Care Planning:  
Does patient have an Advance Directive:  not on file Medication(s):  
New Medications at Discharge: gabapentin (NEURONTIN) 100 mg, ibuprofen (MOTRIN) 800 mg, oxyCODONE IR (ROXICODONE) 5 mg, traMADol (ULTRAM) 50 mg, acetaminophen (TYLENOL EXTRA STRENGTH) 500 mg, ondansetron (ZOFRAN ODT) 8 mg, enoxaparin (LOVENOX) 30 mg/0.3 mL Changed Medications at Discharge: NA 
Discontinued Medications at Discharge: NA 
 
 Medication reconciliation was performed with patient, who verbalizes understanding of administration of home medications. There were no barriers to obtaining medications identified at this time. Referral to Pharm D needed: no  
 
Current Outpatient Medications Medication Sig  
 gabapentin (NEURONTIN) 100 mg capsule Take 1 Cap by mouth ACB/HS. T1 tablet at breakfast and 3 tablets at night.  ibuprofen (MOTRIN) 800 mg tablet Take 1 Tab by mouth every six (6) hours as needed for Pain.  oxyCODONE IR (ROXICODONE) 5 mg immediate release tablet Take 1-2 Tabs by mouth every four (4) hours as needed for Pain. Max Daily Amount: 60 mg.  
 traMADol (ULTRAM) 50 mg tablet Take 1 Tab by mouth every six (6) hours as needed for Pain (Take for breakthrough pain if Oxycodone is not working or when transitioning off Oxycodone). Max Daily Amount: 200 mg.  acetaminophen (TYLENOL EXTRA STRENGTH) 500 mg tablet Take 1-2 Tabs by mouth every six (6) hours as needed for Pain. Not to exceed 4,000mg in any 24 hour period  ondansetron (ZOFRAN ODT) 8 mg disintegrating tablet Take 0.5 Tabs by mouth every eight (8) hours as needed for Nausea.  enoxaparin (LOVENOX) 30 mg/0.3 mL injection 0.3 mL by SubCUTAneous route every twelve (12) hours.  cholecalciferol, vitamin D3, (VITAMIN D3) 2,000 unit tab Take 2,000 Int'l Units by mouth daily.  raNITIdine (ZANTAC) 150 mg tablet Take 150 mg by mouth as needed for Indigestion.  levothyroxine (SYNTHROID) 50 mcg tablet TAKE 1 TABLET BY MOUTH ONCE DAILY BEFORE BREAKFAST  warfarin (COUMADIN) 5 mg tablet Take 7.5 mg by mouth Every Saturday.  warfarin (COUMADIN) 10 mg tablet Take 10 mg by mouth six (6) days a week. Sunday, Monday, Tuesday, Wednesday, Thursday, Friday  butalbital-acetaminophen-caff (FIORICET) -40 mg per capsule Take 1 Cap by mouth every four (4) hours as needed for Pain.  ONABOTULINUMTOXINA (BOTOX INJECTION) by IntraMUSCular route.  The patient receives injections every 3 months at the office of Dr. Sergey Mcfadden ACETAMINOPHEN/DIPHENHYDRAMINE (TYLENOL PM EXTRA STRENGTH PO) Take 3 Tabs by mouth nightly as needed. No current facility-administered medications for this visit. There are no discontinued medications. [facility] follow up appointment(s): No future appointments. Non-[facility] follow up appointment(s): Dr[facility]ello [facility]:  n/a  
 
 
Goals None

## 2018-12-07 NOTE — DISCHARGE SUMMARY
@2NLVO@ [address] [place], [place] [id number]    DISCHARGE SUMMARY     Patient: Vikki Orellana                             Medical Record Number: [id number]                : 1970  Age: 53 y.o. Admit Date: 2018  Discharge Date: 2018    Admission Diagnosis: Failed total knee arthroplasty ([address]  Discharge Diagnosis: FAILED LEFT TOTAL KNEE REPLACEMENT    Procedures: Procedure(s):  REVISION LET TOTAL KNEE REPLACEMENT    Surgeon: Samantha Turner MD  Assistants: Shawn Alexandre PA-C    Anesthesia: spinal  Complications: None     History of Present Illness:  Vikki Orellana is a 53 y.o. female with a history of Left knee pain, swelling, and marked loss of function. Despite conservative management and after clinical and radiographic evaluation, it was determined that she suffered from tibial loosening and would benefit from Procedure(s):  REVISION LET TOTAL KNEE REPLACEMENT, which she consented to undergo after a discussion of the risks, benefits, alternatives, rehab concerns, and potential complications of surgery. Hospital Course:  Vikki Orellana tolerated the procedure well. She was transferred  to the recovery room in stable condition. After a brief stay the patient was then transferred to the Joint Replacement Unit at [address] [place] [address]. On postoperative day #1, the dressing was clean and dry, she was neurovascularly intact. The patient was afebrile and vital signs were stable. Calves were soft and non-tender bilaterally. On postoperative day  # 1, the patient was tolerating a regular diet and making satisfactory progress with physical therapy.       Hemoglobin and INR prior to discharge were   Lab Results   Component Value Date/Time    HGB 10.7 (L) 2018 04:29 AM    INR 1.9 (H) 2018 11:33 AM    INR (POC) 1.1 2018 10:53 AM    Hgb, External 13.2 2013       Vikki Orellana was cleared by physical therapy and was discharged to Home in stable condition on postoperative day 1. She was provided with routine postoperative instructions and advised to follow up in my office in 2 weeks following discharge from the hospital.  She was prescribed aspirin for DVT prophylaxis, tramadol and oxycodone for post-operative pain, dulcolax suppository for constipation, and zofran for nausea. Discharge Medications:  Cannot display discharge medications since this patient is not currently admitted.       Signed by: Danelle Hankins MD  12/7/2018

## 2018-12-13 LAB
BACTERIA SPEC CULT: NORMAL
GRAM STN SPEC: NORMAL
SERVICE CMNT-IMP: NORMAL

## 2018-12-14 LAB
BACTERIA SPEC CULT: NORMAL
BACTERIA SPEC CULT: NORMAL
GRAM STN SPEC: NORMAL
GRAM STN SPEC: NORMAL
SERVICE CMNT-IMP: NORMAL
SERVICE CMNT-IMP: NORMAL

## 2018-12-17 ENCOUNTER — PATIENT OUTREACH (OUTPATIENT)
Dept: FAMILY MEDICINE CLINIC | Age: 48
End: 2018-12-17

## 2018-12-17 NOTE — PROGRESS NOTES
Follow up call placed to patient. Patient was admitted to [facility] on 11/29/18 and discharged on 11/30/18 for REVISION LET TOTAL KNEE REPLACEMENT. Goals      Prevent complications post hospitalization. 12/3/18- Patient will follow up with physicians and Ortho PT/OT. Patient will take all medications as prescribed and watch for any signs or symptoms associated with DX. Reminded patient-   Full extension at the knee is the most important aspect of your range of motion  You may weight bear as tolerated on the knee and during the day you should bend the knee as much as possible  Aquacel or Silverlon Dressings : This may be removed 7 days after the date of your surgery. -   Will follow up in 2 weeks-CDT    12/17/18- Patient reports that she is doing well. Patient has followed up with Dr. Corey Garcia and incision is healing well. Patient able to flex and extend knee. Patient continue to take pain medication as directed. Patient encourage to take a stool softener with pain medication. -CDT

## 2018-12-31 ENCOUNTER — PATIENT OUTREACH (OUTPATIENT)
Dept: FAMILY MEDICINE CLINIC | Age: 48
End: 2018-12-31

## 2018-12-31 NOTE — PROGRESS NOTES
Patient has graduated from the Transitions of Care Coordination  program on 12/31/18. Patient's symptoms are stable at this time. Patient/family has the ability to self-manage. Care management goals have been completed at this time. No further nurse navigator follow up scheduled. Goals Addressed This Visit's Progress  COMPLETED: Prevent complications post hospitalization. 12/3/18- Patient will follow up with physicians and Ortho PT/OT. Patient will take all medications as prescribed and watch for any signs or symptoms associated with DX. Reminded patient-  
Full extension at the knee is the most important aspect of your range of motion You may weight bear as tolerated on the knee and during the day you should bend the knee as much as possible Aquacel or Silverlon Dressings : This may be removed 7 days after the date of your surgery. - Will follow up in 2 weeks-CDT 
 
12/17/18- Patient reports that she is doing well. Patient has followed up with Dr. Jena Meneses and incision is healing well. Patient able to flex and extend knee. Patient continue to take pain medication as directed. Patient encourage to take a stool softener with pain medication. -CDT Pt has nurse navigator's contact information for any further questions, concerns, or needs. Patients upcoming visits:  No future appointments.

## 2019-04-04 NOTE — PERIOP NOTES
PACU IN REPORT FROM ANESTHESIA    Verbal report received from   [id number]   following Spinal for Procedure(s) (LRB):  REVISION LET TOTAL KNEE REPLACEMENT (Left) by  Zoey Marshall MD.    Note the anesthesia record for medications given intraoperatively. Brief Initial Visual Assessment:    Patient Age: [] Infant(1-12mo)      []Pediatric(1-13yrs)    [] Adolescent(13-18yrs)    [x] Adult(18-65yrs)      []Geriatric Adult(>65yrs). Patient    [] Alert           [x]Calm & Cooperative      [] Anxious  Appearance: [x] Drowsy      [] Sedated                          [] Unresponsive     Oriented x 1           Airway:     [x] Patent                          [] Near-obstructed   [] Obstructed                        []Improved with head/airway repositioning                       Airway Adjuncts Present: [] Oral Airway    [] Nasal Trumpet         [] ETT     Respiratory  [x] Even              [] Labored      [] Shallow  Pattern:    [x] Non-Labored  [] VENT and/or respiratory assistance     being provided. Skin:     [x] Pink [] Pale       [x] Warm [] Cool [] Cold   [x]Dry [] Moist [] Diaphoretic     Membranes:  [x] Pink [] Pale       [x] Moist [] Dry [] Crusty     Pain:   [x] No Acute Discomfort. 0 /10 Scale [x] Verbal Numeric   [] Moderate Discomfort.      [] V.A.S. [] Acute Discomfort.                [] A.N.V.    [] Chronic-Issue Related Discomfort.   [] F.L.A.C.C. Note E-MAR for medications administered. []Faces, Causey/Baker    Note assessments documented in flowsheets; any assessment variants to be found in comments or narrative perioperative nurse notes.        Post-anesthesia care now assumed by [facility] BSN, RN-BC
8 yo F with no pmhx, IUTD, presents for evaluation of fever, tmax 100.3, ongoing for a day, associated with emesis x 1 and diarrhea x 1. No chills, no back pain, no abdominal pain, no chest pain, no rash. Mother states that patient has been taking tylenol and motrin. She reports that she has been drinking and tolerating solid food. No other symptoms reported.

## 2019-06-26 DIAGNOSIS — E03.9 ACQUIRED HYPOTHYROIDISM: ICD-10-CM

## 2019-06-26 RX ORDER — LEVOTHYROXINE SODIUM 50 UG/1
TABLET ORAL
Qty: 30 TAB | Refills: 0 | Status: SHIPPED | OUTPATIENT
Start: 2019-06-26 | End: 2019-10-01 | Stop reason: SDUPTHER

## 2019-06-26 NOTE — TELEPHONE ENCOUNTER
Please call patient and let her know she is overdue to have her thyroid checked. I have printed a lab order.

## 2019-08-20 ENCOUNTER — ANESTHESIA (OUTPATIENT)
Dept: ENDOSCOPY | Age: 49
End: 2019-08-20
Payer: MEDICAID

## 2019-08-20 ENCOUNTER — ANESTHESIA EVENT (OUTPATIENT)
Dept: ENDOSCOPY | Age: 49
End: 2019-08-20
Payer: MEDICAID

## 2019-08-20 ENCOUNTER — HOSPITAL ENCOUNTER (OUTPATIENT)
Age: 49
Setting detail: OUTPATIENT SURGERY
Discharge: HOME | End: 2019-08-20
Attending: INTERNAL MEDICINE | Admitting: INTERNAL MEDICINE
Payer: MEDICAID

## 2019-08-20 VITALS
OXYGEN SATURATION: 100 % | SYSTOLIC BLOOD PRESSURE: 128 MMHG | WEIGHT: 203.04 LBS | HEIGHT: 62 IN | RESPIRATION RATE: 12 BRPM | DIASTOLIC BLOOD PRESSURE: 80 MMHG | BODY MASS INDEX: 37.36 KG/M2 | TEMPERATURE: 98.2 F | HEART RATE: 73 BPM

## 2019-08-20 LAB
H PYLORI FROM TISSUE: NEGATIVE
HCG UR QL: NEGATIVE
KIT LOT NO., HCLOLOT: NORMAL
NEGATIVE CONTROL: NEGATIVE
POSITIVE CONTROL: POSITIVE

## 2019-08-20 PROCEDURE — 74011250636 HC RX REV CODE- 250/636: Performed by: NURSE ANESTHETIST, CERTIFIED REGISTERED

## 2019-08-20 PROCEDURE — 76040000019: Performed by: INTERNAL MEDICINE

## 2019-08-20 PROCEDURE — 77030018712 HC DEV BLLN INFL BSC -B: Performed by: INTERNAL MEDICINE

## 2019-08-20 PROCEDURE — 87077 CULTURE AEROBIC IDENTIFY: CPT | Performed by: INTERNAL MEDICINE

## 2019-08-20 PROCEDURE — 81025 URINE PREGNANCY TEST: CPT

## 2019-08-20 PROCEDURE — 77030021593 HC FCPS BIOP ENDOSC BSC -A: Performed by: INTERNAL MEDICINE

## 2019-08-20 PROCEDURE — C1726 CATH, BAL DIL, NON-VASCULAR: HCPCS | Performed by: INTERNAL MEDICINE

## 2019-08-20 PROCEDURE — 76060000031 HC ANESTHESIA FIRST 0.5 HR: Performed by: INTERNAL MEDICINE

## 2019-08-20 RX ORDER — SODIUM CHLORIDE 9 MG/ML
50 INJECTION, SOLUTION INTRAVENOUS CONTINUOUS
Status: DISCONTINUED | OUTPATIENT
Start: 2019-08-20 | End: 2019-08-20 | Stop reason: HOSPADM

## 2019-08-20 RX ORDER — MIDAZOLAM HYDROCHLORIDE 1 MG/ML
.25-5 INJECTION, SOLUTION INTRAMUSCULAR; INTRAVENOUS
Status: DISCONTINUED | OUTPATIENT
Start: 2019-08-20 | End: 2019-08-20 | Stop reason: HOSPADM

## 2019-08-20 RX ORDER — FLUMAZENIL 0.1 MG/ML
0.2 INJECTION INTRAVENOUS
Status: DISCONTINUED | OUTPATIENT
Start: 2019-08-20 | End: 2019-08-20 | Stop reason: HOSPADM

## 2019-08-20 RX ORDER — EPINEPHRINE 0.1 MG/ML
1 INJECTION INTRACARDIAC; INTRAVENOUS
Status: DISCONTINUED | OUTPATIENT
Start: 2019-08-20 | End: 2019-08-20 | Stop reason: HOSPADM

## 2019-08-20 RX ORDER — DEXTROMETHORPHAN/PSEUDOEPHED 2.5-7.5/.8
1.2 DROPS ORAL
Status: DISCONTINUED | OUTPATIENT
Start: 2019-08-20 | End: 2019-08-20 | Stop reason: HOSPADM

## 2019-08-20 RX ORDER — PROPOFOL 10 MG/ML
INJECTION, EMULSION INTRAVENOUS AS NEEDED
Status: DISCONTINUED | OUTPATIENT
Start: 2019-08-20 | End: 2019-08-20 | Stop reason: HOSPADM

## 2019-08-20 RX ORDER — ATROPINE SULFATE 0.1 MG/ML
0.4 INJECTION INTRAVENOUS
Status: DISCONTINUED | OUTPATIENT
Start: 2019-08-20 | End: 2019-08-20 | Stop reason: HOSPADM

## 2019-08-20 RX ORDER — NALOXONE HYDROCHLORIDE 0.4 MG/ML
0.4 INJECTION, SOLUTION INTRAMUSCULAR; INTRAVENOUS; SUBCUTANEOUS
Status: DISCONTINUED | OUTPATIENT
Start: 2019-08-20 | End: 2019-08-20 | Stop reason: HOSPADM

## 2019-08-20 RX ORDER — SODIUM CHLORIDE 9 MG/ML
INJECTION, SOLUTION INTRAVENOUS
Status: DISCONTINUED | OUTPATIENT
Start: 2019-08-20 | End: 2019-08-20 | Stop reason: HOSPADM

## 2019-08-20 RX ADMIN — PROPOFOL 50 MG: 10 INJECTION, EMULSION INTRAVENOUS at 16:41

## 2019-08-20 RX ADMIN — SODIUM CHLORIDE: 9 INJECTION, SOLUTION INTRAVENOUS at 16:20

## 2019-08-20 RX ADMIN — PROPOFOL 50 MG: 10 INJECTION, EMULSION INTRAVENOUS at 16:46

## 2019-08-20 RX ADMIN — PROPOFOL 50 MG: 10 INJECTION, EMULSION INTRAVENOUS at 16:44

## 2019-08-20 RX ADMIN — PROPOFOL 150 MG: 10 INJECTION, EMULSION INTRAVENOUS at 16:38

## 2019-08-20 NOTE — PROGRESS NOTES

## 2019-08-20 NOTE — ANESTHESIA PREPROCEDURE EVALUATION
Anesthetic History   No history of anesthetic complications            Review of Systems / Medical History  Patient summary reviewed, nursing notes reviewed and pertinent labs reviewed    Pulmonary  Within defined limits                 Neuro/Psych         Psychiatric history    Comments: RESTLESS LEG SX    HX OF COMPLEX REGIONAL PAIN SYNDROME Cardiovascular  Within defined limits                Exercise tolerance: >4 METS     GI/Hepatic/Renal     GERD           Endo/Other      Hypothyroidism  Morbid obesity and arthritis     Other Findings   Comments: Factor V leiden def  History of dvt, PE, james filter         Physical Exam    Airway  Mallampati: II  TM Distance: 4 - 6 cm  Neck ROM: normal range of motion   Mouth opening: Normal     Cardiovascular  Regular rate and rhythm,  S1 and S2 normal,  no murmur, click, rub, or gallop  Rhythm: regular  Rate: normal         Dental  No notable dental hx       Pulmonary  Breath sounds clear to auscultation               Abdominal  GI exam deferred       Other Findings            Anesthetic Plan    ASA: 3  Anesthesia type: MAC - femoral single shot and popliteal fossa block      Post-op pain plan if not by surgeon: peripheral nerve block single    Induction: Intravenous  Anesthetic plan and risks discussed with: Patient

## 2019-08-20 NOTE — ROUTINE PROCESS
[facility]  1970  [id number]    Situation:  Verbal report received from: Santos Blanco RN  Procedure: Procedure(s):  ESOPHAGOGASTRODUODENOSCOPY (EGD)  ESOPHAGEAL DILATION  ESOPHAGOGASTRODUODENAL (EGD) BIOPSY    Background:    Preoperative diagnosis: DYSPHAGIA  Postoperative diagnosis: 1.- Schatzk'is Ring  2.- Hiatal hernia  3.- Mild Gastritis    :  Dr. Tita Davis  Assistant(s): Endoscopy Technician-1: Claudene Fee; Laurel Cardoza  Endoscopy RN-1: Alfred Dallas RN    Specimens: * No specimens in log *  H. Pylori  Yes      Assessment:  Intra-procedure medications     Anesthesia gave intra-procedure sedation and medications, see anesthesia flow sheet yes    Intravenous fluids: NS@ KVO     Vital signs stable     Abdominal assessment: round and soft     Recommendation:  Discharge patient per MD order.   Family or Friend   Permission to share finding with family or friend yes

## 2019-08-20 NOTE — ANESTHESIA POSTPROCEDURE EVALUATION
Procedure(s):  ESOPHAGOGASTRODUODENOSCOPY (EGD)  ESOPHAGEAL DILATION  ESOPHAGOGASTRODUODENAL (EGD) BIOPSY. MAC    Anesthesia Post Evaluation      Multimodal analgesia: multimodal analgesia not used between 6 hours prior to anesthesia start to PACU discharge  Patient location during evaluation: bedside  Patient participation: complete - patient participated  Level of consciousness: awake  Pain management: adequate  Airway patency: patent  Anesthetic complications: no  Cardiovascular status: acceptable  Respiratory status: acceptable  Hydration status: acceptable  Post anesthesia nausea and vomiting:  controlled      Vitals Value Taken Time   BP 99/70 8/20/2019  4:56 PM   Temp     Pulse 72 8/20/2019  5:01 PM   Resp 17 8/20/2019  5:01 PM   SpO2 100 % 8/20/2019  5:01 PM   Vitals shown include unvalidated device data.

## 2019-08-20 NOTE — H&P
The patient is a 51year old female who presents for a Recheck of Difficulty swallowing . The patient presents for due to new symptoms (Pt presents for complaints of dysphagia which has persisted for years and become worse as of late.  The symptoms mainly ocurr with solids such as meat or bread.  Symptoms have been associated with heartburn and acid reflux which have also been worsening.  She denies bloody or black stools, nausea, vomiting.  She uses NSAIDs rarely for migraines. ). Previous diagnostic tests have included EGD (2018 - dieulafoy lesion clipped and injected. ). Problem List/Past Medical (Maria R Shoemaker; 2019 9:42 AM)  Thyroid Disease    Factor V Leiden (289.81  D68.51)    Anemia    GI bleed (578.9  K92. 2)   The patient presents today for follow up after treatment in the hospital for a GI bleed. She was found to have a Dieulafoy Lesion which was clipped and injected with Epi. She denies, nausea, vomiting, diarrhea, fevers, or chills. No bloody or black tarry stools. She has taken a PPI for 30 days and has recently been taking Ranitidine PRN which has been controlling her symptoms. Past Surgical History (Maria R Shoemaker; 2019 9:42 AM)  Cholecystectomy     Delivery    Knee Replacement, Total   Bilateral.    Allergies (Maria R Shoemaker; 2019 9:42 AM)  No Known Allergies  [2018]:  No Known Drug Allergies  [2018]: Medication History (Maria R Shoemaker; 2019 9:42 AM)  Warfarin Sodium  (5MG Tablet, 1 Oral 2 days a week) Active. Warfarin Sodium  (10MG Tablet, 1 Oral 5 days a week) Active. Levothyroxine Sodium  (50MCG Tablet, 1 Oral daily) Active. Iron  (Oral) Specific strength unknown - Active. Medications Reconciled     Family History (Maria R Shoemaker; 2019 9:42 AM)  FH: factor V Leiden mutation (V18.3  J72. 2)   Father, Son, Daughter. Social History (Maria R Shoemaker; 2019 9:42 AM)  Tobacco Use   Never smoker. Alcohol Use   Has never drank.   Marital status   . Employment status   N/a. Blood Transfusion   No.    Diagnostic Studies History (Jose Funk; 6/11/2019 9:42 AM)  Endoscopy   Date: 3/2018. Health Maintenance History (Jose Funk; 6/11/2019 9:42 AM)  Flu Vaccine   Date: 2018. Pneumovax   none        Review of Systems (Jose Funk; 6/11/2019 9:42 AM)  General Not Present- Chronic Fatigue, Poor Appetite, Weight Gain and Weight Loss. Skin Not Present- Itching, Rash and Skin Color Changes. HEENT Not Present- Hearing Loss and Vertigo. Respiratory Not Present- Difficulty Breathing and TB exposure. Cardiovascular Not Present- Chest Pain, Use of Antibiotics before Dental Procedures and Use of Blood Thinners. Gastrointestinal Present- See HPI. Musculoskeletal Not Present- Arthritis, Hip Replacement Surgery and Knee Replacement Surgery. Neurological Not Present- Weakness. Psychiatric Not Present- Depression. Endocrine Not Present- Diabetes and Thyroid Problems. Hematology Not Present- Anemia. Vitals (Jose Funk; 6/11/2019 9:44 AM)  6/11/2019 9:42 AM  Weight: 202 lb   Height: 62 in   Height was reported by patient. Body Surface Area: 1.92 m²   Body Mass Index: 36.95 kg/m²    BP: 130/80 (Sitting, Left Arm, Standard)              Physical Exam (Sha Ruelas [facility]; 6/11/2019 10:19 AM)  General  Mental Status - Alert. General Appearance - Cooperative, Pleasant, Not in acute distress. Orientation - Oriented X3. Integumentary  General Characteristics  Color - normal coloration of skin. Head and Neck  Neck  Global Assessment - supple. Eye  Sclera/Conjunctiva - Bilateral - No Jaundice. Chest and Lung Exam  Chest and lung exam reveals  - quiet, even and easy respiratory effort with no use of accessory muscles. Auscultation  Breath sounds - Normal. Adventitious sounds - No Adventitious sounds. Cardiovascular  Auscultation  Rhythm - Regular, No Tachycardia, No Bradycardia .  Heart Sounds - Normal heart sounds , S1 WNL and S2 WNL, No S3, No Summation Gallop. Murmurs & Other Heart Sounds - Auscultation of the heart reveals - No Murmurs. Abdomen  Palpation/Percussion  Tenderness - Non-Tender. Rebound tenderness - No rebound. Rigidity (guarding) - No Rigidity. Dullness to percussion - No abnormal dullness to percussion. Liver - No hepatosplenomegaly. Abdominal Mass Palpable - No masses. Other Characteristics - No Ascites. Auscultation  Auscultation of the abdomen reveals - Bowel sounds normal, No Abdominal bruits and No Succussion splash. Rectal - Did not examine. Peripheral Vascular  Lower Extremity  Palpation - Edema - Left - No edema. Right - No edema. Neurologic  Motor  Involuntary Movements - Asterixis - not present. Assessment & Plan (Sha FRANKLIN; 6/11/2019 10:20 AM)  Dysphagia (787.20  R13.10)  Impression: Concerning for Schatzkis ring, Esophagitis, PUD. Diet and lifestyle modifications to manage GERD and Dysphagia were explained in detail and reinforced. Started Pantoprazole and Carafate. EGD for further evaluation. Current Plans  Started Pantoprazole Sodium 40MG, 1 (one) Tablet daily, #90, 90 days starting 06/11/2019, Ref. x3.  Started Carafate 1GM, 1 (one) Tablet before meals and at bedtime, #360, 90 days starting 06/11/2019, No Refill. Endoscopy (09543) (Discussed risks and benefits with the patient to include: perforation, post polypectomy, or post biopsy bleeding, missed lesions, and sedation reactions.)  Medical Decision Making (Sha FRANKLIN; 6/11/2019 10:21 AM)  Amount/complexity of data to be reviewed:  - Review and summarization of old records      Signed electronically by RIGOBERTO Miller (6/11/2019 10:21 AM)

## 2019-08-20 NOTE — PROGRESS NOTES
CRE balloon dilatation of the esophagus   18 mm Balloon inflated to 3 ATMs and held for 10 seconds. 19 mm Balloon inflated to 4.5 ATMs and held for 10 seconds. 20 mm Balloon inflated to 6 ATMs and held for 30 seconds. No subcutaneous crepitus of the chest or cervical region was noted post dilatation.

## 2019-08-20 NOTE — PROCEDURES
Christiana Kuhn M.D.  [phone]           2019                EGD Operative Report  Marcellus Closs  :  1970  [facility] Medical Record Number:  [id number]      Indication:  Dysphagia/odynophagia, GERD     : Jeremy Link MD    Referring Provider:  Nancy Inman MD      Anesthesia/Sedation:  MAC anesthesia    Airway assessment: No airway problems anticipated    Pre-Procedural Exam:      Airway: clear, no airway problems anticipated  Heart: RRR, without gallops or rubs  Lungs: clear bilaterally without wheezes, crackles, or rhonchi  Abdomen: soft, nontender, nondistended, bowel sounds present  Mental Status: awake, alert and oriented to person, place and time       Procedure Details     After infomed consent was obtained for the procedure, with all risks and benefits of procedure explained the patient was taken to the endoscopy suite and placed in the left lateral decubitus position. Following sequential administration of sedation as per above, the endoscope was inserted into the mouth and advanced under direct vision to second portion of the duodenum. A careful inspection was made as the gastroscope was withdrawn, including a retroflexed view of the proximal stomach; findings and interventions are described below. Findings:   Esophagus:Mucosa within normal throughout the esophagus, evidence of an early schatzki's ring noted at the GE-junction with a residual diameter of about 17-18 mm. Stomach: Small size hiatal hernia and linear erythema noted in the body of the stomach with few small superficial erosions seen suggestive of mild erosive gastritis. Duodenum/jejunum: normal    Therapies:  Effective esophageal dilation with 18 to 20 mm sized balloon    Specimens: Pyloritek           Complications:   None; patient tolerated the procedure well. EBL:  None.            Impression:    1.- Schatzk'is Ring  2.- Hiatal hernia  3.- Mild Gastritis Recommendations:    -Continue acid suppression with ranitidine.  -Await JOSHUA test result and treat for Helicobacter pylori if positive. -GERD diet: avoid fried and fatty foods.  peppermint, chocolate, alcohol, coffee, citrus fruits and juices, tomoato products; avoid lying down for 2 to 3 hours after eating.  -Follow-up in the office if symptoms are persistent    Tran Mike MD

## 2019-08-20 NOTE — DISCHARGE INSTRUCTIONS
Megan Julio M.D.  [phone]           2019  Hallie Ly  :  1970  [facility] Medical Record Number:  [id number]        ENDOSCOPY FINDINGS:   Your endoscopy showed a small hiatal hernia, mild narrowing at the bottom of the esophagus that was dilated and mild irritation in the stomach. Biopsies obtained to rule out H.pylori infection. EGD DISCHARGE INSTRUCTIONS    DISCOMFORT:  Sore throat- throat lozenges or warm salt water gargle  redness at IV site- apply warm compress to area; if redness or soreness persist- contact your physician  Gaseous discomfort- walking, belching will help relieve any discomfort  You may not operate a vehicle for 12 hours  You may not engage in an occupation involving machinery or appliances for rest of today  You may not drink alcoholic beverages for at least 12 hours  Avoid making any critical decisions for at least 24 hour    DIET:   Soft diet today. You may resume your regular diet tomorrow. ACTIVITY  Spend the remainder of the day resting -  avoid any strenuous activity. Avoid driving or operating machinery. CALL M.D. ANY SIGN OF   Increasing pain, nausea, vomiting  Abdominal distension (swelling)  New increased bleeding (oral or rectal)  Fever (chills)  Pain in chest area  Bloody discharge from nose or mouth  Shortness of breath    Follow-up Instructions:   Call Dr. Giovana Choe for any questions or problems. Telephone # [phone]  Biopsies were obtained, the results will be available  in  5 to 7 days. We will call you to notify you of these results. Continue same medications. Follow up in the office if symptoms are persistent.

## 2019-09-17 ENCOUNTER — APPOINTMENT (OUTPATIENT)
Dept: CT IMAGING | Age: 49
End: 2019-09-17
Attending: PHYSICIAN ASSISTANT
Payer: MEDICAID

## 2019-09-17 ENCOUNTER — HOSPITAL ENCOUNTER (EMERGENCY)
Age: 49
Discharge: HOME | End: 2019-09-17
Attending: EMERGENCY MEDICINE
Payer: MEDICAID

## 2019-09-17 VITALS
OXYGEN SATURATION: 97 % | HEART RATE: 85 BPM | TEMPERATURE: 98.1 F | WEIGHT: 206 LBS | HEIGHT: 62 IN | DIASTOLIC BLOOD PRESSURE: 77 MMHG | SYSTOLIC BLOOD PRESSURE: 137 MMHG | BODY MASS INDEX: 37.91 KG/M2 | RESPIRATION RATE: 13 BRPM

## 2019-09-17 DIAGNOSIS — R00.2 PALPITATIONS: Primary | ICD-10-CM

## 2019-09-17 LAB
ALBUMIN SERPL-MCNC: 4.1 G/DL (ref 3.5–5)
ALBUMIN/GLOB SERPL: 1 {RATIO} (ref 1.1–2.2)
ALP SERPL-CCNC: 69 U/L (ref 45–117)
ALT SERPL-CCNC: 36 U/L (ref 12–78)
ANION GAP SERPL CALC-SCNC: 4 MMOL/L (ref 5–15)
AST SERPL-CCNC: 40 U/L (ref 15–37)
BASOPHILS # BLD: 0 K/UL (ref 0–0.1)
BASOPHILS NFR BLD: 0 % (ref 0–1)
BILIRUB SERPL-MCNC: 0.4 MG/DL (ref 0.2–1)
BUN SERPL-MCNC: 13 MG/DL (ref 6–20)
BUN/CREAT SERPL: 18 (ref 12–20)
CALCIUM SERPL-MCNC: 9 MG/DL (ref 8.5–10.1)
CHLORIDE SERPL-SCNC: 107 MMOL/L (ref 97–108)
CO2 SERPL-SCNC: 29 MMOL/L (ref 21–32)
COMMENT, HOLDF: NORMAL
CREAT SERPL-MCNC: 0.73 MG/DL (ref 0.55–1.02)
DIFFERENTIAL METHOD BLD: ABNORMAL
EOSINOPHIL # BLD: 0.1 K/UL (ref 0–0.4)
EOSINOPHIL NFR BLD: 1 % (ref 0–7)
ERYTHROCYTE [DISTWIDTH] IN BLOOD BY AUTOMATED COUNT: 13.2 % (ref 11.5–14.5)
GLOBULIN SER CALC-MCNC: 4.2 G/DL (ref 2–4)
GLUCOSE SERPL-MCNC: 84 MG/DL (ref 65–100)
HCG UR QL: NEGATIVE
HCT VFR BLD AUTO: 40.8 % (ref 35–47)
HGB BLD-MCNC: 13.7 G/DL (ref 11.5–16)
IMM GRANULOCYTES # BLD AUTO: 0 K/UL (ref 0–0.04)
IMM GRANULOCYTES NFR BLD AUTO: 0 % (ref 0–0.5)
INR PPP: 1.6 (ref 0.9–1.1)
LYMPHOCYTES # BLD: 2 K/UL (ref 0.8–3.5)
LYMPHOCYTES NFR BLD: 26 % (ref 12–49)
MCH RBC QN AUTO: 32.5 PG (ref 26–34)
MCHC RBC AUTO-ENTMCNC: 33.6 G/DL (ref 30–36.5)
MCV RBC AUTO: 96.9 FL (ref 80–99)
MONOCYTES # BLD: 0.8 K/UL (ref 0–1)
MONOCYTES NFR BLD: 11 % (ref 5–13)
NEUTS SEG # BLD: 4.6 K/UL (ref 1.8–8)
NEUTS SEG NFR BLD: 62 % (ref 32–75)
NRBC # BLD: 0 K/UL (ref 0–0.01)
NRBC BLD-RTO: 0 PER 100 WBC
PLATELET # BLD AUTO: 521 K/UL (ref 150–400)
PMV BLD AUTO: 9.5 FL (ref 8.9–12.9)
POTASSIUM SERPL-SCNC: 4.4 MMOL/L (ref 3.5–5.1)
PROT SERPL-MCNC: 8.3 G/DL (ref 6.4–8.2)
PROTHROMBIN TIME: 15.8 SEC (ref 9–11.1)
RBC # BLD AUTO: 4.21 M/UL (ref 3.8–5.2)
SAMPLES BEING HELD,HOLD: NORMAL
SODIUM SERPL-SCNC: 140 MMOL/L (ref 136–145)
TROPONIN I SERPL-MCNC: <0.05 NG/ML
WBC # BLD AUTO: 7.6 K/UL (ref 3.6–11)

## 2019-09-17 PROCEDURE — 99284 EMERGENCY DEPT VISIT MOD MDM: CPT

## 2019-09-17 PROCEDURE — 84484 ASSAY OF TROPONIN QUANT: CPT

## 2019-09-17 PROCEDURE — 80053 COMPREHEN METABOLIC PANEL: CPT

## 2019-09-17 PROCEDURE — 85025 COMPLETE CBC W/AUTO DIFF WBC: CPT

## 2019-09-17 PROCEDURE — 85610 PROTHROMBIN TIME: CPT

## 2019-09-17 PROCEDURE — 71275 CT ANGIOGRAPHY CHEST: CPT

## 2019-09-17 PROCEDURE — 81025 URINE PREGNANCY TEST: CPT

## 2019-09-17 PROCEDURE — 36415 COLL VENOUS BLD VENIPUNCTURE: CPT

## 2019-09-17 PROCEDURE — 74011636320 HC RX REV CODE- 636/320: Performed by: RADIOLOGY

## 2019-09-17 PROCEDURE — 93005 ELECTROCARDIOGRAM TRACING: CPT

## 2019-09-17 RX ADMIN — IOPAMIDOL 80 ML: 755 INJECTION, SOLUTION INTRAVENOUS at 19:02

## 2019-09-17 NOTE — ED TRIAGE NOTES
Pt states \"Digna been feeling sob and sometimes having dizziness lately. \" Pt denies any CP or nausea.

## 2019-09-17 NOTE — ED NOTES
Verbal report received from Chio Parr, [facility].

## 2019-09-17 NOTE — DISCHARGE INSTRUCTIONS

## 2019-09-17 NOTE — ED NOTES
Pt brought back to ED 11 for triage. Pt requested to use bathroom before vitals. Gait steady. Urine sample obtained.

## 2019-09-18 LAB
ATRIAL RATE: 83 BPM
CALCULATED P AXIS, ECG09: 34 DEGREES
CALCULATED R AXIS, ECG10: -27 DEGREES
CALCULATED T AXIS, ECG11: -2 DEGREES
DIAGNOSIS, 93000: NORMAL
P-R INTERVAL, ECG05: 178 MS
Q-T INTERVAL, ECG07: 400 MS
QRS DURATION, ECG06: 76 MS
QTC CALCULATION (BEZET), ECG08: 470 MS
VENTRICULAR RATE, ECG03: 83 BPM
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
## 2019-09-30 ENCOUNTER — HOSPITAL ENCOUNTER (OUTPATIENT)
Dept: PREADMISSION TESTING | Age: 49
Discharge: HOME | End: 2019-09-30
Payer: MEDICAID

## 2019-09-30 VITALS
SYSTOLIC BLOOD PRESSURE: 114 MMHG | WEIGHT: 206.3 LBS | HEART RATE: 92 BPM | DIASTOLIC BLOOD PRESSURE: 73 MMHG | TEMPERATURE: 98.6 F | HEIGHT: 62 IN | BODY MASS INDEX: 37.97 KG/M2 | OXYGEN SATURATION: 97 % | RESPIRATION RATE: 14 BRPM

## 2019-09-30 LAB
ALBUMIN SERPL-MCNC: 4.5 G/DL (ref 3.5–5)
ALBUMIN/GLOB SERPL: 1.3 {RATIO} (ref 1.1–2.2)
ALP SERPL-CCNC: 64 U/L (ref 45–117)
ALT SERPL-CCNC: 24 U/L (ref 12–78)
ANION GAP SERPL CALC-SCNC: 4 MMOL/L (ref 5–15)
APPEARANCE UR: CLEAR
APTT PPP: 27.9 SEC (ref 22.1–32)
AST SERPL-CCNC: 14 U/L (ref 15–37)
BACTERIA URNS QL MICRO: NEGATIVE /HPF
BASOPHILS # BLD: 0 K/UL (ref 0–0.1)
BASOPHILS NFR BLD: 0 % (ref 0–1)
BILIRUB SERPL-MCNC: 0.5 MG/DL (ref 0.2–1)
BILIRUB UR QL: NEGATIVE
BUN SERPL-MCNC: 15 MG/DL (ref 6–20)
BUN/CREAT SERPL: 22 (ref 12–20)
CALCIUM SERPL-MCNC: 9.4 MG/DL (ref 8.5–10.1)
CHLORIDE SERPL-SCNC: 104 MMOL/L (ref 97–108)
CO2 SERPL-SCNC: 30 MMOL/L (ref 21–32)
COLOR UR: ABNORMAL
CREAT SERPL-MCNC: 0.69 MG/DL (ref 0.55–1.02)
CRP SERPL-MCNC: <0.29 MG/DL (ref 0–0.6)
DIFFERENTIAL METHOD BLD: ABNORMAL
EOSINOPHIL # BLD: 0.1 K/UL (ref 0–0.4)
EOSINOPHIL NFR BLD: 1 % (ref 0–7)
EPITH CASTS URNS QL MICRO: ABNORMAL /LPF
ERYTHROCYTE [DISTWIDTH] IN BLOOD BY AUTOMATED COUNT: 12.8 % (ref 11.5–14.5)
ERYTHROCYTE [SEDIMENTATION RATE] IN BLOOD: 8 MM/HR (ref 0–20)
EST. AVERAGE GLUCOSE BLD GHB EST-MCNC: 108 MG/DL
GLOBULIN SER CALC-MCNC: 3.5 G/DL (ref 2–4)
GLUCOSE SERPL-MCNC: 79 MG/DL (ref 65–100)
GLUCOSE UR STRIP.AUTO-MCNC: NEGATIVE MG/DL
HBA1C MFR BLD: 5.4 % (ref 4.2–6.3)
HCT VFR BLD AUTO: 43.4 % (ref 35–47)
HGB BLD-MCNC: 14.2 G/DL (ref 11.5–16)
HGB UR QL STRIP: ABNORMAL
HYALINE CASTS URNS QL MICRO: ABNORMAL /LPF (ref 0–5)
IMM GRANULOCYTES # BLD AUTO: 0 K/UL (ref 0–0.04)
IMM GRANULOCYTES NFR BLD AUTO: 0 % (ref 0–0.5)
INR PPP: 1.3 (ref 0.9–1.1)
KETONES UR QL STRIP.AUTO: NEGATIVE MG/DL
LEUKOCYTE ESTERASE UR QL STRIP.AUTO: ABNORMAL
LYMPHOCYTES # BLD: 1.9 K/UL (ref 0.8–3.5)
LYMPHOCYTES NFR BLD: 19 % (ref 12–49)
MCH RBC QN AUTO: 32.4 PG (ref 26–34)
MCHC RBC AUTO-ENTMCNC: 32.7 G/DL (ref 30–36.5)
MCV RBC AUTO: 99.1 FL (ref 80–99)
MONOCYTES # BLD: 0.8 K/UL (ref 0–1)
MONOCYTES NFR BLD: 8 % (ref 5–13)
NEUTS SEG # BLD: 7.3 K/UL (ref 1.8–8)
NEUTS SEG NFR BLD: 72 % (ref 32–75)
NITRITE UR QL STRIP.AUTO: NEGATIVE
NRBC # BLD: 0 K/UL (ref 0–0.01)
NRBC BLD-RTO: 0 PER 100 WBC
PH UR STRIP: 6.5 [PH] (ref 5–8)
PLATELET # BLD AUTO: 531 K/UL (ref 150–400)
PMV BLD AUTO: 9.5 FL (ref 8.9–12.9)
POTASSIUM SERPL-SCNC: 4.8 MMOL/L (ref 3.5–5.1)
PROT SERPL-MCNC: 8 G/DL (ref 6.4–8.2)
PROT UR STRIP-MCNC: NEGATIVE MG/DL
PROTHROMBIN TIME: 13.1 SEC (ref 9–11.1)
RBC # BLD AUTO: 4.38 M/UL (ref 3.8–5.2)
RBC #/AREA URNS HPF: ABNORMAL /HPF (ref 0–5)
SODIUM SERPL-SCNC: 138 MMOL/L (ref 136–145)
SP GR UR REFRACTOMETRY: 1.01 (ref 1–1.03)
THERAPEUTIC RANGE,PTTT: NORMAL SECS (ref 58–77)
TSH SERPL DL<=0.05 MIU/L-ACNC: 5.34 UIU/ML (ref 0.36–3.74)
UA: UC IF INDICATED,UAUC: ABNORMAL
UROBILINOGEN UR QL STRIP.AUTO: 0.2 EU/DL (ref 0.2–1)
WBC # BLD AUTO: 10.2 K/UL (ref 3.6–11)
WBC URNS QL MICRO: ABNORMAL /HPF (ref 0–4)

## 2019-09-30 PROCEDURE — 81001 URINALYSIS AUTO W/SCOPE: CPT

## 2019-09-30 PROCEDURE — 85610 PROTHROMBIN TIME: CPT

## 2019-09-30 PROCEDURE — 84443 ASSAY THYROID STIM HORMONE: CPT

## 2019-09-30 PROCEDURE — 80053 COMPREHEN METABOLIC PANEL: CPT

## 2019-09-30 PROCEDURE — 85730 THROMBOPLASTIN TIME PARTIAL: CPT

## 2019-09-30 PROCEDURE — 85652 RBC SED RATE AUTOMATED: CPT

## 2019-09-30 PROCEDURE — 84466 ASSAY OF TRANSFERRIN: CPT

## 2019-09-30 PROCEDURE — 36415 COLL VENOUS BLD VENIPUNCTURE: CPT

## 2019-09-30 PROCEDURE — 93005 ELECTROCARDIOGRAM TRACING: CPT

## 2019-09-30 PROCEDURE — 83036 HEMOGLOBIN GLYCOSYLATED A1C: CPT

## 2019-09-30 PROCEDURE — 86140 C-REACTIVE PROTEIN: CPT

## 2019-09-30 PROCEDURE — 85025 COMPLETE CBC W/AUTO DIFF WBC: CPT

## 2019-09-30 PROCEDURE — 86900 BLOOD TYPING SEROLOGIC ABO: CPT

## 2019-09-30 NOTE — PERIOP NOTES
[address] [address] [facility]   MAIN OR                                  [phone]   MAIN PRE OP                          [phone]                                                                                AMBULATORY PRE OP          [phone]  PRE-ADMISSION TESTING    [phone]   Surgery Date:  Monday, 10/7/19        Is surgery arrival time given by surgeon? NO  If NO, [id number] staff will call you between 3 and 7pm the day before your surgery with your arrival time. (If your surgery is on a Monday, we will call you the Friday before.)    Call [phone] after 7pm Monday-Friday if you did not receive your arrival time. INSTRUCTIONS BEFORE YOUR SURGERY   When You  Arrive Arrive at the [address] [facility] on the day of your surgery  Have your insurance card, photo ID, and any copayment (if needed)   Food   and   Drink NO food or drink after midnight the night before surgery    This means NO water, gum, mints, coffee, juice, etc.  No alcohol (beer, wine, liquor) 24 hours before and after surgery   Medications to   TAKE   Morning of Surgery MEDICATIONS TO TAKE THE MORNING OF SURGERY WITH A SIP OF WATER:    Ranitidine, levothyroid   Medications  To  STOP      7 days before surgery  Non-Steroidal anti-inflammatory Drugs (NSAID's): for example, Ibuprofen (Advil, Motrin), Naproxen (Aleve)   Aspirin, if taking for pain    Herbal supplements, vitamins, and fish oil   Other:  (Pain medications not listed above, including Tylenol may be taken)   Blood  Thinners  If you take  Aspirin, Plavix, Coumadin, or any blood-thinning or anti-blood clot medicine, talk to the doctor who prescribed the medications for pre-operative instructions.    Bathing Clothing  Jewelry  Valuables       If you shower the morning of surgery, please do not apply anything to your skin (lotions, powders, deodorant, or makeup, especially jose carlos)   Follow all special bath instructions (for total joint replacement, spine and bowel surgeries)   Do not shave or trim anywhere 24 hours before surgery   Wear your hair loose or down; no pony-tails, buns, or metal hair clips   Wear loose, comfortable, clean clothes   Wear glasses instead of contacts   Leave money, valuables, and jewelry, including body piercings, at home   Going Home - or Spending the Night  SAME-DAY SURGERY: You must have a responsible adult drive you home and stay with you 24 hours after surgery   ADMITS: If your doctor is keeping you in the hospital after surgery, leave personal belongings/luggage in your car until you have a hospital room number. Hospital discharge time is 12 noon  Drivers must be here before 12 noon unless you are told differently   Special Instructions   Special Instructions:  · Use Chlorhexidine Care Fusion wash and sponges 3 days prior to surgery as instructed. · Incentive spirometer given with instructions to practice at home and bring back to the hospital on the day of surgery. · [facility] will contact you if your Hemoglobin A1C is greater than 7.5. · Ensure/Glucerna  sample, nutritional information, and Ensure/Glucerna coupon given. · Pain pamphlet and Call Don't Fall reminder reviewed with patient. ·  parking is complimentary Monday - Friday 7 am - 5 pm  · Bring PTA Medication list day of surgery with the last doses taken documented     Follow all instructions so your surgery wont be cancelled. Please, be on time. If a situation occurs and you are delayed the day of surgery, carlos [phone]. If your physical condition changes (like a fever, cold, flu, etc.) call your surgeon. Home medication(s) reviewed and verified  during PAT appointment. The patient was contacted  in person. The patient verbalizes understanding of all instructions and does not  need reinforcement.

## 2019-09-30 NOTE — PERIOP NOTES
Patient sees Dr. Lorraine Lawrence , Oncology for Factor 5 Leiden, he has patient on Coumadin daily, she has appointment tomorrow 10/2/19 with Dr. Lorraine Lawrence, patient took the Medication Plan sheet to give to him to complete and to fax back to PAT. Patient also told PAT nurse that she normally takes Levothyroxine daily, but has been out for about a month, and has not been back to PCP to have new prescription written. Nurse Practioner today ordered TSH levels to be drawn along with other required labs. Patient will call PCP to ask for renewal of perscription for Levothyroxine.
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
## 2019-10-01 ENCOUNTER — OFFICE VISIT (OUTPATIENT)
Dept: FAMILY MEDICINE CLINIC | Age: 49
End: 2019-10-01

## 2019-10-01 VITALS
HEART RATE: 76 BPM | DIASTOLIC BLOOD PRESSURE: 71 MMHG | BODY MASS INDEX: 37.91 KG/M2 | TEMPERATURE: 98.4 F | WEIGHT: 206 LBS | RESPIRATION RATE: 18 BRPM | SYSTOLIC BLOOD PRESSURE: 118 MMHG | HEIGHT: 62 IN

## 2019-10-01 DIAGNOSIS — Z23 ENCOUNTER FOR IMMUNIZATION: ICD-10-CM

## 2019-10-01 DIAGNOSIS — E03.9 ACQUIRED HYPOTHYROIDISM: Primary | ICD-10-CM

## 2019-10-01 DIAGNOSIS — G47.00 INSOMNIA, UNSPECIFIED TYPE: ICD-10-CM

## 2019-10-01 DIAGNOSIS — Z91.199 MEDICALLY NONCOMPLIANT: ICD-10-CM

## 2019-10-01 LAB
ATRIAL RATE: 79 BPM
BACTERIA SPEC CULT: NORMAL
BACTERIA SPEC CULT: NORMAL
CALCULATED R AXIS, ECG10: -36 DEGREES
CALCULATED T AXIS, ECG11: -2 DEGREES
DIAGNOSIS, 93000: NORMAL
P-R INTERVAL, ECG05: 162 MS
Q-T INTERVAL, ECG07: 392 MS
QRS DURATION, ECG06: 70 MS
QTC CALCULATION (BEZET), ECG08: 449 MS
SERVICE CMNT-IMP: NORMAL
VENTRICULAR RATE, ECG03: 79 BPM

## 2019-10-01 RX ORDER — LEVOTHYROXINE SODIUM 50 UG/1
TABLET ORAL
Qty: 30 TAB | Refills: 1 | Status: SHIPPED | OUTPATIENT
Start: 2019-10-01 | End: 2019-12-19 | Stop reason: SDUPTHER

## 2019-10-01 RX ORDER — TRAZODONE HYDROCHLORIDE 50 MG/1
50 TABLET ORAL
Qty: 30 TAB | Refills: 0 | Status: ON HOLD | OUTPATIENT
Start: 2019-10-01 | End: 2019-10-07

## 2019-10-01 NOTE — PROGRESS NOTES
Chief Complaint   Patient presents with    Medication Refill     levothyroxine     1. Have you been to the ER, urgent care clinic since your last visit? Hospitalized since your last visit? Yes [facility] 09/2019 dx sob, dizziness    2. Have you seen or consulted any other health care providers outside of the [address] [place] [address] since your last visit? Include any pap smears or colon screening.  No

## 2019-10-01 NOTE — PROGRESS NOTES
HISTORY OF PRESENT ILLNESS  Connor Griffiths is a 51 y.o. female. Connor Griffiths is her for follow up on her hypothyroidism. This is a chronic problem. She is due for a TSH. Currently, she is taking Synthroid, which had been working well, however, her recent TSH was mildly elevated. This has worsened. She has been out of her medication for at least 6 months. Also, she has been gaining weight. She has had several knee surgeries over the past 2 years. She has been having trouble sleeping and wants to know what she can take. Review of Systems   Constitutional: Negative for malaise/fatigue and weight loss. No weight gain, heat intolerance, cold intolerance   Cardiovascular: Negative for leg swelling. Gastrointestinal: Negative for constipation and diarrhea. Skin:        No skin changes, no hair loss   Psychiatric/Behavioral: Negative for depression. The patient is not nervous/anxious. Lab Results   Component Value Date/Time    TSH 5.34 (H) 09/30/2019 03:36 PM    TSH, External 3.4 03/02/2011    T4, Free 1.0 03/06/2018 09:58 AM           Visit Vitals  /71   Pulse 76   Temp 98.4 °F (36.9 °C) (Oral)   Resp 18   Ht 5' 2\" (1.575 m)   Wt 206 lb (93.4 kg)   LMP 09/01/2019 (Approximate)   BMI 37.68 kg/m²     Physical Exam   Constitutional: She is oriented to person, place, and time. She appears well-developed and well-nourished. No distress. Neck: No thyromegaly present. Cardiovascular: Normal rate, regular rhythm and normal heart sounds. Exam reveals no gallop and no friction rub. No murmur heard. Pulmonary/Chest: Effort normal and breath sounds normal. No respiratory distress. She has no wheezes. She has no rales. Musculoskeletal: She exhibits no edema. Neurological: She is alert and oriented to person, place, and time. She has normal reflexes. Skin: Skin is warm and dry. She is not diaphoretic. Nursing note and vitals reviewed.       ASSESSMENT and PLAN    ICD-10-CM ICD-9-CM    1. Acquired hypothyroidism E03.9 244.9 levothyroxine (SYNTHROID) 50 mcg tablet      TSH 3RD GENERATION   2. Insomnia, unspecified type G47.00 780.52 traZODone (DESYREL) 50 mg tablet   3. Medically noncompliant Z91.19 V15.81    4. Encounter for immunization Z23 V03.89 INFLUENZA VIRUS VAC QUAD,SPLIT,PRESV FREE SYRINGE IM        Uncontrolled hypothyroidism due to noncompliance  Restart Synthroid with TSH in 6 weeks  Trazodone  Flu shot  Noncompliance discussed    Follow-up and Dispositions    · Return in about 1 year (around 10/1/2020) for thyroid. Reviewed plan of care. Patient has provided input and agrees with goals.

## 2019-10-02 LAB — TRANSFERRIN SERPL-MCNC: 297 MG/DL (ref 200–370)

## 2019-10-02 NOTE — PROGRESS NOTES
MRSA and Transferrin WNL      Received Coumadin and lovenox instructions from Dr. Basim Guadarrama:  Stopped Coumadin 10/1, Start Lovenox 10/2 with last injection 10/6, night after procedure resume Lovenox BID, Day after procedure restart Coumadin along with Lovenox, Check INR 5 days post-op.

## 2019-10-04 ENCOUNTER — ANESTHESIA EVENT (OUTPATIENT)
Dept: SURGERY | Age: 49
DRG: 302 | End: 2019-10-04
Payer: MEDICAID

## 2019-10-04 NOTE — PERIOP NOTES
Patient with history of MRSA in 2009 and 2010. There are 3 negative MRSA cultures since then. NO need for contact isolation for the day of surgery.   DOS: 10/7/2019

## 2019-10-07 ENCOUNTER — HOSPITAL ENCOUNTER (INPATIENT)
Age: 49
LOS: 2 days | Discharge: HOME | DRG: 302 | End: 2019-10-09
Attending: ORTHOPAEDIC SURGERY | Admitting: ORTHOPAEDIC SURGERY
Payer: MEDICAID

## 2019-10-07 ENCOUNTER — ANESTHESIA (OUTPATIENT)
Dept: SURGERY | Age: 49
DRG: 302 | End: 2019-10-07
Payer: MEDICAID

## 2019-10-07 ENCOUNTER — APPOINTMENT (OUTPATIENT)
Dept: GENERAL RADIOLOGY | Age: 49
DRG: 302 | End: 2019-10-07
Attending: PHYSICIAN ASSISTANT
Payer: MEDICAID

## 2019-10-07 DIAGNOSIS — T84.018D FAILURE OF TOTAL KNEE REPLACEMENT, SUBSEQUENT ENCOUNTER: Primary | ICD-10-CM

## 2019-10-07 DIAGNOSIS — Z96.659 FAILURE OF TOTAL KNEE REPLACEMENT, SUBSEQUENT ENCOUNTER: Primary | ICD-10-CM

## 2019-10-07 PROBLEM — T84.022A INSTABILITY OF INTERNAL RIGHT KNEE PROSTHESIS (HCC): Status: ACTIVE | Noted: 2019-10-07

## 2019-10-07 LAB
APPEARANCE SNV: ABNORMAL
COLOR SNV: ABNORMAL
HCG UR QL: NEGATIVE
INR PPP: 1 (ref 0.9–1.1)
LYMPHOCYTES NFR SNV MANUAL: 64 % (ref 0–15)
MONOCYTES NFR SNV MANUAL: 28 % (ref 0–65)
NEUTROPHILS NFR SNV MANUAL: 8 % (ref 0–20)
PROTHROMBIN TIME: 10.6 SEC (ref 9–11.1)
RBC # SNV: >100 /CU MM
SPECIMEN SOURCE FLD: ABNORMAL
WBC # SNV: 296 /CU MM (ref 0–150)

## 2019-10-07 PROCEDURE — 74011250636 HC RX REV CODE- 250/636: Performed by: ORTHOPAEDIC SURGERY

## 2019-10-07 PROCEDURE — 74011250636 HC RX REV CODE- 250/636: Performed by: ANESTHESIOLOGY

## 2019-10-07 PROCEDURE — 77030035236 HC SUT PDS STRATFX BARB J&J -B: Performed by: ORTHOPAEDIC SURGERY

## 2019-10-07 PROCEDURE — 77030018673: Performed by: ORTHOPAEDIC SURGERY

## 2019-10-07 PROCEDURE — 74011250636 HC RX REV CODE- 250/636

## 2019-10-07 PROCEDURE — 77030003601 HC NDL NRV BLK BBMI -A

## 2019-10-07 PROCEDURE — 77030007866 HC KT SPN ANES BBMI -B

## 2019-10-07 PROCEDURE — 64445 NJX AA&/STRD SCIATIC NRV IMG: CPT

## 2019-10-07 PROCEDURE — 87075 CULTR BACTERIA EXCEPT BLOOD: CPT

## 2019-10-07 PROCEDURE — 74011000272 HC RX REV CODE- 272: Performed by: ORTHOPAEDIC SURGERY

## 2019-10-07 PROCEDURE — 74011250637 HC RX REV CODE- 250/637: Performed by: PHYSICIAN ASSISTANT

## 2019-10-07 PROCEDURE — 77030040361 HC SLV COMPR DVT MDII -B

## 2019-10-07 PROCEDURE — 77030027138 HC INCENT SPIROMETER -A

## 2019-10-07 PROCEDURE — 77030012935 HC DRSG AQUACEL BMS -B: Performed by: ORTHOPAEDIC SURGERY

## 2019-10-07 PROCEDURE — 73560 X-RAY EXAM OF KNEE 1 OR 2: CPT

## 2019-10-07 PROCEDURE — 74011250636 HC RX REV CODE- 250/636: Performed by: NURSE ANESTHETIST, CERTIFIED REGISTERED

## 2019-10-07 PROCEDURE — 85610 PROTHROMBIN TIME: CPT

## 2019-10-07 PROCEDURE — 77030002933 HC SUT MCRYL J&J -A: Performed by: ORTHOPAEDIC SURGERY

## 2019-10-07 PROCEDURE — 74011250637 HC RX REV CODE- 250/637: Performed by: ANESTHESIOLOGY

## 2019-10-07 PROCEDURE — 74011000250 HC RX REV CODE- 250: Performed by: PHYSICIAN ASSISTANT

## 2019-10-07 PROCEDURE — C1776 JOINT DEVICE (IMPLANTABLE): HCPCS | Performed by: ORTHOPAEDIC SURGERY

## 2019-10-07 PROCEDURE — 77030018723 HC ELCTRD BLD COVD -A: Performed by: ORTHOPAEDIC SURGERY

## 2019-10-07 PROCEDURE — 76030000020 HC AMB SURG 1.5 TO 2 HR INTENSV-TIER 1: Performed by: ORTHOPAEDIC SURGERY

## 2019-10-07 PROCEDURE — 77030031139 HC SUT VCRL2 J&J -A: Performed by: ORTHOPAEDIC SURGERY

## 2019-10-07 PROCEDURE — 87176 TISSUE HOMOGENIZATION CULTR: CPT

## 2019-10-07 PROCEDURE — 74011000250 HC RX REV CODE- 250

## 2019-10-07 PROCEDURE — 74011000258 HC RX REV CODE- 258: Performed by: ORTHOPAEDIC SURGERY

## 2019-10-07 PROCEDURE — 77030008467 HC STPLR SKN COVD -B: Performed by: ORTHOPAEDIC SURGERY

## 2019-10-07 PROCEDURE — 89050 BODY FLUID CELL COUNT: CPT

## 2019-10-07 PROCEDURE — 76060000063 HC AMB SURG ANES 1.5 TO 2 HR: Performed by: ORTHOPAEDIC SURGERY

## 2019-10-07 PROCEDURE — 77030013708 HC HNDPC SUC IRR PULS STRY –B: Performed by: ORTHOPAEDIC SURGERY

## 2019-10-07 PROCEDURE — 74011000250 HC RX REV CODE- 250: Performed by: NURSE ANESTHETIST, CERTIFIED REGISTERED

## 2019-10-07 PROCEDURE — 87205 SMEAR GRAM STAIN: CPT

## 2019-10-07 PROCEDURE — 74011000250 HC RX REV CODE- 250: Performed by: ORTHOPAEDIC SURGERY

## 2019-10-07 PROCEDURE — 64447 NJX AA&/STRD FEMORAL NRV IMG: CPT

## 2019-10-07 PROCEDURE — 77030011640 HC PAD GRND REM COVD -A: Performed by: ORTHOPAEDIC SURGERY

## 2019-10-07 PROCEDURE — 77030040922 HC BLNKT HYPOTHRM STRY -A

## 2019-10-07 PROCEDURE — 74011250637 HC RX REV CODE- 250/637: Performed by: ORTHOPAEDIC SURGERY

## 2019-10-07 PROCEDURE — 65270000029 HC RM PRIVATE

## 2019-10-07 PROCEDURE — 77030018836 HC SOL IRR NACL ICUM -A: Performed by: ORTHOPAEDIC SURGERY

## 2019-10-07 PROCEDURE — 77030039266 HC ADH SKN EXOFIN S2SG -A: Performed by: ORTHOPAEDIC SURGERY

## 2019-10-07 PROCEDURE — 77030028907 HC WRP KNEE WO BGS SOLM -B

## 2019-10-07 PROCEDURE — 81025 URINE PREGNANCY TEST: CPT

## 2019-10-07 PROCEDURE — 74011250636 HC RX REV CODE- 250/636: Performed by: PHYSICIAN ASSISTANT

## 2019-10-07 PROCEDURE — 77030020263 HC SOL INJ SOD CL0.9% LFCR 1000ML: Performed by: ORTHOPAEDIC SURGERY

## 2019-10-07 PROCEDURE — 0SPC09Z REMOVAL OF LINER FROM RIGHT KNEE JOINT, OPEN APPROACH: ICD-10-PCS | Performed by: ORTHOPAEDIC SURGERY

## 2019-10-07 PROCEDURE — 0SUV09Z SUPPLEMENT RIGHT KNEE JOINT, TIBIAL SURFACE WITH LINER, OPEN APPROACH: ICD-10-PCS | Performed by: ORTHOPAEDIC SURGERY

## 2019-10-07 PROCEDURE — 36415 COLL VENOUS BLD VENIPUNCTURE: CPT

## 2019-10-07 PROCEDURE — 76210000000 HC OR PH I REC 2 TO 2.5 HR

## 2019-10-07 DEVICE — TIBIAL BEARING INSERT - PS
Type: IMPLANTABLE DEVICE | Site: KNEE | Status: FUNCTIONAL
Brand: TRIATHLON

## 2019-10-07 RX ORDER — OXYCODONE HYDROCHLORIDE 5 MG/1
5 TABLET ORAL
Qty: 42 TAB | Refills: 0 | Status: SHIPPED | OUTPATIENT
Start: 2019-10-07 | End: 2019-10-14

## 2019-10-07 RX ORDER — NALOXONE HYDROCHLORIDE 0.4 MG/ML
0.4 INJECTION, SOLUTION INTRAMUSCULAR; INTRAVENOUS; SUBCUTANEOUS AS NEEDED
Status: DISCONTINUED | OUTPATIENT
Start: 2019-10-07 | End: 2019-10-09 | Stop reason: HOSPADM

## 2019-10-07 RX ORDER — LIDOCAINE HYDROCHLORIDE 10 MG/ML
0.1 INJECTION, SOLUTION EPIDURAL; INFILTRATION; INTRACAUDAL; PERINEURAL AS NEEDED
Status: DISCONTINUED | OUTPATIENT
Start: 2019-10-07 | End: 2019-10-09 | Stop reason: HOSPADM

## 2019-10-07 RX ORDER — SODIUM CHLORIDE 0.9 % (FLUSH) 0.9 %
5-40 SYRINGE (ML) INJECTION EVERY 8 HOURS
Status: DISCONTINUED | OUTPATIENT
Start: 2019-10-07 | End: 2019-10-09 | Stop reason: HOSPADM

## 2019-10-07 RX ORDER — LIDOCAINE HYDROCHLORIDE 20 MG/ML
INJECTION, SOLUTION INFILTRATION; PERINEURAL AS NEEDED
Status: DISCONTINUED | OUTPATIENT
Start: 2019-10-07 | End: 2019-10-07 | Stop reason: HOSPADM

## 2019-10-07 RX ORDER — BUPIVACAINE HYDROCHLORIDE 7.5 MG/ML
INJECTION, SOLUTION EPIDURAL; RETROBULBAR AS NEEDED
Status: DISCONTINUED | OUTPATIENT
Start: 2019-10-07 | End: 2019-10-07 | Stop reason: HOSPADM

## 2019-10-07 RX ORDER — ENOXAPARIN SODIUM 100 MG/ML
30 INJECTION SUBCUTANEOUS DAILY
COMMUNITY
End: 2019-10-09

## 2019-10-07 RX ORDER — PROPOFOL 10 MG/ML
INJECTION, EMULSION INTRAVENOUS
Status: DISCONTINUED | OUTPATIENT
Start: 2019-10-07 | End: 2019-10-07 | Stop reason: HOSPADM

## 2019-10-07 RX ORDER — MELATONIN
2000 DAILY
Status: DISCONTINUED | OUTPATIENT
Start: 2019-10-08 | End: 2019-10-09 | Stop reason: HOSPADM

## 2019-10-07 RX ORDER — ONDANSETRON 2 MG/ML
4 INJECTION INTRAMUSCULAR; INTRAVENOUS
Status: ACTIVE | OUTPATIENT
Start: 2019-10-07 | End: 2019-10-08

## 2019-10-07 RX ORDER — SODIUM CHLORIDE, SODIUM LACTATE, POTASSIUM CHLORIDE, CALCIUM CHLORIDE 600; 310; 30; 20 MG/100ML; MG/100ML; MG/100ML; MG/100ML
125 INJECTION, SOLUTION INTRAVENOUS CONTINUOUS
Status: DISCONTINUED | OUTPATIENT
Start: 2019-10-07 | End: 2019-10-07 | Stop reason: HOSPADM

## 2019-10-07 RX ORDER — HYDROMORPHONE HYDROCHLORIDE 1 MG/ML
0.5 INJECTION, SOLUTION INTRAMUSCULAR; INTRAVENOUS; SUBCUTANEOUS
Status: ACTIVE | OUTPATIENT
Start: 2019-10-07 | End: 2019-10-08

## 2019-10-07 RX ORDER — ACETAMINOPHEN 500 MG
500-1000 TABLET ORAL
Qty: 60 TAB | Refills: 0 | Status: SHIPPED | OUTPATIENT
Start: 2019-10-07

## 2019-10-07 RX ORDER — OXYCODONE HYDROCHLORIDE 5 MG/1
10 TABLET ORAL
Status: DISCONTINUED | OUTPATIENT
Start: 2019-10-07 | End: 2019-10-09 | Stop reason: HOSPADM

## 2019-10-07 RX ORDER — OXYCODONE HYDROCHLORIDE 5 MG/1
10 TABLET ORAL
Status: DISCONTINUED | OUTPATIENT
Start: 2019-10-07 | End: 2019-10-07 | Stop reason: HOSPADM

## 2019-10-07 RX ORDER — CELECOXIB 100 MG/1
200 CAPSULE ORAL 2 TIMES DAILY
Status: DISCONTINUED | OUTPATIENT
Start: 2019-10-08 | End: 2019-10-07

## 2019-10-07 RX ORDER — ROPIVACAINE HYDROCHLORIDE 2 MG/ML
INJECTION, SOLUTION EPIDURAL; INFILTRATION; PERINEURAL AS NEEDED
Status: DISCONTINUED | OUTPATIENT
Start: 2019-10-07 | End: 2019-10-07 | Stop reason: HOSPADM

## 2019-10-07 RX ORDER — POLYETHYLENE GLYCOL 3350 17 G/17G
17 POWDER, FOR SOLUTION ORAL DAILY
Status: DISCONTINUED | OUTPATIENT
Start: 2019-10-08 | End: 2019-10-09 | Stop reason: HOSPADM

## 2019-10-07 RX ORDER — GABAPENTIN 100 MG/1
100 CAPSULE ORAL
Status: DISCONTINUED | OUTPATIENT
Start: 2019-10-08 | End: 2019-10-09 | Stop reason: HOSPADM

## 2019-10-07 RX ORDER — FACIAL-BODY WIPES
10 EACH TOPICAL DAILY PRN
Status: DISCONTINUED | OUTPATIENT
Start: 2019-10-09 | End: 2019-10-09 | Stop reason: HOSPADM

## 2019-10-07 RX ORDER — ONDANSETRON 2 MG/ML
4 INJECTION INTRAMUSCULAR; INTRAVENOUS AS NEEDED
Status: DISCONTINUED | OUTPATIENT
Start: 2019-10-07 | End: 2019-10-07 | Stop reason: HOSPADM

## 2019-10-07 RX ORDER — HYDROMORPHONE HYDROCHLORIDE 1 MG/ML
.5-1 INJECTION, SOLUTION INTRAMUSCULAR; INTRAVENOUS; SUBCUTANEOUS
Status: DISCONTINUED | OUTPATIENT
Start: 2019-10-07 | End: 2019-10-07 | Stop reason: HOSPADM

## 2019-10-07 RX ORDER — GABAPENTIN 300 MG/1
300 CAPSULE ORAL
Status: DISCONTINUED | OUTPATIENT
Start: 2019-10-07 | End: 2019-10-09 | Stop reason: HOSPADM

## 2019-10-07 RX ORDER — FAMOTIDINE 20 MG/1
20 TABLET, FILM COATED ORAL 2 TIMES DAILY
Status: DISCONTINUED | OUTPATIENT
Start: 2019-10-07 | End: 2019-10-09 | Stop reason: HOSPADM

## 2019-10-07 RX ORDER — LEVOTHYROXINE SODIUM 50 UG/1
50 TABLET ORAL
Status: DISCONTINUED | OUTPATIENT
Start: 2019-10-08 | End: 2019-10-09 | Stop reason: HOSPADM

## 2019-10-07 RX ORDER — GABAPENTIN 100 MG/1
100 CAPSULE ORAL
Qty: 120 CAP | Refills: 1 | Status: SHIPPED | OUTPATIENT
Start: 2019-10-07 | End: 2019-12-24

## 2019-10-07 RX ORDER — MIDAZOLAM HYDROCHLORIDE 1 MG/ML
INJECTION, SOLUTION INTRAMUSCULAR; INTRAVENOUS AS NEEDED
Status: DISCONTINUED | OUTPATIENT
Start: 2019-10-07 | End: 2019-10-07 | Stop reason: HOSPADM

## 2019-10-07 RX ORDER — IBUPROFEN 800 MG/1
800 TABLET ORAL
Qty: 50 TAB | Refills: 2 | Status: SHIPPED | OUTPATIENT
Start: 2019-10-07 | End: 2019-12-24

## 2019-10-07 RX ORDER — CELECOXIB 100 MG/1
100 CAPSULE ORAL ONCE
Status: COMPLETED | OUTPATIENT
Start: 2019-10-07 | End: 2019-10-07

## 2019-10-07 RX ORDER — OXYCODONE HCL 20 MG/1
20 TABLET, FILM COATED, EXTENDED RELEASE ORAL ONCE
Status: COMPLETED | OUTPATIENT
Start: 2019-10-07 | End: 2019-10-07

## 2019-10-07 RX ORDER — ASPIRIN 81 MG/1
81 TABLET ORAL 2 TIMES DAILY
Qty: 60 TAB | Refills: 0 | Status: SHIPPED | OUTPATIENT
Start: 2019-10-07 | End: 2019-10-09

## 2019-10-07 RX ORDER — CELECOXIB 100 MG/1
200 CAPSULE ORAL 2 TIMES DAILY
Status: DISCONTINUED | OUTPATIENT
Start: 2019-10-09 | End: 2019-10-09 | Stop reason: HOSPADM

## 2019-10-07 RX ORDER — AMOXICILLIN 250 MG
1 CAPSULE ORAL 2 TIMES DAILY
Status: DISCONTINUED | OUTPATIENT
Start: 2019-10-07 | End: 2019-10-09 | Stop reason: HOSPADM

## 2019-10-07 RX ORDER — CELECOXIB 100 MG/1
200 CAPSULE ORAL 2 TIMES DAILY
Status: DISCONTINUED | OUTPATIENT
Start: 2019-10-07 | End: 2019-10-07

## 2019-10-07 RX ORDER — ONDANSETRON 8 MG/1
4 TABLET, ORALLY DISINTEGRATING ORAL
Qty: 30 TAB | Refills: 0 | Status: SHIPPED | OUTPATIENT
Start: 2019-10-07 | End: 2019-12-24

## 2019-10-07 RX ORDER — ACETAMINOPHEN 325 MG/1
975 TABLET ORAL ONCE
Status: COMPLETED | OUTPATIENT
Start: 2019-10-07 | End: 2019-10-07

## 2019-10-07 RX ORDER — ENOXAPARIN SODIUM 100 MG/ML
30 INJECTION SUBCUTANEOUS EVERY 12 HOURS
Status: DISCONTINUED | OUTPATIENT
Start: 2019-10-08 | End: 2019-10-09

## 2019-10-07 RX ORDER — SODIUM CHLORIDE 0.9 % (FLUSH) 0.9 %
5-40 SYRINGE (ML) INJECTION AS NEEDED
Status: DISCONTINUED | OUTPATIENT
Start: 2019-10-07 | End: 2019-10-09 | Stop reason: HOSPADM

## 2019-10-07 RX ORDER — PREGABALIN 75 MG/1
75 CAPSULE ORAL ONCE
Status: COMPLETED | OUTPATIENT
Start: 2019-10-07 | End: 2019-10-07

## 2019-10-07 RX ORDER — TRAMADOL HYDROCHLORIDE 50 MG/1
50 TABLET ORAL
Qty: 40 TAB | Refills: 0 | Status: SHIPPED | OUTPATIENT
Start: 2019-10-07 | End: 2019-10-14

## 2019-10-07 RX ORDER — KETOROLAC TROMETHAMINE 30 MG/ML
30 INJECTION, SOLUTION INTRAMUSCULAR; INTRAVENOUS EVERY 6 HOURS
Status: DISCONTINUED | OUTPATIENT
Start: 2019-10-08 | End: 2019-10-07

## 2019-10-07 RX ORDER — BUTALBITAL, ACETAMINOPHEN AND CAFFEINE 50; 325; 40 MG/1; MG/1; MG/1
1 TABLET ORAL
Status: DISCONTINUED | OUTPATIENT
Start: 2019-10-07 | End: 2019-10-09 | Stop reason: HOSPADM

## 2019-10-07 RX ORDER — FENTANYL CITRATE 50 UG/ML
INJECTION, SOLUTION INTRAMUSCULAR; INTRAVENOUS AS NEEDED
Status: DISCONTINUED | OUTPATIENT
Start: 2019-10-07 | End: 2019-10-07 | Stop reason: HOSPADM

## 2019-10-07 RX ORDER — RANITIDINE 150 MG/1
150 TABLET, FILM COATED ORAL AS NEEDED
Status: DISCONTINUED | OUTPATIENT
Start: 2019-10-07 | End: 2019-10-07 | Stop reason: CLARIF

## 2019-10-07 RX ORDER — SODIUM CHLORIDE 9 MG/ML
125 INJECTION, SOLUTION INTRAVENOUS CONTINUOUS
Status: DISPENSED | OUTPATIENT
Start: 2019-10-07 | End: 2019-10-08

## 2019-10-07 RX ORDER — KETOROLAC TROMETHAMINE 30 MG/ML
30 INJECTION, SOLUTION INTRAMUSCULAR; INTRAVENOUS EVERY 6 HOURS
Status: COMPLETED | OUTPATIENT
Start: 2019-10-08 | End: 2019-10-08

## 2019-10-07 RX ORDER — KETOROLAC TROMETHAMINE 30 MG/ML
15 INJECTION, SOLUTION INTRAMUSCULAR; INTRAVENOUS
Status: DISCONTINUED | OUTPATIENT
Start: 2019-10-07 | End: 2019-10-07 | Stop reason: HOSPADM

## 2019-10-07 RX ORDER — POVIDONE-IODINE 10 %
SOLUTION, NON-ORAL TOPICAL AS NEEDED
Status: DISCONTINUED | OUTPATIENT
Start: 2019-10-07 | End: 2019-10-07 | Stop reason: HOSPADM

## 2019-10-07 RX ORDER — ACETAMINOPHEN 325 MG/1
650 TABLET ORAL EVERY 6 HOURS
Status: DISCONTINUED | OUTPATIENT
Start: 2019-10-07 | End: 2019-10-09 | Stop reason: HOSPADM

## 2019-10-07 RX ORDER — SODIUM CHLORIDE, SODIUM LACTATE, POTASSIUM CHLORIDE, CALCIUM CHLORIDE 600; 310; 30; 20 MG/100ML; MG/100ML; MG/100ML; MG/100ML
125 INJECTION, SOLUTION INTRAVENOUS CONTINUOUS
Status: DISPENSED | OUTPATIENT
Start: 2019-10-07 | End: 2019-10-08

## 2019-10-07 RX ORDER — DIPHENHYDRAMINE HYDROCHLORIDE 50 MG/ML
12.5 INJECTION, SOLUTION INTRAMUSCULAR; INTRAVENOUS
Status: ACTIVE | OUTPATIENT
Start: 2019-10-07 | End: 2019-10-08

## 2019-10-07 RX ORDER — OXYCODONE HYDROCHLORIDE 5 MG/1
5 TABLET ORAL
Status: DISCONTINUED | OUTPATIENT
Start: 2019-10-07 | End: 2019-10-09 | Stop reason: HOSPADM

## 2019-10-07 RX ADMIN — FAMOTIDINE 20 MG: 20 TABLET ORAL at 21:54

## 2019-10-07 RX ADMIN — GABAPENTIN 300 MG: 300 CAPSULE ORAL at 21:54

## 2019-10-07 RX ADMIN — OXYCODONE HYDROCHLORIDE 20 MG: 20 TABLET, FILM COATED, EXTENDED RELEASE ORAL at 14:32

## 2019-10-07 RX ADMIN — BUPIVACAINE HYDROCHLORIDE 2.4 ML: 7.5 INJECTION, SOLUTION EPIDURAL; RETROBULBAR at 16:32

## 2019-10-07 RX ADMIN — PROPOFOL 100 MCG/KG/MIN: 10 INJECTION, EMULSION INTRAVENOUS at 16:39

## 2019-10-07 RX ADMIN — WATER 2 G: 1 INJECTION INTRAMUSCULAR; INTRAVENOUS; SUBCUTANEOUS at 22:41

## 2019-10-07 RX ADMIN — PREGABALIN 75 MG: 75 CAPSULE ORAL at 14:32

## 2019-10-07 RX ADMIN — ROPIVACAINE HYDROCHLORIDE 20 ML: 2 INJECTION, SOLUTION EPIDURAL; INFILTRATION; PERINEURAL at 16:19

## 2019-10-07 RX ADMIN — ROPIVACAINE HYDROCHLORIDE 20 ML: 2 INJECTION, SOLUTION EPIDURAL; INFILTRATION; PERINEURAL at 16:23

## 2019-10-07 RX ADMIN — ACETAMINOPHEN 975 MG: 325 TABLET ORAL at 14:31

## 2019-10-07 RX ADMIN — CELECOXIB 100 MG: 100 CAPSULE ORAL at 14:32

## 2019-10-07 RX ADMIN — FENTANYL CITRATE 50 MCG: 50 INJECTION INTRAMUSCULAR; INTRAVENOUS at 16:24

## 2019-10-07 RX ADMIN — FENTANYL CITRATE 50 MCG: 50 INJECTION INTRAMUSCULAR; INTRAVENOUS at 16:15

## 2019-10-07 RX ADMIN — WATER 2 G: 1 INJECTION INTRAMUSCULAR; INTRAVENOUS; SUBCUTANEOUS at 16:45

## 2019-10-07 RX ADMIN — MIDAZOLAM 2 MG: 1 INJECTION INTRAMUSCULAR; INTRAVENOUS at 16:15

## 2019-10-07 RX ADMIN — SODIUM CHLORIDE 125 ML/HR: 900 INJECTION, SOLUTION INTRAVENOUS at 19:15

## 2019-10-07 RX ADMIN — PHENYLEPHRINE HYDROCHLORIDE 80 MCG: 10 INJECTION INTRAMUSCULAR; INTRAVENOUS; SUBCUTANEOUS at 16:56

## 2019-10-07 RX ADMIN — SENNOSIDES,DOCUSATE SODIUM 1 TABLET: 8.6; 5 TABLET, FILM COATED ORAL at 21:54

## 2019-10-07 RX ADMIN — SODIUM CHLORIDE, SODIUM LACTATE, POTASSIUM CHLORIDE, AND CALCIUM CHLORIDE: 600; 310; 30; 20 INJECTION, SOLUTION INTRAVENOUS at 17:00

## 2019-10-07 RX ADMIN — BUTALBITAL, ACETAMINOPHEN, AND CAFFEINE 1 TABLET: 50; 325; 40 TABLET ORAL at 22:03

## 2019-10-07 RX ADMIN — HYDROMORPHONE HYDROCHLORIDE 0.5 MG: 1 INJECTION, SOLUTION INTRAMUSCULAR; INTRAVENOUS; SUBCUTANEOUS at 19:08

## 2019-10-07 RX ADMIN — SODIUM CHLORIDE, SODIUM LACTATE, POTASSIUM CHLORIDE, AND CALCIUM CHLORIDE 125 ML/HR: 600; 310; 30; 20 INJECTION, SOLUTION INTRAVENOUS at 14:25

## 2019-10-07 RX ADMIN — OXYCODONE HYDROCHLORIDE 10 MG: 5 TABLET ORAL at 22:57

## 2019-10-07 RX ADMIN — PHENYLEPHRINE HYDROCHLORIDE 80 MCG: 10 INJECTION INTRAMUSCULAR; INTRAVENOUS; SUBCUTANEOUS at 17:10

## 2019-10-07 RX ADMIN — LIDOCAINE HYDROCHLORIDE 40 MG: 20 INJECTION, SOLUTION INFILTRATION; PERINEURAL at 16:39

## 2019-10-07 RX ADMIN — Medication 10 ML: at 21:56

## 2019-10-07 NOTE — ANESTHESIA PREPROCEDURE EVALUATION
Anesthetic History   No history of anesthetic complications            Review of Systems / Medical History  Patient summary reviewed, nursing notes reviewed and pertinent labs reviewed    Pulmonary  Within defined limits                 Neuro/Psych         Psychiatric history    Comments: RESTLESS LEG SX    HX OF COMPLEX REGIONAL PAIN SYNDROME Cardiovascular  Within defined limits                Exercise tolerance: >4 METS     GI/Hepatic/Renal     GERD           Endo/Other      Hypothyroidism  Morbid obesity and arthritis     Other Findings   Comments: Factor V leiden def  History of dvt, PE, james filter; COumadin/Lovenox  Needs revision TKR           Physical Exam    Airway  Mallampati: II  TM Distance: 4 - 6 cm  Neck ROM: normal range of motion   Mouth opening: Normal     Cardiovascular  Regular rate and rhythm,  S1 and S2 normal,  no murmur, click, rub, or gallop  Rhythm: regular  Rate: normal         Dental  No notable dental hx       Pulmonary  Breath sounds clear to auscultation               Abdominal  GI exam deferred       Other Findings            Anesthetic Plan    ASA: 3  Anesthesia type: spinal and regional - femoral single shot and popliteal fossa block      Post-op pain plan if not by surgeon: peripheral nerve block single      Anesthetic plan and risks discussed with: Patient

## 2019-10-07 NOTE — ANESTHESIA PROCEDURE NOTES
Peripheral Block    Start time: 10/7/2019 4:15 PM  End time: 10/7/2019 4:23 PM  Performed by: Tatum Crowder MD  Authorized by: Tatum Crowder MD       Pre-procedure: Indications: at surgeon's request and post-op pain management    Preanesthetic Checklist: patient identified, risks and benefits discussed, site marked, timeout performed, anesthesia consent given and patient being monitored    Timeout Time: 16:15          Block Type:   Block Type:  Popliteal and femoral single shot  Laterality:  Right  Monitoring:  Continuous pulse ox, frequent vital sign checks, heart rate and responsive to questions  Injection Technique:  Single shot  Procedures: ultrasound guided and nerve stimulator    Patient Position: supine  Prep: chlorhexidine    Location:  Upper thigh  Needle Type:  Stimuplex  Needle Gauge:  22 G  Needle Localization:  Nerve stimulator and ultrasound guidance    Assessment:  Number of attempts:  1  Injection Assessment:  Incremental injection every 5 mL, local visualized surrounding nerve on ultrasound, negative aspiration for blood, no paresthesia and no intravascular symptoms  Patient tolerance:  Patient tolerated the procedure well with no immediate complications  Popliteal block done under ultrasound guidance and nerve stimulation with incremental injection of Ropivacaine 0.2% 20 cc using a 21 G Stimuplex needle. Blocks done by MARLON Kingston under supervision of Dr. Maranda Owens.

## 2019-10-07 NOTE — OP NOTES
OPERATIVE REPORT     Admit Date: 10/7/2019  Admit Diagnosis: Instability of internal right knee prosthesis, subsequent encounter [T84.022D]    Date of Procedure: 10/7/2019   Preoperative Diagnosis: FAILED RIGHT TOTAL KNEE ARTHROPLASTY  Postoperative Diagnosis: FAILED RIGHT TOTAL KNEE ARTHROPLASTY    Procedure: Procedure(s):  REVISION RIGHT TOTAL KNEE ARTHROPLASTY  Surgeon: Diana Bal MD  Assistant(s): Bruce Rob PA-C  Anesthesia: Spinal   Estimated Blood Loss: 300cc  Specimens:   ID Type Source Tests Collected by Time Destination   1 : right knee synovial tissue Tissue Knee, right CULTURE, ANAEROBIC AND AEROBIC Linda Barrios MD 10/7/2019 1701 Microbiology   2 : Right knee aspirate Joint Fluid Knee, right CULTURE, ANAEROBIC AND AEROBIC Linda Barrios MD 10/7/2019 1702 Microbiology   3 : Right knee aspirate Joint Fluid Knee, right CELL COUNT, SYNOVIAL FLUID Linda Barrios MD 10/7/2019 1708 Microbiology      Complications: None          INDICATIONS:     The patient is a 51 y.o., female who has continued pain and instability of the knee. The patient underwent the appropriate preoperative labs and was indicated for surgery. Informed consent obtained including a discussion of the risks and benefits, which include, but are not limited to, bleeding, infection, neurovascular damage, wound complications, pain and stiffness in the knee, periprosthetic loosening, fracture dislocation and DVT, the patient consented for the procedure. DESCRIPTION OF PROCEDURE:     The proper limb was identified and signed in the preoperative holding area. All questions were answered. After adequate anesthesia, the right lower extremity was prepped and draped in sterile fashion. The patient was positioned supine on the operating room table. Using the old incision a mid-line parapatellar incision was used and an aspiration of the knee was performed. A medial arthrotomy was used.  Excess or residual scar was excised if non-viable. The medial and lateral gutters were debrided of scar and tissue. Soft tissue was removed from around the patellar component and notch. The MCL was mobilized and the tibia subluxed. The tibial poly-ethylene was removed without damage to the femoral or tibial components. Both components were tested and were stable to testing. All tissue which appeared reactive was removed from the posterior aspect of the knee. Trials were performed and after adequate stability was obtained with the knee the knee was copiously irrigated with normal saline and dilute betadine. The new poly was impacted in place. There was good knee extension and normal patellar tracking noted. The arthrotomy was closed w/ 0-Prolene suture, and 2-0 Prolene for the subcutaneus tissue and staples. A sterile dressing was applied. The patient was awoken from anesthesia and taken to the recovery room in a stable condiition. OPERATIVE FINDINGS : Laxity of the knee in flexion and extension. IMPLANTS :   Implant Name Type Inv. Item Serial No.  Lot No. LRB No. Used Action   INSERT TIB PS TRIATHLN X3 2 19 --  - SNA  INSERT TIB PS TRIATHLN X3 2 19 --  NA [employer] [id number] Right 1 Implanted       JUSTIFICATION FOR SURGICAL ASSISTANT:   Surgical Assistant, was requried and necessary in this case, to help with soft tissue retraction, extremity positioning, equiment management, implant management, and wound closure.     Tristan Calderon MD

## 2019-10-07 NOTE — ANESTHESIA PROCEDURE NOTES
Spinal Block    Start time: 10/7/2019 4:24 PM  End time: 10/7/2019 4:32 PM  Performed by: Jose A Webster MD  Authorized by: Jose A Webster MD     Pre-procedure: Indications: at surgeon's request and primary anesthetic  Preanesthetic Checklist: patient identified, risks and benefits discussed, anesthesia consent, site marked, patient being monitored and timeout performed    Timeout Time: 16:24          Spinal Block:   Patient Position:  Seated  Prep Region:  Lumbar  Prep: Betadine      Location:  L3-4  Technique:  Single shot        Needle:   Needle Type:   Anahy  Needle Gauge:  25 G  Attempts:  2      Events: CSF confirmed, no blood with aspiration and no paresthesia        Assessment:  Insertion:  Uncomplicated  Patient tolerance:  Patient tolerated the procedure well with no immediate complications  Spinal done by MARLON Joaquin under supervision of Dr. Tatianna Leger

## 2019-10-07 NOTE — ANESTHESIA POSTPROCEDURE EVALUATION
Procedure(s):  REVISION RIGHT TOTAL KNEE ARTHROPLASTY. spinal, regional, MAC    Anesthesia Post Evaluation      Multimodal analgesia: multimodal analgesia used between 6 hours prior to anesthesia start to PACU discharge  Patient location during evaluation: bedside  Patient participation: complete - patient participated  Level of consciousness: awake  Pain management: adequate  Airway patency: patent  Anesthetic complications: no  Cardiovascular status: acceptable  Respiratory status: acceptable  Hydration status: acceptable  Post anesthesia nausea and vomiting:  controlled      Vitals Value Taken Time   /72 10/7/2019  7:20 PM   Temp     Pulse 71 10/7/2019  7:21 PM   Resp 12 10/7/2019  7:21 PM   SpO2 100 % 10/7/2019  7:21 PM   Vitals shown include unvalidated device data.

## 2019-10-08 LAB
ANION GAP SERPL CALC-SCNC: 8 MMOL/L (ref 5–15)
BUN SERPL-MCNC: 15 MG/DL (ref 6–20)
BUN/CREAT SERPL: 25 (ref 12–20)
CALCIUM SERPL-MCNC: 7.8 MG/DL (ref 8.5–10.1)
CHLORIDE SERPL-SCNC: 108 MMOL/L (ref 97–108)
CO2 SERPL-SCNC: 24 MMOL/L (ref 21–32)
CREAT SERPL-MCNC: 0.59 MG/DL (ref 0.55–1.02)
GLUCOSE SERPL-MCNC: 113 MG/DL (ref 65–100)
HGB BLD-MCNC: 11.4 G/DL (ref 11.5–16)
POTASSIUM SERPL-SCNC: 3.6 MMOL/L (ref 3.5–5.1)
SODIUM SERPL-SCNC: 140 MMOL/L (ref 136–145)

## 2019-10-08 PROCEDURE — 74011250636 HC RX REV CODE- 250/636: Performed by: PHYSICIAN ASSISTANT

## 2019-10-08 PROCEDURE — 97116 GAIT TRAINING THERAPY: CPT

## 2019-10-08 PROCEDURE — 74011250637 HC RX REV CODE- 250/637: Performed by: PHYSICIAN ASSISTANT

## 2019-10-08 PROCEDURE — 97162 PT EVAL MOD COMPLEX 30 MIN: CPT

## 2019-10-08 PROCEDURE — 94760 N-INVAS EAR/PLS OXIMETRY 1: CPT

## 2019-10-08 PROCEDURE — 85018 HEMOGLOBIN: CPT

## 2019-10-08 PROCEDURE — 97535 SELF CARE MNGMENT TRAINING: CPT

## 2019-10-08 PROCEDURE — 77010033678 HC OXYGEN DAILY

## 2019-10-08 PROCEDURE — 80048 BASIC METABOLIC PNL TOTAL CA: CPT

## 2019-10-08 PROCEDURE — 36415 COLL VENOUS BLD VENIPUNCTURE: CPT

## 2019-10-08 PROCEDURE — 74011000250 HC RX REV CODE- 250: Performed by: PHYSICIAN ASSISTANT

## 2019-10-08 PROCEDURE — 74011250636 HC RX REV CODE- 250/636: Performed by: ORTHOPAEDIC SURGERY

## 2019-10-08 PROCEDURE — 97165 OT EVAL LOW COMPLEX 30 MIN: CPT

## 2019-10-08 PROCEDURE — 97110 THERAPEUTIC EXERCISES: CPT

## 2019-10-08 PROCEDURE — 65270000029 HC RM PRIVATE

## 2019-10-08 PROCEDURE — 74011250637 HC RX REV CODE- 250/637: Performed by: NURSE PRACTITIONER

## 2019-10-08 RX ORDER — WARFARIN SODIUM 5 MG/1
10 TABLET ORAL
Status: DISCONTINUED | OUTPATIENT
Start: 2019-10-08 | End: 2019-10-09 | Stop reason: HOSPADM

## 2019-10-08 RX ORDER — WARFARIN SODIUM 5 MG/1
5 TABLET ORAL
Status: DISCONTINUED | OUTPATIENT
Start: 2019-10-12 | End: 2019-10-09 | Stop reason: HOSPADM

## 2019-10-08 RX ADMIN — LEVOTHYROXINE SODIUM 50 MCG: 50 TABLET ORAL at 06:45

## 2019-10-08 RX ADMIN — OXYCODONE HYDROCHLORIDE 10 MG: 5 TABLET ORAL at 12:11

## 2019-10-08 RX ADMIN — ENOXAPARIN SODIUM 30 MG: 30 INJECTION SUBCUTANEOUS at 09:02

## 2019-10-08 RX ADMIN — BUTALBITAL, ACETAMINOPHEN, AND CAFFEINE 1 TABLET: 50; 325; 40 TABLET ORAL at 16:10

## 2019-10-08 RX ADMIN — GABAPENTIN 300 MG: 300 CAPSULE ORAL at 21:39

## 2019-10-08 RX ADMIN — WATER 2 G: 1 INJECTION INTRAMUSCULAR; INTRAVENOUS; SUBCUTANEOUS at 06:45

## 2019-10-08 RX ADMIN — ENOXAPARIN SODIUM 30 MG: 30 INJECTION SUBCUTANEOUS at 21:40

## 2019-10-08 RX ADMIN — BUTALBITAL, ACETAMINOPHEN, AND CAFFEINE 1 TABLET: 50; 325; 40 TABLET ORAL at 12:11

## 2019-10-08 RX ADMIN — GABAPENTIN 100 MG: 100 CAPSULE ORAL at 09:02

## 2019-10-08 RX ADMIN — FAMOTIDINE 20 MG: 20 TABLET ORAL at 15:44

## 2019-10-08 RX ADMIN — OXYCODONE HYDROCHLORIDE 10 MG: 5 TABLET ORAL at 07:02

## 2019-10-08 RX ADMIN — Medication 10 ML: at 07:02

## 2019-10-08 RX ADMIN — OXYCODONE HYDROCHLORIDE 10 MG: 5 TABLET ORAL at 15:42

## 2019-10-08 RX ADMIN — OXYCODONE HYDROCHLORIDE 10 MG: 5 TABLET ORAL at 21:39

## 2019-10-08 RX ADMIN — FAMOTIDINE 20 MG: 20 TABLET ORAL at 09:01

## 2019-10-08 RX ADMIN — WARFARIN SODIUM 10 MG: 5 TABLET ORAL at 18:31

## 2019-10-08 RX ADMIN — KETOROLAC TROMETHAMINE 30 MG: 30 INJECTION, SOLUTION INTRAMUSCULAR at 01:13

## 2019-10-08 RX ADMIN — ACETAMINOPHEN 650 MG: 325 TABLET ORAL at 09:01

## 2019-10-08 RX ADMIN — KETOROLAC TROMETHAMINE 30 MG: 30 INJECTION, SOLUTION INTRAMUSCULAR at 20:43

## 2019-10-08 RX ADMIN — OXYCODONE HYDROCHLORIDE 10 MG: 5 TABLET ORAL at 18:31

## 2019-10-08 RX ADMIN — SODIUM CHLORIDE 125 ML/HR: 900 INJECTION, SOLUTION INTRAVENOUS at 03:30

## 2019-10-08 RX ADMIN — ACETAMINOPHEN 650 MG: 325 TABLET ORAL at 14:31

## 2019-10-08 RX ADMIN — KETOROLAC TROMETHAMINE 30 MG: 30 INJECTION, SOLUTION INTRAMUSCULAR at 06:45

## 2019-10-08 RX ADMIN — KETOROLAC TROMETHAMINE 30 MG: 30 INJECTION, SOLUTION INTRAMUSCULAR at 14:31

## 2019-10-08 RX ADMIN — VITAMIN D, TAB 1000IU (100/BT) 2000 INT'L UNITS: 25 TAB at 09:01

## 2019-10-08 RX ADMIN — BUTALBITAL, ACETAMINOPHEN, AND CAFFEINE 1 TABLET: 50; 325; 40 TABLET ORAL at 07:02

## 2019-10-08 RX ADMIN — SENNOSIDES,DOCUSATE SODIUM 1 TABLET: 8.6; 5 TABLET, FILM COATED ORAL at 09:01

## 2019-10-08 RX ADMIN — Medication 10 ML: at 21:42

## 2019-10-08 RX ADMIN — WATER 2 G: 1 INJECTION INTRAMUSCULAR; INTRAVENOUS; SUBCUTANEOUS at 14:31

## 2019-10-08 RX ADMIN — ACETAMINOPHEN 650 MG: 325 TABLET ORAL at 21:39

## 2019-10-08 NOTE — PROGRESS NOTES
NUTRITION   RD Screen      Pt seen for:      []  MST for     [x]   MD Consult    []        Supplements  []   PO intake check   []        Food Allergies  []   Food Preferences/tolerances    []        Rescreen  []   Education    []        Diet order clarification []   Other   []  LOS                          Nutrition Prescription:     Increase protein intake for 30 days, via supplements or dietary sources, in addition to balanced meals. Encourage adequate intake of meals and supplements    Assessment:   Information obtained from:   Patient    10/8: 52 y/o F admitted with instability of internal R knee prosthesis, subsequent encounter. MD consult for ONS. PMH - DVT, GERD, GI bleed, MRSA, obesity, vit D deficiency. S/p revision R total knee arthroplasty 10/7. Pt reports good appetite with >75% breakfast intake. Pt with good appetite PTA, usually only consumes breakfast and dinner but consumes x3-4 ensure daily.  lb. BMI 37.3 c/w obesity. Estimate nutritional needs -250 kcal to promote healthy weight loss. Pt reports recent weight stability, confirmed by weight Hx in EMR. No c/s difficulties. NKFA. Pt denies n/v/c/d. LBM 10/8, on bowel regimen. Skin without PI. Educated pt on increased protein s/p surgery. Disucssed protein rich foods and how to incorporate into diet. Recommended pt continue current ONS regimen however in ~4 weeks, cut back to 2 daily to promote weight loss. Will add Ensure HP TID in-house. Labs and medications reviewed. Labs Reviewed: [x]  Medications Reviewed: [x]     Cultural, Baptist and ethnic food preferences:   [x]  None   []  Identified and addressed    Diet: Regular    PO Intake: [x]           Good     []           Fair      []           Poor     No data found.     Wt Readings from Last 5 Encounters:   10/07/19 92.5 kg (204 lb)   10/01/19 93.4 kg (206 lb)   09/30/19 93.6 kg (206 lb 4.8 oz)   09/17/19 93.4 kg (206 lb)   08/20/19 92.1 kg (203 lb 0.7 oz)   ]    Body mass index is 37.31 kg/m². Skin: R knee incision  BM: 10/7, on bowel regimen  ABD: WDL    Estimated Daily Nutrition Requirements:  Kcals/day: 1711 Kcals/day(REE 1508 x 1.3 -250 kcal)  Protein: 95 g(93 - 111 gm (1.0 - 1.2 gm/kg))  Fluid:  1711    (1mL/kcal)  Based On: Hafsa Lipoma  Weight Used: Actual wt    Weight Changes:   []   Loss  []   Gain  [x]   Stable    Nutrition Problems Identified  [x]     None  []     Specified food preferences   []     Dislikes supplements              []     Allergies  []     Difficulty chewing     []     Dentition   []     Nausea/Vomiting  []    Constipation  []    Diarrhea    Nutrition Diagnosis:      Increased nutrient needs related to increased protein needs with wound healing evidenced by impaired skin integrity with R knee incision.     Intervention:   [x]    Obtained/adjusted food preferences/tolerances and/or snacks options   []    Dislikes supplements will try a substitution   []    Modify diet for food allergies  []    Adjust texture due to difficulty chewing   []    Encourage Fresh Fruit, Activia yogurt, fluid   [x]    Educated patient  []    Rescreen per screening protocol  [x]    Add Supplements - Ensure HP TID    Goal: patient to consume 1 protein item with meals and 50% of meals and snacks in the next 5-7 days    Monitoring/Evaluation:   Education & Discharge Needs  [x] Nutrition related discharge needs addressed:   [x] Supplements (on d/c instruction &/or coupons provided)    [x] Education   [] No nutrition related discharge needs at this time    Monitor: intake, wt and wound healing    Rescreen: [x]  At Nutrition Risk          []  Not at [facility], rescreen per screening protocol      Byron Gonzalez, [address] [place] [address]  Pager [phone]   Office [phone]

## 2019-10-08 NOTE — PROGRESS NOTES
Physical Therapy Note:    Orders acknowledged, chart reviewed, discussed with RN. PT evaluation deferred. Pt requesting to complete breakfast prior to participating with PT. Will continue to follow and proceed with PT evaluation when appropriate.     Luis Murrieta, PT, DPT, Arun Gibson

## 2019-10-08 NOTE — FORENSIC NURSE
FNE in to speak to pt, no forensic needs identified at this time. Report using SBAR given to Marisela Weller RN CCL.

## 2019-10-08 NOTE — PROGRESS NOTES
Pt questioning lack of therapeutic lovenox dose, pt states they take  mg twice a day at home for PE, on call MYNOR Stanley notified, indicated that current order is acceptable but will follow up with attending

## 2019-10-08 NOTE — PERIOP NOTES
TRANSFER - OUT REPORT:    Verbal report given to YUE Castrejon on Bari Draft  being transferred to [address] [place] [address] for routine post - op       Report consisted of patients Situation, Background, Assessment and   Recommendations(SBAR). Information from the following report(s) SBAR, Procedure Summary, Intake/Output and MAR was reviewed with the receiving nurse. Lines:   Peripheral IV 10/07/19 Right Forearm (Active)   Site Assessment Clean, dry, & intact 10/7/2019  6:30 PM   Phlebitis Assessment 0 10/7/2019  6:30 PM   Infiltration Assessment 0 10/7/2019  6:30 PM   Dressing Status Clean, dry, & intact; Occlusive 10/7/2019  6:30 PM   Dressing Type Tape;Transparent 10/7/2019  6:30 PM   Hub Color/Line Status Pink; Infusing 10/7/2019  6:30 PM   Alcohol Cap Used Yes 10/7/2019  2:23 PM        Opportunity for questions and clarification was provided.       Patient transported with:   Registered Nurse

## 2019-10-08 NOTE — PROGRESS NOTES
Entered room after responding to toilet alarm from the main nurses station. Observed patient walking out of the bathroom using the [facility] as a walker and her  holding her hips. I assisted patient back to bed and provided EXTENSIVE education on the need to call for assistance anytime pt needs to get out of bed. Reinforced that it is not safe to ambulate to the bathroom at this time, even with a walker and staff. Pt has not regained full sensation and mobility to her operative leg (\"That leg is still numb! \") Educated that she is at very high risk for falls, discussed the bed alarm and reminded patient and  to call for any toileting or out of bed needs. Pt expressed concerns about feeling \"too exposed\" using the bedside commode. Discussed with the patient that we can close the door and pull the curtain but that the [facility] is our only safe option for toileting until she has more sensation/movement and has worked with physical therapy. Pt and family will need continued education as they did not seem to accept the importance of calling for assistance during this post-op period. Bed alarm on when this RN left the room. Informed primary RN Florentino Morrison and PCT Angelita Smith of the above.

## 2019-10-08 NOTE — PROGRESS NOTES
Problem: Mobility Impaired (Adult and Pediatric)  Goal: *Acute Goals and Plan of Care (Insert Text)  Description  FUNCTIONAL STATUS PRIOR TO ADMISSION: Patient was independent and active without use of DME.    HOME SUPPORT PRIOR TO ADMISSION: The patient lived with family but did not require assist.    Physical Therapy Goals  Initiated 10/8/2019    1. Patient will move from supine to sit and sit to supine  in bed with modified independence within 4 days. 2. Patient will perform sit to stand with modified independence within 4 days. 3. Patient will ambulate with modified independence for 150 feet with the least restrictive device within 4 days. 4. Patient will ascend/descend 1 stairs without handrail(s) with supervision/set-up within 4 days. 5. Patient will perform home exercise program per protocol with modified independence within 4 days. 6. Patient will demonstrate AROM 0-90 degrees in operative joint within 4 days. 10/8/2019 1600 by Emperatriz Ta, PT, DPT  Outcome: Progressing Towards Goal   PHYSICAL THERAPY TREATMENT  Patient: Nadine Barrera (79 y.o. female)  Date: 10/8/2019  Diagnosis: Instability of internal right knee prosthesis, subsequent encounter [T84.022D] <principal problem not specified>  Procedure(s) (LRB):  PACU/RECOVERY (Right) 1 Day Post-Op  Precautions: WBAT, Fall(no flexion >90)  Chart, physical therapy assessment, plan of care and goals were reviewed. ASSESSMENT  Based on the objective data described below, pt demonstrates good progress toward PT goals. She ambulates increased distance, progresses to stair training, and engages in training regarding exercises for strengthening and ROM. She continues to report impaired RLE sensation. RLE dorsiflexion remains limited but improved compared with morning session. Pain rated 3-5/10 to R knee.  Pt demonstrating mobility sufficient for function within home environment with 24-hour assistance/CGA for all mobility, thus she is cleared for discharge from PT perspective. RN and ortho NP notified. Current Level of Function Impacting Discharge (mobility/balance): CGA for ambulation using RW in light of RLE sensation impairment    Other factors to consider for discharge: several animals in home         PLAN :  Patient continues to benefit from skilled intervention to address the above impairments. Continue treatment per established plan of care. to address goals. Recommendation for discharge: (in order for the patient to meet his/her long term goals)  Outpatient physical therapy follow up recommended for R knee strengthening and ROM     This discharge recommendation:  A follow-up discussion with the attending provider and/or case management is planned    Equipment recommendations for successful discharge (if) home: patient owns DME required for discharge       SUBJECTIVE:   Patient stated I sort of want to see what it's going to be like when the numbness wears off. I don't want to go home and all of a sudden be in a lot of pain.     Pt received supine, agreeable to PT and cleared by RN. OBJECTIVE DATA SUMMARY:   Critical Behavior:  Neurologic State: Alert  Orientation Level: Oriented X4  Cognition: Follows commands  Safety/Judgement: Awareness of environment, Fall prevention, Good awareness of safety precautions, Home safety, Insight into deficits    Range of Motion:  AROM: Within functional limits(except R knee)        RLE AROM  R Knee Flexion: 90  R Knee Extension: 5   Reviewed 90 flexion ROM limitation per MD order. Pt verbalizes understanding. R ankle dorsiflexion 2+/5  Sensation impaired throughout RLE, pt reporting tingling and diminished light touch sensation, though improved compared with morning encounter.        Functional Mobility Training:  Bed Mobility:     Supine to Sit: Modified independent  Sit to Supine: Modified independent  Scooting: Modified independent        Transfers:  Sit to Stand: Contact guard assistance;Stand-by assistance; appropriate techniques without cues. Stand to Sit: Contact guard assistance;Stand-by assistance                             Balance:  Sitting: Without support  Sitting - Static: Good (unsupported)  Sitting - Dynamic: Good (unsupported)  Standing: With support  Standing - Static: Good  Standing - Dynamic : Good  Ambulation/Gait Training:  Distance (ft): (80' x 2)  Assistive Device: Walker, rolling;Gait belt  Ambulation - Level of Assistance: Contact guard assistance(CGA for safety given RLE impaired sensation)        Gait Abnormalities: Antalgic;Decreased step clearance; Step to gait  Right Side Weight Bearing: As tolerated        Stance: Right decreased                        No loss of balance; appropriate RW techniques. Stairs:  Number of Stairs Trained: 1  Stairs - Level of Assistance: Contact guard assistance   Rail Use: None    Car Transfers:  Pt educated regarding appropriate techniques for car transfers within relevant precautions and verbalizes understanding. Therapeutic Exercises:     EXERCISE   Sets   Reps   Active Active Assist   Passive Self ROM   Comments   Ankle Pumps 1 10 ?                                        ?                                        ?                                        ?                                           Quad Sets 1 6 ?                                        ?                                        ?                                        ?                                           Hamstring Sets 1 6 ?                                        ?                                        ?                                        ?                                           Short Arc Quads 1 6 ?                                        ?                                        ?                                        ?                                           Knee Extension Stretch 1    ?                                          ? ?                                          ?                                           Heel Slides 2 6 ?                                        ?                                        ?                                        ?                                           Long Arc Quads   ? ?                                        ?                                        ?                                           Knee Flexion Stretch   ? ?                                        ?                                        ?                                           Straight Leg Raises 1 6 ?                                        ?                                        ?                                        ?                                               Pain Rating:  3/10 at rest; 5/10 after treatment  Offered rest, repositioning, elevation, cold pack replaced    Activity Tolerance:   Good  Please refer to the flowsheet for vital signs taken during this treatment.     After treatment patient left in no apparent distress:   Supine in bed, Bed / chair alarm activated, Caregiver / family present and Side rails x 3; RLE elevated using blue wedge    COMMUNICATION/COLLABORATION:   The patients plan of care was discussed with: Registered Nurse    Gómez Mcpherson, PT, DPT   Time Calculation: 36 mins

## 2019-10-08 NOTE — PROGRESS NOTES
10/8/2019  2:51 PM  Reason for Admission:   Revision Rt Total Knee                  RRAT Score:     14   Moderate Risk/Yellow             Do you (patient/family) have any concerns for transition/discharge? Pt has no concerns, lives w/  and 10 children who can assist at d/c              Plan for utilizing home health:   No history, none indicated for this admission    Current Advanced Directive/Advance Care Plan:  Pt does not have ACP,  Ana Wong (C) [phone] is surrogate            Transition of Care Plan:        1. Hospital admission for surgical management, PT/OT evals  2. CM to follow  3. ACP planning  4. D/C when stable to home w/ family assistance and outpatient PT, ortho f/u  5.  [facility] (Gilma Montoya) [phone] will transport    Care Management Interventions  PCP Verified by CM: Jeannie Darnell MD)  Last Visit to PCP: 10/01/19  Palliative Care Criteria Met (RRAT>21 & CHF Dx)?: No  Mode of Transport at Discharge: Self(Family)  Physical Therapy Consult: Yes  Occupational Therapy Consult: Yes  Current Support Network: Lives with Spouse, Own Home(pt lives w/  and 10 children in pvt residence, reports to be ambulatory, iADLs and drives, familycan assist w/ transport)  Confirm Follow Up Transport: Family  Plan discussed with Pt/Family/Caregiver: Yes  Discharge Location  Discharge Placement: Home with outpatient services      CM met w/ pt and her  to begin d/c planning, charted demographics verified, pt lives w/  and their 10 children in 1 story home w/ 4 entry steps, reports to be ambulatory, iADLS and drives.   Family can assist w/ transport  PCP: Karina Lorenzana MD,  DME: RW, sp cane  Rx: coverage through Medicaid, fills at [address]

## 2019-10-08 NOTE — PROGRESS NOTES
Orthopaedic Progress Note  Post Op day: 1 Day Post-Op    2019 12:12 PM     Patient: Aide Moctezuma MRN: [id number]  SSN: [id number]    YOB: 1970  Age: 51 y.o. Sex: female      Admit date:  10/7/2019  Date of Surgery:  10/7/2019   Procedures:  Procedure(s):  PACU/RECOVERY  Admitting Physician:  Burton Burr MD   Surgeon:  Santa Cooley) and Role:     * Anesthesia, Case - Primary    Consulting Physician(s): Treatment Team: Attending Provider: Raj Bernardo MD; Primary Nurse: Gerardo Wei; Utilization Review: Porfirio Frances RN    SUBJECTIVE:     Aide Moctezuma is a 51 y.o. female is 1 Day Post-Op s/p Procedure(s):  Right TKR with an appropriate level of post-operative pain. No complaints of nausea, vomiting, dizziness, lightheadedness, chest pain, or shortness of breath. OBJECTIVE:       Physical Exam:  General: Alert, cooperative, no distress. Respiratory: Respirations unlabored  Neurological:  Neurovascular exam within normal limits. Motor: + DF/PF. Dressing/Wound:  Aquacel dressing clean, dry and intact. No significant erythema or swelling.       Vital Signs:        Patient Vitals for the past 8 hrs:   BP Temp Pulse Resp SpO2   10/08/19 1019 120/74 -- 89 -- --   10/08/19 1014 120/64 -- 89 -- --   10/08/19 1011 98/65 -- 86 -- --   10/08/19 0808 106/69 98.2 °F (36.8 °C) 87 18 96 %                                          Temp (24hrs), Av.8 °F (36.6 °C), Min:97.2 °F (36.2 °C), Max:98.7 °F (37.1 °C)      Labs:        Recent Labs     10/08/19  0303 10/07/19  1426   HGB 11.4*  --    INR  --  1.0     Lab Results   Component Value Date/Time    Sodium 140 10/08/2019 03:03 AM    Potassium 3.6 10/08/2019 03:03 AM    Chloride 108 10/08/2019 03:03 AM    CO2 24 10/08/2019 03:03 AM    Glucose 113 (H) 10/08/2019 03:03 AM    BUN 15 10/08/2019 03:03 AM    Creatinine 0.59 10/08/2019 03:03 AM    Calcium 7.8 (L) 10/08/2019 03:03 AM       PT/OT:        Gait  Stance: Right decreased  Gait Abnormalities: Antalgic, Decreased step clearance  Ambulation - Level of Assistance: Contact guard assistance, Assist x2  Distance (ft): 90 Feet (ft)  Assistive Device: Walker, rolling, Gait belt       Patient mobility  Bed Mobility Training  Supine to Sit: Modified independent  Scooting: Modified independent  Transfer Training  Sit to Stand: Assist x2, Additional time, Contact guard assistance  Stand to Sit: Contact guard assistance, Additional time, Assist x2      Gait Training  Assistive Device: Walker, rolling, Gait belt  Ambulation - Level of Assistance: Contact guard assistance, Assist x2  Distance (ft): 90 Feet (ft)   Weight Bearing Status  Right Side Weight Bearing: As tolerated        ASSESSMENT / PLAN:   Active Problems:    Instability of internal right knee prosthesis ([address] (10/7/2019)         S/P right TKR/RECOVERY Continue working with PT/OT. DVT Prophylaxis Lovenox, restart coumadin today per Dr. Omaira Vargas, SCDs   Pain Continue current regimen   Discharge Disposition Home with outpatient PT when cleared by therapy-today vs tomorrow pending progress.      Signed By: Franck Barker NP    RN, MSN, FNP-BC  Orthopedic Nurse Practitioner  [place]
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
## 2019-10-08 NOTE — PROGRESS NOTES
Occupational Therapy:  10/08/19    Orders received and appreciated, chart reviewed. Patient seen and evaluated by Occupational Therapy. Patient is performing well on POD 1 and anticipate continued progress in acute setting. BP monitored (see below) with no reports of dizziness throughout session. Recommend follow up therapy per MD orders. Vitals:    10/08/19 0808 10/08/19 1011 10/08/19 1014 10/08/19 1019   BP: 106/69 98/65 120/64 120/74   BP Patient Position: At rest At rest Sitting Standing   Pulse: 87 86 89 89     Full note to follow.     Thank you,  Ana Maria Stewart, OTR/L

## 2019-10-08 NOTE — FACE TO FACE
Rounded on patient, f/u from Joint Pre-op Patient Education Class. Patient states she had class 2 years ago before her knee surgery. Reviewed ice application, exercises and incentive spirometry use. Patient states their home space is prepared and safe. Questions answered. Patient complaint about care post surgery, patient advocate will be notified.

## 2019-10-08 NOTE — PROGRESS NOTES
Spiritual Care Partner Volunteer visited patient on the [address] [place] [address] Surgical Ortho unit on 10/8/19. Documented by:  Jon Solis.      Paging Service: [phone])

## 2019-10-08 NOTE — PROGRESS NOTES
Orders received, chart reviewed and patient evaluated by physical therapy. Pending progression with skilled acute physical therapy, recommend:  Outpatient physical therapy follow up recommended for R knee strengthening and ROM. Recommend with nursing patient to complete as able in order to maintain strength, endurance and independence: OOB to chair 3x/day with CGA x 2 and ambulating with rolling walker. Thank you for your assistance. Full evaluation to follow.

## 2019-10-09 VITALS
DIASTOLIC BLOOD PRESSURE: 70 MMHG | SYSTOLIC BLOOD PRESSURE: 105 MMHG | HEART RATE: 88 BPM | WEIGHT: 204 LBS | OXYGEN SATURATION: 99 % | RESPIRATION RATE: 16 BRPM | TEMPERATURE: 97.8 F | HEIGHT: 62 IN | BODY MASS INDEX: 37.54 KG/M2

## 2019-10-09 PROCEDURE — 97110 THERAPEUTIC EXERCISES: CPT

## 2019-10-09 PROCEDURE — 74011250637 HC RX REV CODE- 250/637: Performed by: PHYSICIAN ASSISTANT

## 2019-10-09 PROCEDURE — 97116 GAIT TRAINING THERAPY: CPT

## 2019-10-09 PROCEDURE — 94760 N-INVAS EAR/PLS OXIMETRY 1: CPT

## 2019-10-09 PROCEDURE — 74011250636 HC RX REV CODE- 250/636: Performed by: ORTHOPAEDIC SURGERY

## 2019-10-09 RX ORDER — ENOXAPARIN SODIUM 100 MG/ML
100 INJECTION SUBCUTANEOUS EVERY 12 HOURS
Qty: 20 SYRINGE | Refills: 0 | Status: SHIPPED | OUTPATIENT
Start: 2019-10-09 | End: 2019-10-09 | Stop reason: SDUPTHER

## 2019-10-09 RX ORDER — ENOXAPARIN SODIUM 100 MG/ML
100 INJECTION SUBCUTANEOUS EVERY 12 HOURS
Qty: 20 SYRINGE | Refills: 0 | Status: SHIPPED | OUTPATIENT
Start: 2019-10-09 | End: 2019-12-24

## 2019-10-09 RX ORDER — ENOXAPARIN SODIUM 100 MG/ML
90 INJECTION SUBCUTANEOUS EVERY 12 HOURS
Status: DISCONTINUED | OUTPATIENT
Start: 2019-10-09 | End: 2019-10-09 | Stop reason: HOSPADM

## 2019-10-09 RX ADMIN — BUTALBITAL, ACETAMINOPHEN, AND CAFFEINE 1 TABLET: 50; 325; 40 TABLET ORAL at 10:35

## 2019-10-09 RX ADMIN — Medication 10 ML: at 12:36

## 2019-10-09 RX ADMIN — OXYCODONE HYDROCHLORIDE 10 MG: 5 TABLET ORAL at 01:23

## 2019-10-09 RX ADMIN — CELECOXIB 200 MG: 100 CAPSULE ORAL at 01:17

## 2019-10-09 RX ADMIN — FAMOTIDINE 20 MG: 20 TABLET ORAL at 08:49

## 2019-10-09 RX ADMIN — OXYCODONE HYDROCHLORIDE 10 MG: 5 TABLET ORAL at 12:34

## 2019-10-09 RX ADMIN — OXYCODONE HYDROCHLORIDE 10 MG: 5 TABLET ORAL at 06:42

## 2019-10-09 RX ADMIN — BUTALBITAL, ACETAMINOPHEN, AND CAFFEINE 1 TABLET: 50; 325; 40 TABLET ORAL at 06:46

## 2019-10-09 RX ADMIN — ENOXAPARIN SODIUM 90 MG: 100 INJECTION SUBCUTANEOUS at 09:43

## 2019-10-09 RX ADMIN — BUTALBITAL, ACETAMINOPHEN, AND CAFFEINE 1 TABLET: 50; 325; 40 TABLET ORAL at 01:23

## 2019-10-09 RX ADMIN — VITAMIN D, TAB 1000IU (100/BT) 2000 INT'L UNITS: 25 TAB at 08:49

## 2019-10-09 RX ADMIN — GABAPENTIN 100 MG: 100 CAPSULE ORAL at 08:49

## 2019-10-09 RX ADMIN — Medication 10 ML: at 06:47

## 2019-10-09 RX ADMIN — CELECOXIB 200 MG: 100 CAPSULE ORAL at 12:35

## 2019-10-09 RX ADMIN — OXYCODONE HYDROCHLORIDE 10 MG: 5 TABLET ORAL at 09:43

## 2019-10-09 RX ADMIN — LEVOTHYROXINE SODIUM 50 MCG: 50 TABLET ORAL at 06:42

## 2019-10-09 NOTE — PROGRESS NOTES
10/9/2019 3:37 PM Met with pt to discuss outpatient PT. Pt declined CM arranging appt and reported she will set up appt herself. Pt reported she will be going to [address] [place] [address] at [facility], phone number provided to pt.  WINSOME Handley

## 2019-10-09 NOTE — PROGRESS NOTES
Problem: Mobility Impaired (Adult and Pediatric)  Goal: *Acute Goals and Plan of Care (Insert Text)  Description  FUNCTIONAL STATUS PRIOR TO ADMISSION: Patient was independent and active without use of DME.    HOME SUPPORT PRIOR TO ADMISSION: The patient lived with family but did not require assist.    Physical Therapy Goals  Initiated 10/8/2019    1. Patient will move from supine to sit and sit to supine  in bed with modified independence within 4 days. 2. Patient will perform sit to stand with modified independence within 4 days. 3. Patient will ambulate with modified independence for 150 feet with the least restrictive device within 4 days. 4. Patient will ascend/descend 1 stairs without handrail(s) with supervision/set-up within 4 days. 5. Patient will perform home exercise program per protocol with modified independence within 4 days. 6. Patient will demonstrate AROM 0-90 degrees in operative joint within 4 days. Outcome: Progressing Towards Goal  Note:   PHYSICAL THERAPY TREATMENT  Patient: Connor Griffiths (1 y.o. female)  Date: 10/9/2019  Diagnosis: Instability of internal right knee prosthesis, subsequent encounter [T84.022D] <principal problem not specified>  Procedure(s) (LRB):  PACU/RECOVERY (Right) 2 Days Post-Op  Precautions: WBAT, Fall(no flexion >90)  Chart, physical therapy assessment, plan of care and goals were reviewed. ASSESSMENT  Based on the objective data described below, up to [address] for functional transfers and gait training using RW for steadying. Ascend/ descend 4 steps using single handrail and HHA, pt reports she \"has options\" for entering home. No knee bucking noted with gait training on level surfaces.  Pt is cleared for discharge from hospital from PT standpoint    Current Level of Function Impacting Discharge (mobility/balance): RW for mobility    Other factors to consider for discharge: pt reports 10 children living at home and several dogs         PLAN :  Patient continues to benefit from skilled intervention to address the above impairments. Continue treatment per established plan of care. to address goals. Recommendation for discharge: (in order for the patient to meet his/her long term goals)  Outpatient physical therapy follow up recommended for       This discharge recommendation:  Has been made in collaboration with the attending provider and/or case management    Equipment recommendations for successful discharge (if) home: patient owns DME required for discharge       SUBJECTIVE:   Patient stated i have done this before.     OBJECTIVE DATA SUMMARY:   Critical Behavior:  Neurologic State: Alert  Orientation Level: Oriented X4  Cognition: Appropriate decision making, Appropriate for age attention/concentration, Appropriate safety awareness, Follows commands  Safety/Judgement: Awareness of environment, Fall prevention, Good awareness of safety precautions, Home safety, Insight into deficits    Range of Motion:           RLE AROM  R Knee Flexion: 90  R Knee Extension: 10                Functional Mobility Training:  Bed Mobility:     Supine to Sit: Modified independent  Sit to Supine: Modified independent  Scooting: Modified independent        Transfers:  Sit to Stand: Contact guard assistance  Stand to Sit: Contact guard assistance                             Balance:  Sitting: Intact  Standing: Intact; With support  Ambulation/Gait Training:  Distance (ft): 80 Feet (ft)     Ambulation - Level of Assistance: Contact guard assistance        Gait Abnormalities: Antalgic; Step to gait                                      Stairs:  Number of Stairs Trained: 4  Stairs - Level of Assistance: Minimum assistance;Assist X1   Rail Use: Right (HHA on L)    Therapeutic Exercises:     EXERCISE   Sets   Reps   Active Active Assist   Passive Self ROM   Comments   Ankle Pumps   ?                                         ?                                        ? ?                                           Quad Sets   ? ?                                        ?                                        ?                                           Hamstring Sets   ? ?                                        ?                                        ?                                           Short Arc Quads   ? ?                                        ?                                        ?                                           Knee Extension Stretch     ? ?                                          ?                                          ?                                           Heel Slides   ? ?                                        ?                                        ?                                           Long Arc Quads   ? ?                                        ?                                        ?                                           Knee Flexion Stretch   ? ?                                        ?                                        ?                                           Straight Leg Raises   ? ?                                        ?                                        ?                                               Pain Ratin/10      Activity Tolerance:   Good  Please refer to the flowsheet for vital signs taken during this treatment.     After treatment patient left in no apparent distress:   Supine in bed, Call bell within reach and Bed / chair alarm activated    COMMUNICATION/COLLABORATION:   The patients plan of care was discussed with: Registered Nurse    Marilyn Hua Calculation: 25 mins

## 2019-10-09 NOTE — PROGRESS NOTES
Patient received multiple scripts and a copy of discharge instructions. Scripts included Tramadol and Oxycodone IR. Nurse read and explained all medications and instructions to her.  Verbalized understanding

## 2019-10-09 NOTE — PROGRESS NOTES
Problem: Falls - Risk of  Goal: *Absence of Falls  Description  Document Qi Ignacio Fall Risk and appropriate interventions in the flowsheet.   Outcome: Progressing Towards Goal  Note:   Fall Risk Interventions:  Mobility Interventions: Assess mobility with egress test, Communicate number of staff needed for ambulation/transfer, Patient to call before getting OOB, Utilize walker, cane, or other assistive device         Medication Interventions: Assess postural VS orthostatic hypotension, Bed/chair exit alarm, Evaluate medications/consider consulting pharmacy, Patient to call before getting OOB, Teach patient to arise slowly    Elimination Interventions: Bed/chair exit alarm, Call light in reach, Patient to call for help with toileting needs              Problem: Pain  Goal: *Control of Pain  Outcome: Progressing Towards Goal
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
## 2019-10-09 NOTE — PROGRESS NOTES
Pt stable, reports incident while in PACU, I will discuss with the nursing supervisor. Home today if doing well.

## 2019-10-09 NOTE — DISCHARGE INSTRUCTIONS
TOTAL KNEE DISCHARGE INSTRUCTIONS      Patient: Lore Echevarria MRN: [id number]  SSN: [id number]              Please take the time to review the following instructions before you leave the hospital and use them as guidelines during your recovery from surgery. If you have any questions you may contact my office at [phone]  After business hours or during the weekend you can contact me through [address] or text / call at [phone] (cell phone) for emergency's. Please use the office number during regular business hours. SPECIAL INSTRUCTIONS :   1. Full extension at the knee is the most important aspect of your range of motion. Avoid placing a pillow or bump behind the knee. Rather, place the heel up on a bump or pillow and allow gravity to help straighten the knee. 2. You may weight bear as tolerated on the knee and during the day you should bend the knee as much as possible. 3. Drainage from the incision more than 4 days from surgery is concerning. Contact my office if there is any question [phone].   4. You may contact me directly through [employer] if there are specific questions or text / call using my cell number [phone]. DRESSING :     AQUACEL Dressings : This should be removed by physical therapy or you may remove this yourself 7 days after the date of your surgery. If there is no drainage, then a simple dressing may be used or no dressing at all. Other dressing options can be purchased over the counter at a local pharmacy or medical supply vendor. A porous adhesive dressing such as pictured above can be purchased online ([address] [facility] [address]) or at your local [facility] or [employer]. You only need to keep the incision covered for 7 days after showers. A dressing may be used for longer if there are issues with clothing clinging to the incision. Showering/ Bathing: You may shower with the Silverlon dressing in place.  This is left in place for 7 days following discharge from the hospital. If your incision is dry without drainage you may shower following your discharge home. After 7 days your dressing should be removed for showering. It is fine to have water run over the incision. Do not vigorously scrub your incision. Apply a clean, dry dressing after you have dried your incision. Do not take a bath or get into a swimming pool / [employer] until you follow up with Dr. Polly Sweeney. Do not soak your incision under water. If there is continued drainage or you are concerned contact Dr Aaron Keenan office prior to showering [phone] ext [phone] . Diet:  You may advance to your regular diet as tolerated. Increase your clear liquid intake for the next 2-3 days. Medication:      1. You will be given prescriptions for pain medication when you are discharged from the hospital. The side effects of these medications can be substantial and the narcotic medications are not mandatory. You may substitute these medications with Tylenol or Alleve / Motrin. 2. Please use the medications as prescribed. Pain medications may cause constipation- Colace twice daily and Miralax one scoop daily while taking the narcotic medication should help prevent constipation. Please discuss with your local pharmacist regarding increasing this dosage if constipation persists. Other possible side effects of pain medication are dizziness, headache, nausea, vomiting, and urinary retention. Discontinue the pain medication if you develop itching, rash, shortness of breath, or difficulties swallowing. If these symptoms become severe or are not relieved by discontinuing the medication, you should seek immediate medical attention. 3. Refills of pain medication are authorized during office hours only (8 AM- 5 PM  Monday thru Friday). Many of these medication will require you or a family member to pick-up a physical prescription at the office.    4. Medications other than antiinflammatories will not be called into the pharmacy after business hours. 5. You may resume the medication(s) you were taking prior to your surgery. Narcotics may change the effects of some antidepressant medication(s). If you have any questions about possible interactions between your regular medications and the pain medication, you should ask the pharmacist or contact the prescribing physician. 6. If you have constipation which is not improved by oral stool softeners then a Ducolax suppository should be purchased over the counter. 7. Continue the blood thinner Lovenox) and Coumadin following surgery. You need to have your INR checked 5 days after surgery. Follow up appointment:    Please call our office at [phone] for your follow up appointment. This should be scheduled 14 days following the date of surgery. You should also have a scheduled appointment for physical therapy following surgery. Physical Therapy / Nursing:    Physical Therapy following surgery will be arranged as an outpatient or at home. They have specific instructions for rehab and wound care. It is fine to have physical therapy remove your dressing at 7 days following surgery. Returning to work:    Normal return to work is 6-12 weeks following surgery. Depending on your progression following surgery and specific job duties you may take longer for a full return to work. DRIVING    You should not return to driving until you are off all opioid pain medications and able to safely and quickly apply the brakes. This is normally 2-6 weeks for left sided surgery and 2-8 weeks for right sided surgery. Important Signs and Symptoms:    If any of the following signs or symptoms occur, you should contact Dr. Jeff Anthony office.   Please be advised if a problem arises which you feel requires immediate medical attention or you are unable to contact Dr. Jeff Anthony office you should seek immediate medical attention at the ER or other health care facility you have access to.    1. A sudden increase in swelling and/or redness or warmth at the area your surgery was performed which isnt relieved by rest, ice, and elevation. 2. Oral temperature greater than 101 degrees for 12 hours or more which isnt relieved by an increase in fluid intake and taking 2 Tylenol every 4-6 hours. 3. Excessive drainage from your incisions, or drainage which hasnt stopped by 72 hours after your surgery. 4. Fever, chills, shortness of breath, chest pain, nausea, vomiting or other signs and symptoms which are of concern to you. FREQUENTLY ASKED QUESTIONS   What should I take for pain?  o In general you will be discharged with three medications for pain (Extra Strength Tylenol, Oxycodone 5mg and Tramadol). Gabapentin is also used for pain and taken as directed (100mg in the am and 300mg prior to bed at night). This may vary slightly depending on what you were taking in the hospital. USE ICE regularly, this is the most important modality for controlling pain. Scheduled Medications :      1. Tylenol 1 tablet (500mg) to 2 tablets (1000mg) every 4 hours. This should not exceed 4000mg in a 24 hour period. 2. Ibuprofen 800mg every 8 hours x 30 days. 3. Gabapentin 1 tablet (100 mg) in the morning with breakfast and 3 tablets (300 mg) at night before bed. Once you decide to discontinue taking this medication, it should be tapered over a 7 day time period. Break through Pain Medications :       1st Line - Oxycodone 1 (5mg) - 2 (10mg) every 4 hours (Or as directed), take these between Tylenol / Ibuprofen doses if your pain is not below 4 / 10. This may be needed only for several days following your discharge. Extra Strength Tylenol 1-2 tablets (500-1000mg) every 4-6 hrs. 2nd Line - Tramadol 50mg Every 8 hours for pain if not improving with the Tylenol and Oxycodone.               When should I call for advice regarding my pain?  o After 12 hours on the above regimen, if nothing is working call the office [phone], contact me through [address] or text / call my cell after hours [phone].  Can I get refills?  o Opioid refills are provided for the first 6 weeks following surgery. o Try Tylenol 500- 1000 mg along with Alleve 220-440mg (every 12 hours) or Motrin 200-800mg (every 6-8 hours) during the majority of the day. - Save the opioid pain medications for physical therapy (1 hour prior) and before sleeping at night. - Keep in mind you need to discontinue these medications prior to returning to driving.  Is swelling normal?  o Almost everyone has some degree of swelling following surgery. o Following hip and knee replacement surgery, swelling can be normal below the incision for the first 1-2 weeks. - This swelling peaks around 5-7 days after surgery.   - You may have some bruising around the back of the thigh, calf and even into the foot. - Use ice daily   What should I do for the swelling?  o Ice, Ice, Ice  o Keep the limb elevated. o Apply compression socks (knee high for total knees and up to the mid-thigh for total hips.   o Heat may also be applied, choose the modality that makes you the most comfortable.  How long should I remain on blood thinners following surgery?  o Thirty days   When can I drive?  o Once you have stopped using regular narcotic pain medications (Percocet, Lortab, etc.) and can safely apply the brakes without hesitation.  When can I shower? o 48-72 hours following surgery if the incision is dry.  o No submersion of the incision, bathing or swimming for 14 days following surgery or until cleared by Dr Nathen Trevino.  What do I do with the dressing when I shower?  o The dressing can be removed after 7 days. o The incision is sealed with Dermabond (Biologic glue) and except for wounds which are draining should be watertight.  How active should I be following surgery?   o Progress activities in moderation at your own pace.   o Walk each day and set progressive goals with small increments (1st week - ½ block of walking, 2nd week - 1 block, 3rd week - 2 blocks, etc.)     Please do not hesitate to contact me through [employer] or by text / call me at [phone] (cell phone) for questions following surgery - MD Em Miles MD  Cell [phone]  [address] [phone]  Medical Assistants: [phone] [phone]

## 2019-10-09 NOTE — PROGRESS NOTES
Problem: Falls - Risk of  Goal: *Absence of Falls  Description  Document Brianna Dear Fall Risk and appropriate interventions in the flowsheet.   Outcome: Progressing Towards Goal  Note:   Fall Risk Interventions:  Mobility Interventions: Bed/chair exit alarm, Patient to call before getting OOB, OT consult for ADLs         Medication Interventions: Patient to call before getting OOB, Assess postural VS orthostatic hypotension, Bed/chair exit alarm    Elimination Interventions: Call light in reach, Patient to call for help with toileting needs, Toileting schedule/hourly rounds              Problem: Pain  Goal: *Control of Pain  Outcome: Progressing Towards Goal

## 2019-10-11 ENCOUNTER — TELEPHONE (OUTPATIENT)
Dept: MEDSURG UNIT | Age: 49
End: 2019-10-11

## 2019-10-11 NOTE — TELEPHONE ENCOUNTER
Phone call made to patient after hospital discharge from surgical joint replacement. 1. States discharge instructions were clear and easy to understand has no  questions  2. Patient was able to fill prescriptions and has no medications questions. 3. States pain is tolerable and pain medication is effective. Patient has a headache r/t oxycodone has called  to change meds, encouraged pt to text him  4. Bruising minimal, swelling is minimal, elevation and ice is effective  5. States dressing intact without drainage  6. Denies N/V, loss of appetite, constipation  7. Mobility using a walker without difficulty, moving q1hr  8. Using I.S. And doing exercises regularly  9. Patient states needs were met by the staff while in the hospital  10. Follow up appointment scheduled for 10/18  11.  PT is not scheduled yet will call today    Opportunity given for patient to ask questions

## 2019-10-13 NOTE — DISCHARGE SUMMARY
Total Knee Revision Home Discharge Summary    Patient ID:  Nadine Barrera  1970  51 y.o.  [id number]    Admit date: 10/7/2019    Discharge date and time: 10/9/2019  3:49 PM     Admitting Physician: Aleena Johnson MD     Admission Diagnoses: Instability of internal right knee prosthesis, subsequent encounter [T84.022D]    Discharge Diagnoses: Poly exchange of the right knee    Surgeon: Nate Shin, [facility]:  Nadine Barrera was admitted on10/7/2019 and underwent successful right knee poly exchange. Intra-operative complications or issues none. The patient was transferred to the orthopaedic floor in stable condition. Physical therapy issues during the hospital stay included BID therapy. At the time of discharge, the patient was able to ambulate safely and had an understanding of the explicit discharge precautions and instructions following surgery. The patient had adequate oral pain control and good PO intake. Important in Hospital Events : Did well with therpay    Post Op complications: None    Cannot display discharge medications since this patient is not currently admitted. CULTURES :  Lab Results   Component Value Date/Time    Specimen Description: URINE 02/02/2013 03:42 PM    Specimen Description: NASAL 02/18/2012 03:00 AM    Specimen Description: URINE 10/01/2011 04:05 AM    Specimen Description: URINE 06/02/2011 09:15 AM    Specimen Description: URINE 02/05/2010 06:30 PM     Lab Results   Component Value Date/Time    Culture result: No growth thus far, holding 14 days. 10/07/2019 05:02 PM    Culture result: No growth thus far, holding 14 days. 10/07/2019 05:02 PM    Culture result: No growth thus far, holding 14 days. 10/07/2019 05:01 PM    Culture result: No growth thus far, holding 14 days.  10/07/2019 05:01 PM    Culture result: MRSA NOT PRESENT 09/30/2019 03:36 PM    Culture result:  09/30/2019 03:36 PM         Screening of patient nares for MRSA is for surveillance purposes and, if positive, to facilitate isolation considerations in high risk settings. It is not intended for automatic decolonization interventions per se as regimens are not sufficiently effective to warrant routine use.        Discharged to: Home        Signed:  Clement Cabot, MD  10/13/2019  11:38 AM

## 2019-10-21 LAB
BACTERIA SPEC CULT: NORMAL
GRAM STN SPEC: NORMAL
SERVICE CMNT-IMP: NORMAL

## 2019-10-22 ENCOUNTER — OFFICE VISIT (OUTPATIENT)
Dept: FAMILY MEDICINE CLINIC | Age: 49
End: 2019-10-22

## 2019-10-22 DIAGNOSIS — Z86.711 HISTORY OF PULMONARY EMBOLISM: ICD-10-CM

## 2019-10-22 DIAGNOSIS — Z79.01 CHRONIC ANTICOAGULATION: Primary | ICD-10-CM

## 2019-10-22 DIAGNOSIS — D68.2 HEREDITARY COAGULATION FACTOR DEFICIENCY (HCC): ICD-10-CM

## 2019-10-22 DIAGNOSIS — Z76.89 ENCOUNTER TO ESTABLISH CARE: ICD-10-CM

## 2019-10-22 DIAGNOSIS — E03.9 HYPOTHYROIDISM, UNSPECIFIED TYPE: ICD-10-CM

## 2019-10-22 DIAGNOSIS — R68.84 JAW PAIN: ICD-10-CM

## 2019-10-24 VITALS
TEMPERATURE: 98.4 F | HEIGHT: 62 IN | SYSTOLIC BLOOD PRESSURE: 106 MMHG | BODY MASS INDEX: 38.64 KG/M2 | HEART RATE: 84 BPM | OXYGEN SATURATION: 97 % | DIASTOLIC BLOOD PRESSURE: 70 MMHG | RESPIRATION RATE: 20 BRPM | WEIGHT: 210 LBS

## 2019-10-24 PROBLEM — M54.2 NECK PAIN: Status: ACTIVE | Noted: 2019-10-24

## 2019-10-24 PROBLEM — B00.9 HERPES SIMPLEX VIRUS (HSV) INFECTION: Status: ACTIVE | Noted: 2019-10-24

## 2019-10-24 PROBLEM — N96 HISTORY OF RECURRENT MISCARRIAGES: Status: ACTIVE | Noted: 2019-10-24

## 2019-10-24 PROBLEM — A49.02 MRSA (METHICILLIN RESISTANT STAPHYLOCOCCUS AUREUS): Status: ACTIVE | Noted: 2019-10-24

## 2019-10-25 LAB
INR BLD: 1.2
PT POC: 14.5 SECONDS
VALID INTERNAL CONTROL?: YES
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
## 2019-10-26 NOTE — PROGRESS NOTES
Results noted. The patient has been notified of this information and all questions answered. Pt to have warfarin adjusted accordingly.      Katie Shahid MD  PGY2 Family Medicine Resident

## 2019-10-28 LAB
ABO + RH BLD: NORMAL
BLOOD GROUP ANTIBODIES SERPL: NORMAL
SPECIMEN EXP DATE BLD: NORMAL

## 2019-11-12 DIAGNOSIS — E03.9 ACQUIRED HYPOTHYROIDISM: ICD-10-CM

## 2019-12-19 DIAGNOSIS — E03.9 ACQUIRED HYPOTHYROIDISM: ICD-10-CM

## 2019-12-19 RX ORDER — LEVOTHYROXINE SODIUM 50 UG/1
TABLET ORAL
Qty: 30 TAB | Refills: 0 | Status: SHIPPED | OUTPATIENT
Start: 2019-12-19 | End: 2019-12-27 | Stop reason: SDUPTHER

## 2019-12-19 NOTE — TELEPHONE ENCOUNTER
Telephone Encounter        Caller's first/last name: Corrinne Sathish      Relationship to patient and are they on HIPAA: self      Reason for call: Ms Sol Mishra has apt for 12/24/2019 for thyroid testing, will MD send in prescription to pharmacy for out of med      Further clarification of call: 21       Does caller want a return call? yes      Best contact number: [phone]      Did you check for a duplicate Encounter? yes

## 2019-12-24 ENCOUNTER — OFFICE VISIT (OUTPATIENT)
Dept: FAMILY MEDICINE CLINIC | Age: 49
End: 2019-12-24

## 2019-12-24 ENCOUNTER — HOSPITAL ENCOUNTER (OUTPATIENT)
Dept: LAB | Age: 49
Discharge: HOME | End: 2019-12-24

## 2019-12-24 VITALS
TEMPERATURE: 98 F | HEART RATE: 83 BPM | SYSTOLIC BLOOD PRESSURE: 119 MMHG | WEIGHT: 211.8 LBS | DIASTOLIC BLOOD PRESSURE: 80 MMHG | HEIGHT: 62 IN | BODY MASS INDEX: 38.98 KG/M2 | OXYGEN SATURATION: 97 % | RESPIRATION RATE: 20 BRPM

## 2019-12-24 DIAGNOSIS — E03.9 ACQUIRED HYPOTHYROIDISM: ICD-10-CM

## 2019-12-24 DIAGNOSIS — E55.9 VITAMIN D DEFICIENCY: ICD-10-CM

## 2019-12-24 DIAGNOSIS — G43.809 OTHER MIGRAINE WITHOUT STATUS MIGRAINOSUS, NOT INTRACTABLE: Primary | ICD-10-CM

## 2019-12-24 DIAGNOSIS — E78.5 HYPERLIPIDEMIA, UNSPECIFIED HYPERLIPIDEMIA TYPE: ICD-10-CM

## 2019-12-24 DIAGNOSIS — D64.9 ANEMIA, UNSPECIFIED TYPE: ICD-10-CM

## 2019-12-24 LAB
BASOPHILS # BLD: 0.1 K/UL (ref 0–0.1)
BASOPHILS NFR BLD: 1 % (ref 0–1)
DIFFERENTIAL METHOD BLD: ABNORMAL
EOSINOPHIL # BLD: 0.2 K/UL (ref 0–0.4)
EOSINOPHIL NFR BLD: 3 % (ref 0–7)
ERYTHROCYTE [DISTWIDTH] IN BLOOD BY AUTOMATED COUNT: 13.1 % (ref 11.5–14.5)
HCT VFR BLD AUTO: 41.6 % (ref 35–47)
HGB BLD-MCNC: 13.1 G/DL (ref 11.5–16)
IMM GRANULOCYTES # BLD AUTO: 0 K/UL (ref 0–0.04)
IMM GRANULOCYTES NFR BLD AUTO: 0 % (ref 0–0.5)
LYMPHOCYTES # BLD: 1.9 K/UL (ref 0.8–3.5)
LYMPHOCYTES NFR BLD: 37 % (ref 12–49)
MCH RBC QN AUTO: 32.2 PG (ref 26–34)
MCHC RBC AUTO-ENTMCNC: 31.5 G/DL (ref 30–36.5)
MCV RBC AUTO: 102.2 FL (ref 80–99)
MONOCYTES # BLD: 0.5 K/UL (ref 0–1)
MONOCYTES NFR BLD: 10 % (ref 5–13)
NEUTS SEG # BLD: 2.5 K/UL (ref 1.8–8)
NEUTS SEG NFR BLD: 49 % (ref 32–75)
NRBC # BLD: 0 K/UL (ref 0–0.01)
NRBC BLD-RTO: 0 PER 100 WBC
PLATELET # BLD AUTO: 484 K/UL (ref 150–400)
PMV BLD AUTO: 10 FL (ref 8.9–12.9)
RBC # BLD AUTO: 4.07 M/UL (ref 3.8–5.2)
WBC # BLD AUTO: 5.2 K/UL (ref 3.6–11)

## 2019-12-24 RX ORDER — BUTALBITAL, ACETAMINOPHEN AND CAFFEINE 300; 40; 50 MG/1; MG/1; MG/1
1 CAPSULE ORAL
Qty: 10 CAP | Refills: 0 | Status: SHIPPED | OUTPATIENT
Start: 2019-12-24

## 2019-12-24 NOTE — PATIENT INSTRUCTIONS
Recurring Migraine Headache: Care Instructions Your Care Instructions Migraines are painful, throbbing headaches. They often start on one side of the head. They may cause nausea and vomiting and make you sensitive to light, sound, or smell. Some people may have only a few migraines throughout life. Others have them as often as several times a month. The goal of treatment is to reduce the number of migraines you have and relieve your symptoms. Even with treatment, you may continue to have migraines. You play an important role in dealing with your headaches. Work on avoiding things that seem to trigger your migraines. When you feel a headache coming on, act quickly to stop it before it gets worse. Follow-up care is a key part of your treatment and safety. Be sure to make and go to all appointments, and call your doctor if you are having problems. It's also a good idea to know your test results and keep a list of the medicines you take. How can you care for yourself at home? · Do not drive if you have taken a prescription pain medicine. · Rest in a quiet, dark room until your headache is gone. Close your eyes and try to relax or go to sleep. Do not watch TV or read. · Put a cold, moist cloth or cold pack on the painful area for 10 to 20 minutes at a time. Put a thin cloth between the cold pack and your skin. · Have someone gently massage your neck and shoulders. · Take your medicines exactly as prescribed. Call your doctor if you think you are having a problem with your medicine. You will get more details on the specific medicines your doctor prescribes. To prevent migraines · Keep a headache diary so you can figure out what triggers your headaches. Avoiding triggers may help you prevent headaches. Record when each headache began, how long it lasted, and what the pain was like. Use words like throbbing, aching, stabbing, or dull.  Write down any other symptoms you had with the headache. These may include nausea, flashing lights or dark spots, or sensitivity to bright light or loud noise. Note if the headache occurred near your period. List anything that might have triggered the headache. Triggers may include certain foods (chocolate, cheese, wine) or odors, smoke, bright light, stress, or lack of sleep. · If your doctor has prescribed medicine for your migraines, take it as directed. You may have medicine that you take only when you get a migraine and medicine that you take all the time to help prevent migraines. ? If your doctor has prescribed medicine for when you get a headache, take it at the first sign of a migraine, unless your doctor has given you other instructions. ? If your doctor has prescribed medicine to prevent migraines, take it exactly as prescribed. Call your doctor if you think you are having a problem with your medicine. · Find healthy ways to deal with stress. Migraines are most common during or right after stressful times. Take time to relax before and after you do something that has caused a migraine in the past. 
· Try to keep your muscles relaxed by keeping good posture. Check your jaw, face, neck, and shoulder muscles for tension. Try to relax them. When sitting at a desk, change positions often. Stretch for 30 seconds each hour. · Get regular sleep and exercise. · Eat regular meals, and avoid foods and drinks that often trigger migraines. These include chocolate and alcohol, especially red wine and port. Chemicals used in food, such as aspartame and monosodium glutamate (MSG), also can trigger migraines. So can some food additives, such as those found in hot dogs, boone, cold cuts, aged cheeses, and pickled foods. · Limit caffeine by not drinking too much coffee, tea, or soda. Do not quit caffeine suddenly, because that can also give you migraines. · Do not smoke or allow others to smoke around you.  If you need help quitting, talk to your doctor about stop-smoking programs and medicines. These can increase your chances of quitting for good. · If you are taking birth control pills or hormone therapy, talk to your doctor about whether they are triggering your migraines. When should you call for help? Call 911 anytime you think you may need emergency care. For example, call if: 
  · You have symptoms of a stroke. These may include: 
? Sudden numbness, tingling, weakness, or loss of movement in your face, arm, or leg, especially on only one side of your body. ? Sudden vision changes. ? Sudden trouble speaking. ? Sudden confusion or trouble understanding simple statements. ? Sudden problems with walking or balance. ? A sudden, severe headache that is different from past headaches.  
 Call your doctor now or seek immediate medical care if: 
  · You develop a fever and a stiff neck.  
  · You have new nausea and vomiting, or you cannot keep down food or liquids.  
 Watch closely for changes in your health, and be sure to contact your doctor if: 
  · You have a headache that does not get better within 1 or 2 days.  
  · Your headaches get worse or happen more often. Where can you learn more? Go to http://guera-samreen.info/. Enter V975 in the search box to learn more about \"Recurring Migraine Headache: Care Instructions. \" Current as of: March 28, 2019 Content Version: 12.2 © 6900-9335 [employer]. Care instructions adapted under license by [employer] (which disclaims liability or warranty for this information). If you have questions about a medical condition or this instruction, always ask your healthcare professional. [place] [address] any warranty or liability for your use of this information.
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
## 2019-12-24 NOTE — PROGRESS NOTES
I reviewed with the resident the patient's medical history and the resident's history and findings on the physical examination. I discussed assessment and plan with the resident and concur with the findings and plan as documented in the resident's note.     51 y.o. 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

## 2019-12-24 NOTE — PROGRESS NOTES
1. Have you been to the ER, urgent care clinic since your last visit? Hospitalized since your last visit? No    2. Have you seen or consulted any other health care providers outside of the [address] [place] [address] since your last visit? Include any pap smears or colon screening. No    Reviewed record in preparation for visit and have necessary documentation  Goals that were addressed and/or need to be completed during or after this appointment include     Health Maintenance Due   Topic Date Due    PAP AKA CERVICAL CYTOLOGY  07/27/2019       Patient is accompanied by self I have received verbal consent from Coby De Guzman to discuss any/all medical information while they are present in the room.

## 2019-12-25 LAB
25(OH)D3 SERPL-MCNC: 13.1 NG/ML (ref 30–100)
CHOLEST SERPL-MCNC: 221 MG/DL
HDLC SERPL-MCNC: 46 MG/DL
HDLC SERPL: 4.8 {RATIO} (ref 0–5)
LDLC SERPL CALC-MCNC: 141.4 MG/DL (ref 0–100)
LIPID PROFILE,FLP: ABNORMAL
TRIGL SERPL-MCNC: 168 MG/DL (ref ?–150)
TSH SERPL DL<=0.05 MIU/L-ACNC: 5.4 UIU/ML (ref 0.36–3.74)
VLDLC SERPL CALC-MCNC: 33.6 MG/DL

## 2019-12-27 ENCOUNTER — TELEPHONE (OUTPATIENT)
Dept: FAMILY MEDICINE CLINIC | Age: 49
End: 2019-12-27

## 2019-12-27 DIAGNOSIS — E03.9 ACQUIRED HYPOTHYROIDISM: ICD-10-CM

## 2019-12-27 DIAGNOSIS — E55.9 VITAMIN D DEFICIENCY: Primary | ICD-10-CM

## 2019-12-27 RX ORDER — LEVOTHYROXINE SODIUM 75 UG/1
TABLET ORAL
Qty: 30 TAB | Refills: 1 | Status: SHIPPED | OUTPATIENT
Start: 2019-12-27 | End: 2020-03-23 | Stop reason: SDUPTHER

## 2019-12-27 RX ORDER — ERGOCALCIFEROL 1.25 MG/1
50000 CAPSULE ORAL
Qty: 8 CAP | Refills: 0 | Status: SHIPPED | OUTPATIENT
Start: 2019-12-27 | End: 2020-03-23 | Stop reason: ALTCHOICE

## 2019-12-27 NOTE — PROGRESS NOTES
Results noted. TSH high at 5.4. Will increase Synthroid dose. Vitamin D low, will increase dose. .4, up from 101 3 yrs ago. ASCVD Risk score 1.3%, statin not recommended. Anemia resolved.     Katie Shahid MD  PGY2 Family Medicine Resident

## 2019-12-27 NOTE — TELEPHONE ENCOUNTER
Called patient to discuss lab results. TSH high, increased Synthroid dose. Vitamin D low, pt states is currently taking 1,000 units daily. Gave option to increase to 2,000 daily or start once a week tablet, pt would like to start once weekly tablet. Will do 50,000 unit ergocalciferol for 2 months then plan to recheck vitam D level and go to 2,000 units daily. LDL worsened to 141, encouraging diet modification and exercise, statin not recommended at this time. Anemia resolved. Pt voiced understanding and agreement, all questions answered. Pt to f/u in 4-6 weeks for repeat labs and reevaluation.      Jessica Lui MD  2:07 PM

## 2020-03-14 DIAGNOSIS — E55.9 VITAMIN D DEFICIENCY: ICD-10-CM

## 2020-03-14 DIAGNOSIS — E03.9 ACQUIRED HYPOTHYROIDISM: ICD-10-CM

## 2020-03-16 RX ORDER — ERGOCALCIFEROL 1.25 MG/1
CAPSULE ORAL
Qty: 8 CAP | Refills: 0 | OUTPATIENT
Start: 2020-03-16

## 2020-03-16 RX ORDER — LEVOTHYROXINE SODIUM 75 UG/1
TABLET ORAL
Qty: 30 TAB | Refills: 0 | OUTPATIENT
Start: 2020-03-16

## 2020-03-16 NOTE — TELEPHONE ENCOUNTER
Will refill Synthroid, not Euthyrox, and pt to continue vitamin D 2,000 units daily. Needs repeat TSH and vitamin D testing.

## 2020-03-20 NOTE — TELEPHONE ENCOUNTER
Pt understands that an appt is needed for any refills but given this virus situation, she would like to know if he still wants her to come in. Or could a small supply be sent to pharmacy?

## 2020-03-23 DIAGNOSIS — E03.9 ACQUIRED HYPOTHYROIDISM: ICD-10-CM

## 2020-03-23 RX ORDER — CHOLECALCIFEROL (VITAMIN D3) 125 MCG
2000 CAPSULE ORAL DAILY
Qty: 90 TAB | Refills: 0 | Status: SHIPPED | OUTPATIENT
Start: 2020-03-23 | End: 2020-07-29 | Stop reason: SDUPTHER

## 2020-03-23 RX ORDER — LEVOTHYROXINE SODIUM 75 UG/1
TABLET ORAL
Qty: 90 TAB | Refills: 0 | Status: SHIPPED | OUTPATIENT
Start: 2020-03-23 | End: 2020-07-29 | Stop reason: SDUPTHER

## 2020-03-23 NOTE — TELEPHONE ENCOUNTER
Pt states that she needs her medications. Was told because of the Virus they were not doing the lab work. What is she suppose to do about her medications. She needs her Vitamin D, 50,000 mg, Levothyroxine. Can the labs be put in so she can have it checked although she has been without her Vitamin D for about 2 weeks. Please call Pt on her medications.  Thanks

## 2020-03-24 RX ORDER — LEVOTHYROXINE SODIUM 50 UG/1
TABLET ORAL
Qty: 30 TAB | Refills: 0 | OUTPATIENT
Start: 2020-03-24

## 2020-03-24 NOTE — TELEPHONE ENCOUNTER
Refills ordered for synthroid and vitamin D. Completed high dose vitamin D, will restart 2,000 units daily. Ordered TSH and vitamin D labs, patient does not need to come in right away to have these checked, but order is in for patient to come for a lab appointment in the next month or so.      Olu Ellison MD  10:00 PM

## 2020-07-17 LAB
25(OH)D3+25(OH)D2 SERPL-MCNC: 35.4 NG/ML (ref 30–100)
TSH SERPL DL<=0.005 MIU/L-ACNC: 3.12 UIU/ML (ref 0.45–4.5)

## 2020-07-19 NOTE — TELEPHONE ENCOUNTER
Results noted. Vitamin D within normal limits. TSH within normal limits.      Fabby Cabrera MD  PGY3 Family Medicine Resident

## 2020-07-22 ENCOUNTER — TELEPHONE (OUTPATIENT)
Dept: FAMILY MEDICINE CLINIC | Age: 50
End: 2020-07-22

## 2020-07-22 NOTE — TELEPHONE ENCOUNTER
----- Message from Tarik Santos sent at 7/21/2020  5:09 PM EDT -----  Regarding: Dr. Mariann Cannon Message/Vendor Calls    Caller's first and last name:  pt    Reason for call:  Requesting to speak with the nurse    Callback required yes/no and why:  yes    Best contact number(s):  [phone]    Details to clarify the request:  Requesting lab results.      Tarik Santos

## 2020-07-23 NOTE — TELEPHONE ENCOUNTER
Attempted to call. unsuccessful. message left  Mychart message sent  Per Dr Asia Llanes  Vitamin D within normal limits. TSH within normal limits. No medication adjustments needed at this time. Please call if you have any questions.

## 2020-07-29 DIAGNOSIS — E03.9 ACQUIRED HYPOTHYROIDISM: ICD-10-CM

## 2020-07-29 DIAGNOSIS — E55.9 VITAMIN D DEFICIENCY: ICD-10-CM

## 2020-07-29 RX ORDER — CHOLECALCIFEROL (VITAMIN D3) 125 MCG
2000 CAPSULE ORAL DAILY
Qty: 90 TAB | Refills: 1 | Status: SHIPPED | OUTPATIENT
Start: 2020-07-29 | End: 2022-08-30

## 2020-07-29 RX ORDER — LEVOTHYROXINE SODIUM 75 UG/1
TABLET ORAL
Qty: 90 TAB | Refills: 1 | Status: SHIPPED | OUTPATIENT
Start: 2020-07-29 | End: 2021-01-21 | Stop reason: SDUPTHER

## 2020-07-29 NOTE — TELEPHONE ENCOUNTER
----- Message from Vj Hernandez sent at 7/29/2020 11:43 AM EDT -----  Regarding: Verline Alden. Nissa/ telephone  Contact: [phone] (if not patient): n/a  Relationship of caller (if not patient): n/a  Best contact number(s): [phone]  Name of medication and dosage if known: Levothyroxine 75mg, Vitamin D 2000 units  Is patient out of this medication (yes/no): yes  Pharmacy name: [address] listed in chart? (yes/no): no  Pharmacy phone number: [phone]  Date of last visit: Unknown  Details to clarify the request: Pt recently changed from one [address] to another one. Number listed above for the new one.

## 2020-09-21 ENCOUNTER — TELEPHONE (OUTPATIENT)
Dept: INTERNAL MEDICINE CLINIC | Age: 50
End: 2020-09-21

## 2020-09-21 NOTE — TELEPHONE ENCOUNTER
Returned call to pt. She has been scheduled for an appt with est care and manage INR for anticoags with Dr Carlos Juarez today.  Pt thanks

## 2020-09-21 NOTE — TELEPHONE ENCOUNTER
Patient returned call to practice to advise she saw DR. Azar 1 time for a sick visit due to location. Patient reports moving  to DR. Josiah Sullivan, would like to transfer care back to her. PSR advised patient provider is currently not taking new patients. Patient request DR. Moran please reconsider.      Please call patient to advise if able to accommodate request.    [phone]

## 2020-10-21 NOTE — PROGRESS NOTES
7/25/2017 [facility] downtime will be from [id number] [Negative] : Heme/Lymph

## 2021-01-21 ENCOUNTER — VIRTUAL VISIT (OUTPATIENT)
Dept: FAMILY MEDICINE CLINIC | Age: 51
End: 2021-01-21
Payer: MEDICAID

## 2021-01-21 DIAGNOSIS — R03.0 ELEVATED BLOOD PRESSURE READING: ICD-10-CM

## 2021-01-21 DIAGNOSIS — E03.9 ACQUIRED HYPOTHYROIDISM: Primary | ICD-10-CM

## 2021-01-21 PROCEDURE — 99443 PR PHYS/QHP TELEPHONE EVALUATION 21-30 MIN: CPT | Performed by: STUDENT IN AN ORGANIZED HEALTH CARE EDUCATION/TRAINING PROGRAM

## 2021-01-21 RX ORDER — LEVOTHYROXINE SODIUM 75 UG/1
TABLET ORAL
Qty: 90 TAB | Refills: 0 | Status: SHIPPED | OUTPATIENT
Start: 2021-01-21 | End: 2021-04-27

## 2021-01-21 NOTE — Clinical Note
When you get the chance, could you call her to help set up an in-person clinic visit in 4 weeks for blood pressure check, thyroid, and labs? Thank you!
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
## 2021-01-26 NOTE — PROGRESS NOTES
[address] Medicine Residency Attending Addendum:  Dr. Travis Suazo MD,  the patient and I were not physically present during this encounter. The resident and I are concurrently monitoring the patient care through appropriate telecommunication technology. I discussed the findings, assessment and plan with the resident and agree with the resident's findings and plan as documented in the resident's note.       Floresita Cheng MD

## 2021-02-13 ENCOUNTER — TELEPHONE (OUTPATIENT)
Dept: FAMILY MEDICINE CLINIC | Age: 51
End: 2021-02-13

## 2021-02-13 NOTE — TELEPHONE ENCOUNTER
Received request for on call physician to call patient. Patient complaining of elevated BP today. Initial reading 150/102, then 140/102. On no BP meds. Had headache (has chronic migraine headaches). Also felt like her heart was fluttering, but has had intermittent palpitations in the past. Currently symptoms resolved. Advised patient to make a follow up appt with PCP and continue to monitor symptoms. Advised to go to ED/urgent care if she has persistent headache that does not resolve with typical tx, persistent palpitations, chest pain, SOB, nausea, vomiting, visual changes, any other acute changes.

## 2021-02-20 ENCOUNTER — TELEPHONE (OUTPATIENT)
Dept: FAMILY MEDICINE CLINIC | Age: 51
End: 2021-02-20

## 2021-02-20 NOTE — TELEPHONE ENCOUNTER
----- Message from [employer] sent at 2/19/2021  4:19 PM EST -----  Regarding: /Telephone  Contact: [phone]  General Message/Vendor Calls    Caller's first and last name:pt      Reason for call:UTI symtpoms      Callback required yes/no and why:yes to discuss next steps      Best contact number(s):[phone]      Details to clarify the request:n/a      [employer]

## 2021-02-22 NOTE — TELEPHONE ENCOUNTER
Returned call. Patient already has an appt scheduled 2/23/2021 and would like to wait to address during that visit.

## 2021-02-23 ENCOUNTER — OFFICE VISIT (OUTPATIENT)
Dept: FAMILY MEDICINE CLINIC | Age: 51
End: 2021-02-23
Payer: MEDICAID

## 2021-02-23 VITALS
RESPIRATION RATE: 20 BRPM | TEMPERATURE: 98.3 F | HEART RATE: 90 BPM | DIASTOLIC BLOOD PRESSURE: 95 MMHG | SYSTOLIC BLOOD PRESSURE: 137 MMHG | WEIGHT: 214 LBS | HEIGHT: 62 IN | BODY MASS INDEX: 39.38 KG/M2 | OXYGEN SATURATION: 100 %

## 2021-02-23 DIAGNOSIS — E55.9 VITAMIN D DEFICIENCY: ICD-10-CM

## 2021-02-23 DIAGNOSIS — R03.0 ELEVATED BLOOD PRESSURE READING: ICD-10-CM

## 2021-02-23 DIAGNOSIS — E78.5 HYPERLIPIDEMIA, UNSPECIFIED HYPERLIPIDEMIA TYPE: ICD-10-CM

## 2021-02-23 DIAGNOSIS — E03.9 ACQUIRED HYPOTHYROIDISM: ICD-10-CM

## 2021-02-23 DIAGNOSIS — E66.9 CLASS 2 OBESITY WITH BODY MASS INDEX (BMI) OF 39.0 TO 39.9 IN ADULT, UNSPECIFIED OBESITY TYPE, UNSPECIFIED WHETHER SERIOUS COMORBIDITY PRESENT: ICD-10-CM

## 2021-02-23 DIAGNOSIS — N30.01 ACUTE CYSTITIS WITH HEMATURIA: Primary | ICD-10-CM

## 2021-02-23 DIAGNOSIS — G43.809 OTHER MIGRAINE WITHOUT STATUS MIGRAINOSUS, NOT INTRACTABLE: ICD-10-CM

## 2021-02-23 LAB
BILIRUB UR QL STRIP: NEGATIVE
GLUCOSE UR-MCNC: NEGATIVE MG/DL
KETONES P FAST UR STRIP-MCNC: NEGATIVE MG/DL
PH UR STRIP: 7 [PH] (ref 4.6–8)
PROT UR QL STRIP: NEGATIVE
SP GR UR STRIP: 1.02 (ref 1–1.03)
UA UROBILINOGEN AMB POC: NORMAL (ref 0.2–1)
URINALYSIS CLARITY POC: CLEAR
URINALYSIS COLOR POC: YELLOW
URINE BLOOD POC: NORMAL
URINE LEUKOCYTES POC: NORMAL
URINE NITRITES POC: NEGATIVE

## 2021-02-23 PROCEDURE — 81003 URINALYSIS AUTO W/O SCOPE: CPT | Performed by: STUDENT IN AN ORGANIZED HEALTH CARE EDUCATION/TRAINING PROGRAM

## 2021-02-23 PROCEDURE — 99214 OFFICE O/P EST MOD 30 MIN: CPT | Performed by: STUDENT IN AN ORGANIZED HEALTH CARE EDUCATION/TRAINING PROGRAM

## 2021-02-23 RX ORDER — NITROFURANTOIN 25; 75 MG/1; MG/1
100 CAPSULE ORAL 2 TIMES DAILY
Qty: 10 CAP | Refills: 0 | Status: SHIPPED | OUTPATIENT
Start: 2021-02-23 | End: 2021-11-16 | Stop reason: ALTCHOICE

## 2021-02-23 RX ORDER — BUTALBITAL, ACETAMINOPHEN AND CAFFEINE 300; 40; 50 MG/1; MG/1; MG/1
1 CAPSULE ORAL
Qty: 10 CAP | Refills: 0 | Status: CANCELLED | OUTPATIENT
Start: 2021-02-23

## 2021-02-23 RX ORDER — AMLODIPINE BESYLATE 5 MG/1
5 TABLET ORAL DAILY
Qty: 30 TAB | Refills: 1 | Status: SHIPPED | OUTPATIENT
Start: 2021-02-23 | End: 2021-04-27

## 2021-02-23 NOTE — PROGRESS NOTES
[facility]    Subjective:   Tamera Murrell is a 53 y.o. female with history of migraines, GIB, hypothyroidism, OA, VTE, depression, Factor V Leiden  CC: dysuria  History provided by patient     HPI:  Pt states that she had been off her thyroid medicine for a while, but just started taking this again. Her weight has increased significantly since. Her blood pressure has been high for months. She has been noting it above goal of 140/90, often times with systolics as high as 123. She has been very stressed lately. Her most recent pressure was 164/110. She denies CP, palpitations, SOB, LE edema, but endorses headaches. She has chronic headaches. She also has chronic neck pain and reports a history of DDD. She follows with neurology for her headaches. She receives botox injections and a limited amount of fioricet as it interacts with her warfarin. Her warfarin has limited use of many therapies in the past.     Pt has been having frequency, urgency, and dysuria. No fevers, chills. This has occurred for the last few days. She is familiar with UTIs and reports this feels like her previous infections. No fevers, chills, N/V, flank pain. PFSH:     Current Outpatient Medications on File Prior to Visit   Medication Sig Dispense Refill    levothyroxine (SYNTHROID) 75 mcg tablet TAKE 1 TABLET BY MOUTH ONCE DAILY BEFORE BREAKFAST 90 Tab 0    cholecalciferol, vitamin D3, (Vitamin D3) 50 mcg (2,000 unit) tab Take 1 Tab by mouth daily. 90 Tab 1    butalbital-acetaminophen-caff (FIORICET) -40 mg per capsule Take 1 Cap by mouth every four (4) hours as needed for Pain. 10 Cap 0    acetaminophen (TYLENOL EXTRA STRENGTH) 500 mg tablet Take 1-2 Tabs by mouth every six (6) hours as needed for Pain. Not to exceed 4,000mg in any 24 hour period  Indications: pain 60 Tab 0    warfarin (COUMADIN) 5 mg tablet Take 5 mg by mouth Every Saturday.       warfarin (COUMADIN) 10 mg tablet Take 10 mg by mouth six (6) days a week. Sunday, Monday, Tuesday, Wednesday, Thursday, Friday      ONABOTULINUMTOXINA (BOTOX INJECTION) by IntraMUSCular route. The patient receives injections every 3 months at the office of Dr. Christopher Bassett  Indications: due next Oct 21, 2019       No current facility-administered medications on file prior to visit.         Patient Active Problem List   Diagnosis Code    History of thromboembolism of vein Z86.718    History of pulmonary embolism Z86.711    Depression F32.9    Palpitations R00.2    Reflex sympathetic dystrophy of the leg G90.529    Falkner filter in place Z95.828    Migraine headache G43.909    Bilateral knee pain M25.561, M25.562    Postcoital and contact bleeding N93.0    Hypothyroidism E03.9    Vitamin D deficiency E55.9    Hereditary coagulation factor deficiency ([facility] D68.2    Primary localized osteoarthritis of knees, bilateral M17.0    GI bleed K92.2    Failed total knee arthroplasty (HCC) T84.018A, Z96.659    Instability of internal right knee prosthesis (HCC) T84.022A    MRSA (methicillin resistant Staphylococcus aureus) A49.02    Neck pain M54.2    Herpes simplex virus (HSV) infection B00.9    History of recurrent miscarriages N96    Factor V Leiden ([facility] D68.51       Social History     Socioeconomic History    Marital status:      Spouse name: Not on file    Number of children: Not on file    Years of education: Not on file    Highest education level: Not on file   Occupational History    Not on file   Social Needs    Financial resource strain: Not on file    Food insecurity     Worry: Not on file     Inability: Not on file    Transportation needs     Medical: Not on file     Non-medical: Not on file   Tobacco Use    Smoking status: Never Smoker    Smokeless tobacco: Never Used   Substance and Sexual Activity    Alcohol use: No    Drug use: No    Sexual activity: Yes     Partners: Male     Birth control/protection: None   Lifestyle    Physical activity     Days per week: Not on file     Minutes per session: Not on file    Stress: Not on file   Relationships    Social connections     Talks on phone: Not on file     Gets together: Not on file     Attends Anglican service: Not on file     Active member of club or organization: Not on file     Attends meetings of clubs or organizations: Not on file     Relationship status: Not on file    Intimate partner violence     Fear of current or ex partner: Not on file     Emotionally abused: Not on file     Physically abused: Not on file     Forced sexual activity: Not on file   Other Topics Concern    Not on file   Social History Narrative    Not on file       Review of Systems   Constitutional: Negative for chills, fever and malaise/fatigue. Weight gain   Eyes: Negative for blurred vision and pain. Respiratory: Negative for cough and shortness of breath. Cardiovascular: Negative for chest pain, palpitations and leg swelling. Gastrointestinal: Negative for nausea and vomiting. Genitourinary: Positive for dysuria, frequency and urgency. Negative for flank pain and hematuria. Musculoskeletal: Positive for neck pain. Neurological: Positive for headaches. Negative for dizziness. Endo/Heme/Allergies: Negative for polydipsia. Objective:     Visit Vitals  BP (!) 137/95   Pulse 90   Temp 98.3 °F (36.8 °C) (Temporal)   Resp 20   Ht 5' 2\" (1.575 m)   Wt 214 lb (97.1 kg)   SpO2 100%   BMI 39.14 kg/m²          Physical Exam  Constitutional:       Appearance: Normal appearance. HENT:      Head: Normocephalic and atraumatic. Neck:      Musculoskeletal: Normal range of motion. No neck rigidity or muscular tenderness. Comments: No TTP down cervical spine or paraspinal muscles. ROM normal.  Cardiovascular:      Rate and Rhythm: Normal rate and regular rhythm. Abdominal:      General: There is no distension. Palpations: Abdomen is soft. Tenderness:  There is no abdominal tenderness. There is no right CVA tenderness or left CVA tenderness. Musculoskeletal:      Right lower leg: No edema. Left lower leg: No edema. Skin:     General: Skin is warm and dry. Neurological:      Mental Status: She is alert. Pertinent Labs/Studies: UA with 1+ blood and 1+ LE, personally reviewed. Assessment and orders:       ICD-10-CM ICD-9-CM    1. Acute cystitis with hematuria  N30.01 595.0 CULTURE, URINE      AMB POC URINALYSIS DIP STICK AUTO W/O MICRO      CULTURE, URINE   2. Acquired hypothyroidism  E03.9 244.9 TSH 3RD GENERATION   3. Other migraine without status migrainosus, not intractable  G43.809 346.80 XR SPINE CERV 4 OR 5 V   4. Class 2 obesity with body mass index (BMI) of 39.0 to 39.9 in adult, unspecified obesity type, unspecified whether serious comorbidity present  E66.9 278.00 LIPID PANEL    Z68.39 V85.39 HEMOGLOBIN A1C WITH EAG   5. Elevated blood pressure reading  B10.6 134.5 METABOLIC PANEL, BASIC   6. Vitamin D deficiency  E55.9 268.9 VITAMIN D, 25 HYDROXY   7. Hyperlipidemia, unspecified hyperlipidemia type  E78.5 272.4 LIPID PANEL     Encounter Diagnoses   Name Primary?  Acute cystitis with hematuria Yes    Acquired hypothyroidism     Other migraine without status migrainosus, not intractable     Class 2 obesity with body mass index (BMI) of 39.0 to 39.9 in adult, unspecified obesity type, unspecified whether serious comorbidity present     Elevated blood pressure reading     Vitamin D deficiency     Hyperlipidemia, unspecified hyperlipidemia type      Diagnoses and all orders for this visit:    1. Acute cystitis with hematuria - symptoms c/w acute cystitis. 1+ LE, 1+ blood on UA, will send culture, but empirically treat in meantime.  -     CULTURE, URINE; Future  -     AMB POC URINALYSIS DIP STICK AUTO W/O MICRO  -     Macrobid 100 mg BID for 5 days    2. Acquired hypothyroidism - inconsistent use of synthroid.  Pt feels she has gained a lot of weight due to this. Currently taking medication again. Will check TSH today.  -     TSH 3RD GENERATION; Future    3. Other migraine without status migrainosus, not intractable - chronic MSK disease could be contributing to headaches. She has chronic neck pain and reports a history of DDD, so will get cervical xray to evaluate this. Pt may benefit from ortho or pain management. -     XR SPINE CERV 4 OR 5 V; Future    4. Class 2 obesity with body mass index (BMI) of 39.0 to 39.9 in adult, unspecified obesity type, unspecified whether serious comorbidity present - will check routine lab work given evaluating with lab work today. Pt concerned of weight gain, but inconsistently taking synthroid. Checking TSH. Will discuss further if all values normal.  -     LIPID PANEL; Future  -     HEMOGLOBIN A1C WITH EAG; Future  -     Checking TSH    5. Elevated blood pressure reading - Pt has been having multiple elevated BP readings at home with systolics in 535S and diastolic over 043. Pressures today elevated. Systolic initially in 852K. On repeat, 137/95. Diastolic still elevated. Will start BP medication.  -     METABOLIC PANEL, BASIC; Future  -     Amlodipine 5 mg BID  -     Advised to keep BP log at home for review in clinic  -     RTC in 2 weeks for BP log review    6. Vitamin D deficiency  -     VITAMIN D, 25 HYDROXY; Future    7. Hyperlipidemia, unspecified hyperlipidemia type  -     LIPID PANEL; Future        Follow-up and Dispositions    · Return in about 2 weeks (around 3/9/2021) for BP log check. I have discussed the diagnosis with the patient and the intended plan as seen in the above orders. Social history, medical history, and labs were reviewed. The patient has received an after-visit summary and questions were answered concerning future plans. I have discussed medication side effects and warnings with the patient as well.     José Luis Ann MD  Resident [facility]  02/26/21

## 2021-02-27 LAB
BACTERIA UR CULT: ABNORMAL
BACTERIA UR CULT: ABNORMAL

## 2021-03-02 ENCOUNTER — LAB ONLY (OUTPATIENT)
Dept: FAMILY MEDICINE CLINIC | Age: 51
End: 2021-03-02

## 2021-03-02 DIAGNOSIS — E78.5 HYPERLIPIDEMIA, UNSPECIFIED HYPERLIPIDEMIA TYPE: ICD-10-CM

## 2021-03-02 DIAGNOSIS — E66.9 CLASS 2 OBESITY WITH BODY MASS INDEX (BMI) OF 39.0 TO 39.9 IN ADULT, UNSPECIFIED OBESITY TYPE, UNSPECIFIED WHETHER SERIOUS COMORBIDITY PRESENT: ICD-10-CM

## 2021-03-02 DIAGNOSIS — E03.9 ACQUIRED HYPOTHYROIDISM: ICD-10-CM

## 2021-03-02 DIAGNOSIS — E55.9 VITAMIN D DEFICIENCY: ICD-10-CM

## 2021-03-02 DIAGNOSIS — R03.0 ELEVATED BLOOD PRESSURE READING: ICD-10-CM

## 2021-03-04 LAB
25(OH)D3+25(OH)D2 SERPL-MCNC: 22 NG/ML (ref 30–100)
BUN SERPL-MCNC: 16 MG/DL (ref 6–24)
BUN/CREAT SERPL: 25 (ref 9–23)
CALCIUM SERPL-MCNC: 9.6 MG/DL (ref 8.7–10.2)
CHLORIDE SERPL-SCNC: 104 MMOL/L (ref 96–106)
CHOLEST SERPL-MCNC: 208 MG/DL (ref 100–199)
CO2 SERPL-SCNC: 18 MMOL/L (ref 20–29)
CREAT SERPL-MCNC: 0.63 MG/DL (ref 0.57–1)
EST. AVERAGE GLUCOSE BLD GHB EST-MCNC: 108 MG/DL
GLUCOSE SERPL-MCNC: 93 MG/DL (ref 65–99)
HBA1C MFR BLD: 5.4 % (ref 4.8–5.6)
HDLC SERPL-MCNC: 47 MG/DL
LDLC SERPL CALC-MCNC: 142 MG/DL (ref 0–99)
POTASSIUM SERPL-SCNC: 4.5 MMOL/L (ref 3.5–5.2)
SODIUM SERPL-SCNC: 139 MMOL/L (ref 134–144)
TRIGL SERPL-MCNC: 104 MG/DL (ref 0–149)
TSH SERPL DL<=0.005 MIU/L-ACNC: 3.41 UIU/ML (ref 0.45–4.5)
VLDLC SERPL CALC-MCNC: 19 MG/DL (ref 5–40)

## 2021-03-10 NOTE — PROGRESS NOTES
Low vitamin D, will increase dose. A1c 5.4. Lipid panel with elevated LDL and total cholesterol. TSH normal. BMP rodolfo.     Clifford Farmer MD  Family Medicine Resident

## 2021-03-18 ENCOUNTER — TELEPHONE (OUTPATIENT)
Dept: FAMILY MEDICINE CLINIC | Age: 51
End: 2021-03-18

## 2021-03-18 NOTE — TELEPHONE ENCOUNTER
----- Message from Jorge Chan sent at 3/17/2021  4:54 PM EDT -----  Regarding: /Telephone  General Message/Vendor Calls    Caller's first and last name: Pt       Reason for call: Requesting a call back in regards to her BP       Callback required yes/no and why: yes       Best contact number(s): [phone]      Details to clarify the request: N/A       Jorge Chan

## 2021-03-18 NOTE — TELEPHONE ENCOUNTER
----- Message from Lily Lucero sent at 3/18/2021  2:41 PM EDT -----  Regarding: Dr. Alyson Robledo Telephone  Patient return call    Caller's first and last name and relationship (if not the patient): N/A      Best contact number(s): [phone]      Whose call is being returned: Dr. Bobby Cagle    Details to clarify the request: Left a message for Dr. Bobby Cagle, returning call. Patient had question about blood pressure medication.       Lily Lucero

## 2021-10-08 ENCOUNTER — TRANSCRIBE ORDER (OUTPATIENT)
Dept: SCHEDULING | Age: 51
End: 2021-10-08

## 2021-10-08 DIAGNOSIS — Z96.651 STATUS POST REVISION OF TOTAL KNEE REPLACEMENT, RIGHT: Primary | ICD-10-CM

## 2021-10-28 RX ORDER — AMLODIPINE BESYLATE 5 MG/1
TABLET ORAL
Qty: 90 TABLET | Refills: 1 | Status: SHIPPED | OUTPATIENT
Start: 2021-10-28 | End: 2021-11-16

## 2021-11-16 ENCOUNTER — OFFICE VISIT (OUTPATIENT)
Dept: FAMILY MEDICINE CLINIC | Age: 51
End: 2021-11-16
Payer: MEDICAID

## 2021-11-16 VITALS
TEMPERATURE: 97.5 F | DIASTOLIC BLOOD PRESSURE: 92 MMHG | BODY MASS INDEX: 39.38 KG/M2 | WEIGHT: 214 LBS | HEIGHT: 62 IN | HEART RATE: 86 BPM | OXYGEN SATURATION: 99 % | RESPIRATION RATE: 22 BRPM | SYSTOLIC BLOOD PRESSURE: 145 MMHG

## 2021-11-16 DIAGNOSIS — B35.3 TINEA PEDIS OF RIGHT FOOT: ICD-10-CM

## 2021-11-16 DIAGNOSIS — J40 BRONCHITIS: Primary | ICD-10-CM

## 2021-11-16 DIAGNOSIS — E03.9 ACQUIRED HYPOTHYROIDISM: ICD-10-CM

## 2021-11-16 DIAGNOSIS — I10 PRIMARY HYPERTENSION: ICD-10-CM

## 2021-11-16 PROCEDURE — 87804 INFLUENZA ASSAY W/OPTIC: CPT | Performed by: STUDENT IN AN ORGANIZED HEALTH CARE EDUCATION/TRAINING PROGRAM

## 2021-11-16 PROCEDURE — 99213 OFFICE O/P EST LOW 20 MIN: CPT | Performed by: STUDENT IN AN ORGANIZED HEALTH CARE EDUCATION/TRAINING PROGRAM

## 2021-11-16 RX ORDER — BENZONATATE 200 MG/1
200 CAPSULE ORAL
Qty: 30 CAPSULE | Refills: 1 | Status: SHIPPED | OUTPATIENT
Start: 2021-11-16 | End: 2021-11-23

## 2021-11-16 RX ORDER — BENZONATATE 200 MG/1
200 CAPSULE ORAL
Qty: 30 CAPSULE | Refills: 1 | Status: SHIPPED | OUTPATIENT
Start: 2021-11-16 | End: 2021-11-16

## 2021-11-16 RX ORDER — PHENOL/SODIUM PHENOLATE
20 AEROSOL, SPRAY (ML) MUCOUS MEMBRANE 2 TIMES DAILY
COMMUNITY
End: 2022-06-15 | Stop reason: SDUPTHER

## 2021-11-16 RX ORDER — CHLORPHENIRAMINE MALEATE 4 MG
TABLET ORAL 2 TIMES DAILY
Qty: 45 G | Refills: 1 | Status: SHIPPED | OUTPATIENT
Start: 2021-11-16

## 2021-11-16 RX ORDER — AMLODIPINE BESYLATE 10 MG/1
10 TABLET ORAL DAILY
Qty: 90 TABLET | Refills: 1 | Status: SHIPPED | OUTPATIENT
Start: 2021-11-16 | End: 2022-06-15

## 2021-11-16 NOTE — PROGRESS NOTES
Sb Pedroza (: 1970) is a 49 y.o. female, established patient, here for evaluation of the following chief complaint(s):  Weight Gain, Medication Evaluation, and Hypertension       Assessment/Plan:  1. Bronchitis-flu negative, Covid negative 2 days ago. Discussed conservative symptom driven treatments. Will attempt Tessalon for the cough. -     benzonatate (TESSALON) 200 mg capsule; Take 1 Capsule by mouth three (3) times daily as needed for Cough for up to 7 days. , Normal, Disp-30 Capsule, R-1  -     AMB POC RAPID INFLUENZA TEST  2. Tinea pedis of right foot-causing itching and some flaking between the toes. Has affected the last 2 toenails. Discussed oral versus topical treatments. Patient likes to use topicals for the symptoms. Oral medications for the nails are an option in the future. Would have to watch out for interactions with warfarin and antifungals. -     clotrimazole (LOTRIMIN) 1 % topical cream; Apply  to affected area two (2) times a day., Normal, Disp-45 g, R-1  3. Primary hypertension-blood pressure high today in the last few visits. We will double her amlodipine from 5 to 10 mg. Continue BP checks at home. -     amLODIPine (NORVASC) 10 mg tablet; Take 1 Tablet by mouth daily. , Normal, Disp-90 Tablet, R-1  -     METABOLIC PANEL, BASIC; Future  4. Acquired hypothyroidism-patient has had difficulty with her weight over the last few months. TSH last checked 6 months ago, so will make sure is in goal range today.  -     TSH 3RD GENERATION; Future    Return in about 3 months (around 2022). Subjective/Objective:  HPI  URI symptoms-5 days of a persistant productive cough, voice hoarseness. Was seen at [facility] ED 2 days ago, Covid negative. Denies nasal congestion, ear pain, fever, chills, abdominal pain, diarrhea. Main symptom that is bothering her is the cough. Meds attempted: Dayquil and Nyquil.        Physical Exam  Blood pressure (!) 145/92, pulse 86, temperature 97.5 °F (36.4 °C), temperature source Oral, resp. rate 22, height 5' 2\" (1.575 m), weight 214 lb (97.1 kg), SpO2 99 %. Body mass index is 39.14 kg/m². General appearance - Alert, NAD, coughing frequently throughout the visit. Head - Atraumatic. Normocephalic. No lymphadenopathy  Eyes - EOMI. Sclera white. Ears - Hearing grossly normal, TMs clear without erythema or bulging. Nose - Nares patent, no polyps  Throat - pharynx clear, no exudates. Respiratory - LCTAB. No wheeze/rale/rhonchi  Heart - Normal rate, regular rhythm. No m/r/r  Abdomen - Soft, non tender. Non distended. Neurological - No focal deficits. Speech normal.   Musculoskeletal - Normal ROM, Gait normal.    Extremities - No LE edema. Distal pulses intact  Skin - normal coloration and normal turgor. No cyanosis, no rash. Medical History- Reviewed Surgical History- Reviewed Social History- Reviewed   Cynthia Proctor has a past medical history of Acquired hypothyroidism (4/20/2017), Advanced maternal age in pregnancy (08/10/2011), Anemia NEC, Anesthesia complication (3673), Anticoagulated on warfarin, Arthritis, DVT (deep vein thrombosis) in pregnancy (2003), DVT (deep venous thrombosis) ([facility] [address] (1996), Factor V deficiency ([facility] [address] (6/27/2017), Genital herpes complicating pregnancy (05/7/1020), GERD (gastroesophageal reflux disease), GI bleed (03/2018), [place] filter in place (11/9/2011), Hereditary thrombophilia ([facility] [address] (8/10/2011), History of blood transfusion (03/2018), History of palpitations, History of pulmonary embolism (08/10/2011), Migraine headache (02/08/2012), MRSA (methicillin resistant Staphylococcus aureus) (2009 & 2010), Obesity, Class II, BMI 35-39.9, Pap smear for cervical cancer screening (4/27/12 neg HPV NEG), Postpartum depression, Reflex sympathetic dystrophy of the leg (10/12/2011), Restless leg syndrome, Stressful life events affecting family and household, and Vitamin D deficiency (4/20/2017).  She also has no past medical history of Abnormal Pap smear, Asthma,  delivery, Chlamydia, Community acquired pneumonia, Complication of anesthesia, COPD, Delivery normal, Dementia, Developmental delay, Diabetes mellitus, Essential hypertension, Fetal alcohol syndrome, Gonorrhea, Hearing reduced, Heart abnormalities, Human immunodeficiency virus (HIV) disease ([address] Infertility, Kidney disease, Otitis media, Phlebitis and thrombophlebitis of unspecified site, Premature Birth, Respiratory abnormalities, Rhesus isoimmunization unspecified as to episode of care in pregnancy, Second hand smoke exposure, Sickle-cell disease, unspecified, Sleep disorder, Syphilis, Systemic lupus erythematosus ([address] Tick bite, Trauma, Unspecified epilepsy without mention of intractable epilepsy, or Vision decreased. Dereck Garcia has a past surgical history that includes hc dil james entry (); hx lap cholecystectomy (); hx  section; hx knee replacement (Bilateral, 2017); hx endoscopy (N/A, 2018); and hx knee replacement (Right, 2018). Dereck Garcia reports that she has never smoked. She has never used smokeless tobacco. She reports that she does not drink alcohol and does not use drugs.          Problem List- Reviewed   Dereck Garcia has History of thromboembolism of vein, History of pulmonary embolism, Depression, Palpitations, Reflex sympathetic dystrophy of the leg, [place] filter in place, Migraine headache, Bilateral knee pain, Postcoital and contact bleeding, Hypothyroidism, Vitamin D deficiency, Hereditary coagulation factor deficiency ([address] Primary localized osteoarthritis of knees, bilateral, GI bleed, Failed total knee arthroplasty ([address] Instability of internal right knee prosthesis ([address] MRSA (methicillin resistant Staphylococcus aureus), Neck pain, Herpes simplex virus (HSV) infection, History of recurrent miscarriages, and Factor V Leiden ([address] on their problem list.       Current Outpatient Medications   Medication Instructions    acetaminophen (TYLENOL EXTRA STRENGTH) 500-1,000 mg, Oral, EVERY 6 HOURS AS NEEDED, Not to exceed 4,000mg in any 24 hour period    amLODIPine (NORVASC) 10 mg, Oral, DAILY    benzonatate (TESSALON) 200 mg, Oral, 3 TIMES DAILY AS NEEDED    butalbital-acetaminophen-caff (FIORICET) -40 mg per capsule 1 Capsule, Oral, EVERY 4 HOURS AS NEEDED    cholecalciferol (vitamin D3) (VITAMIN D3) 2,000 Units, Oral, DAILY    clotrimazole (LOTRIMIN) 1 % topical cream Topical, 2 TIMES DAILY    levothyroxine (SYNTHROID) 75 mcg tablet TAKE 1 TABLET BY MOUTH ONCE DAILY BEFORE BREAKFAST    Omeprazole delayed release (PRILOSEC D/R) 20 mg, Oral, 2 TIMES DAILY    ONABOTULINUMTOXINA (BOTOX INJECTION) IntraMUSCular, The patient receives injections every 3 months at the office of Dr. Nj Powell warfarin (COUMADIN) 5 mg, EVERY SATURDAY    warfarin (COUMADIN) 10 mg, Oral, DAILY, Sunday, Monday, Tuesday, Wednesday, Thursday, Friday           An electronic signature was used to authenticate this note.   -- Tamika Cheney MD

## 2021-11-16 NOTE — PROGRESS NOTES
1. Have you been to the ER, urgent care clinic since your last visit? Hospitalized since your last visit? Yes When: [facility]     2. Have you seen or consulted any other health care providers outside of the [address] [place] [address] since your last visit? Include any pap smears or colon screening. No    Reviewed record in preparation for visit and have necessary documentation  Goals that were addressed and/or need to be completed during or after this appointment include     Health Maintenance Due   Topic Date Due    Hepatitis C Screening  Never done    COVID-19 Vaccine (1) Never done    Colorectal Cancer Screening Combo  03/04/2019    Shingrix Vaccine Age 50> (1 of 2) Never done    Breast Cancer Screen Mammogram  03/29/2021    Cervical cancer screen  07/27/2021    Flu Vaccine (1) 09/01/2021       Patient is accompanied by spouse I have received verbal consent from Candelaria Perkins to discuss any/all medical information while they are present in the room.       Will request PAP results from Dr. Binh Hunter

## 2021-11-17 LAB
BUN SERPL-MCNC: 8 MG/DL (ref 6–24)
BUN/CREAT SERPL: 13 (ref 9–23)
CALCIUM SERPL-MCNC: 9.4 MG/DL (ref 8.7–10.2)
CHLORIDE SERPL-SCNC: 104 MMOL/L (ref 96–106)
CO2 SERPL-SCNC: 22 MMOL/L (ref 20–29)
CREAT SERPL-MCNC: 0.64 MG/DL (ref 0.57–1)
GLUCOSE SERPL-MCNC: 92 MG/DL (ref 65–99)
POTASSIUM SERPL-SCNC: 4.3 MMOL/L (ref 3.5–5.2)
QUICKVUE INFLUENZA TEST: NEGATIVE
SODIUM SERPL-SCNC: 140 MMOL/L (ref 134–144)
TSH SERPL DL<=0.005 MIU/L-ACNC: 1.85 UIU/ML (ref 0.45–4.5)
VALID INTERNAL CONTROL?: YES

## 2022-03-18 PROBLEM — M54.2 NECK PAIN: Status: ACTIVE | Noted: 2019-10-24

## 2022-03-18 PROBLEM — N93.0 POSTCOITAL AND CONTACT BLEEDING: Status: ACTIVE | Noted: 2017-03-20

## 2022-03-18 PROBLEM — E03.9 HYPOTHYROIDISM: Status: ACTIVE | Noted: 2017-04-20

## 2022-03-19 PROBLEM — D68.2 HEREDITARY COAGULATION FACTOR DEFICIENCY (HCC): Status: ACTIVE | Noted: 2017-06-27

## 2022-03-19 PROBLEM — E55.9 VITAMIN D DEFICIENCY: Status: ACTIVE | Noted: 2017-04-20

## 2022-03-19 PROBLEM — A49.02 MRSA (METHICILLIN RESISTANT STAPHYLOCOCCUS AUREUS): Status: ACTIVE | Noted: 2019-10-24

## 2022-03-19 PROBLEM — M17.0 PRIMARY LOCALIZED OSTEOARTHRITIS OF KNEES, BILATERAL: Status: ACTIVE | Noted: 2017-07-24

## 2022-03-19 PROBLEM — K92.2 GI BLEED: Status: ACTIVE | Noted: 2018-03-04

## 2022-03-19 PROBLEM — B00.9 HERPES SIMPLEX VIRUS (HSV) INFECTION: Status: ACTIVE | Noted: 2019-10-24

## 2022-03-19 PROBLEM — T84.022A INSTABILITY OF INTERNAL RIGHT KNEE PROSTHESIS (HCC): Status: ACTIVE | Noted: 2019-10-07

## 2022-03-19 PROBLEM — N96 HISTORY OF RECURRENT MISCARRIAGES: Status: ACTIVE | Noted: 2019-10-24

## 2022-03-20 PROBLEM — Z96.659 FAILED TOTAL KNEE ARTHROPLASTY (HCC): Status: ACTIVE | Noted: 2018-11-29

## 2022-03-20 PROBLEM — T84.018A FAILED TOTAL KNEE ARTHROPLASTY (HCC): Status: ACTIVE | Noted: 2018-11-29

## 2022-06-15 DIAGNOSIS — I10 PRIMARY HYPERTENSION: ICD-10-CM

## 2022-06-15 DIAGNOSIS — K21.9 GASTROESOPHAGEAL REFLUX DISEASE WITHOUT ESOPHAGITIS: Primary | ICD-10-CM

## 2022-06-15 RX ORDER — PHENOL/SODIUM PHENOLATE
20 AEROSOL, SPRAY (ML) MUCOUS MEMBRANE 2 TIMES DAILY
Qty: 60 TABLET | Refills: 3 | Status: SHIPPED | OUTPATIENT
Start: 2022-06-15

## 2022-06-15 RX ORDER — AMLODIPINE BESYLATE 10 MG/1
10 TABLET ORAL DAILY
Qty: 90 TABLET | Refills: 1 | Status: SHIPPED | OUTPATIENT
Start: 2022-06-15

## 2022-07-28 DIAGNOSIS — E03.9 ACQUIRED HYPOTHYROIDISM: ICD-10-CM

## 2022-07-28 RX ORDER — LEVOTHYROXINE SODIUM 75 UG/1
75 TABLET ORAL
Qty: 120 TABLET | Refills: 0 | Status: SHIPPED | OUTPATIENT
Start: 2022-07-28 | End: 2022-11-25

## 2022-08-30 ENCOUNTER — OFFICE VISIT (OUTPATIENT)
Dept: FAMILY MEDICINE CLINIC | Age: 52
End: 2022-08-30
Payer: MEDICAID

## 2022-08-30 VITALS
OXYGEN SATURATION: 98 % | WEIGHT: 225 LBS | TEMPERATURE: 98.3 F | SYSTOLIC BLOOD PRESSURE: 116 MMHG | HEIGHT: 62 IN | RESPIRATION RATE: 18 BRPM | HEART RATE: 88 BPM | DIASTOLIC BLOOD PRESSURE: 73 MMHG | BODY MASS INDEX: 41.41 KG/M2

## 2022-08-30 DIAGNOSIS — Z11.59 NEED FOR HEPATITIS C SCREENING TEST: ICD-10-CM

## 2022-08-30 DIAGNOSIS — Z12.31 BREAST CANCER SCREENING BY MAMMOGRAM: ICD-10-CM

## 2022-08-30 DIAGNOSIS — F32.A ANXIETY AND DEPRESSION: Primary | ICD-10-CM

## 2022-08-30 DIAGNOSIS — E78.5 HYPERLIPIDEMIA, UNSPECIFIED HYPERLIPIDEMIA TYPE: ICD-10-CM

## 2022-08-30 DIAGNOSIS — M79.89 LEG SWELLING: ICD-10-CM

## 2022-08-30 DIAGNOSIS — F41.9 ANXIETY AND DEPRESSION: Primary | ICD-10-CM

## 2022-08-30 DIAGNOSIS — E03.9 ACQUIRED HYPOTHYROIDISM: ICD-10-CM

## 2022-08-30 DIAGNOSIS — R63.5 WEIGHT GAIN: ICD-10-CM

## 2022-08-30 PROBLEM — M54.81 OCCIPITAL NEURALGIA: Status: ACTIVE | Noted: 2022-08-30

## 2022-08-30 PROBLEM — M54.12 CERVICAL RADICULOPATHY: Status: ACTIVE | Noted: 2022-08-30

## 2022-08-30 LAB — HBA1C MFR BLD HPLC: 5.4 %

## 2022-08-30 PROCEDURE — 83036 HEMOGLOBIN GLYCOSYLATED A1C: CPT | Performed by: STUDENT IN AN ORGANIZED HEALTH CARE EDUCATION/TRAINING PROGRAM

## 2022-08-30 PROCEDURE — 99214 OFFICE O/P EST MOD 30 MIN: CPT | Performed by: STUDENT IN AN ORGANIZED HEALTH CARE EDUCATION/TRAINING PROGRAM

## 2022-08-30 NOTE — PROGRESS NOTES
Chief Complaint   Patient presents with    Follow-up     1. \"Have you been to the ER, urgent care clinic since your last visit? Hospitalized since your last visit? \" No    2. \"Have you seen or consulted any other health care providers outside of the [address] [place] [address] since your last visit? \"  [facility]      3. For patients aged 39-70: Has the patient had a colonoscopy / FIT/ Cologuard? No      If the patient is female:    4. For patients aged 41-77: Has the patient had a mammogram within the past 2 years? No      5. For patients aged 21-65: Has the patient had a pap smear?  Yes - no Care Gap present    Health Maintenance Due   Topic Date Due    Hepatitis C Screening  Never done    COVID-19 Vaccine (1) Never done    Depression Monitoring  Never done    Colorectal Cancer Screening Combo  03/04/2019    Shingrix Vaccine Age 50> (1 of 2) Never done    Breast Cancer Screen Mammogram  03/29/2021    Cervical cancer screen  07/27/2021

## 2022-08-30 NOTE — LETTER
8/30/2022 1:58 PM      To Medical Records,    Please fax us the most recent office visit notes so that we may update the patient's records for continuity of care.      Our fax number: [phone]    Patient:   Wilfredo Langford  1970      Sincerely,      Fernando Smith MD
HA since this morning; hx of migraines; c/o worse than normal HA
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
## 2022-09-01 NOTE — PROGRESS NOTES
Hepatitis C virus screening negative  CMP okay  Total cholesterol 225, triglycerides 157, HDL 59, . ASCVD risk 1.6%. Continue to recommend healthy diet and exercise. TSH within normal limits.

## 2022-09-02 LAB
ALBUMIN SERPL-MCNC: 4.8 G/DL (ref 3.8–4.9)
ALBUMIN/GLOB SERPL: 1.8 {RATIO} (ref 1.2–2.2)
ALP SERPL-CCNC: 92 IU/L (ref 44–121)
ALT SERPL-CCNC: 16 IU/L (ref 0–32)
AST SERPL-CCNC: 15 IU/L (ref 0–40)
BILIRUB SERPL-MCNC: 0.2 MG/DL (ref 0–1.2)
BUN SERPL-MCNC: 16 MG/DL (ref 6–24)
BUN/CREAT SERPL: 25 (ref 9–23)
CALCIUM SERPL-MCNC: 9.8 MG/DL (ref 8.7–10.2)
CHLORIDE SERPL-SCNC: 103 MMOL/L (ref 96–106)
CHOLEST SERPL-MCNC: 225 MG/DL (ref 100–199)
CO2 SERPL-SCNC: 17 MMOL/L (ref 20–29)
CREAT SERPL-MCNC: 0.65 MG/DL (ref 0.57–1)
EGFR: 107 ML/MIN/1.73
GLOBULIN SER CALC-MCNC: 2.7 G/DL (ref 1.5–4.5)
GLUCOSE SERPL-MCNC: 100 MG/DL (ref 65–99)
HCV AB S/CO SERPL IA: <0.1 S/CO RATIO (ref 0–0.9)
HDLC SERPL-MCNC: 59 MG/DL
LDLC SERPL CALC-MCNC: 138 MG/DL (ref 0–99)
POTASSIUM SERPL-SCNC: 4.6 MMOL/L (ref 3.5–5.2)
PROT SERPL-MCNC: 7.5 G/DL (ref 6–8.5)
SODIUM SERPL-SCNC: 140 MMOL/L (ref 134–144)
TRIGL SERPL-MCNC: 157 MG/DL (ref 0–149)
TSH SERPL DL<=0.005 MIU/L-ACNC: 2.03 UIU/ML (ref 0.45–4.5)
VLDLC SERPL CALC-MCNC: 28 MG/DL (ref 5–40)

## 2022-09-06 ENCOUNTER — TELEPHONE (OUTPATIENT)
Dept: FAMILY MEDICINE CLINIC | Age: 52
End: 2022-09-06

## 2022-09-28 NOTE — PROGRESS NOTES
Anesthesia Pre-Procedure Evaluation    Patient: Sammie Ramirez   MRN: [id number] : 1974        Procedure : Procedure(s):  Xi Robotic Assisted laparoscopic right ovarian cystectomy, endometriosis resection/fulguration, cervical biopsy, and endometrial biopsy          Past Medical History:   Diagnosis Date     ASCUS (atypical squamous cells of undetermined significance) on Pap smear 10/13/2016    Neg HPV     History of colposcopy 2018     LSIL (low grade squamous intraepithelial lesion) on Pap smear 2014      Past Surgical History:   Procedure Laterality Date     LEEP TX, CERVICAL  x 2 per chart    historical     ORTHOPEDIC SURGERY        Allergies   Allergen Reactions     Amoxicillin      Rash, hives     Codeine Sulfate Nausea and Vomiting     Hydrocodone Nausea and Vomiting     Lactose Diarrhea     dairy     Seasonal Allergies       Social History     Tobacco Use     Smoking status: Never Smoker     Smokeless tobacco: Never Used   Substance Use Topics     Alcohol use: Yes     Comment: wine 1-2 glasses on weekends      Wt Readings from Last 1 Encounters:   22 58.1 kg (128 lb)        Anesthesia Evaluation            ROS/MED HX  ENT/Pulmonary:  - neg pulmonary ROS     Neurologic:     (+) migraines,     Cardiovascular:  - neg cardiovascular ROS     METS/Exercise Tolerance:     Hematologic:  - neg hematologic  ROS     Musculoskeletal:  - neg musculoskeletal ROS     GI/Hepatic:  - neg GI/hepatic ROS     Renal/Genitourinary:  - neg Renal ROS     Endo:  - neg endo ROS     Psychiatric/Substance Use:  - neg psychiatric ROS     Infectious Disease:  - neg infectious disease ROS     Malignancy:  - neg malignancy ROS     Other: Comment: .Lab Test        08/15/22     03/01/21                       1015          0843          WBC          4.2          4.9           HGB          14.9         13.8          MCV          89           89            PLT          217          184            Lab Test  Problem: Falls - Risk of  Goal: *Absence of Falls  Document Rea Fall Risk and appropriate interventions in the flowsheet.    Outcome: Progressing Towards Goal  Fall Risk Interventions:  Mobility Interventions: Communicate number of staff needed for ambulation/transfer, Patient to call before getting OOB         Medication Interventions: Bed/chair exit alarm, Patient to call before getting OOB, Teach patient to arise slowly    Elimination Interventions: Patient to call for help with toileting needs, Call light in reach    History of Falls Interventions: Room close to nurse's station        08/15/22     03/01/21     03/09/20     02/10/20                       1015          0843          0840          0956          NA           134          140           --          139           POTASSIUM    3.7          3.6          3.7          3.1*          CHLORIDE     103          107           --          108           CO2          23           29            --          28            BUN          10           11            --          10            CR           0.75         0.78          --          0.72          ANIONGAP     8            4             --          3             GITA          9.3          9.0           --          8.2*          GLC          94           92            --          97                       Physical Exam    Airway        Mallampati: II    Neck ROM: full     Respiratory Devices and Support         Dental           Cardiovascular   cardiovascular exam normal       Rhythm and rate: regular     Pulmonary   pulmonary exam normal                OUTSIDE LABS:  CBC:   Lab Results   Component Value Date    WBC 4.2 08/15/2022    WBC 4.9 03/01/2021    HGB 14.9 08/15/2022    HGB 13.8 03/01/2021    HCT 44.8 08/15/2022    HCT 41.4 03/01/2021     08/15/2022     03/01/2021     BMP:   Lab Results   Component Value Date     08/15/2022     03/01/2021    POTASSIUM 3.7 08/15/2022    POTASSIUM 3.6 03/01/2021    CHLORIDE 103 08/15/2022    CHLORIDE 107 03/01/2021    CO2 23 08/15/2022    CO2 29 03/01/2021    BUN 10 08/15/2022    BUN 11 03/01/2021    CR 0.75 08/15/2022    CR 0.78 03/01/2021    GLC 94 08/15/2022    GLC 92 03/01/2021     COAGS: No results found for: PTT, INR, FIBR  POC: No results found for: BGM, HCG, HCGS  HEPATIC:   Lab Results   Component Value Date    ALBUMIN 3.5 08/15/2022    PROTTOTAL 7.2 08/15/2022    ALT 21 08/15/2022    AST 19 08/15/2022    ALKPHOS 48 08/15/2022    BILITOTAL 0.6 08/15/2022     OTHER:   Lab Results   Component Value Date    GITA 9.3  08/15/2022    TSH 0.68 08/15/2022       Anesthesia Plan    ASA Status:  1      Anesthesia Type: General.     - Airway: ETT   Induction: Intravenous, Propofol.   Maintenance: Balanced.        Consents    Anesthesia Plan(s) and associated risks, benefits, and realistic alternatives discussed. Questions answered and patient/representative(s) expressed understanding.    - Discussed:     - Discussed with:  Patient         Postoperative Care    Pain management: IV analgesics, Oral pain medications, Multi-modal analgesia.   PONV prophylaxis: Ondansetron (or other 5HT-3), Dexamethasone or Solumedrol     Comments:                Joey Julian DO

## 2022-11-05 DIAGNOSIS — K21.9 GASTROESOPHAGEAL REFLUX DISEASE WITHOUT ESOPHAGITIS: ICD-10-CM

## 2022-11-07 RX ORDER — OMEPRAZOLE 20 MG/1
CAPSULE, DELAYED RELEASE ORAL
Qty: 60 CAPSULE | Refills: 3 | Status: SHIPPED | OUTPATIENT
Start: 2022-11-07 | End: 2022-11-18 | Stop reason: SDUPTHER

## 2022-11-09 DIAGNOSIS — E03.9 ACQUIRED HYPOTHYROIDISM: ICD-10-CM

## 2022-11-09 RX ORDER — LEVOTHYROXINE SODIUM 75 UG/1
75 TABLET ORAL
Qty: 120 TABLET | Refills: 0 | Status: SHIPPED | OUTPATIENT
Start: 2022-11-09 | End: 2023-03-09

## 2022-11-09 NOTE — TELEPHONE ENCOUNTER
----- Message from Radha Ruffdakota sent at 11/9/2022 12:27 PM EST -----  Subject: Refill Request    QUESTIONS  Name of Medication? levothyroxine (SYNTHROID) 75 mcg tablet  Patient-reported dosage and instructions? 75MCG Take once daily   How many days do you have left? 0  Preferred Pharmacy? [facility] phone number (if available)? [phone]  Additional Information for Provider? Please call patient to confirm this   refill has been sent in. Patient advised that her pharmacy has sent over   multiple requests through fax but has not heard back. Please advise.   ---------------------------------------------------------------------------  --------------  CALL BACK INFO  What is the best way for the office to contact you? OK to leave message on   voicemail  Preferred Call Back Phone Number? [phone]  ---------------------------------------------------------------------------  --------------  SCRIPT ANSWERS  Relationship to Patient?  Self

## 2022-11-18 ENCOUNTER — OFFICE VISIT (OUTPATIENT)
Dept: FAMILY MEDICINE CLINIC | Age: 52
End: 2022-11-18
Payer: MEDICAID

## 2022-11-18 VITALS
RESPIRATION RATE: 19 BRPM | BODY MASS INDEX: 41.41 KG/M2 | WEIGHT: 225 LBS | OXYGEN SATURATION: 99 % | DIASTOLIC BLOOD PRESSURE: 77 MMHG | TEMPERATURE: 98.1 F | HEIGHT: 62 IN | SYSTOLIC BLOOD PRESSURE: 125 MMHG | HEART RATE: 96 BPM

## 2022-11-18 DIAGNOSIS — G89.29 CHRONIC NECK PAIN: ICD-10-CM

## 2022-11-18 DIAGNOSIS — I10 PRIMARY HYPERTENSION: Primary | ICD-10-CM

## 2022-11-18 DIAGNOSIS — G47.00 INSOMNIA, UNSPECIFIED TYPE: ICD-10-CM

## 2022-11-18 DIAGNOSIS — Z23 ENCOUNTER FOR IMMUNIZATION: ICD-10-CM

## 2022-11-18 DIAGNOSIS — M54.2 CHRONIC NECK PAIN: ICD-10-CM

## 2022-11-18 DIAGNOSIS — K21.9 GASTROESOPHAGEAL REFLUX DISEASE WITHOUT ESOPHAGITIS: ICD-10-CM

## 2022-11-18 DIAGNOSIS — F41.9 ANXIETY AND DEPRESSION: ICD-10-CM

## 2022-11-18 DIAGNOSIS — F32.A ANXIETY AND DEPRESSION: ICD-10-CM

## 2022-11-18 PROCEDURE — 3074F SYST BP LT 130 MM HG: CPT | Performed by: STUDENT IN AN ORGANIZED HEALTH CARE EDUCATION/TRAINING PROGRAM

## 2022-11-18 PROCEDURE — 3078F DIAST BP <80 MM HG: CPT | Performed by: STUDENT IN AN ORGANIZED HEALTH CARE EDUCATION/TRAINING PROGRAM

## 2022-11-18 PROCEDURE — 99213 OFFICE O/P EST LOW 20 MIN: CPT | Performed by: STUDENT IN AN ORGANIZED HEALTH CARE EDUCATION/TRAINING PROGRAM

## 2022-11-18 PROCEDURE — 90686 IIV4 VACC NO PRSV 0.5 ML IM: CPT | Performed by: STUDENT IN AN ORGANIZED HEALTH CARE EDUCATION/TRAINING PROGRAM

## 2022-11-18 RX ORDER — RIVAROXABAN 20 MG/1
TABLET, FILM COATED ORAL
COMMUNITY
Start: 2022-11-11

## 2022-11-18 RX ORDER — OMEPRAZOLE 20 MG/1
20 CAPSULE, DELAYED RELEASE ORAL 2 TIMES DAILY
Qty: 60 CAPSULE | Refills: 3 | Status: SHIPPED | OUTPATIENT
Start: 2022-11-18
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
## 2022-11-18 NOTE — PROGRESS NOTES
Chief Complaint   Patient presents with    Follow-up     1. \"Have you been to the ER, urgent care clinic since your last visit? Hospitalized since your last visit? \" No    2. \"Have you seen or consulted any other health care providers outside of the [address] [place] [address] since your last visit? \" No     3. For patients aged 39-70: Has the patient had a colonoscopy / FIT/ Cologuard? No      If the patient is female:    4. For patients aged 41-77: Has the patient had a mammogram within the past 2 years? No      5. For patients aged 21-65: Has the patient had a pap smear?  NA - based on age or sex    Health Maintenance Due   Topic Date Due    COVID-19 Vaccine (1) Never done    Colorectal Cancer Screening Combo  03/04/2019    Shingrix Vaccine Age 50> (1 of 2) Never done    Breast Cancer Screen Mammogram  03/29/2021    Cervical cancer screen  07/27/2021    Flu Vaccine (1) 08/01/2022

## 2022-12-06 DIAGNOSIS — I10 PRIMARY HYPERTENSION: ICD-10-CM

## 2022-12-06 RX ORDER — AMLODIPINE BESYLATE 10 MG/1
10 TABLET ORAL DAILY
Qty: 90 TABLET | Refills: 1 | Status: SHIPPED | OUTPATIENT
Start: 2022-12-06

## 2022-12-21 NOTE — PROGRESS NOTES
Called for central line placement. Need for line discussed with resident who cited hypotension in the setting of a GI bleed. Patient has just been started on miguel and h/h pending. Recommended that we wait to determine labs and if her need for pressors increases as miguel can be infused peripherally. Resident will inform me if patient has any clinical changes that warrant a central line placement. 77 y/o female with PMHx HTN, DM initially presented to [facility] with complaints of SOB/ dyspnea, COVID+ requiring BiPAP found to have a large mediastinal mass with mass effect. Transferred to [facility] for thoracic evaluation

## 2023-02-15 NOTE — TELEPHONE ENCOUNTER
Hepatitis C virus screening negative  CMP okay  Total cholesterol 225, triglycerides 157, HDL 59, . ASCVD risk 1.6%. TSH within normal limits. Insufficient sample to obtain pBNP. Discussed results with patient by phone. Lab work reassuring. Recommended healthy diet and regular exercise. Did not recommend statin as ASCVD risk 1.6%. Intermittent leg swelling likely d/t positional dependent edema as no other concerning symptoms for CHF. Discussed with patient. Collectively decided to hold off on pBNP for now.
[facility]

## 2023-03-11 DIAGNOSIS — E03.9 ACQUIRED HYPOTHYROIDISM: ICD-10-CM

## 2023-03-13 RX ORDER — LEVOTHYROXINE SODIUM 75 UG/1
TABLET ORAL
Qty: 120 TABLET | Refills: 0 | Status: SHIPPED | OUTPATIENT
Start: 2023-03-13

## 2023-05-15 ENCOUNTER — TELEPHONE (OUTPATIENT)
Facility: CLINIC | Age: 53
End: 2023-05-15

## 2023-05-15 NOTE — TELEPHONE ENCOUNTER
Kingston Castro that works with Dr. Britt Walker at [facility] would like to speak with a nurse or Doctor about this patient before they prescribe her medications.  Please give her a call

## 2023-05-15 NOTE — TELEPHONE ENCOUNTER
[facility] called. Spoke with Gaviota. Stated patient would like to start lamisil. Wanted to ensure this is okay with Dr. Sherman Cruz prior to prescribing.

## 2023-06-02 DIAGNOSIS — I10 ESSENTIAL (PRIMARY) HYPERTENSION: ICD-10-CM

## 2023-06-03 RX ORDER — AMLODIPINE BESYLATE 10 MG/1
TABLET ORAL
Qty: 30 TABLET | Refills: 0 | Status: SHIPPED | OUTPATIENT
Start: 2023-06-03

## 2023-07-05 DIAGNOSIS — I10 ESSENTIAL (PRIMARY) HYPERTENSION: ICD-10-CM

## 2023-07-05 DIAGNOSIS — E03.9 HYPOTHYROIDISM, UNSPECIFIED: ICD-10-CM

## 2023-07-05 RX ORDER — AMLODIPINE BESYLATE 10 MG/1
TABLET ORAL
Qty: 30 TABLET | Refills: 0 | Status: SHIPPED | OUTPATIENT
Start: 2023-07-05

## 2023-07-05 RX ORDER — LEVOTHYROXINE SODIUM 0.07 MG/1
TABLET ORAL
Qty: 90 TABLET | Refills: 0 | Status: SHIPPED | OUTPATIENT
Start: 2023-07-05

## 2023-07-05 NOTE — TELEPHONE ENCOUNTER
Patient due for follow-up appointment. Message staff to request patient come in for follow-up appointment. Provided 30-day refill of amlodipine 10 mg daily.

## 2023-07-06 NOTE — TELEPHONE ENCOUNTER
Patient called, appt scheduled. Requests female provider stating this is her preference. PCP changed to Dr. Sanchez Huerta.

## 2023-07-14 ENCOUNTER — OFFICE VISIT (OUTPATIENT)
Facility: CLINIC | Age: 53
End: 2023-07-14
Payer: MEDICAID

## 2023-07-14 VITALS
OXYGEN SATURATION: 99 % | WEIGHT: 230 LBS | RESPIRATION RATE: 18 BRPM | HEART RATE: 85 BPM | DIASTOLIC BLOOD PRESSURE: 86 MMHG | HEIGHT: 62 IN | BODY MASS INDEX: 42.33 KG/M2 | TEMPERATURE: 97.7 F | SYSTOLIC BLOOD PRESSURE: 132 MMHG

## 2023-07-14 DIAGNOSIS — E66.01 CLASS 3 SEVERE OBESITY WITHOUT SERIOUS COMORBIDITY WITH BODY MASS INDEX (BMI) OF 40.0 TO 44.9 IN ADULT, UNSPECIFIED OBESITY TYPE (HCC): ICD-10-CM

## 2023-07-14 DIAGNOSIS — G43.119 INTRACTABLE MIGRAINE WITH AURA WITHOUT STATUS MIGRAINOSUS: Primary | ICD-10-CM

## 2023-07-14 PROCEDURE — 99213 OFFICE O/P EST LOW 20 MIN: CPT

## 2023-07-14 RX ORDER — TOPIRAMATE 25 MG/1
25 TABLET ORAL NIGHTLY
Qty: 60 TABLET | Refills: 3 | Status: SHIPPED | OUTPATIENT
Start: 2023-07-14

## 2023-07-14 SDOH — ECONOMIC STABILITY: INCOME INSECURITY: HOW HARD IS IT FOR YOU TO PAY FOR THE VERY BASICS LIKE FOOD, HOUSING, MEDICAL CARE, AND HEATING?: NOT HARD AT ALL

## 2023-07-14 SDOH — ECONOMIC STABILITY: FOOD INSECURITY: WITHIN THE PAST 12 MONTHS, YOU WORRIED THAT YOUR FOOD WOULD RUN OUT BEFORE YOU GOT MONEY TO BUY MORE.: NEVER TRUE

## 2023-07-14 SDOH — ECONOMIC STABILITY: FOOD INSECURITY: WITHIN THE PAST 12 MONTHS, THE FOOD YOU BOUGHT JUST DIDN'T LAST AND YOU DIDN'T HAVE MONEY TO GET MORE.: NEVER TRUE

## 2023-07-14 SDOH — ECONOMIC STABILITY: HOUSING INSECURITY
IN THE LAST 12 MONTHS, WAS THERE A TIME WHEN YOU DID NOT HAVE A STEADY PLACE TO SLEEP OR SLEPT IN A SHELTER (INCLUDING NOW)?: NO

## 2023-07-14 ASSESSMENT — PATIENT HEALTH QUESTIONNAIRE - PHQ9
10. IF YOU CHECKED OFF ANY PROBLEMS, HOW DIFFICULT HAVE THESE PROBLEMS MADE IT FOR YOU TO DO YOUR WORK, TAKE CARE OF THINGS AT HOME, OR GET ALONG WITH OTHER PEOPLE: 0
3. TROUBLE FALLING OR STAYING ASLEEP: 3
9. THOUGHTS THAT YOU WOULD BE BETTER OFF DEAD, OR OF HURTING YOURSELF: 0
4. FEELING TIRED OR HAVING LITTLE ENERGY: 2
SUM OF ALL RESPONSES TO PHQ QUESTIONS 1-9: 9
6. FEELING BAD ABOUT YOURSELF - OR THAT YOU ARE A FAILURE OR HAVE LET YOURSELF OR YOUR FAMILY DOWN: 3
SUM OF ALL RESPONSES TO PHQ QUESTIONS 1-9: 9
8. MOVING OR SPEAKING SO SLOWLY THAT OTHER PEOPLE COULD HAVE NOTICED. OR THE OPPOSITE, BEING SO FIGETY OR RESTLESS THAT YOU HAVE BEEN MOVING AROUND A LOT MORE THAN USUAL: 0
SUM OF ALL RESPONSES TO PHQ9 QUESTIONS 1 & 2: 1
1. LITTLE INTEREST OR PLEASURE IN DOING THINGS: 0
SUM OF ALL RESPONSES TO PHQ QUESTIONS 1-9: 9
SUM OF ALL RESPONSES TO PHQ QUESTIONS 1-9: 9
5. POOR APPETITE OR OVEREATING: 0
2. FEELING DOWN, DEPRESSED OR HOPELESS: 1
7. TROUBLE CONCENTRATING ON THINGS, SUCH AS READING THE NEWSPAPER OR WATCHING TELEVISION: 0

## 2023-08-03 DIAGNOSIS — I10 ESSENTIAL (PRIMARY) HYPERTENSION: ICD-10-CM

## 2023-08-03 RX ORDER — AMLODIPINE BESYLATE 10 MG/1
TABLET ORAL
Qty: 30 TABLET | Refills: 0 | Status: SHIPPED | OUTPATIENT
Start: 2023-08-03

## 2023-09-02 DIAGNOSIS — I10 ESSENTIAL (PRIMARY) HYPERTENSION: ICD-10-CM

## 2023-09-05 RX ORDER — AMLODIPINE BESYLATE 10 MG/1
TABLET ORAL
Qty: 30 TABLET | Refills: 0 | Status: SHIPPED | OUTPATIENT
Start: 2023-09-05

## 2023-09-28 DIAGNOSIS — E03.9 HYPOTHYROIDISM, UNSPECIFIED: ICD-10-CM

## 2023-09-29 RX ORDER — LEVOTHYROXINE SODIUM 0.07 MG/1
TABLET ORAL
Qty: 90 TABLET | Refills: 0 | OUTPATIENT
Start: 2023-09-29

## 2023-09-30 DIAGNOSIS — I10 ESSENTIAL (PRIMARY) HYPERTENSION: ICD-10-CM

## 2023-10-02 RX ORDER — AMLODIPINE BESYLATE 10 MG/1
TABLET ORAL
Qty: 30 TABLET | Refills: 0 | Status: SHIPPED | OUTPATIENT
Start: 2023-10-02

## 2023-10-27 DIAGNOSIS — I10 ESSENTIAL (PRIMARY) HYPERTENSION: ICD-10-CM

## 2023-10-27 RX ORDER — AMLODIPINE BESYLATE 10 MG/1
TABLET ORAL
Qty: 90 TABLET | Refills: 0 | Status: SHIPPED | OUTPATIENT
Start: 2023-10-27

## 2023-10-28 DIAGNOSIS — E03.9 HYPOTHYROIDISM, UNSPECIFIED: ICD-10-CM

## 2023-11-01 RX ORDER — LEVOTHYROXINE SODIUM 0.07 MG/1
TABLET ORAL
Qty: 90 TABLET | Refills: 0 | Status: SHIPPED | OUTPATIENT
Start: 2023-11-01

## 2023-12-13 ENCOUNTER — APPOINTMENT (OUTPATIENT)
Facility: HOSPITAL | Age: 53
End: 2023-12-13
Payer: MEDICAID

## 2023-12-13 ENCOUNTER — HOSPITAL ENCOUNTER (EMERGENCY)
Facility: HOSPITAL | Age: 53
Discharge: HOME | End: 2023-12-13
Attending: EMERGENCY MEDICINE
Payer: MEDICAID

## 2023-12-13 VITALS
TEMPERATURE: 98.6 F | BODY MASS INDEX: 42.88 KG/M2 | HEART RATE: 82 BPM | DIASTOLIC BLOOD PRESSURE: 76 MMHG | HEIGHT: 62 IN | WEIGHT: 233.03 LBS | RESPIRATION RATE: 14 BRPM | SYSTOLIC BLOOD PRESSURE: 144 MMHG | OXYGEN SATURATION: 99 %

## 2023-12-13 DIAGNOSIS — R42 VERTIGO: Primary | ICD-10-CM

## 2023-12-13 LAB
ALBUMIN SERPL-MCNC: 4.4 G/DL (ref 3.5–5)
ALBUMIN/GLOB SERPL: 1.1 (ref 1.1–2.2)
ALP SERPL-CCNC: 70 U/L (ref 45–117)
ALT SERPL-CCNC: 22 U/L (ref 12–78)
ANION GAP SERPL CALC-SCNC: 13 MMOL/L (ref 5–15)
AST SERPL-CCNC: 22 U/L (ref 15–37)
BASOPHILS # BLD: 0.1 K/UL (ref 0–0.1)
BASOPHILS NFR BLD: 1 % (ref 0–1)
BILIRUB SERPL-MCNC: 0.5 MG/DL (ref 0.2–1)
BUN SERPL-MCNC: 14 MG/DL (ref 6–20)
BUN/CREAT SERPL: 21 (ref 12–20)
CALCIUM SERPL-MCNC: 9.4 MG/DL (ref 8.5–10.1)
CHLORIDE SERPL-SCNC: 101 MMOL/L (ref 97–108)
CO2 SERPL-SCNC: 25 MMOL/L (ref 21–32)
CREAT SERPL-MCNC: 0.68 MG/DL (ref 0.55–1.02)
DIFFERENTIAL METHOD BLD: ABNORMAL
EKG ATRIAL RATE: 86 BPM
EKG DIAGNOSIS: NORMAL
EKG P AXIS: 46 DEGREES
EKG P-R INTERVAL: 202 MS
EKG Q-T INTERVAL: 400 MS
EKG QRS DURATION: 74 MS
EKG QTC CALCULATION (BAZETT): 478 MS
EKG R AXIS: -33 DEGREES
EKG T AXIS: 18 DEGREES
EKG VENTRICULAR RATE: 86 BPM
EOSINOPHIL # BLD: 0.1 K/UL (ref 0–0.4)
EOSINOPHIL NFR BLD: 1 % (ref 0–7)
ERYTHROCYTE [DISTWIDTH] IN BLOOD BY AUTOMATED COUNT: 13.5 % (ref 11.5–14.5)
GLOBULIN SER CALC-MCNC: 4.1 G/DL (ref 2–4)
GLUCOSE SERPL-MCNC: 92 MG/DL (ref 65–100)
HCT VFR BLD AUTO: 43.1 % (ref 35–47)
HGB BLD-MCNC: 14.7 G/DL (ref 11.5–16)
IMM GRANULOCYTES # BLD AUTO: 0 K/UL (ref 0–0.04)
IMM GRANULOCYTES NFR BLD AUTO: 0 % (ref 0–0.5)
LYMPHOCYTES # BLD: 2.5 K/UL (ref 0.8–3.5)
LYMPHOCYTES NFR BLD: 31 % (ref 12–49)
MAGNESIUM SERPL-MCNC: 2 MG/DL (ref 1.6–2.4)
MCH RBC QN AUTO: 31.7 PG (ref 26–34)
MCHC RBC AUTO-ENTMCNC: 34.1 G/DL (ref 30–36.5)
MCV RBC AUTO: 92.9 FL (ref 80–99)
MONOCYTES # BLD: 0.6 K/UL (ref 0–1)
MONOCYTES NFR BLD: 7 % (ref 5–13)
NEUTS SEG # BLD: 4.8 K/UL (ref 1.8–8)
NEUTS SEG NFR BLD: 60 % (ref 32–75)
NRBC # BLD: 0 K/UL (ref 0–0.01)
NRBC BLD-RTO: 0 PER 100 WBC
PLATELET # BLD AUTO: 559 K/UL (ref 150–400)
PMV BLD AUTO: 9.3 FL (ref 8.9–12.9)
POTASSIUM SERPL-SCNC: 3.8 MMOL/L (ref 3.5–5.1)
PROT SERPL-MCNC: 8.5 G/DL (ref 6.4–8.2)
RBC # BLD AUTO: 4.64 M/UL (ref 3.8–5.2)
SODIUM SERPL-SCNC: 139 MMOL/L (ref 136–145)
TROPONIN I SERPL HS-MCNC: <4 NG/L (ref 0–51)
WBC # BLD AUTO: 8.1 K/UL (ref 3.6–11)

## 2023-12-13 PROCEDURE — 36415 COLL VENOUS BLD VENIPUNCTURE: CPT

## 2023-12-13 PROCEDURE — 99284 EMERGENCY DEPT VISIT MOD MDM: CPT

## 2023-12-13 PROCEDURE — 84484 ASSAY OF TROPONIN QUANT: CPT

## 2023-12-13 PROCEDURE — 70450 CT HEAD/BRAIN W/O DYE: CPT

## 2023-12-13 PROCEDURE — 83735 ASSAY OF MAGNESIUM: CPT

## 2023-12-13 PROCEDURE — 80053 COMPREHEN METABOLIC PANEL: CPT

## 2023-12-13 PROCEDURE — 93005 ELECTROCARDIOGRAM TRACING: CPT | Performed by: EMERGENCY MEDICINE

## 2023-12-13 PROCEDURE — 85025 COMPLETE CBC W/AUTO DIFF WBC: CPT

## 2023-12-13 RX ORDER — MECLIZINE HYDROCHLORIDE 25 MG/1
25 TABLET ORAL 3 TIMES DAILY PRN
Qty: 30 TABLET | Refills: 0 | Status: SHIPPED | OUTPATIENT
Start: 2023-12-13 | End: 2023-12-23
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
## 2024-01-04 ENCOUNTER — NURSE TRIAGE (OUTPATIENT)
Dept: OTHER | Facility: CLINIC | Age: 54
End: 2024-01-04

## 2024-01-04 NOTE — TELEPHONE ENCOUNTER
[facility]    Received call from Isis at [facility] with Red Flag Complaint.    Subjective: Caller states high blood pressure during physical therapy     Current Symptoms:   -BP during /106, 133/107, 139/65 readings 1/3/24  -most recent BP 1303's/92  -frequent SOB  -random dizzy spells  -heart racing/skipping beats occurring a few times throughout the week    Onset: 1 week ago; unchanged    Pain Severity: denies pain currently    Temperature: denies fevers     What has been tried:   -scheduled to see cardiology    Recommended disposition: See in Office Within 2 Weeks    Care advice provided, patient verbalizes understanding; denies any other questions or concerns; instructed to call back for any new or worsening symptoms.    Patient/Caller agrees with recommended disposition; writer provided warm transfer to Dipti at [facility] for appointment scheduling    Attention Provider:  Thank you for allowing me to participate in the care of your patient.  The patient was connected to triage in response to information provided to the [facility].  Please do not respond through this encounter as the response is not directed to a shared pool.    Reason for Disposition   Systolic BP >= 130 OR Diastolic >= 80, and is taking BP medications    Protocols used: Blood Pressure - High-ADULT-OH

## 2024-01-08 RX ORDER — OMEPRAZOLE 20 MG/1
20 CAPSULE, DELAYED RELEASE ORAL 2 TIMES DAILY
Qty: 180 CAPSULE | Refills: 1 | Status: SHIPPED | OUTPATIENT
Start: 2024-01-08

## 2024-01-08 NOTE — TELEPHONE ENCOUNTER
Pt called requesting a refill on the below medication. Please send to [facility].     omeprazole (PRILOSEC) 20 MG delayed release capsule

## 2024-01-11 ENCOUNTER — OFFICE VISIT (OUTPATIENT)
Facility: CLINIC | Age: 54
End: 2024-01-11

## 2024-01-11 VITALS
OXYGEN SATURATION: 97 % | WEIGHT: 234.4 LBS | SYSTOLIC BLOOD PRESSURE: 153 MMHG | DIASTOLIC BLOOD PRESSURE: 88 MMHG | BODY MASS INDEX: 43.13 KG/M2 | HEIGHT: 62 IN | TEMPERATURE: 98.2 F | HEART RATE: 87 BPM | RESPIRATION RATE: 16 BRPM

## 2024-01-11 DIAGNOSIS — E03.9 ACQUIRED HYPOTHYROIDISM: ICD-10-CM

## 2024-01-11 DIAGNOSIS — H93.8X3 CONGESTION OF BOTH EARS: ICD-10-CM

## 2024-01-11 DIAGNOSIS — F41.9 ANXIETY: ICD-10-CM

## 2024-01-11 DIAGNOSIS — I10 PRIMARY HYPERTENSION: Primary | ICD-10-CM

## 2024-01-11 RX ORDER — CETIRIZINE HYDROCHLORIDE 10 MG/1
10 TABLET ORAL DAILY
Qty: 45 TABLET | Refills: 0 | Status: SHIPPED | OUTPATIENT
Start: 2024-01-11 | End: 2024-02-25

## 2024-01-11 RX ORDER — CITALOPRAM HYDROBROMIDE 10 MG/1
10 TABLET ORAL NIGHTLY
Qty: 45 TABLET | Refills: 0 | Status: SHIPPED | OUTPATIENT
Start: 2024-01-11

## 2024-01-11 RX ORDER — HYDROCHLOROTHIAZIDE 25 MG/1
25 TABLET ORAL EVERY MORNING
Qty: 45 TABLET | Refills: 0 | Status: SHIPPED | OUTPATIENT
Start: 2024-01-11

## 2024-01-11 NOTE — PROGRESS NOTES
Chief Complaint   Patient presents with    Hypertension     Elevated BP        \"Have you been to the ER, urgent care clinic since your last visit?  Hospitalized since your last visit?\"    YES, Emergency Clinic/HTN    “Have you seen or consulted any other health care providers outside of [facility] since your last visit?”    NO    “Have you had a colorectal cancer screening such as a colonoscopy/FIT/Cologuard?    NO     Have you had a mammogram?”   NO           Health Maintenance Due   Topic Date Due    Hepatitis B vaccine (1 of 3 - 3-dose series) Never done    COVID-19 Vaccine (1) Never done    Colorectal Cancer Screen  Never done    Breast cancer screen  Never done    Shingles vaccine (1 of 2) Never done    Flu vaccine (1) 08/01/2023
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
## 2024-01-13 LAB — TSH SERPL DL<=0.005 MIU/L-ACNC: 3.35 UIU/ML (ref 0.45–4.5)

## 2024-01-16 ENCOUNTER — TELEPHONE (OUTPATIENT)
Age: 54
End: 2024-01-16

## 2024-01-17 NOTE — TELEPHONE ENCOUNTER
Called and spoke with patient about her results. Patient denies any lightheadedness/dizziness after starting her new medications, but states that she has had some chest pressure. ER precautions were given and patient is to follow-up in 1-2 weeks for HTN follow-up. Per patient, her pressures are still elevated after the addition of HCTZ.

## 2024-01-25 ENCOUNTER — ANCILLARY PROCEDURE (OUTPATIENT)
Age: 54
End: 2024-01-25
Payer: MEDICAID

## 2024-01-25 ENCOUNTER — OFFICE VISIT (OUTPATIENT)
Age: 54
End: 2024-01-25
Payer: MEDICAID

## 2024-01-25 VITALS
RESPIRATION RATE: 16 BRPM | BODY MASS INDEX: 42.33 KG/M2 | OXYGEN SATURATION: 99 % | DIASTOLIC BLOOD PRESSURE: 76 MMHG | WEIGHT: 230 LBS | HEART RATE: 82 BPM | HEIGHT: 62 IN | SYSTOLIC BLOOD PRESSURE: 120 MMHG

## 2024-01-25 DIAGNOSIS — R00.2 PALPITATIONS: ICD-10-CM

## 2024-01-25 DIAGNOSIS — R00.2 PALPITATIONS: Primary | ICD-10-CM

## 2024-01-25 DIAGNOSIS — I20.0 UNSTABLE ANGINA (HCC): ICD-10-CM

## 2024-01-25 DIAGNOSIS — R00.0 TACHYCARDIA: ICD-10-CM

## 2024-01-25 DIAGNOSIS — R06.02 SOB (SHORTNESS OF BREATH): ICD-10-CM

## 2024-01-25 PROCEDURE — 99203 OFFICE O/P NEW LOW 30 MIN: CPT | Performed by: SPECIALIST

## 2024-01-25 PROCEDURE — 93246 EXT ECG>7D<15D RECORDING: CPT

## 2024-01-25 PROCEDURE — 93010 ELECTROCARDIOGRAM REPORT: CPT | Performed by: SPECIALIST

## 2024-01-25 PROCEDURE — 93005 ELECTROCARDIOGRAM TRACING: CPT | Performed by: SPECIALIST

## 2024-01-25 PROCEDURE — 99214 OFFICE O/P EST MOD 30 MIN: CPT | Performed by: SPECIALIST

## 2024-01-25 ASSESSMENT — PATIENT HEALTH QUESTIONNAIRE - PHQ9
SUM OF ALL RESPONSES TO PHQ QUESTIONS 1-9: 0
2. FEELING DOWN, DEPRESSED OR HOPELESS: 0
SUM OF ALL RESPONSES TO PHQ QUESTIONS 1-9: 0
3. TROUBLE FALLING OR STAYING ASLEEP: 0
1. LITTLE INTEREST OR PLEASURE IN DOING THINGS: 0
SUM OF ALL RESPONSES TO PHQ9 QUESTIONS 1 & 2: 0
4. FEELING TIRED OR HAVING LITTLE ENERGY: 0
SUM OF ALL RESPONSES TO PHQ QUESTIONS 1-9: 0
7. TROUBLE CONCENTRATING ON THINGS, SUCH AS READING THE NEWSPAPER OR WATCHING TELEVISION: 0
10. IF YOU CHECKED OFF ANY PROBLEMS, HOW DIFFICULT HAVE THESE PROBLEMS MADE IT FOR YOU TO DO YOUR WORK, TAKE CARE OF THINGS AT HOME, OR GET ALONG WITH OTHER PEOPLE: 0
SUM OF ALL RESPONSES TO PHQ QUESTIONS 1-9: 0
9. THOUGHTS THAT YOU WOULD BE BETTER OFF DEAD, OR OF HURTING YOURSELF: 0
5. POOR APPETITE OR OVEREATING: 0
8. MOVING OR SPEAKING SO SLOWLY THAT OTHER PEOPLE COULD HAVE NOTICED. OR THE OPPOSITE, BEING SO FIGETY OR RESTLESS THAT YOU HAVE BEEN MOVING AROUND A LOT MORE THAN USUAL: 0
6. FEELING BAD ABOUT YOURSELF - OR THAT YOU ARE A FAILURE OR HAVE LET YOURSELF OR YOUR FAMILY DOWN: 0

## 2024-01-25 NOTE — PROGRESS NOTES
Odette Ng     1970       Bree Lee MD, [facility]  Date of Visit-1/25/2024   PCP is Parth Briseno MD   [facility] [place]  [facility]  HPI:  Odette Ng is a 53 y.o. female   New patient for palps, ER visit 12/13/2023 with dizziness labile blood pressure  EKG- NSR WNL normal axis and intervals   Dizzy spells   Sob also  Was going to PT appt and got dizzy  High bp when she checks  Dizzy 2-3 times a month, lose balance for past year  Chest pain with pressure once a week, lasted 30-60 minutes , with exertion  Sob with WILLIAM   Couldn't breath last night, has to sit up , feels like panic and sat up with lights on  Med Hx- DVT , 3 PE 20 years ago, permanent Xarelto Factor V Leiden, Lot of pregnancies, G 18 /P 14 age is 10-31 yoa  Weight gain   EKG NSR WNL  Herniated disc neck surgery planned Dr Raygoza at [facility] is place of residence  Assessment/Plan:     Patient describes chest pain once a week lasting 60 minutes at times.  Associated shortness of breath dizzy spells.  EKG is normal.  There is WILLIAM.  Prior PEs.  Factor V Leiden.  Recommend she undergo cardiac workup with stress testing and echocardiography.  She has significant risk factors and persistent symptoms.  Will also assess right and left ventricular function for WILLIAM post PE by echo.  She is an active lady with 14 children.  Her symptoms have been increasing WILLIAM.  She reports some palpitations.  If echo and nuclear are normal then consider outpatient Holter monitoring.  Discussed current therapy with amlodipine hydrochlorothiazide and Xarelto.  She is not having significant lower extremity edema from the amlodipine.  She is tolerating HCTZ.  If dizziness persist consider stopping thiazide diuretic.  Patient Instructions   You have a You have been scheduled for a nuclear stress test and an echo.    Stress Test:    Nuclear stress testing evaluates blood flow to your heart muscle and

## 2024-01-31 DIAGNOSIS — E03.9 HYPOTHYROIDISM, UNSPECIFIED: ICD-10-CM

## 2024-01-31 DIAGNOSIS — I10 ESSENTIAL (PRIMARY) HYPERTENSION: ICD-10-CM

## 2024-02-03 RX ORDER — AMLODIPINE BESYLATE 10 MG/1
TABLET ORAL
Qty: 90 TABLET | Refills: 1 | Status: SHIPPED | OUTPATIENT
Start: 2024-02-03

## 2024-02-03 RX ORDER — LEVOTHYROXINE SODIUM 0.07 MG/1
TABLET ORAL
Qty: 90 TABLET | Refills: 1 | Status: SHIPPED | OUTPATIENT
Start: 2024-02-03

## 2024-02-12 DIAGNOSIS — E03.9 HYPOTHYROIDISM, UNSPECIFIED: ICD-10-CM

## 2024-02-12 DIAGNOSIS — I10 ESSENTIAL (PRIMARY) HYPERTENSION: ICD-10-CM

## 2024-02-13 RX ORDER — AMLODIPINE BESYLATE 10 MG/1
TABLET ORAL
Qty: 90 TABLET | Refills: 0 | OUTPATIENT
Start: 2024-02-13

## 2024-02-13 RX ORDER — LEVOTHYROXINE SODIUM 0.07 MG/1
TABLET ORAL
Qty: 90 TABLET | Refills: 0 | OUTPATIENT
Start: 2024-02-13

## 2024-02-17 LAB — ECHO BSA: 2.14 M2

## 2024-02-17 NOTE — RESULT ENCOUNTER NOTE
Rogers if she is agreeable start atenolol 25 mg , then after 1 week go to 50 mg.  Thanks    Mychart to patient:    Your 2-week monitor showed generally normal rhythm.  There were some periods of supraventricular tachycardia meaning that the top chambers fire faster probably through a hidden pathway in the top chamber.  This did not occur a  for very long or a lot but definitely did occur and at  times that did correlate to when you felt some symptoms.  We want to continue our workup with your planned echo and stress test.  Typically for SVT that is not very long in duration we do not recommend an ablation unless the symptoms become difficult to control.  The first step in this case I think would be to try medication.  I would start with a beta-blocker like atenolol at 25 mg and then after a week or 2 increase it to 50 mg and see how you do.  My nurse will reach out to you to talk about the prescription and follow-up.  Future Appointments  2/29/2024  12:30 PM   SHABNAM PORTILLO NUCLEAR 1     KWAKU CALLAWAY  2/29/2024  3:00 PM    SHABNAM PORTILLO ECHO 2        KWAKU CALLAWAY  3/12/2024  11:20 AM   Bree Lee MD CAV BS AMB

## 2024-02-22 ENCOUNTER — TELEPHONE (OUTPATIENT)
Age: 54
End: 2024-02-22

## 2024-02-22 DIAGNOSIS — I47.10 SVT (SUPRAVENTRICULAR TACHYCARDIA): Primary | ICD-10-CM

## 2024-02-22 RX ORDER — ATENOLOL 25 MG/1
25 TABLET ORAL DAILY
Qty: 180 TABLET | Refills: 1 | Status: SHIPPED | OUTPATIENT
Start: 2024-02-22

## 2024-02-22 NOTE — TELEPHONE ENCOUNTER
Verified patient with two types of identifiers. Per MD patient called and given results. Went over SVT, new medication, when to take it, how to take it and advised to NOT take it on the day of stress test next week but to bring it to the appointment. Also changed password for VIVIANA Abel for patient to be able to log in as she has an active account but has never used it.     Sent recap in [employer] message per Mrs. Ng's request.     Rx sent per VO by MD.     ----- Message from Bree Lee MD sent at 2/17/2024  2:48 PM EST -----  Rogers if she is agreeable start atenolol 25 mg , then after 1 week go to 50 mg.  Thanks    Sundarhart to patient:    Your 2-week monitor showed generally normal rhythm.  There were some periods of supraventricular tachycardia meaning that the top chambers fire faster probably through a hidden pathway in the top chamber.  This did not occur a  for very long or a lot but definitely did occur and at  times that did correlate to when you felt some symptoms.  We want to continue our workup with your planned echo and stress test.  Typically for SVT that is not very long in duration we do not recommend an ablation unless the symptoms become difficult to control.  The first step in this case I think would be to try medication.  I would start with a beta-blocker like atenolol at 25 mg and then after a week or 2 increase it to 50 mg and see how you do.  My nurse will reach out to you to talk about the prescription and follow-up.  Future Appointments  2/29/2024  12:30 PM   SHABNAM PORTILLO NUCLEAR 1     KWAKU MICHELLE AMB  2/29/2024  3:00 PM    SHABNAM PORTILLO ECHO 2        KWAKU CALLAWAY  3/12/2024  11:20 AM   Bree Lee MD C[facility] BS AMB

## 2024-02-25 DIAGNOSIS — G43.119 INTRACTABLE MIGRAINE WITH AURA WITHOUT STATUS MIGRAINOSUS: ICD-10-CM

## 2024-02-28 DIAGNOSIS — I10 ESSENTIAL (PRIMARY) HYPERTENSION: ICD-10-CM

## 2024-02-28 DIAGNOSIS — E03.9 HYPOTHYROIDISM, UNSPECIFIED: ICD-10-CM

## 2024-02-28 RX ORDER — TOPIRAMATE 25 MG/1
25 TABLET ORAL NIGHTLY
Qty: 60 TABLET | Refills: 3 | OUTPATIENT
Start: 2024-02-28

## 2024-02-29 ENCOUNTER — ANCILLARY PROCEDURE (OUTPATIENT)
Age: 54
End: 2024-02-29
Payer: MEDICAID

## 2024-02-29 VITALS
HEART RATE: 54 BPM | BODY MASS INDEX: 42.33 KG/M2 | SYSTOLIC BLOOD PRESSURE: 130 MMHG | WEIGHT: 230 LBS | DIASTOLIC BLOOD PRESSURE: 88 MMHG | HEIGHT: 62 IN

## 2024-02-29 DIAGNOSIS — I20.0 UNSTABLE ANGINA (HCC): ICD-10-CM

## 2024-02-29 DIAGNOSIS — I47.10 SVT (SUPRAVENTRICULAR TACHYCARDIA): ICD-10-CM

## 2024-02-29 DIAGNOSIS — R06.02 SOB (SHORTNESS OF BREATH): ICD-10-CM

## 2024-02-29 DIAGNOSIS — R00.0 TACHYCARDIA: ICD-10-CM

## 2024-02-29 DIAGNOSIS — R00.2 PALPITATIONS: ICD-10-CM

## 2024-02-29 LAB
ECHO AO ASC DIAM: 3.8 CM
ECHO AO ASCENDING AORTA INDEX: 1.87 CM/M2
ECHO AO ROOT DIAM: 3.5 CM
ECHO AO ROOT INDEX: 1.72 CM/M2
ECHO AV AREA PEAK VELOCITY: 2.9 CM2
ECHO AV AREA VTI: 2.9 CM2
ECHO AV AREA/BSA PEAK VELOCITY: 1.4 CM2/M2
ECHO AV AREA/BSA VTI: 1.4 CM2/M2
ECHO AV MEAN GRADIENT: 2 MMHG
ECHO AV MEAN VELOCITY: 0.7 M/S
ECHO AV PEAK GRADIENT: 4 MMHG
ECHO AV PEAK VELOCITY: 1 M/S
ECHO AV VELOCITY RATIO: 0.8
ECHO AV VTI: 23.3 CM
ECHO BSA: 2.14 M2
ECHO LA DIAMETER INDEX: 1.82 CM/M2
ECHO LA DIAMETER: 3.7 CM
ECHO LA TO AORTIC ROOT RATIO: 1.06
ECHO LA VOL A-L A2C: 78 ML (ref 22–52)
ECHO LA VOL A-L A4C: 61 ML (ref 22–52)
ECHO LA VOL BP: 67 ML (ref 22–52)
ECHO LA VOL MOD A2C: 69 ML (ref 22–52)
ECHO LA VOL MOD A4C: 60 ML (ref 22–52)
ECHO LA VOL/BSA BIPLANE: 33 ML/M2 (ref 16–34)
ECHO LA VOLUME AREA LENGTH: 72 ML
ECHO LA VOLUME INDEX A-L A2C: 38 ML/M2 (ref 16–34)
ECHO LA VOLUME INDEX A-L A4C: 30 ML/M2 (ref 16–34)
ECHO LA VOLUME INDEX AREA LENGTH: 35 ML/M2 (ref 16–34)
ECHO LA VOLUME INDEX MOD A2C: 34 ML/M2 (ref 16–34)
ECHO LA VOLUME INDEX MOD A4C: 30 ML/M2 (ref 16–34)
ECHO LV E' LATERAL VELOCITY: 8 CM/S
ECHO LV E' SEPTAL VELOCITY: 7 CM/S
ECHO LV EDV A2C: 94 ML
ECHO LV EDV A4C: 87 ML
ECHO LV EDV BP: 90 ML (ref 56–104)
ECHO LV EDV INDEX A4C: 43 ML/M2
ECHO LV EDV INDEX BP: 44 ML/M2
ECHO LV EDV NDEX A2C: 46 ML/M2
ECHO LV EJECTION FRACTION A2C: 70 %
ECHO LV EJECTION FRACTION A4C: 69 %
ECHO LV EJECTION FRACTION BIPLANE: 69 % (ref 55–100)
ECHO LV ESV A2C: 28 ML
ECHO LV ESV A4C: 27 ML
ECHO LV ESV BP: 28 ML (ref 19–49)
ECHO LV ESV INDEX A2C: 14 ML/M2
ECHO LV ESV INDEX A4C: 13 ML/M2
ECHO LV ESV INDEX BP: 14 ML/M2
ECHO LV FRACTIONAL SHORTENING: 36 % (ref 28–44)
ECHO LV INTERNAL DIMENSION DIASTOLE INDEX: 2.17 CM/M2
ECHO LV INTERNAL DIMENSION DIASTOLIC: 4.4 CM (ref 3.9–5.3)
ECHO LV INTERNAL DIMENSION SYSTOLIC INDEX: 1.38 CM/M2
ECHO LV INTERNAL DIMENSION SYSTOLIC: 2.8 CM
ECHO LV IVSD: 1 CM (ref 0.6–0.9)
ECHO LV MASS 2D: 137.8 G (ref 67–162)
ECHO LV MASS INDEX 2D: 67.9 G/M2 (ref 43–95)
ECHO LV POSTERIOR WALL DIASTOLIC: 0.9 CM (ref 0.6–0.9)
ECHO LV RELATIVE WALL THICKNESS RATIO: 0.41
ECHO LVOT AREA: 3.8 CM2
ECHO LVOT AV VTI INDEX: 0.77
ECHO LVOT DIAM: 2.2 CM
ECHO LVOT MEAN GRADIENT: 1 MMHG
ECHO LVOT PEAK GRADIENT: 2 MMHG
ECHO LVOT PEAK VELOCITY: 0.8 M/S
ECHO LVOT STROKE VOLUME INDEX: 33.5 ML/M2
ECHO LVOT SV: 68 ML
ECHO LVOT VTI: 17.9 CM
ECHO MV A VELOCITY: 0.52 M/S
ECHO MV E DECELERATION TIME (DT): 317.2 MS
ECHO MV E VELOCITY: 0.76 M/S
ECHO MV E/A RATIO: 1.46
ECHO MV E/E' LATERAL: 9.5
ECHO MV E/E' RATIO (AVERAGED): 10.18
ECHO PV MAX VELOCITY: 1 M/S
ECHO PV PEAK GRADIENT: 4 MMHG

## 2024-02-29 PROCEDURE — 78452 HT MUSCLE IMAGE SPECT MULT: CPT

## 2024-02-29 PROCEDURE — PBSHW PBB SHADOW CHARGE: Performed by: SPECIALIST

## 2024-02-29 PROCEDURE — 93017 CV STRESS TEST TRACING ONLY: CPT

## 2024-02-29 PROCEDURE — A9500 TC99M SESTAMIBI: HCPCS | Performed by: SPECIALIST

## 2024-02-29 PROCEDURE — 93306 TTE W/DOPPLER COMPLETE: CPT | Performed by: SPECIALIST

## 2024-02-29 RX ORDER — TETRAKIS(2-METHOXYISOBUTYLISOCYANIDE)COPPER(I) TETRAFLUOROBORATE 1 MG/ML
7.4 INJECTION, POWDER, LYOPHILIZED, FOR SOLUTION INTRAVENOUS
Status: COMPLETED | OUTPATIENT
Start: 2024-02-29 | End: 2024-02-29

## 2024-02-29 RX ORDER — TETRAKIS(2-METHOXYISOBUTYLISOCYANIDE)COPPER(I) TETRAFLUOROBORATE 1 MG/ML
23.6 INJECTION, POWDER, LYOPHILIZED, FOR SOLUTION INTRAVENOUS
Status: COMPLETED | OUTPATIENT
Start: 2024-02-29 | End: 2024-02-29

## 2024-02-29 RX ORDER — LEVOTHYROXINE SODIUM 0.07 MG/1
TABLET ORAL
Qty: 90 TABLET | Refills: 0 | OUTPATIENT
Start: 2024-02-29

## 2024-02-29 RX ORDER — REGADENOSON 0.08 MG/ML
0.4 INJECTION, SOLUTION INTRAVENOUS
Status: COMPLETED | OUTPATIENT
Start: 2024-02-29 | End: 2024-02-29

## 2024-02-29 RX ORDER — AMLODIPINE BESYLATE 10 MG/1
TABLET ORAL
Qty: 90 TABLET | Refills: 0 | OUTPATIENT
Start: 2024-02-29

## 2024-02-29 RX ADMIN — REGADENOSON 0.4 MG: 0.08 INJECTION, SOLUTION INTRAVENOUS at 14:40

## 2024-02-29 RX ADMIN — TECHNETIUM TC 99M SESTAMIBI 7.4 MILLICURIE: 1 INJECTION, POWDER, FOR SOLUTION INTRAVENOUS at 13:00

## 2024-02-29 RX ADMIN — TECHNETIUM TC 99M SESTAMIBI 23.6 MILLICURIE: 1 INJECTION, POWDER, FOR SOLUTION INTRAVENOUS at 14:40

## 2024-03-05 DIAGNOSIS — I10 PRIMARY HYPERTENSION: ICD-10-CM

## 2024-03-05 NOTE — TELEPHONE ENCOUNTER
Pt request a refill on the below medication.  Please send to [facility] [phone].         hydroCHLOROthiazide (HYDRODIURIL) 25 MG tablet

## 2024-03-07 LAB
ECHO BSA: 2.14 M2
NUC STRESS EJECTION FRACTION: 65 %
STRESS BASELINE DIAS BP: 88 MMHG
STRESS BASELINE HR: 56 BPM
STRESS BASELINE ST DEPRESSION: 0 MM
STRESS BASELINE SYS BP: 130 MMHG
STRESS ESTIMATED WORKLOAD: 7 METS
STRESS O2 SAT PEAK: 98 %
STRESS O2 SAT REST: 94 %
STRESS PEAK DIAS BP: 80 MMHG
STRESS PEAK SYS BP: 120 MMHG
STRESS PERCENT HR ACHIEVED: 43 %
STRESS POST PEAK HR: 72 BPM
STRESS RATE PRESSURE PRODUCT: 8640 BPM*MMHG
STRESS ST DEPRESSION: 0 MM
STRESS TARGET HR: 168 BPM
TID: 1.13

## 2024-03-07 RX ORDER — HYDROCHLOROTHIAZIDE 25 MG/1
25 TABLET ORAL EVERY MORNING
Qty: 90 TABLET | Refills: 1 | Status: SHIPPED | OUTPATIENT
Start: 2024-03-07

## 2024-03-11 ENCOUNTER — OFFICE VISIT (OUTPATIENT)
Facility: CLINIC | Age: 54
End: 2024-03-11
Payer: MEDICAID

## 2024-03-11 VITALS
OXYGEN SATURATION: 99 % | BODY MASS INDEX: 43.98 KG/M2 | DIASTOLIC BLOOD PRESSURE: 76 MMHG | HEART RATE: 69 BPM | RESPIRATION RATE: 20 BRPM | SYSTOLIC BLOOD PRESSURE: 121 MMHG | TEMPERATURE: 97.4 F | HEIGHT: 62 IN | WEIGHT: 239 LBS

## 2024-03-11 DIAGNOSIS — F41.9 ANXIETY: ICD-10-CM

## 2024-03-11 DIAGNOSIS — I10 PRIMARY HYPERTENSION: Primary | ICD-10-CM

## 2024-03-11 DIAGNOSIS — M62.838 TRAPEZIUS MUSCLE SPASM: ICD-10-CM

## 2024-03-11 DIAGNOSIS — E66.01 CLASS 3 SEVERE OBESITY WITHOUT SERIOUS COMORBIDITY WITH BODY MASS INDEX (BMI) OF 40.0 TO 44.9 IN ADULT, UNSPECIFIED OBESITY TYPE (HCC): ICD-10-CM

## 2024-03-11 PROCEDURE — 99214 OFFICE O/P EST MOD 30 MIN: CPT

## 2024-03-11 PROCEDURE — 3078F DIAST BP <80 MM HG: CPT

## 2024-03-11 PROCEDURE — 3074F SYST BP LT 130 MM HG: CPT

## 2024-03-11 RX ORDER — TIZANIDINE 4 MG/1
4 TABLET ORAL NIGHTLY
Qty: 10 TABLET | Refills: 0 | Status: SHIPPED | OUTPATIENT
Start: 2024-03-11

## 2024-03-11 RX ORDER — OLMESARTAN MEDOXOMIL 20 MG/1
20 TABLET ORAL DAILY
Qty: 90 TABLET | Refills: 0 | Status: SHIPPED | OUTPATIENT
Start: 2024-03-11

## 2024-03-11 RX ORDER — HYDROCHLOROTHIAZIDE 25 MG/1
25 TABLET ORAL EVERY MORNING
Qty: 90 TABLET | Refills: 1 | Status: SHIPPED | OUTPATIENT
Start: 2024-03-11

## 2024-03-12 ENCOUNTER — OFFICE VISIT (OUTPATIENT)
Age: 54
End: 2024-03-12

## 2024-03-12 VITALS
HEART RATE: 61 BPM | RESPIRATION RATE: 16 BRPM | BODY MASS INDEX: 43.24 KG/M2 | DIASTOLIC BLOOD PRESSURE: 62 MMHG | WEIGHT: 235 LBS | OXYGEN SATURATION: 97 % | SYSTOLIC BLOOD PRESSURE: 120 MMHG | HEIGHT: 62 IN

## 2024-03-12 DIAGNOSIS — I47.10 SVT (SUPRAVENTRICULAR TACHYCARDIA) (HCC): Primary | ICD-10-CM

## 2024-03-12 DIAGNOSIS — D68.51 FACTOR V LEIDEN (HCC): ICD-10-CM

## 2024-03-12 DIAGNOSIS — I10 HYPERTENSION, ESSENTIAL: ICD-10-CM

## 2024-03-12 DIAGNOSIS — R06.02 SOB (SHORTNESS OF BREATH): ICD-10-CM

## 2024-03-12 LAB
BUN SERPL-MCNC: 13 MG/DL (ref 6–24)
BUN/CREAT SERPL: 21 (ref 9–23)
CALCIUM SERPL-MCNC: 9.4 MG/DL (ref 8.7–10.2)
CHLORIDE SERPL-SCNC: 105 MMOL/L (ref 96–106)
CO2 SERPL-SCNC: 21 MMOL/L (ref 20–29)
CREAT SERPL-MCNC: 0.63 MG/DL (ref 0.57–1)
EGFRCR SERPLBLD CKD-EPI 2021: 107 ML/MIN/1.73
GLUCOSE SERPL-MCNC: 82 MG/DL (ref 70–99)
POTASSIUM SERPL-SCNC: 4.5 MMOL/L (ref 3.5–5.2)
SODIUM SERPL-SCNC: 141 MMOL/L (ref 134–144)

## 2024-03-12 ASSESSMENT — PATIENT HEALTH QUESTIONNAIRE - PHQ9
SUM OF ALL RESPONSES TO PHQ QUESTIONS 1-9: 0
4. FEELING TIRED OR HAVING LITTLE ENERGY: NOT AT ALL
SUM OF ALL RESPONSES TO PHQ9 QUESTIONS 1 & 2: 0
5. POOR APPETITE OR OVEREATING: NOT AT ALL
SUM OF ALL RESPONSES TO PHQ QUESTIONS 1-9: 0
10. IF YOU CHECKED OFF ANY PROBLEMS, HOW DIFFICULT HAVE THESE PROBLEMS MADE IT FOR YOU TO DO YOUR WORK, TAKE CARE OF THINGS AT HOME, OR GET ALONG WITH OTHER PEOPLE: NOT DIFFICULT AT ALL
SUM OF ALL RESPONSES TO PHQ QUESTIONS 1-9: 0
1. LITTLE INTEREST OR PLEASURE IN DOING THINGS: NOT AT ALL
SUM OF ALL RESPONSES TO PHQ QUESTIONS 1-9: 0
8. MOVING OR SPEAKING SO SLOWLY THAT OTHER PEOPLE COULD HAVE NOTICED. OR THE OPPOSITE, BEING SO FIGETY OR RESTLESS THAT YOU HAVE BEEN MOVING AROUND A LOT MORE THAN USUAL: NOT AT ALL
3. TROUBLE FALLING OR STAYING ASLEEP: NOT AT ALL
6. FEELING BAD ABOUT YOURSELF - OR THAT YOU ARE A FAILURE OR HAVE LET YOURSELF OR YOUR FAMILY DOWN: NOT AT ALL
2. FEELING DOWN, DEPRESSED OR HOPELESS: NOT AT ALL
9. THOUGHTS THAT YOU WOULD BE BETTER OFF DEAD, OR OF HURTING YOURSELF: NOT AT ALL
7. TROUBLE CONCENTRATING ON THINGS, SUCH AS READING THE NEWSPAPER OR WATCHING TELEVISION: NOT AT ALL

## 2024-03-12 ASSESSMENT — ENCOUNTER SYMPTOMS
CONSTIPATION: 0
NAUSEA: 0
COUGH: 0
SHORTNESS OF BREATH: 0
VOMITING: 0
DIARRHEA: 0
BACK PAIN: 1

## 2024-03-12 NOTE — RESULT ENCOUNTER NOTE
Patient BMP returned without any electrolyte abnormalities, continued dosing of HCTZ is appropriate at this time.

## 2024-03-12 NOTE — PROGRESS NOTES
I saw and evaluated the patient, performing the key elements of the service.  I discussed the findings, assessment and plan with the resident and agree with the resident's findings and plan as documented in the resident's note.   
[place] filter in place 11/9/2011    Hereditary thrombophilia (HCC) 8/10/2011    History of blood transfusion 03/2018    GI Bleed - 4 units    History of palpitations     History of pulmonary embolism 08/10/2011    three in one time    Migraine headache 02/08/2012    Botox injections    MRSA (methicillin resistant Staphylococcus aureus) 2009 & 2010    3 neg nasal swabs since    Obesity, Class II, BMI 35-39.9     Pap smear for cervical cancer screening 4/27/12 neg HPV NEG    Postpartum depression     Reflex sympathetic dystrophy of the leg 10/12/2011    Restless leg syndrome     Stressful life events affecting family and household     14 kids    Vitamin D deficiency 4/20/2017       Current Outpatient Medications   Medication Sig Dispense Refill    hydroCHLOROthiazide (HYDRODIURIL) 25 MG tablet Take 1 tablet by mouth every morning 90 tablet 1    olmesartan (BENICAR) 20 MG tablet Take 1 tablet by mouth daily 90 tablet 0    tiZANidine (ZANAFLEX) 4 MG tablet Take 1 tablet by mouth at bedtime 10 tablet 0    levothyroxine (SYNTHROID) 75 MCG tablet TAKE 1 TABLET BY MOUTH EVERY DAY BEFORE BREAKFAST 90 tablet 1    omeprazole (PRILOSEC) 20 MG delayed release capsule Take 1 capsule by mouth 2 times daily 180 capsule 1    rivaroxaban (XARELTO) 20 MG TABS tablet 1 tablet daily       No current facility-administered medications for this visit.       Allergies   Allergen Reactions    Metoclopramide Nausea And Vomiting, Nausea Only and Other (See Comments)     Cramping \"everywhere\"  Cramping \"everywhere\"    Sumatriptan Nausea And Vomiting and Other (See Comments)      Face flushed- felt like \" needles in face\"   Face flushed- felt like \" needles in face\"       Social History     Tobacco Use    Smoking status: Never    Smokeless tobacco: Never   Substance Use Topics    Alcohol use: No    Drug use: No       Family History   Problem Relation Age of Onset    No Known Problems Mother     Other Sister         Factor V deficiency

## 2024-04-05 DIAGNOSIS — I10 ESSENTIAL (PRIMARY) HYPERTENSION: ICD-10-CM

## 2024-04-09 ENCOUNTER — OFFICE VISIT (OUTPATIENT)
Facility: CLINIC | Age: 54
End: 2024-04-09
Payer: MEDICAID

## 2024-04-09 VITALS
HEIGHT: 62 IN | TEMPERATURE: 97.9 F | BODY MASS INDEX: 42.69 KG/M2 | OXYGEN SATURATION: 98 % | WEIGHT: 232 LBS | SYSTOLIC BLOOD PRESSURE: 110 MMHG | RESPIRATION RATE: 18 BRPM | HEART RATE: 80 BPM | DIASTOLIC BLOOD PRESSURE: 74 MMHG

## 2024-04-09 DIAGNOSIS — E66.01 CLASS 3 SEVERE OBESITY WITHOUT SERIOUS COMORBIDITY WITH BODY MASS INDEX (BMI) OF 40.0 TO 44.9 IN ADULT, UNSPECIFIED OBESITY TYPE (HCC): Primary | ICD-10-CM

## 2024-04-09 DIAGNOSIS — R63.4 WEIGHT LOSS: ICD-10-CM

## 2024-04-09 DIAGNOSIS — M62.838 TRAPEZIUS MUSCLE SPASM: ICD-10-CM

## 2024-04-09 DIAGNOSIS — Z82.0 FAMILY HISTORY OF MS (MULTIPLE SCLEROSIS): ICD-10-CM

## 2024-04-09 PROCEDURE — 99214 OFFICE O/P EST MOD 30 MIN: CPT

## 2024-04-09 PROCEDURE — G2211 COMPLEX E/M VISIT ADD ON: HCPCS

## 2024-04-09 RX ORDER — AMLODIPINE BESYLATE 10 MG/1
TABLET ORAL
Qty: 90 TABLET | Refills: 0 | Status: SHIPPED | OUTPATIENT
Start: 2024-04-09 | End: 2024-04-11 | Stop reason: SINTOL

## 2024-04-09 RX ORDER — TIZANIDINE 4 MG/1
4 TABLET ORAL NIGHTLY
Qty: 12 TABLET | Refills: 0 | Status: SHIPPED | OUTPATIENT
Start: 2024-04-09 | End: 2024-04-21

## 2024-04-09 NOTE — PATIENT INSTRUCTIONS
Start with restricting calories to 1700 every day with no more than 45g of fat, try to get a total of 90 minutes of exercise every week.    Take 1g tylenol with meals in addition to the tizanidine at night.

## 2024-04-11 PROBLEM — R63.4 WEIGHT LOSS: Status: ACTIVE | Noted: 2024-04-11

## 2024-04-11 PROBLEM — E66.813 CLASS 3 SEVERE OBESITY WITHOUT SERIOUS COMORBIDITY WITH BODY MASS INDEX (BMI) OF 40.0 TO 44.9 IN ADULT: Status: ACTIVE | Noted: 2024-04-11

## 2024-04-11 PROBLEM — E66.01 CLASS 3 SEVERE OBESITY WITHOUT SERIOUS COMORBIDITY WITH BODY MASS INDEX (BMI) OF 40.0 TO 44.9 IN ADULT (HCC): Status: ACTIVE | Noted: 2024-04-11

## 2024-04-11 PROBLEM — Z82.0 FAMILY HISTORY OF MS (MULTIPLE SCLEROSIS): Status: ACTIVE | Noted: 2024-04-11

## 2024-04-11 ASSESSMENT — ENCOUNTER SYMPTOMS
BACK PAIN: 1
SHORTNESS OF BREATH: 0

## 2024-04-11 NOTE — PROGRESS NOTES
Chief Complaint   Patient presents with    Weight Management    Follow-up       \"Have you been to the ER, urgent care clinic since your last visit?  Hospitalized since your last visit?\"    NO    “Have you seen or consulted any other health care providers outside of [facility] since your last visit?”    YES- NEURO    “Have you had a colorectal cancer screening such as a colonoscopy/FIT/Cologuard?    NO    No colonoscopy on file  No cologuard on file  No FIT/FOBT on file   No flexible sigmoidoscopy on file        Have you had a mammogram?”   NO    No breast cancer screening on file             Health Maintenance Due   Topic Date Due    Hepatitis B vaccine (1 of 3 - 3-dose series) Never done    COVID-19 Vaccine (1) Never done    Colorectal Cancer Screen  Never done    Breast cancer screen  Never done    Shingles vaccine (1 of 2) Never done       
I saw and evaluated the patient, performing the key elements of the service.  I discussed the findings, assessment and plan with the resident and agree with the resident's findings and plan as documented in the resident's note.  Counseling provided around weight loss.  Also noting upcoming cervical neck surgery.    
physical examinati.  No focal neurological deficits of either motor or sensory detected.  No dysdiadochokinesia, or dysmetria noted.  Patient to be undergoing cervical spine surgery for right sided for male stenosis noted on MRI.  Patient symptoms consistent with multiple sclerosis.    -Patient does have established relationship with neurologist and spinal surgeon, recommended she follow-up with them if symptoms persist after surgery.  -Will reevaluate after patient has fully recovered from spinal surgery       No follow-ups on file.    Odette Ng expressed understanding of this plan. An AVS was printed and given to the patient.     Pt seen by and discussed with Dr. Chacon,    Jc Roberto MD  Family Medicine Resident

## 2024-04-11 NOTE — ASSESSMENT & PLAN NOTE
Assessed patient today with physical examinati.  No focal neurological deficits of either motor or sensory detected.  No dysdiadochokinesia, or dysmetria noted.  Patient to be undergoing cervical spine surgery for right sided for male stenosis noted on MRI.  Patient symptoms consistent with multiple sclerosis.    -Patient does have established relationship with neurologist and spinal surgeon, recommended she follow-up with them if symptoms persist after surgery.  -Will reevaluate after patient has fully recovered from spinal surgery

## 2024-04-11 NOTE — ASSESSMENT & PLAN NOTE
Patient seen for weight loss clinic today.  Patient has been restricting her calories since I saw her 1 month ago.  Patient current calorie goal is 1700 lianet daily with 1 day of 2100.  Patient is attempting exercise for approximately 90 minutes a week, although there is some difficulty with patient significant history of joint problems and multiple replacements needed.  -Continue diet and exercise, plan to see patient in 1 month  -Plan to seek prior authorization for GLP-1 after 3 months of diet and exercise

## 2024-04-24 DIAGNOSIS — H93.8X3 CONGESTION OF BOTH EARS: ICD-10-CM

## 2024-04-24 RX ORDER — CETIRIZINE HYDROCHLORIDE 10 MG/1
10 TABLET ORAL DAILY
Qty: 45 TABLET | Refills: 0 | OUTPATIENT
Start: 2024-04-24 | End: 2024-06-08

## 2024-04-24 NOTE — TELEPHONE ENCOUNTER
Pt called to request a refill on the following medication:    -cetirizine (ZYRTEC) 10 MG tablet     Please advise...

## 2024-05-07 ENCOUNTER — OFFICE VISIT (OUTPATIENT)
Facility: CLINIC | Age: 54
End: 2024-05-07
Payer: MEDICAID

## 2024-05-07 VITALS
WEIGHT: 227 LBS | RESPIRATION RATE: 16 BRPM | TEMPERATURE: 98.3 F | DIASTOLIC BLOOD PRESSURE: 89 MMHG | HEIGHT: 62 IN | SYSTOLIC BLOOD PRESSURE: 131 MMHG | HEART RATE: 94 BPM | BODY MASS INDEX: 41.77 KG/M2 | OXYGEN SATURATION: 97 %

## 2024-05-07 DIAGNOSIS — M54.2 NECK PAIN: ICD-10-CM

## 2024-05-07 DIAGNOSIS — Z76.89 ENCOUNTER FOR WEIGHT MANAGEMENT: ICD-10-CM

## 2024-05-07 DIAGNOSIS — E66.01 CLASS 3 SEVERE OBESITY DUE TO EXCESS CALORIES WITHOUT SERIOUS COMORBIDITY WITH BODY MASS INDEX (BMI) OF 40.0 TO 44.9 IN ADULT (HCC): Primary | ICD-10-CM

## 2024-05-07 DIAGNOSIS — R63.4 WEIGHT LOSS: ICD-10-CM

## 2024-05-07 PROCEDURE — 99214 OFFICE O/P EST MOD 30 MIN: CPT

## 2024-05-07 RX ORDER — TIZANIDINE 4 MG/1
4 TABLET ORAL NIGHTLY
Qty: 14 TABLET | Refills: 0 | Status: SHIPPED | OUTPATIENT
Start: 2024-05-07 | End: 2024-05-21

## 2024-05-07 RX ORDER — TIRZEPATIDE 2.5 MG/.5ML
2.5 INJECTION, SOLUTION SUBCUTANEOUS WEEKLY
Qty: 4 ML | Refills: 0 | Status: SHIPPED | OUTPATIENT
Start: 2024-05-07 | End: 2024-05-08 | Stop reason: ALTCHOICE

## 2024-05-07 RX ORDER — VALACYCLOVIR HYDROCHLORIDE 1 G/1
TABLET, FILM COATED ORAL
COMMUNITY
Start: 2024-04-23 | End: 2024-05-09

## 2024-05-07 RX ORDER — TIRZEPATIDE 5 MG/.5ML
5 INJECTION, SOLUTION SUBCUTANEOUS WEEKLY
Qty: 36 ML | Refills: 3 | Status: SHIPPED | OUTPATIENT
Start: 2024-06-04 | End: 2024-05-08 | Stop reason: ALTCHOICE

## 2024-05-07 NOTE — PROGRESS NOTES
I saw and evaluated the patient, performing the key elements of the service.  I discussed the findings, assessment and plan with the resident and agree with the resident's findings and plan as documented in the resident's note.
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
## 2024-05-08 DIAGNOSIS — R63.4 WEIGHT LOSS: Primary | ICD-10-CM

## 2024-05-08 DIAGNOSIS — E66.01 CLASS 3 SEVERE OBESITY DUE TO EXCESS CALORIES WITHOUT SERIOUS COMORBIDITY WITH BODY MASS INDEX (BMI) OF 40.0 TO 44.9 IN ADULT (HCC): ICD-10-CM

## 2024-05-08 DIAGNOSIS — E03.9 HYPOTHYROIDISM, UNSPECIFIED: ICD-10-CM

## 2024-05-08 RX ORDER — SEMAGLUTIDE 1.7 MG/.75ML
1.7 INJECTION, SOLUTION SUBCUTANEOUS
Qty: 3 ML | Refills: 0 | Status: SHIPPED | OUTPATIENT
Start: 2024-07-31 | End: 2024-08-28

## 2024-05-08 RX ORDER — SEMAGLUTIDE 2.4 MG/.75ML
2.4 INJECTION, SOLUTION SUBCUTANEOUS
Qty: 9 ML | Refills: 2 | Status: SHIPPED | OUTPATIENT
Start: 2024-08-28 | End: 2025-05-07

## 2024-05-08 NOTE — PROGRESS NOTES
Received message from insurance company for medication.  Preferred regimen is wegovy, will place order for Wegovy.

## 2024-05-08 NOTE — TELEPHONE ENCOUNTER
Pt states the pharmacy does not have another refill for her Levothyroxine. Please send a refill to [facility].

## 2024-05-09 PROBLEM — Z76.89 ENCOUNTER FOR WEIGHT MANAGEMENT: Status: ACTIVE | Noted: 2024-05-09

## 2024-05-09 RX ORDER — LEVOTHYROXINE SODIUM 0.07 MG/1
75 TABLET ORAL
Qty: 90 TABLET | Refills: 1 | Status: SHIPPED | OUTPATIENT
Start: 2024-05-09

## 2024-05-09 ASSESSMENT — ENCOUNTER SYMPTOMS
SHORTNESS OF BREATH: 0
COUGH: 0

## 2024-05-10 ENCOUNTER — TELEPHONE (OUTPATIENT)
Facility: CLINIC | Age: 54
End: 2024-05-10

## 2024-05-10 DIAGNOSIS — E66.01 CLASS 3 SEVERE OBESITY DUE TO EXCESS CALORIES WITHOUT SERIOUS COMORBIDITY WITH BODY MASS INDEX (BMI) OF 40.0 TO 44.9 IN ADULT (HCC): ICD-10-CM

## 2024-05-10 DIAGNOSIS — Z71.3 ENCOUNTER FOR WEIGHT LOSS COUNSELING: Primary | ICD-10-CM

## 2024-05-10 DIAGNOSIS — Z76.89 ENCOUNTER FOR WEIGHT MANAGEMENT: ICD-10-CM

## 2024-05-10 PROBLEM — R63.4 WEIGHT LOSS: Status: RESOLVED | Noted: 2024-04-11 | Resolved: 2024-05-10

## 2024-05-10 NOTE — TELEPHONE ENCOUNTER
LOV Dr Roberto. Pt states the pharmacy is still waiting for a prior auth on her weight loss medication. Please advise.

## 2024-05-10 NOTE — ASSESSMENT & PLAN NOTE
Patient presenting today with complaint of continued pain.  Patient was previously prescribed Robaxin by her surgeon which caused an allergic reaction.  Patient denies reaction to previously prescribed Zanaflex at this office.  Will provide temporary prescription while patient reaches out to her surgeon to have Robaxin prescription replaced.  -Tizanidine 4 mg nightly for 14 days

## 2024-05-10 NOTE — ASSESSMENT & PLAN NOTE
Patient presenting today with 12 pounds of weight loss since initiation of weight management.  Patient is doing well with dieting and is having some issues with exercise related to comorbid conditions.  Overall patient appears to be doing well and would benefit from medication assistance to allow her to lose weight at a steady rate and establish good habits to allow her to maintain a lower weight.  -Will attempt to prescribe Zepp bound, if not available through insurance will attempt a different medication  -Will continue to follow with patient

## 2024-05-10 NOTE — PROGRESS NOTES
Sent message to patient about refusal of coverage by insurance for Wegovy, if patient is amenable will attempt to obtain Saxenda daily injections.

## 2024-05-10 NOTE — TELEPHONE ENCOUNTER
Mary Ellen from [facility] called to provide confirmation code for Covermymeds: [id number]

## 2024-05-28 RX ORDER — OMEPRAZOLE 20 MG/1
20 CAPSULE, DELAYED RELEASE ORAL 2 TIMES DAILY
Qty: 180 CAPSULE | Refills: 1 | Status: SHIPPED | OUTPATIENT
Start: 2024-05-28

## 2024-06-12 DIAGNOSIS — I10 PRIMARY HYPERTENSION: ICD-10-CM

## 2024-06-17 RX ORDER — OLMESARTAN MEDOXOMIL 20 MG/1
20 TABLET ORAL DAILY
Qty: 90 TABLET | Refills: 2 | Status: SHIPPED | OUTPATIENT
Start: 2024-06-17

## 2024-06-17 NOTE — TELEPHONE ENCOUNTER
olmesartan (BENICAR) 20 MG tablet   Pt would like Rx to be sent to [facility] in [place] on [address].

## 2024-07-16 NOTE — TELEPHONE ENCOUNTER
omeprazole (PRILOSEC) 20 MG delayed release capsule   Please send to [facility] in [place] phone # [phone].

## 2024-07-17 RX ORDER — OMEPRAZOLE 20 MG/1
20 CAPSULE, DELAYED RELEASE ORAL 2 TIMES DAILY
Qty: 180 CAPSULE | Refills: 1 | Status: SHIPPED | OUTPATIENT
Start: 2024-07-17

## 2024-09-09 ENCOUNTER — TELEPHONE (OUTPATIENT)
Facility: CLINIC | Age: 54
End: 2024-09-09

## 2024-10-08 ENCOUNTER — TELEPHONE (OUTPATIENT)
Facility: CLINIC | Age: 54
End: 2024-10-08

## 2024-10-08 ENCOUNTER — OFFICE VISIT (OUTPATIENT)
Facility: CLINIC | Age: 54
End: 2024-10-08
Payer: MEDICAID

## 2024-10-08 VITALS
HEIGHT: 62 IN | SYSTOLIC BLOOD PRESSURE: 109 MMHG | DIASTOLIC BLOOD PRESSURE: 74 MMHG | WEIGHT: 232.4 LBS | HEART RATE: 72 BPM | BODY MASS INDEX: 42.76 KG/M2 | OXYGEN SATURATION: 100 % | TEMPERATURE: 97.5 F | RESPIRATION RATE: 18 BRPM

## 2024-10-08 DIAGNOSIS — Z76.89 ENCOUNTER FOR WEIGHT MANAGEMENT: ICD-10-CM

## 2024-10-08 DIAGNOSIS — D75.89 MACROCYTOSIS WITHOUT ANEMIA: ICD-10-CM

## 2024-10-08 DIAGNOSIS — R20.2 NUMBNESS AND TINGLING IN BOTH HANDS: ICD-10-CM

## 2024-10-08 DIAGNOSIS — E66.813 CLASS 3 SEVERE OBESITY DUE TO EXCESS CALORIES WITHOUT SERIOUS COMORBIDITY WITH BODY MASS INDEX (BMI) OF 40.0 TO 44.9 IN ADULT: ICD-10-CM

## 2024-10-08 DIAGNOSIS — E66.813 CLASS 3 SEVERE OBESITY WITH SERIOUS COMORBIDITY AND BODY MASS INDEX (BMI) OF 40.0 TO 44.9 IN ADULT, UNSPECIFIED OBESITY TYPE: Primary | ICD-10-CM

## 2024-10-08 DIAGNOSIS — E66.01 CLASS 3 SEVERE OBESITY WITH SERIOUS COMORBIDITY AND BODY MASS INDEX (BMI) OF 40.0 TO 44.9 IN ADULT, UNSPECIFIED OBESITY TYPE: Primary | ICD-10-CM

## 2024-10-08 DIAGNOSIS — E66.01 CLASS 3 SEVERE OBESITY DUE TO EXCESS CALORIES WITHOUT SERIOUS COMORBIDITY WITH BODY MASS INDEX (BMI) OF 40.0 TO 44.9 IN ADULT: ICD-10-CM

## 2024-10-08 DIAGNOSIS — R20.0 NUMBNESS AND TINGLING IN BOTH HANDS: ICD-10-CM

## 2024-10-08 PROCEDURE — 99214 OFFICE O/P EST MOD 30 MIN: CPT

## 2024-10-08 RX ORDER — CETIRIZINE HYDROCHLORIDE 10 MG/1
10 TABLET ORAL DAILY
COMMUNITY

## 2024-10-08 SDOH — ECONOMIC STABILITY: FOOD INSECURITY: WITHIN THE PAST 12 MONTHS, YOU WORRIED THAT YOUR FOOD WOULD RUN OUT BEFORE YOU GOT MONEY TO BUY MORE.: NEVER TRUE

## 2024-10-08 SDOH — ECONOMIC STABILITY: INCOME INSECURITY: HOW HARD IS IT FOR YOU TO PAY FOR THE VERY BASICS LIKE FOOD, HOUSING, MEDICAL CARE, AND HEATING?: NOT HARD AT ALL

## 2024-10-08 SDOH — ECONOMIC STABILITY: FOOD INSECURITY: WITHIN THE PAST 12 MONTHS, THE FOOD YOU BOUGHT JUST DIDN'T LAST AND YOU DIDN'T HAVE MONEY TO GET MORE.: NEVER TRUE

## 2024-10-08 NOTE — ASSESSMENT & PLAN NOTE
Patient presenting today with persistent obesity despite exercise and changes in diet.  Patient has previously been attempted on multiple GLP-1's without being able to get these medications approved.  Patient has history of hypertensive emergency, although hypertension is well-controlled at this time.  Phentermine therapy is possible, but would like to proceed with other medications and use as last line due to patient's history of hypertensive emergency.  -Naltrexone-bupropion 8-90 mg daily, titrating up by 1 tablet daily until maximum of 2 tablets twice daily  -Follow-up in 1 month
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
## 2024-10-08 NOTE — TELEPHONE ENCOUNTER
Pt states the pharmacy told her that the Contrave needs to be written in 2 separate prescriptions. That is the only way her insurance will cover it. Please advise.

## 2024-10-08 NOTE — PROGRESS NOTES
\"Have you been to the ER, urgent care clinic since your last visit?  Hospitalized since your last visit?\"    NO    “Have you seen or consulted any other health care providers outside our system since your last visit?”    NO    Have you had a mammogram?”   NO    No breast cancer screening on file       “Have you had a colorectal cancer screening such as a colonoscopy/FIT/Cologuard?    NO    No colonoscopy on file  No cologuard on file  No FIT/FOBT on file   No flexible sigmoidoscopy on file

## 2024-10-09 ENCOUNTER — TELEPHONE (OUTPATIENT)
Facility: CLINIC | Age: 54
End: 2024-10-09

## 2024-10-09 DIAGNOSIS — E66.01 CLASS 3 SEVERE OBESITY WITH SERIOUS COMORBIDITY AND BODY MASS INDEX (BMI) OF 40.0 TO 44.9 IN ADULT, UNSPECIFIED OBESITY TYPE: Primary | ICD-10-CM

## 2024-10-09 DIAGNOSIS — E66.813 CLASS 3 SEVERE OBESITY WITH SERIOUS COMORBIDITY AND BODY MASS INDEX (BMI) OF 40.0 TO 44.9 IN ADULT, UNSPECIFIED OBESITY TYPE: Primary | ICD-10-CM

## 2024-10-09 LAB
BUN SERPL-MCNC: 32 MG/DL (ref 6–24)
BUN/CREAT SERPL: 26 (ref 9–23)
CALCIUM SERPL-MCNC: 10 MG/DL (ref 8.7–10.2)
CHLORIDE SERPL-SCNC: 102 MMOL/L (ref 96–106)
CO2 SERPL-SCNC: 17 MMOL/L (ref 20–29)
CREAT SERPL-MCNC: 1.24 MG/DL (ref 0.57–1)
EGFRCR SERPLBLD CKD-EPI 2021: 52 ML/MIN/1.73
GLUCOSE SERPL-MCNC: 83 MG/DL (ref 70–99)
POTASSIUM SERPL-SCNC: 4.4 MMOL/L (ref 3.5–5.2)
SODIUM SERPL-SCNC: 141 MMOL/L (ref 134–144)

## 2024-10-09 RX ORDER — BUPROPION HYDROCHLORIDE 150 MG/1
150 TABLET, EXTENDED RELEASE ORAL 2 TIMES DAILY
Qty: 60 TABLET | Refills: 0 | Status: SHIPPED | OUTPATIENT
Start: 2024-10-23

## 2024-10-09 RX ORDER — BUPROPION HYDROCHLORIDE 100 MG/1
TABLET ORAL
Qty: 21 TABLET | Refills: 0 | Status: SHIPPED | OUTPATIENT
Start: 2024-10-09 | End: 2024-10-23

## 2024-10-09 NOTE — PROGRESS NOTES
[facility]    [address] [address]    Phone:  [phone]       Thank You for choosing us for your health care visit. We are glad to serve you and happy to provide you with this summary of your visit. Please help us to ensure we have accurate records. If you find anything that needs to be changed, please let our staff know as soon as possible.          Your Demographic Information     Patient Name Sex     Nicole Downs Female 1937       Ethnic Group Patient Race    Not of  or  Origin White      Your Visit Details     Date & Time Provider Department    2017 9:45 AM Ishmael Jackson MD [facility]      Your Upcoming Appointment*(Max 10)     2017  9:30 AM CDT   Office Visit with David Starkey MD   [facility] ([facility])    [address] [address]   [phone]            Thursday Suzette 15, 2017 10:30 AM CDT   Follow-up Visit with MCKAY Portillo   [facility] ([facility])    [address] [place] [address]   [phone]            2017 10:15 AM CDT   New Patient Visit with Corey Rogers MD   [facility] ([facility])    [address] [place] [address] [address] [place] [address]   [phone]            2017  9:00 AM CDT   Follow-up Visit with Ishmael Jackson MD   [facility] ([facility])    [address] [place] [address]   [phone]              Your To Do List     Future Orders Please Complete On or Around Expires    BASIC METABOLIC PANEL  May 26, 2017 May 31, 2018      Conditions Discussed Today or Order-Related Diagnoses        Comments    Essential hypertension    -  Primary       Your Vitals Were     BP Pulse Resp Height Weight SpO2    134/58 (BP  Sent over split prescription per pharmacy request.   Location: [facility] Patient Position: Sitting, Cuff Size: Large Adult) 86 20 5' (1.524 m) 217 lb 11.2 oz (98.7 kg) 92%    BMI Smoking Status                42.52 kg/m2 Former Smoker          Medications Prescribed or Re-Ordered Today     lisinopril-hydroCHLOROthiazide (PRINZIDE,ZESTORETIC) 10-12.5 MG per tablet    Sig - Route: Take 2 tablets by mouth daily. - Oral    Class: Eprescribe    Pharmacy: [facility] [address] Ph #: [phone]    potassium chloride (K-DUR,KLOR-CON) 20 MEQ CR tablet    Sig - Route: Take 1 tablet by mouth daily. - Oral    Class: Eprcrib    Pharmacy: [address] Ph #: [phone]    lisinopril (PRINIVIL,ZESTRIL) 40 MG tablet    Sig - Route: Take 1 tablet by mouth daily. - Oral    Class: Eprescribe    Pharmacy: [address] Ph #: [phone]    hydrochlorothiazide (HYDRODIURIL) 25 MG tablet    Sig - Route: Take 1 tablet by mouth daily. - Oral    Class: Eprcrib    Pharmacy: [address] Ph #: [phone]      Your Current Medications Are        Disp Refills Start End    lisinopril-hydroCHLOROthiazide (PRINZIDE,ZESTORETIC) 10-12.5 MG per tablet 30 tablet 11 5/26/2017     Sig - Route: Take 2 tablets by mouth daily. - Oral    Class: Eprescribe    potassium chloride (K-DUR,KLOR-CON) 20 MEQ CR tablet 30 tablet 1 5/26/2017     Sig - Route: Take 1 tablet by mouth daily. - Oral    Class: Eprescribe    lisinopril (PRINIVIL,ZESTRIL) 40 MG tablet 30 tablet 1 5/26/2017     Sig - Route: Take 1 tablet by mouth daily. - Oral    Class: Eprescribe    hydrochlorothiazide (HYDRODIURIL) 25 MG tablet 30 tablet 1 5/26/2017     Sig - Route: Take 1 tablet by mouth daily. - Oral    Class: Eprescribe    Tiotropium Bromide Monohydrate (SPIRIVA RESPIMAT) 2.5 MCG/ACT Aero Soln 1 Inhaler 1 12/27/2016     Sig - Route: Inhale 2  puffs into the lungs daily. - Inhalation    Class: Sample    carvedilol (COREG) 12.5 MG tablet        Sig - Route: Take 12.5 mg by mouth 2 times daily (with meals). - Oral    Class: Historical Med    ipratropium (ATROVENT) 0.06 % nasal spray        Sig - Route: Spray 2 sprays in each nostril daily. - Each Nare    Class: Historical Med    empagliflozin (JARDIANCE) 25 MG tablet        Sig - Route: Take 25 mg by mouth daily (before breakfast). - Oral    Class: Historical Med    atorvastatin (LIPITOR) 40 MG tablet        Sig - Route: Take 40 mg by mouth daily. - Oral    Class: Historical Med    meloxicam (MOBIC) 7.5 MG tablet        Sig - Route: Take 7.5 mg by mouth daily. - Oral    Class: Historical Med    Melatonin 5 MG TABLET DISPERSIBLE        Class: Historical Med    Route: Oral    L-Lysine HCl 1000 MG Tab        Class: Historical Med    Route: Oral    naproxen sodium (ALEVE) 220 MG tablet        Sig - Route: Take 220 mg by mouth 2 times daily (with meals). - Oral    Class: Historical Med    DISPENSE        Sig: protandim one daily    Class: Historical Med    MULTIPLE VITAMIN PO        Class: Historical Med    Route: Oral    Liraglutide (VICTOZA SC)        Sig - Route: Patient believes she takes \"1.2\" on the syringe. - Subcutaneous    Class: Historical Med    gabapentin (NEURONTIN) 100 MG capsule 90 capsule 0 6/23/2015     Sig - Route: Take 1 capsule by mouth 3 times daily as needed (numbness in feet). Patient needs to see Dr. Weller for more refills. - Oral    Class: Eprescribe    FLUCONAZOLE PO        Sig - Route: Take  by mouth. Unknown strength. Takes three times weekly. - Oral    Class: Historical Med    UNABLE TO FIND        Sig: Elequis. Unknown strength.    Class: Historical Med    CALCIUM-VITAMIN D PO        Sig - Route: Take 1 tablet by mouth daily. - Oral    Class: Historical Med    levothyroxine (SYNTHROID, LEVOTHROID) 100 MCG tablet        Sig - Route: Take 100 mcg by mouth daily. - Oral    Class:  Historical Med    omeprazole (PRILOSEC) 40 MG capsule        Sig - Route: Take 40 mg by mouth daily. - Oral    Class: Historical Med    aspirin 81 MG tablet        Sig - Route: Take 81 mg by mouth daily. - Oral    Class: Historical Med      Allergies     No Known Allergies      Immunizations History as of 5/26/2017     Name Date    Pneumococcal Polysaccharide Adult  Deferred (Patient Refused), 10/1/2003      Problem List as of 5/26/2017     Spinal stenosis of lumbar region with neurogenic claudication    Lumbar spondylosis with myelopathy    Pulmonary embolism, bilateral (CMS/HCC)    Hypertension    Acute, but ill-defined, cerebrovascular disease    Disturbance of skin sensation    Phlebitis and thrombophlebitis of other deep vessels of lower extremities    Lichen sclerosus    Hx of TIA (transient ischemic attack) and stroke    Chronic diastolic heart failure (CMS/HCC)            Patient Instructions     None

## 2024-10-10 LAB
FOLATE SERPL-MCNC: 11.4 NG/ML
VIT B12 SERPL-MCNC: 459 PG/ML (ref 232–1245)

## 2024-10-21 DIAGNOSIS — E03.9 HYPOTHYROIDISM, UNSPECIFIED: ICD-10-CM

## 2024-10-21 RX ORDER — LEVOTHYROXINE SODIUM 75 UG/1
75 TABLET ORAL
Qty: 90 TABLET | Refills: 1 | Status: SHIPPED | OUTPATIENT
Start: 2024-10-21

## 2024-10-21 NOTE — TELEPHONE ENCOUNTER
Medication Refill Request    Odette Ng is requesting a refill of the following medication(s):   Requested Prescriptions     Pending Prescriptions Disp Refills    levothyroxine (SYNTHROID) 75 MCG tablet 90 tablet 1     Sig: Take 1 tablet by mouth every morning (before breakfast)        Listed PCP is Jc Roberto MD   Last provider to prescribe medication: Dr Roberto  Last Date of Medication Prescribed:  05/09/2024  Date of Last Office Visit at [facility]:  10/08/2024  FUTURE APPOINTMENT: No future appointments.    Please send refill to:        [facility] [address] [place] [address] - [address] [place] [address] - P [phone] - F [phone]  [address] [place] [address]  Phone: [phone] Fax: [phone]      Please review request and approve or deny with recommendations.

## 2024-10-22 ENCOUNTER — OFFICE VISIT (OUTPATIENT)
Facility: CLINIC | Age: 54
End: 2024-10-22
Payer: MEDICAID

## 2024-10-22 ENCOUNTER — TELEPHONE (OUTPATIENT)
Facility: CLINIC | Age: 54
End: 2024-10-22

## 2024-10-22 VITALS
TEMPERATURE: 98.1 F | BODY MASS INDEX: 42.91 KG/M2 | HEART RATE: 74 BPM | OXYGEN SATURATION: 96 % | SYSTOLIC BLOOD PRESSURE: 111 MMHG | RESPIRATION RATE: 18 BRPM | WEIGHT: 233.2 LBS | HEIGHT: 62 IN | DIASTOLIC BLOOD PRESSURE: 73 MMHG

## 2024-10-22 DIAGNOSIS — B02.9 HERPES ZOSTER WITHOUT COMPLICATION: Primary | ICD-10-CM

## 2024-10-22 PROCEDURE — 99214 OFFICE O/P EST MOD 30 MIN: CPT

## 2024-10-22 RX ORDER — VALACYCLOVIR HYDROCHLORIDE 1 G/1
1000 TABLET, FILM COATED ORAL 3 TIMES DAILY
Qty: 21 TABLET | Refills: 0 | Status: SHIPPED | OUTPATIENT
Start: 2024-10-22 | End: 2024-10-29

## 2024-10-22 RX ORDER — BUTALBITAL, ACETAMINOPHEN AND CAFFEINE 50; 325; 40 MG/1; MG/1; MG/1
1 TABLET ORAL EVERY 6 HOURS PRN
Qty: 16 TABLET | Refills: 0 | Status: SHIPPED | OUTPATIENT
Start: 2024-10-22

## 2024-10-22 RX ORDER — OXYCODONE HYDROCHLORIDE 5 MG/1
5 TABLET ORAL EVERY 6 HOURS PRN
Qty: 12 TABLET | Refills: 0 | Status: SHIPPED | OUTPATIENT
Start: 2024-10-22 | End: 2024-10-22

## 2024-10-22 RX ORDER — GABAPENTIN 300 MG/1
300 CAPSULE ORAL NIGHTLY
Qty: 30 CAPSULE | Refills: 0 | Status: SHIPPED | OUTPATIENT
Start: 2024-10-22 | End: 2024-11-21

## 2024-10-22 RX ORDER — LIDOCAINE 30 MG/G
CREAM TOPICAL
Qty: 28.35 G | Refills: 1 | Status: SHIPPED | OUTPATIENT
Start: 2024-10-22 | End: 2024-10-22 | Stop reason: CLARIF

## 2024-10-22 RX ORDER — ACETAMINOPHEN 500 MG
500 TABLET ORAL 3 TIMES DAILY
Qty: 30 TABLET | Refills: 0 | Status: SHIPPED | OUTPATIENT
Start: 2024-10-22 | End: 2024-11-01

## 2024-10-22 RX ORDER — LIDOCAINE 40 MG/G
CREAM TOPICAL
Qty: 120 G | Refills: 0 | Status: SHIPPED | OUTPATIENT
Start: 2024-10-22

## 2024-10-22 NOTE — TELEPHONE ENCOUNTER
Pt states her insurance will not cover the Lidocaine 3% cream. Pt's insurance will cover the 4%. Please send in a new Rx to [facility]
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
## 2024-11-18 DIAGNOSIS — E03.9 HYPOTHYROIDISM, UNSPECIFIED: ICD-10-CM

## 2024-11-18 RX ORDER — LEVOTHYROXINE SODIUM 75 UG/1
75 TABLET ORAL
Qty: 90 TABLET | Refills: 1 | Status: SHIPPED | OUTPATIENT
Start: 2024-11-18

## 2024-11-18 RX ORDER — CETIRIZINE HYDROCHLORIDE 10 MG/1
10 TABLET ORAL DAILY
Qty: 90 TABLET | Refills: 1 | Status: SHIPPED | OUTPATIENT
Start: 2024-11-18

## 2024-11-18 NOTE — TELEPHONE ENCOUNTER
Medication Refill Request    Odette Ng is requesting a refill of the following medication(s):   Requested Prescriptions     Pending Prescriptions Disp Refills    cetirizine (ZYRTEC) 10 MG tablet 90 tablet 1     Sig: Take 1 tablet by mouth daily    levothyroxine (SYNTHROID) 75 MCG tablet 90 tablet 1     Sig: Take 1 tablet by mouth every morning (before breakfast)        Listed PCP is Jc Roberto MD     Date of Last Office Visit at [facility]:  10/22/2024  FUTURE APPOINTMENT: No future appointments.    Please send refill to:      [facility] [address] [place] [address] - [address] [facility] -  [phone] - F [phone]  [address] [place] [address]  Phone: [phone] Fax: [phone]          Please review request and approve or deny with recommendations.

## 2024-11-29 ENCOUNTER — TELEPHONE (OUTPATIENT)
Age: 54
End: 2024-11-29

## 2024-11-29 DIAGNOSIS — E03.9 HYPOTHYROIDISM, UNSPECIFIED: ICD-10-CM

## 2024-11-30 RX ORDER — LEVOTHYROXINE SODIUM 75 UG/1
75 TABLET ORAL
Qty: 90 TABLET | Refills: 1 | Status: SHIPPED | OUTPATIENT
Start: 2024-11-30 | End: 2024-12-06 | Stop reason: SDUPTHER

## 2024-11-30 RX ORDER — CETIRIZINE HYDROCHLORIDE 10 MG/1
10 TABLET ORAL DAILY
Qty: 90 TABLET | Refills: 1 | Status: SHIPPED | OUTPATIENT
Start: 2024-11-30

## 2024-12-06 DIAGNOSIS — E03.9 HYPOTHYROIDISM, UNSPECIFIED: ICD-10-CM

## 2024-12-06 RX ORDER — LEVOTHYROXINE SODIUM 75 UG/1
75 TABLET ORAL
Qty: 90 TABLET | Refills: 3 | Status: SHIPPED | OUTPATIENT
Start: 2024-12-06

## 2024-12-06 NOTE — TELEPHONE ENCOUNTER
Medication Refill Request    Odette Ng is requesting a refill of the following medication(s):   Requested Prescriptions     Pending Prescriptions Disp Refills    levothyroxine (SYNTHROID) 75 MCG tablet 90 tablet 3     Sig: Take 1 tablet by mouth every morning (before breakfast)        Listed PCP is Jc Roberto MD   Last provider to prescribe medication: Dr. Roberto  Last Date of Medication Prescribed: 11/30/2024 but was sent to a [place] pharmacy   Date of Last Office Visit at [facility]: 10/22/24   FUTURE APPOINTMENT: No future appointments.    Please send refill to:    [facility] [address] [place] [address] - [address] [place] [address] - P [phone] - F [phone]  [address] [place] [address]  Phone: [phone] Fax: [phone]          Please review request and approve or deny with recommendations.

## 2024-12-20 DIAGNOSIS — I10 PRIMARY HYPERTENSION: ICD-10-CM

## 2024-12-21 RX ORDER — HYDROCHLOROTHIAZIDE 25 MG/1
25 TABLET ORAL EVERY MORNING
Qty: 90 TABLET | Refills: 1 | Status: SHIPPED | OUTPATIENT
Start: 2024-12-21

## 2025-02-27 ENCOUNTER — OFFICE VISIT (OUTPATIENT)
Facility: CLINIC | Age: 55
End: 2025-02-27

## 2025-02-27 VITALS
HEIGHT: 62 IN | TEMPERATURE: 97.5 F | RESPIRATION RATE: 18 BRPM | DIASTOLIC BLOOD PRESSURE: 58 MMHG | SYSTOLIC BLOOD PRESSURE: 87 MMHG | OXYGEN SATURATION: 98 % | BODY MASS INDEX: 41.7 KG/M2 | WEIGHT: 226.6 LBS | HEART RATE: 70 BPM

## 2025-02-27 DIAGNOSIS — R79.89 ELEVATED SERUM CREATININE: ICD-10-CM

## 2025-02-27 DIAGNOSIS — E66.01 CLASS 3 SEVERE OBESITY DUE TO EXCESS CALORIES WITHOUT SERIOUS COMORBIDITY WITH BODY MASS INDEX (BMI) OF 40.0 TO 44.9 IN ADULT: ICD-10-CM

## 2025-02-27 DIAGNOSIS — E66.813 CLASS 3 SEVERE OBESITY DUE TO EXCESS CALORIES WITHOUT SERIOUS COMORBIDITY WITH BODY MASS INDEX (BMI) OF 40.0 TO 44.9 IN ADULT: ICD-10-CM

## 2025-02-27 DIAGNOSIS — I10 PRIMARY HYPERTENSION: Primary | ICD-10-CM

## 2025-02-27 RX ORDER — HYDROCHLOROTHIAZIDE 12.5 MG/1
12.5 CAPSULE ORAL EVERY MORNING
Qty: 30 CAPSULE | Refills: 0 | Status: SHIPPED | OUTPATIENT
Start: 2025-02-27

## 2025-02-27 SDOH — ECONOMIC STABILITY: FOOD INSECURITY: WITHIN THE PAST 12 MONTHS, THE FOOD YOU BOUGHT JUST DIDN'T LAST AND YOU DIDN'T HAVE MONEY TO GET MORE.: NEVER TRUE

## 2025-02-27 SDOH — ECONOMIC STABILITY: FOOD INSECURITY: WITHIN THE PAST 12 MONTHS, YOU WORRIED THAT YOUR FOOD WOULD RUN OUT BEFORE YOU GOT MONEY TO BUY MORE.: NEVER TRUE

## 2025-02-27 ASSESSMENT — PATIENT HEALTH QUESTIONNAIRE - PHQ9
3. TROUBLE FALLING OR STAYING ASLEEP: NOT AT ALL
10. IF YOU CHECKED OFF ANY PROBLEMS, HOW DIFFICULT HAVE THESE PROBLEMS MADE IT FOR YOU TO DO YOUR WORK, TAKE CARE OF THINGS AT HOME, OR GET ALONG WITH OTHER PEOPLE: NOT DIFFICULT AT ALL
8. MOVING OR SPEAKING SO SLOWLY THAT OTHER PEOPLE COULD HAVE NOTICED. OR THE OPPOSITE, BEING SO FIGETY OR RESTLESS THAT YOU HAVE BEEN MOVING AROUND A LOT MORE THAN USUAL: NOT AT ALL
4. FEELING TIRED OR HAVING LITTLE ENERGY: NOT AT ALL
1. LITTLE INTEREST OR PLEASURE IN DOING THINGS: NOT AT ALL
SUM OF ALL RESPONSES TO PHQ QUESTIONS 1-9: 0
SUM OF ALL RESPONSES TO PHQ9 QUESTIONS 1 & 2: 0
2. FEELING DOWN, DEPRESSED OR HOPELESS: NOT AT ALL
SUM OF ALL RESPONSES TO PHQ QUESTIONS 1-9: 0
6. FEELING BAD ABOUT YOURSELF - OR THAT YOU ARE A FAILURE OR HAVE LET YOURSELF OR YOUR FAMILY DOWN: NOT AT ALL
SUM OF ALL RESPONSES TO PHQ QUESTIONS 1-9: 0
5. POOR APPETITE OR OVEREATING: NOT AT ALL
SUM OF ALL RESPONSES TO PHQ QUESTIONS 1-9: 0
9. THOUGHTS THAT YOU WOULD BE BETTER OFF DEAD, OR OF HURTING YOURSELF: NOT AT ALL
7. TROUBLE CONCENTRATING ON THINGS, SUCH AS READING THE NEWSPAPER OR WATCHING TELEVISION: NOT AT ALL

## 2025-02-27 ASSESSMENT — ANXIETY QUESTIONNAIRES
1. FEELING NERVOUS, ANXIOUS, OR ON EDGE: NOT AT ALL
IF YOU CHECKED OFF ANY PROBLEMS ON THIS QUESTIONNAIRE, HOW DIFFICULT HAVE THESE PROBLEMS MADE IT FOR YOU TO DO YOUR WORK, TAKE CARE OF THINGS AT HOME, OR GET ALONG WITH OTHER PEOPLE: NOT DIFFICULT AT ALL
6. BECOMING EASILY ANNOYED OR IRRITABLE: NOT AT ALL
2. NOT BEING ABLE TO STOP OR CONTROL WORRYING: NOT AT ALL
7. FEELING AFRAID AS IF SOMETHING AWFUL MIGHT HAPPEN: NOT AT ALL
5. BEING SO RESTLESS THAT IT IS HARD TO SIT STILL: NOT AT ALL
4. TROUBLE RELAXING: NOT AT ALL
3. WORRYING TOO MUCH ABOUT DIFFERENT THINGS: NOT AT ALL
GAD7 TOTAL SCORE: 0

## 2025-02-27 NOTE — PROGRESS NOTES
I discussed the patient's history and physical exam with the resident. I reviewed the assessment and plan and agree with the resident's documentation.     
\"Have you been to the ER, urgent care clinic since your last visit?  Hospitalized since your last visit?\"    NO    “Have you seen or consulted any other health care providers outside our system since your last visit?”    NO    Have you had a mammogram?”   NO    No breast cancer screening on file       “Have you had a colorectal cancer screening such as a colonoscopy/FIT/Cologuard?    NO    No colonoscopy on file  No cologuard on file  No FIT/FOBT on file   No flexible sigmoidoscopy on file            
(5' 2\")   Wt 102.8 kg (226 lb 9.6 oz)   SpO2 98%   BMI 41.45 kg/m²       Physical Exam  Constitutional:       General: She is not in acute distress.     Appearance: Normal appearance.   HENT:      Head: Normocephalic and atraumatic.      Mouth/Throat:      Mouth: Mucous membranes are moist.      Pharynx: Oropharynx is clear.   Cardiovascular:      Rate and Rhythm: Normal rate and regular rhythm.      Heart sounds: No murmur heard.     No friction rub. No gallop.   Pulmonary:      Effort: Pulmonary effort is normal. No respiratory distress.      Breath sounds: Normal breath sounds. No stridor. No wheezing, rhonchi or rales.   Skin:     General: Skin is warm and dry.   Neurological:      Mental Status: She is alert and oriented to person, place, and time.            Assessment/Plan:    1. Primary hypertension  Overview:  Chronic  Current medications: Telmisartan 20 mg, HCTZ 12.5 mg  Assessment & Plan:  54-year-old patient presenting today for follow-up.  Patient has been undergoing weight loss and has experienced a reduction in her blood pressure as compared to her last visit.  Patient has not been able to take blood pressure at home, but will be obtaining a new cuff today.  Blood pressure rechecked twice and still found to be low in office today, and patient is complaining of some lightheadedness at home.  May be secondary to recent cessation of sugar intake, but based on findings today I suspect there is a component of hypotension.  -Reduce hydrochlorothiazide to 12.5 mg  -Follow-up in 2 weeks  -BMP to evaluate previously noted elevated creatinine  Orders:  -     hydroCHLOROthiazide 12.5 MG capsule; Take 1 capsule by mouth every morning, Disp-30 capsule, R-0Normal  2. Elevated serum creatinine  -     Basic Metabolic Panel; Future  3. Class 3 severe obesity due to excess calories without serious comorbidity with body mass index (BMI) of 40.0 to 44.9 in adult  Overview:  Patient with history of unexpected weight

## 2025-02-27 NOTE — ASSESSMENT & PLAN NOTE
54-year-old patient presenting today for continued weight loss management.  Patient has been attempted on several medications and we have been having difficulty getting her insurance to cover them.  Latest attempted medication was bupropion which did not have desired effect.  Patient states that she has begun cutting sugar out of her diet and has decreased her calorie intake to 1200 kcal daily with 30 minutes of exercise daily.  Patient has lost about 8 pounds since last seen in my office and appears to be doing well with this intervention currently.  Her goal is to lose 100 pounds over the next year.  -Continue with current measures, will follow-up with patient to determine if a different medication may be appropriate, may consider phentermine if conservative measures are not efficacious

## 2025-02-27 NOTE — ASSESSMENT & PLAN NOTE
54-year-old patient presenting today for follow-up.  Patient has been undergoing weight loss and has experienced a reduction in her blood pressure as compared to her last visit.  Patient has not been able to take blood pressure at home, but will be obtaining a new cuff today.  Blood pressure rechecked twice and still found to be low in office today, and patient is complaining of some lightheadedness at home.  May be secondary to recent cessation of sugar intake, but based on findings today I suspect there is a component of hypotension.  -Reduce hydrochlorothiazide to 12.5 mg  -Follow-up in 2 weeks  -BMP to evaluate previously noted elevated creatinine

## 2025-03-03 ENCOUNTER — HOSPITAL ENCOUNTER (INPATIENT)
Facility: HOSPITAL | Age: 55
LOS: 2 days | Discharge: HOME | DRG: 469 | End: 2025-03-05
Attending: EMERGENCY MEDICINE | Admitting: FAMILY MEDICINE
Payer: MEDICAID

## 2025-03-03 ENCOUNTER — PATIENT MESSAGE (OUTPATIENT)
Facility: CLINIC | Age: 55
End: 2025-03-03

## 2025-03-03 ENCOUNTER — APPOINTMENT (OUTPATIENT)
Facility: HOSPITAL | Age: 55
DRG: 469 | End: 2025-03-03
Payer: MEDICAID

## 2025-03-03 DIAGNOSIS — N17.9 AKI (ACUTE KIDNEY INJURY): Primary | ICD-10-CM

## 2025-03-03 LAB
ALBUMIN SERPL-MCNC: 4.2 G/DL (ref 3.5–5)
ALBUMIN/GLOB SERPL: 1 (ref 1.1–2.2)
ALP SERPL-CCNC: 69 U/L (ref 45–117)
ALT SERPL-CCNC: 19 U/L (ref 12–78)
ANION GAP SERPL CALC-SCNC: 9 MMOL/L (ref 2–12)
APPEARANCE UR: CLEAR
AST SERPL-CCNC: 23 U/L (ref 15–37)
BACTERIA URNS QL MICRO: ABNORMAL /HPF
BASOPHILS # BLD: 0.08 K/UL (ref 0–0.1)
BASOPHILS NFR BLD: 0.9 % (ref 0–1)
BILIRUB SERPL-MCNC: 0.3 MG/DL (ref 0.2–1)
BILIRUB UR QL: NEGATIVE
BUN SERPL-MCNC: 55 MG/DL (ref 6–20)
BUN/CREAT SERPL: 28 (ref 12–20)
CALCIUM SERPL-MCNC: 9.9 MG/DL (ref 8.5–10.1)
CHLORIDE SERPL-SCNC: 101 MMOL/L (ref 97–108)
CO2 SERPL-SCNC: 27 MMOL/L (ref 21–32)
COLOR UR: ABNORMAL
CREAT SERPL-MCNC: 1.95 MG/DL (ref 0.55–1.02)
CREAT UR-MCNC: 28 MG/DL
DIFFERENTIAL METHOD BLD: ABNORMAL
EOSINOPHIL # BLD: 0.28 K/UL (ref 0–0.4)
EOSINOPHIL NFR BLD: 3.3 % (ref 0–7)
EPITH CASTS URNS QL MICRO: ABNORMAL /LPF
ERYTHROCYTE [DISTWIDTH] IN BLOOD BY AUTOMATED COUNT: 12.3 % (ref 11.5–14.5)
GLOBULIN SER CALC-MCNC: 4.3 G/DL (ref 2–4)
GLUCOSE SERPL-MCNC: 94 MG/DL (ref 65–100)
GLUCOSE UR STRIP.AUTO-MCNC: NEGATIVE MG/DL
HCT VFR BLD AUTO: 38.2 % (ref 35–47)
HGB BLD-MCNC: 12.6 G/DL (ref 11.5–16)
HGB UR QL STRIP: NEGATIVE
IMM GRANULOCYTES # BLD AUTO: 0.02 K/UL (ref 0–0.04)
IMM GRANULOCYTES NFR BLD AUTO: 0.2 % (ref 0–0.5)
KETONES UR QL STRIP.AUTO: NEGATIVE MG/DL
LEUKOCYTE ESTERASE UR QL STRIP.AUTO: NEGATIVE
LYMPHOCYTES # BLD: 2.48 K/UL (ref 0.8–3.5)
LYMPHOCYTES NFR BLD: 29 % (ref 12–49)
MAGNESIUM SERPL-MCNC: 2.4 MG/DL (ref 1.6–2.4)
MCH RBC QN AUTO: 31 PG (ref 26–34)
MCHC RBC AUTO-ENTMCNC: 33 G/DL (ref 30–36.5)
MCV RBC AUTO: 94.1 FL (ref 80–99)
MONOCYTES # BLD: 0.86 K/UL (ref 0–1)
MONOCYTES NFR BLD: 10.1 % (ref 5–13)
NEUTS SEG # BLD: 4.82 K/UL (ref 1.8–8)
NEUTS SEG NFR BLD: 56.5 % (ref 32–75)
NITRITE UR QL STRIP.AUTO: NEGATIVE
NRBC # BLD: 0 K/UL (ref 0–0.01)
NRBC BLD-RTO: 0 PER 100 WBC
PH UR STRIP: 5.5 (ref 5–8)
PLATELET # BLD AUTO: 457 K/UL (ref 150–400)
PMV BLD AUTO: 10.2 FL (ref 8.9–12.9)
POTASSIUM SERPL-SCNC: 3.5 MMOL/L (ref 3.5–5.1)
PROT SERPL-MCNC: 8.5 G/DL (ref 6.4–8.2)
PROT UR STRIP-MCNC: NEGATIVE MG/DL
RBC # BLD AUTO: 4.06 M/UL (ref 3.8–5.2)
RBC #/AREA URNS HPF: ABNORMAL /HPF (ref 0–5)
SODIUM SERPL-SCNC: 137 MMOL/L (ref 136–145)
SODIUM UR-SCNC: 38 MMOL/L
SP GR UR REFRACTOMETRY: 1.01 (ref 1–1.03)
URINE CULTURE IF INDICATED: ABNORMAL
UROBILINOGEN UR QL STRIP.AUTO: 0.2 EU/DL (ref 0.2–1)
WBC # BLD AUTO: 8.5 K/UL (ref 3.6–11)
WBC URNS QL MICRO: ABNORMAL /HPF (ref 0–4)

## 2025-03-03 PROCEDURE — 36415 COLL VENOUS BLD VENIPUNCTURE: CPT

## 2025-03-03 PROCEDURE — 6370000000 HC RX 637 (ALT 250 FOR IP)

## 2025-03-03 PROCEDURE — 85025 COMPLETE CBC W/AUTO DIFF WBC: CPT

## 2025-03-03 PROCEDURE — 2580000003 HC RX 258

## 2025-03-03 PROCEDURE — 84300 ASSAY OF URINE SODIUM: CPT

## 2025-03-03 PROCEDURE — 76700 US EXAM ABDOM COMPLETE: CPT

## 2025-03-03 PROCEDURE — 80053 COMPREHEN METABOLIC PANEL: CPT

## 2025-03-03 PROCEDURE — 1100000000 HC RM PRIVATE

## 2025-03-03 PROCEDURE — 82570 ASSAY OF URINE CREATININE: CPT

## 2025-03-03 PROCEDURE — 83935 ASSAY OF URINE OSMOLALITY: CPT

## 2025-03-03 PROCEDURE — 83735 ASSAY OF MAGNESIUM: CPT

## 2025-03-03 PROCEDURE — 99285 EMERGENCY DEPT VISIT HI MDM: CPT

## 2025-03-03 PROCEDURE — 81001 URINALYSIS AUTO W/SCOPE: CPT

## 2025-03-03 RX ORDER — HYDROCHLOROTHIAZIDE 25 MG/1
12.5 TABLET ORAL EVERY MORNING
Status: DISCONTINUED | OUTPATIENT
Start: 2025-03-04 | End: 2025-03-04

## 2025-03-03 RX ORDER — SODIUM CHLORIDE, SODIUM LACTATE, POTASSIUM CHLORIDE, CALCIUM CHLORIDE 600; 310; 30; 20 MG/100ML; MG/100ML; MG/100ML; MG/100ML
INJECTION, SOLUTION INTRAVENOUS CONTINUOUS
Status: DISPENSED | OUTPATIENT
Start: 2025-03-03 | End: 2025-03-04

## 2025-03-03 RX ORDER — ACETAMINOPHEN 325 MG/1
650 TABLET ORAL EVERY 6 HOURS PRN
Status: DISCONTINUED | OUTPATIENT
Start: 2025-03-03 | End: 2025-03-05 | Stop reason: HOSPADM

## 2025-03-03 RX ORDER — ONDANSETRON 2 MG/ML
4 INJECTION INTRAMUSCULAR; INTRAVENOUS EVERY 6 HOURS PRN
Status: DISCONTINUED | OUTPATIENT
Start: 2025-03-03 | End: 2025-03-05 | Stop reason: HOSPADM

## 2025-03-03 RX ORDER — CETIRIZINE HYDROCHLORIDE 10 MG/1
10 TABLET ORAL DAILY
Status: DISCONTINUED | OUTPATIENT
Start: 2025-03-04 | End: 2025-03-05 | Stop reason: HOSPADM

## 2025-03-03 RX ORDER — ACETAMINOPHEN 500 MG
500 TABLET ORAL 3 TIMES DAILY
Status: DISCONTINUED | OUTPATIENT
Start: 2025-03-03 | End: 2025-03-05 | Stop reason: HOSPADM

## 2025-03-03 RX ORDER — ONDANSETRON 4 MG/1
4 TABLET, ORALLY DISINTEGRATING ORAL EVERY 8 HOURS PRN
Status: DISCONTINUED | OUTPATIENT
Start: 2025-03-03 | End: 2025-03-05 | Stop reason: HOSPADM

## 2025-03-03 RX ORDER — SODIUM CHLORIDE 0.9 % (FLUSH) 0.9 %
5-40 SYRINGE (ML) INJECTION PRN
Status: DISCONTINUED | OUTPATIENT
Start: 2025-03-03 | End: 2025-03-05 | Stop reason: HOSPADM

## 2025-03-03 RX ORDER — POLYETHYLENE GLYCOL 3350 17 G/17G
17 POWDER, FOR SOLUTION ORAL DAILY PRN
Status: DISCONTINUED | OUTPATIENT
Start: 2025-03-03 | End: 2025-03-05 | Stop reason: HOSPADM

## 2025-03-03 RX ORDER — SODIUM CHLORIDE 0.9 % (FLUSH) 0.9 %
5-40 SYRINGE (ML) INJECTION EVERY 12 HOURS SCHEDULED
Status: DISCONTINUED | OUTPATIENT
Start: 2025-03-03 | End: 2025-03-05 | Stop reason: HOSPADM

## 2025-03-03 RX ORDER — ACETAMINOPHEN 650 MG/1
650 SUPPOSITORY RECTAL EVERY 6 HOURS PRN
Status: DISCONTINUED | OUTPATIENT
Start: 2025-03-03 | End: 2025-03-05 | Stop reason: HOSPADM

## 2025-03-03 RX ORDER — PANTOPRAZOLE SODIUM 40 MG/1
40 TABLET, DELAYED RELEASE ORAL
Status: DISCONTINUED | OUTPATIENT
Start: 2025-03-04 | End: 2025-03-05 | Stop reason: HOSPADM

## 2025-03-03 RX ORDER — LEVOTHYROXINE SODIUM 75 UG/1
75 TABLET ORAL
Status: DISCONTINUED | OUTPATIENT
Start: 2025-03-04 | End: 2025-03-05 | Stop reason: HOSPADM

## 2025-03-03 RX ORDER — TIZANIDINE 2 MG/1
4 TABLET ORAL ONCE
Status: COMPLETED | OUTPATIENT
Start: 2025-03-03 | End: 2025-03-04

## 2025-03-03 RX ORDER — SODIUM CHLORIDE 9 MG/ML
INJECTION, SOLUTION INTRAVENOUS PRN
Status: DISCONTINUED | OUTPATIENT
Start: 2025-03-03 | End: 2025-03-05 | Stop reason: HOSPADM

## 2025-03-03 RX ADMIN — RIVAROXABAN 20 MG: 20 TABLET, FILM COATED ORAL at 20:32

## 2025-03-03 RX ADMIN — SODIUM CHLORIDE, SODIUM LACTATE, POTASSIUM CHLORIDE, AND CALCIUM CHLORIDE: .6; .31; .03; .02 INJECTION, SOLUTION INTRAVENOUS at 20:21

## 2025-03-03 ASSESSMENT — PAIN - FUNCTIONAL ASSESSMENT: PAIN_FUNCTIONAL_ASSESSMENT: NONE - DENIES PAIN
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
## 2025-03-03 NOTE — TELEPHONE ENCOUNTER
Received a preliminary report from [facility] about patient's BMP showing creatinine elevation to 1.99. Patient is in at least ALEXANDREA with potential progression to ARF if levels continue to rise at their current rate. Have called patient and recommended she report to an ER to be evaluated for the cause of her ALEXANDREA. Patient denies urinary symptoms or dysuria. Patient has been drinking plenty of water and has some increased protein intake. Patient does endorse occasional left sided back pain which she reports can radiate to the groin, though this only occurs when the patient is seated for prolonged periods.

## 2025-03-03 NOTE — H&P
[address] [id number]   Office [phone]  Fax [phone]       Admission H&P     Name: Odette Ng MRN: [id number]  Sex: Female   YOB: 1970  Age: 54 y.o.  PCP: Jc Roberto MD     Source of Information: patient, medical records    Chief complaint: Abnormal lab    History of Present Illness  Odette Ng is a 54 y.o. female with pmhx HTN, factor V Leiden, hypothyroidism, cervical radiculopathy s/p spinal fusion who presents to the ER complaining of abnormal creatinine. Reports that PCP advised pt to be evaluated in ED for elevated cr of 1.99. States it was previously 0.63. Reports she has been trying to lose weight-Went on 0 sugar diet a month ago. Drinking 3 x 16.9 oz bottles of water daily. Reports good PO intake. Started taking tizanidine within the past few weeks to months-unsure exact date. No supplements or other changes to medication. No dysuria, frequency or urgency or hematuria. Endorses more dizziness than usual and palpitations. No falls or LOC. No HA, CP, SOB, abdominal pain. Denies any history of elevated Cr, renal problems. No further sxs or concerns at present.      ED summary/course:   Vitals:   Vitals:    03/03/25 1648   BP: 125/81   Pulse: 81   Resp: 16   Temp: 97.5 °F (36.4 °C)   TempSrc: Oral   SpO2: 100%   Weight: 102.5 kg (226 lb)   Height: 1.575 m (5' 2\")       Pertinent labs:   Lab Results   Component Value Date    WBC 8.5 03/03/2025    HGB 12.6 03/03/2025    HCT 38.2 03/03/2025    MCV 94.1 03/03/2025     (H) 03/03/2025     Lab Results   Component Value Date     03/03/2025    K 3.5 03/03/2025     03/03/2025    CO2 27 03/03/2025    BUN 55 (H) 03/03/2025    CREATININE 1.95 (H) 03/03/2025    GLUCOSE 94 03/03/2025    CALCIUM 9.9 03/03/2025    BILITOT 0.3 03/03/2025    ALKPHOS 69 03/03/2025    AST 23 03/03/2025    ALT 19 03/03/2025    LABGLOM 30 (L) 03/03/2025    GFRAA 120 11/16/2021    AGRATIO 1.8 08/30/2022    GLOB

## 2025-03-03 NOTE — ED TRIAGE NOTES
Patient reports she was referred here by her PCP for a creatinine of 1.99    Patient reports her PCP has been trending her kidney function (which patient was unaware of) and she was referred here after her most recent testing.    Patient denies any symptoms- denies any complaints.

## 2025-03-04 PROBLEM — I95.2 HYPOTENSION DUE TO DRUGS: Status: ACTIVE | Noted: 2025-03-04

## 2025-03-04 LAB
ANION GAP SERPL CALC-SCNC: 6 MMOL/L (ref 2–12)
ANION GAP SERPL CALC-SCNC: 9 MMOL/L (ref 2–12)
BUN SERPL-MCNC: 34 MG/DL (ref 6–20)
BUN SERPL-MCNC: 43 MG/DL (ref 6–20)
BUN SERPL-MCNC: 53 MG/DL (ref 6–24)
BUN/CREAT SERPL: 25 (ref 12–20)
BUN/CREAT SERPL: 27 (ref 12–20)
BUN/CREAT SERPL: 27 (ref 9–23)
CALCIUM SERPL-MCNC: 10 MG/DL (ref 8.7–10.2)
CALCIUM SERPL-MCNC: 9.2 MG/DL (ref 8.5–10.1)
CALCIUM SERPL-MCNC: 9.4 MG/DL (ref 8.5–10.1)
CHLORIDE SERPL-SCNC: 107 MMOL/L (ref 97–108)
CHLORIDE SERPL-SCNC: 109 MMOL/L (ref 97–108)
CHLORIDE SERPL-SCNC: 96 MMOL/L (ref 96–106)
CO2 SERPL-SCNC: 22 MMOL/L (ref 21–32)
CO2 SERPL-SCNC: 22 MMOL/L (ref 21–32)
CO2 SERPL-SCNC: 23 MMOL/L (ref 20–29)
CREAT SERPL-MCNC: 1.35 MG/DL (ref 0.55–1.02)
CREAT SERPL-MCNC: 1.58 MG/DL (ref 0.55–1.02)
CREAT SERPL-MCNC: 1.99 MG/DL (ref 0.57–1)
CREAT UR-MCNC: 47.9 MG/DL
EGFRCR SERPLBLD CKD-EPI 2021: 29 ML/MIN/1.73
GLUCOSE BLD STRIP.AUTO-MCNC: 119 MG/DL (ref 65–117)
GLUCOSE BLD STRIP.AUTO-MCNC: 126 MG/DL (ref 65–117)
GLUCOSE BLD STRIP.AUTO-MCNC: 91 MG/DL (ref 65–117)
GLUCOSE SERPL-MCNC: 119 MG/DL (ref 65–100)
GLUCOSE SERPL-MCNC: 80 MG/DL (ref 70–99)
GLUCOSE SERPL-MCNC: 87 MG/DL (ref 65–100)
MICROALBUMIN UR-MCNC: 3.54 MG/DL
MICROALBUMIN/CREAT UR-RTO: 74 MG/G (ref 0–30)
OSMOLALITY UR: 264 MOSM/KG H2O
POTASSIUM SERPL-SCNC: 4 MMOL/L (ref 3.5–5.1)
POTASSIUM SERPL-SCNC: 4.2 MMOL/L (ref 3.5–5.1)
POTASSIUM SERPL-SCNC: 4.6 MMOL/L (ref 3.5–5.2)
SERVICE CMNT-IMP: ABNORMAL
SERVICE CMNT-IMP: ABNORMAL
SERVICE CMNT-IMP: NORMAL
SODIUM SERPL-SCNC: 137 MMOL/L (ref 136–145)
SODIUM SERPL-SCNC: 138 MMOL/L (ref 136–145)
SODIUM SERPL-SCNC: 139 MMOL/L (ref 134–144)

## 2025-03-04 PROCEDURE — 2000000000 HC ICU R&B

## 2025-03-04 PROCEDURE — 2500000003 HC RX 250 WO HCPCS

## 2025-03-04 PROCEDURE — 82570 ASSAY OF URINE CREATININE: CPT

## 2025-03-04 PROCEDURE — 84540 ASSAY OF URINE/UREA-N: CPT

## 2025-03-04 PROCEDURE — 83935 ASSAY OF URINE OSMOLALITY: CPT

## 2025-03-04 PROCEDURE — 82962 GLUCOSE BLOOD TEST: CPT

## 2025-03-04 PROCEDURE — 2580000003 HC RX 258

## 2025-03-04 PROCEDURE — 99222 1ST HOSP IP/OBS MODERATE 55: CPT | Performed by: STUDENT IN AN ORGANIZED HEALTH CARE EDUCATION/TRAINING PROGRAM

## 2025-03-04 PROCEDURE — 6370000000 HC RX 637 (ALT 250 FOR IP)

## 2025-03-04 PROCEDURE — 80048 BASIC METABOLIC PNL TOTAL CA: CPT

## 2025-03-04 PROCEDURE — 2060000000 HC ICU INTERMEDIATE R&B

## 2025-03-04 PROCEDURE — 82043 UR ALBUMIN QUANTITATIVE: CPT

## 2025-03-04 PROCEDURE — 51798 US URINE CAPACITY MEASURE: CPT

## 2025-03-04 RX ORDER — NOREPINEPHRINE BITARTRATE 0.06 MG/ML
1-100 INJECTION, SOLUTION INTRAVENOUS CONTINUOUS
Status: DISCONTINUED | OUTPATIENT
Start: 2025-03-04 | End: 2025-03-05

## 2025-03-04 RX ORDER — 0.9 % SODIUM CHLORIDE 0.9 %
1000 INTRAVENOUS SOLUTION INTRAVENOUS ONCE
Status: COMPLETED | OUTPATIENT
Start: 2025-03-04 | End: 2025-03-04

## 2025-03-04 RX ORDER — SODIUM CHLORIDE 9 MG/ML
INJECTION, SOLUTION INTRAVENOUS CONTINUOUS
Status: DISCONTINUED | OUTPATIENT
Start: 2025-03-04 | End: 2025-03-04

## 2025-03-04 RX ORDER — DEXTROSE MONOHYDRATE 100 MG/ML
INJECTION, SOLUTION INTRAVENOUS CONTINUOUS PRN
Status: DISCONTINUED | OUTPATIENT
Start: 2025-03-04 | End: 2025-03-04

## 2025-03-04 RX ORDER — INSULIN LISPRO 100 [IU]/ML
0-8 INJECTION, SOLUTION INTRAVENOUS; SUBCUTANEOUS
Status: DISCONTINUED | OUTPATIENT
Start: 2025-03-04 | End: 2025-03-04

## 2025-03-04 RX ORDER — SODIUM CHLORIDE, SODIUM LACTATE, POTASSIUM CHLORIDE, AND CALCIUM CHLORIDE .6; .31; .03; .02 G/100ML; G/100ML; G/100ML; G/100ML
1000 INJECTION, SOLUTION INTRAVENOUS ONCE
Status: COMPLETED | OUTPATIENT
Start: 2025-03-04 | End: 2025-03-04

## 2025-03-04 RX ADMIN — SODIUM CHLORIDE, PRESERVATIVE FREE 10 ML: 5 INJECTION INTRAVENOUS at 07:57

## 2025-03-04 RX ADMIN — SODIUM CHLORIDE 1000 ML: 0.9 INJECTION, SOLUTION INTRAVENOUS at 10:20

## 2025-03-04 RX ADMIN — SODIUM CHLORIDE 1000 ML: 9 INJECTION, SOLUTION INTRAVENOUS at 06:49

## 2025-03-04 RX ADMIN — RIVAROXABAN 20 MG: 20 TABLET, FILM COATED ORAL at 17:22

## 2025-03-04 RX ADMIN — PANTOPRAZOLE SODIUM 40 MG: 40 TABLET, DELAYED RELEASE ORAL at 05:25

## 2025-03-04 RX ADMIN — NOREPINEPHRINE BITARTRATE 5 MCG/MIN: 64 SOLUTION INTRAVENOUS at 09:26

## 2025-03-04 RX ADMIN — CETIRIZINE HYDROCHLORIDE 10 MG: 10 TABLET, FILM COATED ORAL at 07:56

## 2025-03-04 RX ADMIN — SODIUM CHLORIDE, POTASSIUM CHLORIDE, SODIUM LACTATE AND CALCIUM CHLORIDE 1000 ML: 600; 310; 30; 20 INJECTION, SOLUTION INTRAVENOUS at 04:25

## 2025-03-04 RX ADMIN — TIZANIDINE 4 MG: 2 TABLET ORAL at 01:29

## 2025-03-04 RX ADMIN — LEVOTHYROXINE SODIUM 75 MCG: 0.07 TABLET ORAL at 05:25

## 2025-03-04 ASSESSMENT — PAIN SCALES - GENERAL
PAINLEVEL_OUTOF10: 0
PAINLEVEL_OUTOF10: 0
PAINLEVEL_OUTOF10: 3
PAINLEVEL_OUTOF10: 0

## 2025-03-04 ASSESSMENT — PAIN DESCRIPTION - ORIENTATION
ORIENTATION: RIGHT
ORIENTATION: RIGHT

## 2025-03-04 ASSESSMENT — PAIN DESCRIPTION - LOCATION
LOCATION: SHOULDER
LOCATION: SHOULDER

## 2025-03-04 ASSESSMENT — PAIN DESCRIPTION - DESCRIPTORS: DESCRIPTORS: NUMBNESS

## 2025-03-04 NOTE — CARE COORDINATION
Care Management Initial Assessment  3/4/2025 4:35 PM  If patient is discharged prior to next notation, then this note serves as note for discharge by case management.    Reason for Admission:   ALEXANDREA (acute kidney injury) [N17.9]         Patient Admission Status: Inpatient  Date Admitted to [facility]: 3/3/25  RUR: Readmission Risk Score: 8    Hospitalization in the last 30 days (Readmission):  no        Advance Care Planning:  Code Status: Full Code  Primary Healthcare Decision Maker:  spouse    Advance Directive: has NO advanced directive - not interested in additional information     __________________________________________________________________________  Assessment:   New admission  Low RUR    Comments: will need specific orders from attending pertaining to discharge needs due to low RUR    Discharge Concerns: []Yes [x]No []Unknown   Describe:    Financial concerns/barriers: []Yes, explain: [x]No []Unknown/Not discussed  __________________________________________________________________________    Insurer:   Active Insurance as of 3/3/2025       Primary Coverage       Payor Plan Insurance Group Employer/Plan Group    [facility] MEDICAID ANTHEM [facility]EEPERS [id number]       Payor Plan Address Payor Plan Phone Number Payor Plan Fax Number Effective Dates    [address] [address]   5/1/2024 - None Entered    [facility] [id number]         Subscriber Name Subscriber Birth Date Member ID       RAFAEL BENTLEY 1970 [id number]                     PCP: Jc Roberto MD   Address: [address] [place] [address] / [facility] [address]   Phone number: [phone]    Pharmacy:   [facility] [address] - P [phone] - F [phone]  [address]  [place] [address]  Phone: [phone] Fax: [phone]    [address] - [address] [phone] - F [phone]  [address]  [facility] [address]  Phone:

## 2025-03-04 NOTE — DISCHARGE SUMMARY
[address] [place] [address]   Office [phone]  Fax [phone]         Discharge Note     Name: Odette Ng MRN: [id number]  Sex: Female   YOB: 1970  Age: 54 y.o.  PCP: Jc Roberto MD     Date of admission: 3/3/2025  Date of discharge/transfer: 3/5/2025    Attending physician at admission: Ara Blair MD.  Attending physician at discharge/transfer:  Ara Blair MD.  Resident physician at discharge/transfer: Darcie Maher MD     Consultants during hospitalization  None     Admission diagnoses   ALEXANDREA (acute kidney injury) [N17.9]    Recommended follow-up after discharge    1. PCP-Jc Roberto MD     Things to follow up on with PCP:   Hypertension - consider reducing zanaflex, continue midodrine as appropriate   Renal function - BMP    History of Present Illness  As per admitting provider, Dr. Gurrola:   \"Odette Ng is a 54 y.o. female with pmhx HTN, factor V Leiden, hypothyroidism, cervical radiculopathy s/p spinal fusion who presents to the ER complaining of abnormal creatinine. Reports that PCP advised pt to be evaluated in ED for elevated cr of 1.99. States it was previously 0.63. Reports she has been trying to lose weight-Went on 0 sugar diet a month ago. Drinking 3 x 16.9 oz bottles of water daily. Reports good PO intake. Started taking tizanidine within the past few weeks to months-unsure exact date. No supplements or other changes to medication. No dysuria, frequency or urgency or hematuria. Endorses more dizziness than usual and palpitations. No falls or LOC. No HA, CP, SOB, abdominal pain. Denies any history of elevated Cr, renal problems. No further sxs or concerns at present.\"      Hospital course  Odette Ng was admitted into the Family Medicine Service from 3/3/2025 to 3/5/2025 for ALEXANDREA (acute kidney injury) [N17.9]. During the course of this admission, the following conditions were addressed/managed:    ALEXANDREA: POA Cr 1.95,

## 2025-03-04 NOTE — PLAN OF CARE
Problem: Pain  Goal: Verbalizes/displays adequate comfort level or baseline comfort level  Outcome: Progressing     Problem: Safety - Adult  Goal: Free from fall injury  3/4/2025 0912 by Paradise Jacques, RN  Outcome: Progressing  3/4/2025 0911 by Paradise Jacques, RN  Outcome: Progressing      pt received semi-supine in bed in NAD +IV lock, mostly non-verbal throughout session

## 2025-03-04 NOTE — DISCHARGE INSTRUCTIONS
Date/Time                                                                                                                                              Patient or Representative Signature                                                          Date/Time

## 2025-03-04 NOTE — ED NOTES
TRANSFER - OUT REPORT:    Verbal report given to GUSTAVO Cain on Odette Ng  [facility]       Report consisted of patient's Situation, Background, Assessment and   Recommendations(SBAR).     Information from the following report(s) Nurse Handoff Report, Index, ED Encounter Summary, ED SBAR, Adult Overview, MAR, and Recent Results was reviewed with the receiving nurse.    [place] Assessment:    Presents to emergency department  because of falls (Syncope, seizure, or loss of consciousness): No  Age > 70: No  Altered Mental Status, Intoxication with alcohol or substance confusion (Disorientation, impaired judgment, poor safety awaremess, or inability to follow instructions): No  Impaired Mobility: Ambulates or transfers with assistive devices or assistance; Unable to ambulate or transer.: No  Nursing Judgement: No          Lines:   Peripheral IV 03/03/25 Right Antecubital (Active)        Opportunity for questions and clarification was provided.      Patient transported with:  Monitor and Tech

## 2025-03-05 VITALS
WEIGHT: 223.2 LBS | TEMPERATURE: 97.8 F | DIASTOLIC BLOOD PRESSURE: 87 MMHG | BODY MASS INDEX: 41.07 KG/M2 | HEIGHT: 62 IN | HEART RATE: 60 BPM | RESPIRATION RATE: 13 BRPM | OXYGEN SATURATION: 98 % | SYSTOLIC BLOOD PRESSURE: 147 MMHG

## 2025-03-05 LAB
ANION GAP SERPL CALC-SCNC: 4 MMOL/L (ref 2–12)
BASOPHILS # BLD: 0.07 K/UL (ref 0–0.1)
BASOPHILS NFR BLD: 0.8 % (ref 0–1)
BUN SERPL-MCNC: 26 MG/DL (ref 6–20)
BUN/CREAT SERPL: 24 (ref 12–20)
CALCIUM SERPL-MCNC: 9.4 MG/DL (ref 8.5–10.1)
CHLORIDE SERPL-SCNC: 111 MMOL/L (ref 97–108)
CO2 SERPL-SCNC: 26 MMOL/L (ref 21–32)
CREAT SERPL-MCNC: 1.07 MG/DL (ref 0.55–1.02)
DIFFERENTIAL METHOD BLD: ABNORMAL
EOSINOPHIL # BLD: 0.46 K/UL (ref 0–0.4)
EOSINOPHIL NFR BLD: 5.3 % (ref 0–7)
ERYTHROCYTE [DISTWIDTH] IN BLOOD BY AUTOMATED COUNT: 12.6 % (ref 11.5–14.5)
GLUCOSE SERPL-MCNC: 104 MG/DL (ref 65–100)
HCT VFR BLD AUTO: 32.1 % (ref 35–47)
HGB BLD-MCNC: 10.6 G/DL (ref 11.5–16)
IMM GRANULOCYTES # BLD AUTO: 0.04 K/UL (ref 0–0.04)
IMM GRANULOCYTES NFR BLD AUTO: 0.5 % (ref 0–0.5)
LYMPHOCYTES # BLD: 2.89 K/UL (ref 0.8–3.5)
LYMPHOCYTES NFR BLD: 33.3 % (ref 12–49)
MCH RBC QN AUTO: 31.5 PG (ref 26–34)
MCHC RBC AUTO-ENTMCNC: 33 G/DL (ref 30–36.5)
MCV RBC AUTO: 95.3 FL (ref 80–99)
MONOCYTES # BLD: 0.75 K/UL (ref 0–1)
MONOCYTES NFR BLD: 8.7 % (ref 5–13)
NEUTS SEG # BLD: 4.46 K/UL (ref 1.8–8)
NEUTS SEG NFR BLD: 51.4 % (ref 32–75)
NRBC # BLD: 0 K/UL (ref 0–0.01)
NRBC BLD-RTO: 0 PER 100 WBC
OSMOLALITY UR: 425 MOSM/KG H2O
PLATELET # BLD AUTO: 351 K/UL (ref 150–400)
PMV BLD AUTO: 9.9 FL (ref 8.9–12.9)
POTASSIUM SERPL-SCNC: 4 MMOL/L (ref 3.5–5.1)
RBC # BLD AUTO: 3.37 M/UL (ref 3.8–5.2)
SODIUM SERPL-SCNC: 141 MMOL/L (ref 136–145)
UUN UR-MCNC: 606 MG/DL
WBC # BLD AUTO: 8.7 K/UL (ref 3.6–11)

## 2025-03-05 PROCEDURE — 6370000000 HC RX 637 (ALT 250 FOR IP)

## 2025-03-05 PROCEDURE — 80048 BASIC METABOLIC PNL TOTAL CA: CPT

## 2025-03-05 PROCEDURE — 2580000003 HC RX 258

## 2025-03-05 PROCEDURE — 99238 HOSP IP/OBS DSCHRG MGMT 30/<: CPT | Performed by: STUDENT IN AN ORGANIZED HEALTH CARE EDUCATION/TRAINING PROGRAM

## 2025-03-05 PROCEDURE — 2500000003 HC RX 250 WO HCPCS

## 2025-03-05 PROCEDURE — 85025 COMPLETE CBC W/AUTO DIFF WBC: CPT

## 2025-03-05 PROCEDURE — 36415 COLL VENOUS BLD VENIPUNCTURE: CPT

## 2025-03-05 RX ORDER — 0.9 % SODIUM CHLORIDE 0.9 %
1000 INTRAVENOUS SOLUTION INTRAVENOUS ONCE
Status: COMPLETED | OUTPATIENT
Start: 2025-03-05 | End: 2025-03-05

## 2025-03-05 RX ORDER — MIDODRINE HYDROCHLORIDE 5 MG/1
10 TABLET ORAL 2 TIMES DAILY WITH MEALS
Status: DISCONTINUED | OUTPATIENT
Start: 2025-03-05 | End: 2025-03-05 | Stop reason: HOSPADM

## 2025-03-05 RX ORDER — MIDODRINE HYDROCHLORIDE 10 MG/1
10 TABLET ORAL 2 TIMES DAILY WITH MEALS
Qty: 30 TABLET | Refills: 0 | Status: SHIPPED | OUTPATIENT
Start: 2025-03-05

## 2025-03-05 RX ADMIN — SODIUM CHLORIDE 1000 ML: 0.9 INJECTION, SOLUTION INTRAVENOUS at 06:57

## 2025-03-05 RX ADMIN — MIDODRINE HYDROCHLORIDE 10 MG: 5 TABLET ORAL at 08:27

## 2025-03-05 RX ADMIN — SODIUM CHLORIDE, PRESERVATIVE FREE 10 ML: 5 INJECTION INTRAVENOUS at 08:27

## 2025-03-05 RX ADMIN — CETIRIZINE HYDROCHLORIDE 10 MG: 10 TABLET, FILM COATED ORAL at 08:27

## 2025-03-05 RX ADMIN — LEVOTHYROXINE SODIUM 75 MCG: 0.07 TABLET ORAL at 05:23

## 2025-03-05 RX ADMIN — PANTOPRAZOLE SODIUM 40 MG: 40 TABLET, DELAYED RELEASE ORAL at 05:23

## 2025-03-05 ASSESSMENT — PAIN - FUNCTIONAL ASSESSMENT
PAIN_FUNCTIONAL_ASSESSMENT: NONE - DENIES PAIN
PAIN_FUNCTIONAL_ASSESSMENT: NONE - DENIES PAIN

## 2025-03-05 NOTE — CARE COORDINATION
3/5/25  11:58 AM    CM noted dc order. No CM needs noted.    JACKIE Farris  [facility]      Available via CAPNIA

## 2025-03-05 NOTE — PROGRESS NOTES
Discharge instructions reviewed with patient at this time. All questions answered. Patient wheeled out of unit to private vehicle by hospital staff.   
Low BP  reported to MD, bolus  given w/ no  Significant result. Pt asymptomatic. Seen by MD and evaluated. Transferred to ICU as ordered.  Report given to receiving RN. Belongings sent w/ pt.   
[facility] RESIDENCY PROGRAM   Senior Resident Admission Note    Chart reviewed. Patient seen, examined, and discussed with Dr. Gurrola (PGY-1). See H&P note for more details.    CC: Abnormal Lab      HPI:  Odette Ng is a 54 y.o. female w/ a known history of HTN and Factor 5 leiden who presents for ALEXANDREA.     Pcp advised pt to be evaluated in ED for worsening cr on lab work.  Patient denies any abdominal pain, dysuria, frequency, urgency, hematuria although per PCPs note patient had endorsed left lower back pain with radiation down to the groin.  Patient tolerating diet and drinking water adequately.  Within the last month patient had started low sugar/calorie diet and her hydrochlorothiazide was decreased from 25 mg daily to 12.5 mg daily due to lower blood pressures by PCP.  Pt denies any DOSHI, CP, SOB.    Pertinent ED Findings:  VS: Within normal ranges  Labs: Elevated creatinine 1.95 [0.6 baseline]  Imaging: None  Treatment: No treatment was started in the ED    PE:  General appearance - alert, well appearing, and in no distress  Chest - clear to auscultation, no wheezes, rales or rhonchi, symmetric air entry  Heart - normal rate, regular rhythm, normal S1, S2, no murmurs, rubs, clicks or gallops,   Abdomen - soft, nontender, nondistended, no masses or organomegaly  Neurological - alert, oriented, normal speech, no focal findings  Extremities - peripheral pulses normal, no pedal edema, no clubbing or cyanosis    A/P: 54 y.o. female w/ known history of HTN and Factor 5 leiden admitted for ALEXANDREA.    ALEXANDREA: POA Cr 1.95 (bl 0.6). Ddx includes prerenal from dehydration after starting new diet with evidence of lower bp requiring adjustment to bp medications by pcp vs obstructive due to pts reports of L back pain with radiation to groin. UA nml. EF 69% with normal systolic and distolic function so can be liberal with fluids.   - Follow urine electrolytes   - Obtain renal US  - Strict ins and outs  - 
sodium chloride 0.9 % bolus 1,000 mL  1,000 mL IntraVENous Once    norepinephrine (LEVOPHED) 16 mg in dextrose 5% 250 mL (premix)  1-100 mcg/min IntraVENous Continuous    acetaminophen (TYLENOL) tablet 500 mg  500 mg Oral TID    cetirizine (ZYRTEC) tablet 10 mg  10 mg Oral Daily    [Held by provider] hydroCHLOROthiazide (HYDRODIURIL) tablet 12.5 mg  12.5 mg Oral QAM    levothyroxine (SYNTHROID) tablet 75 mcg  75 mcg Oral QAM AC    pantoprazole (PROTONIX) tablet 40 mg  40 mg Oral QAM AC    rivaroxaban (XARELTO) tablet 20 mg  20 mg Oral Daily    sodium chloride flush 0.9 % injection 5-40 mL  5-40 mL IntraVENous 2 times per day    sodium chloride flush 0.9 % injection 5-40 mL  5-40 mL IntraVENous PRN    0.9 % sodium chloride infusion   IntraVENous PRN    ondansetron (ZOFRAN-ODT) disintegrating tablet 4 mg  4 mg Oral Q8H PRN    Or    ondansetron (ZOFRAN) injection 4 mg  4 mg IntraVENous Q6H PRN    polyethylene glycol (GLYCOLAX) packet 17 g  17 g Oral Daily PRN    acetaminophen (TYLENOL) tablet 650 mg  650 mg Oral Q6H PRN    Or    acetaminophen (TYLENOL) suppository 650 mg  650 mg Rectal Q6H PRN     Allergies  Allergies   Allergen Reactions    Robaxin [Methocarbamol] Itching    Metoclopramide Nausea And Vomiting, Nausea Only and Other (See Comments)     Cramping \"everywhere\"  Cramping \"everywhere\"    Sumatriptan Nausea And Vomiting and Other (See Comments)      Face flushed- felt like \" needles in face\"   Face flushed- felt like \" needles in face\"     CBC:  Recent Labs     03/03/25  1651   WBC 8.5   HGB 12.6   *     Metabolic Panel:  Recent Labs     03/03/25  1651 03/04/25  0450    138   K 3.5 4.2    107   CO2 27 22   BUN 55* 43*   MG 2.4  --    ALT 19  --      Imaging/procedures:   US ABDOMEN COMPLETE  Narrative: EXAM: US ABDOMEN COMPLETE     Clinical history: worsening ALEXANDREA. L sided back pain radiating to groin.  INDICATION:   worsening ALEXANDREA. L sided back pain radiating to groin.    COMPARISON: 
new dietary changes (no sugar). UA non-infectious. Abdominal US reveals no stone/hydronephrosis. Suspect prerenal due to antihypertensives, might also consider zanaflex as contributing to hypotension. Significantly improved with Cr today 1.07  - Encourage po hydration   - Discontinue home HCTZ, olmesartan, follow up with PCP  - Midodrine 10 mg BID     Bradycardia: First degree AV block on telemetry, HR lowest ovn so suspect some component is sinus enid from sleeping, will continue to monitor    HTN: Controlled on home olmesartan 20mg and HCTZ 12.5mg qd. HCTZ dose recently decreased given improving control.  - Dc home meds, follow up with PCP    Factor V Leiden: H/o PE x3 and multiple DVTs. On Xarelto 20mg daily  - Continue home med    Hypothyroidism: Last TSH 3.50 1/2024. On home levothyroxine 75mcg qd  - Continue home med    Cervical radiculopathy  Back pain: s/p spinal fusion. Was started on tizanidine 4mg qhs 6 months ago.  - Continue home med, discuss with PCP  - Tylenol for pain    GERD: Controlled on home prilosec  - Sub home prilosec for protonix 40mg qd    Allergic rhinitis: controlled on home zyrtec 10mg qd  - Continue home med    Obesity: BMI is Body mass index is 41.34 kg/m².  -Encouraging lifestyle modifications and further follow up outpatient.     FEN/GI - Regular diet.   Activity - Ambulate as tolerated  DVT prophylaxis - Xarelto   GI prophylaxis - Home prilosec>protonix  Fall prophylaxis - Not indicated at this time.  Disposition - Plan to d/c to Home.   Code Status - FULL, discussed with patient / caregivers.  Next of Kin Name and Contact:   ARMANDO Ng [phone]    Patient discussed with Dr. Blair (Attending Physician)     Darcie Maher MD

## 2025-03-05 NOTE — PLAN OF CARE
Central Line Procedure Note  Staff:     Anesthesiologist:  SARAVANAN CAPPS  Location: In OR after induction  Procedure Start/Stop Times:     patient identified, IV checked, site marked, risks and benefits discussed, informed consent, monitors and equipment checked, pre-op evaluation and at physician/surgeon's request      Correct Patient: Yes      Correct Position: Yes      Correct Site: Yes      Correct Procedure: Yes      Correct Laterality:  Yes    Site Marked:  Yes  Line Placement:     Procedure:  Central Line    Insertion laterality:  Right    Insertion site:  Femoral    Position:  Supine      Maximal Sterile Barriers: All elements of maximal sterile barrier technique followed      (Maximal sterile barriers include:   Sterile gown, Sterile Gloves, Mask, Cap, Whole body draped, hand hygiene and acceptable skin prep).Skin Prep: Chloraprep         Injection Technique:  Ultrasound guided and Seldinger Technique    Sterile Ultrasound Technique:  Sterile probe cover and Sterile gel    Vein evaluated via U/S for patency/adequacy of catheter insertion and is adequate.  Using realtime U/S imaging the vein was punctured, and needle was observed entering vein on U/S      Permanent Image entered into patient's record      Local skin infiltration:  None    Catheter size:  9 Fr, 2 lumen 11.5 cm (MAC)    Catheter length at skin (cm):  15    Cath secured with: suture      Dressing:  Tegaderm, Biopatch and Occlusive dressing    Complications:  None obvious    Blood aspirated all lumens: Yes      All Lumens Flushed: Yes      Tip termination: other      Verification method:  Placement to be verified post-op           Problem: Discharge Planning  Goal: Discharge to home or other facility with appropriate resources  Outcome: Adequate for Discharge     Problem: Metabolic/Fluid and Electrolytes - Adult  Goal: Hemodynamic stability and optimal renal function maintained  Outcome: Adequate for Discharge     Problem: Safety - Adult  Goal: Free from fall injury  Outcome: Adequate for Discharge     Problem: Pain  Goal: Verbalizes/displays adequate comfort level or baseline comfort level  Outcome: Adequate for Discharge     Problem: Skin/Tissue Integrity  Goal: Skin integrity remains intact  Description: 1.  Monitor for areas of redness and/or skin breakdown  2.  Assess vascular access sites hourly  3.  Every 4-6 hours minimum:  Change oxygen saturation probe site  4.  Every 4-6 hours:  If on nasal continuous positive airway pressure, respiratory therapy assess nares and determine need for appliance change or resting period  Outcome: Adequate for Discharge  Flowsheets (Taken 3/5/2025 0800)  Skin Integrity Remains Intact: Monitor for areas of redness and/or skin breakdown

## 2025-03-07 ENCOUNTER — TELEPHONE (OUTPATIENT)
Facility: CLINIC | Age: 55
End: 2025-03-07

## 2025-03-07 NOTE — TELEPHONE ENCOUNTER
Care Transitions Initial Follow Up Call    Outreach made within 2 business days of discharge: Yes    Patient: Odette Ng Patient : 1970   MRN: [id number]  Reason for Admission: abnormal labs, ALEXANDREA  Discharge Date: 3/5/25       Spoke with: patient    Discharge department/facility: [facility] Interactive Patient Contact:  Was patient able to fill all prescriptions: Yes  Was patient instructed to bring all medications to the follow-up visit: Yes  Is patient taking all medications as directed in the discharge summary? Yes  Does patient understand their discharge instructions: Yes  Does patient have questions or concerns that need addressed prior to 7-14 day follow up office visit: no    Additional needs identified to be addressed with provider  No needs identified             Scheduled appointment with PCP within 7-14 days: yes    Follow Up  Future Appointments   Date Time Provider Department Center   3/10/2025  8:20 AM Jc Roberto MD [facility]       BARTOLOME LARRY RN

## 2025-03-10 ENCOUNTER — OFFICE VISIT (OUTPATIENT)
Facility: CLINIC | Age: 55
End: 2025-03-10
Payer: MEDICAID

## 2025-03-10 VITALS
TEMPERATURE: 97.7 F | HEIGHT: 62 IN | SYSTOLIC BLOOD PRESSURE: 125 MMHG | BODY MASS INDEX: 42.25 KG/M2 | WEIGHT: 229.6 LBS | DIASTOLIC BLOOD PRESSURE: 73 MMHG | RESPIRATION RATE: 18 BRPM | OXYGEN SATURATION: 98 % | HEART RATE: 57 BPM

## 2025-03-10 DIAGNOSIS — Z09 HOSPITAL DISCHARGE FOLLOW-UP: ICD-10-CM

## 2025-03-10 DIAGNOSIS — R06.02 SHORTNESS OF BREATH: ICD-10-CM

## 2025-03-10 DIAGNOSIS — N17.9 AKI (ACUTE KIDNEY INJURY): Primary | ICD-10-CM

## 2025-03-10 DIAGNOSIS — I95.9 HYPOTENSION, UNSPECIFIED HYPOTENSION TYPE: ICD-10-CM

## 2025-03-10 PROCEDURE — 99213 OFFICE O/P EST LOW 20 MIN: CPT

## 2025-03-10 PROCEDURE — 1111F DSCHRG MED/CURRENT MED MERGE: CPT

## 2025-03-10 NOTE — PROGRESS NOTES
[facility]  [address] [place] [address]  Tel: [phone]  Fax: [phone]    Post-Discharge Transitional Care  Follow Up      Odette Ng   YOB: 1970    Date of Office Visit:  3/10/2025  Date of Hospital Admission: 3/3/25  Date of Hospital Discharge: 3/5/25  Risk of hospital readmission (high >=14%. Medium >=10%) :Readmission Risk Score: 6.2      Care management risk score Rising risk (score 2-5) and Complex Care (Scores >=6): No Risk Score On File     Non face to face  following discharge, date last encounter closed (first attempt may have been earlier): 03/07/2025    Call initiated 2 business days of discharge: Yes    ASSESSMENT/PLAN:   ALEXANDREA (acute kidney injury)  Hypotension in s/o HTN  Hospital discharge follow-up  Admitted to [facility] from 03/03-03/05/2025 for ALEXANDREA. Course complicated by hypotension. Her POA creatinine during hospitalization was 1.95 (baseline 0.7), eGFR 28. Discharge creatinine 1.07, eGFR 24 (03/05/2025). She had a abdominal US that was unrevealing. Ultimately suspected prerenal etiology due to antihypertensive regimen and possibly side effects from zanaflex. She was instructed to hold her antihypertensive regimen (HCTZ and Olmesartan) and was discharged home on midodrine 10 mg BID.   - Continue to HOLD HCTX 12.5 daily and Olmesartan 20 mg daily  - OK to continue midodrine 10 mg daily  - HOLD midodrine for SBP > 140 / DBP > 90  - Repeat BMP   - BNP, if elevated will order echo for further evaluation  - Follow up in 1 week for BP check   -     ID DISCHARGE MEDS RECONCILED W/ CURRENT OUTPATIENT MED LIST      Medical Decision Making: low complexity  Return in 1 week (on 3/17/2025) for BP check .           Subjective:   HPI:  Follow up of Hospital problems/diagnosis(es): ALEXANDREA    Inpatient course: Discharge summary reviewed- see chart.    Interval history/Current status:   -

## 2025-03-10 NOTE — PATIENT INSTRUCTIONS
Keep a log of your Blood Pressure at home   Please hold dose of midodrine for blood pressure greater than 140 over 90   Follow up in 1 week to reassess your symptoms and blood pressure

## 2025-03-10 NOTE — PROGRESS NOTES
\"Have you been to the ER, urgent care clinic since your last visit?  Hospitalized since your last visit?\"    YES/ here today for follow up  “Have you seen or consulted any other health care providers outside our system since your last visit?”    NO    Have you had a mammogram?”   NO    No breast cancer screening on file       “Have you had a colorectal cancer screening such as a colonoscopy/FIT/Cologuard?    NO    No colonoscopy on file  No cologuard on file  No FIT/FOBT on file   No flexible sigmoidoscopy on file

## 2025-03-11 LAB
BUN SERPL-MCNC: 21 MG/DL (ref 6–24)
BUN/CREAT SERPL: 19 (ref 9–23)
CALCIUM SERPL-MCNC: 9.2 MG/DL (ref 8.7–10.2)
CHLORIDE SERPL-SCNC: 108 MMOL/L (ref 96–106)
CO2 SERPL-SCNC: ABNORMAL MMOL/L
CREAT SERPL-MCNC: 1.11 MG/DL (ref 0.57–1)
EGFRCR SERPLBLD CKD-EPI 2021: 59 ML/MIN/1.73
GLUCOSE SERPL-MCNC: 72 MG/DL (ref 70–99)
POTASSIUM SERPL-SCNC: 4.2 MMOL/L (ref 3.5–5.2)
SODIUM SERPL-SCNC: 143 MMOL/L (ref 134–144)

## 2025-03-12 LAB — NT-PROBNP SERPL-MCNC: 354 PG/ML (ref 0–249)

## 2025-03-13 ENCOUNTER — RESULTS FOLLOW-UP (OUTPATIENT)
Facility: CLINIC | Age: 55
End: 2025-03-13

## 2025-03-13 ENCOUNTER — HOSPITAL ENCOUNTER (EMERGENCY)
Facility: HOSPITAL | Age: 55
Discharge: HOME | End: 2025-03-13
Attending: EMERGENCY MEDICINE
Payer: MEDICAID

## 2025-03-13 ENCOUNTER — TELEPHONE (OUTPATIENT)
Facility: CLINIC | Age: 55
End: 2025-03-13

## 2025-03-13 ENCOUNTER — TELEPHONE (OUTPATIENT)
Age: 55
End: 2025-03-13

## 2025-03-13 VITALS
BODY MASS INDEX: 41.96 KG/M2 | TEMPERATURE: 97.7 F | HEART RATE: 66 BPM | DIASTOLIC BLOOD PRESSURE: 68 MMHG | SYSTOLIC BLOOD PRESSURE: 99 MMHG | RESPIRATION RATE: 18 BRPM | OXYGEN SATURATION: 98 % | HEIGHT: 62 IN | WEIGHT: 228 LBS

## 2025-03-13 DIAGNOSIS — I48.91 ATRIAL FIBRILLATION WITH RAPID VENTRICULAR RESPONSE (HCC): Primary | ICD-10-CM

## 2025-03-13 LAB
ALBUMIN SERPL-MCNC: 3.8 G/DL (ref 3.5–5)
ALBUMIN/GLOB SERPL: 1.2 (ref 1.1–2.2)
ALP SERPL-CCNC: 63 U/L (ref 45–117)
ALT SERPL-CCNC: 25 U/L (ref 12–78)
ANION GAP SERPL CALC-SCNC: 6 MMOL/L (ref 2–12)
AST SERPL-CCNC: 19 U/L (ref 15–37)
BASOPHILS # BLD: 0.1 K/UL (ref 0–0.1)
BASOPHILS NFR BLD: 1.5 % (ref 0–1)
BILIRUB SERPL-MCNC: 0.5 MG/DL (ref 0.2–1)
BUN SERPL-MCNC: 16 MG/DL (ref 6–20)
BUN/CREAT SERPL: 16 (ref 12–20)
CALCIUM SERPL-MCNC: 9.4 MG/DL (ref 8.5–10.1)
CHLORIDE SERPL-SCNC: 114 MMOL/L (ref 97–108)
CO2 SERPL-SCNC: 23 MMOL/L (ref 21–32)
CREAT SERPL-MCNC: 1.03 MG/DL (ref 0.55–1.02)
DIFFERENTIAL METHOD BLD: ABNORMAL
EOSINOPHIL # BLD: 0.31 K/UL (ref 0–0.4)
EOSINOPHIL NFR BLD: 4.6 % (ref 0–7)
ERYTHROCYTE [DISTWIDTH] IN BLOOD BY AUTOMATED COUNT: 14.2 % (ref 11.5–14.5)
GLOBULIN SER CALC-MCNC: 3.3 G/DL (ref 2–4)
GLUCOSE SERPL-MCNC: 93 MG/DL (ref 65–100)
HCT VFR BLD AUTO: 37 % (ref 35–47)
HGB BLD-MCNC: 12.3 G/DL (ref 11.5–16)
IMM GRANULOCYTES # BLD AUTO: 0.02 K/UL (ref 0–0.04)
IMM GRANULOCYTES NFR BLD AUTO: 0.3 % (ref 0–0.5)
INR PPP: 1.2 (ref 0.9–1.1)
LYMPHOCYTES # BLD: 2.2 K/UL (ref 0.8–3.5)
LYMPHOCYTES NFR BLD: 33 % (ref 12–49)
MAGNESIUM SERPL-MCNC: 2.2 MG/DL (ref 1.6–2.4)
MCH RBC QN AUTO: 31.8 PG (ref 26–34)
MCHC RBC AUTO-ENTMCNC: 33.2 G/DL (ref 30–36.5)
MCV RBC AUTO: 95.6 FL (ref 80–99)
MONOCYTES # BLD: 0.79 K/UL (ref 0–1)
MONOCYTES NFR BLD: 11.8 % (ref 5–13)
NEUTS SEG # BLD: 3.25 K/UL (ref 1.8–8)
NEUTS SEG NFR BLD: 48.8 % (ref 32–75)
NRBC # BLD: 0 K/UL (ref 0–0.01)
NRBC BLD-RTO: 0 PER 100 WBC
NT PRO BNP: 933 PG/ML
PLATELET # BLD AUTO: 423 K/UL (ref 150–400)
PMV BLD AUTO: 10.3 FL (ref 8.9–12.9)
POTASSIUM SERPL-SCNC: 3.8 MMOL/L (ref 3.5–5.1)
PROT SERPL-MCNC: 7.1 G/DL (ref 6.4–8.2)
PROTHROMBIN TIME: 12.4 SEC (ref 9.2–11.2)
RBC # BLD AUTO: 3.87 M/UL (ref 3.8–5.2)
SODIUM SERPL-SCNC: 143 MMOL/L (ref 136–145)
TROPONIN I SERPL HS-MCNC: 24 NG/L (ref 0–51)
TSH SERPL DL<=0.05 MIU/L-ACNC: 6.51 UIU/ML (ref 0.36–3.74)
WBC # BLD AUTO: 6.7 K/UL (ref 3.6–11)

## 2025-03-13 PROCEDURE — 99283 EMERGENCY DEPT VISIT LOW MDM: CPT

## 2025-03-13 PROCEDURE — 84443 ASSAY THYROID STIM HORMONE: CPT

## 2025-03-13 PROCEDURE — 85610 PROTHROMBIN TIME: CPT

## 2025-03-13 PROCEDURE — 84484 ASSAY OF TROPONIN QUANT: CPT

## 2025-03-13 PROCEDURE — 94761 N-INVAS EAR/PLS OXIMETRY MLT: CPT

## 2025-03-13 PROCEDURE — 93005 ELECTROCARDIOGRAM TRACING: CPT | Performed by: EMERGENCY MEDICINE

## 2025-03-13 PROCEDURE — 99284 EMERGENCY DEPT VISIT MOD MDM: CPT

## 2025-03-13 PROCEDURE — 83880 ASSAY OF NATRIURETIC PEPTIDE: CPT

## 2025-03-13 PROCEDURE — 80053 COMPREHEN METABOLIC PANEL: CPT

## 2025-03-13 PROCEDURE — 83735 ASSAY OF MAGNESIUM: CPT

## 2025-03-13 PROCEDURE — 85025 COMPLETE CBC W/AUTO DIFF WBC: CPT

## 2025-03-13 PROCEDURE — 36415 COLL VENOUS BLD VENIPUNCTURE: CPT

## 2025-03-13 ASSESSMENT — ENCOUNTER SYMPTOMS
BACK PAIN: 0
NAUSEA: 0
ABDOMINAL PAIN: 0
SHORTNESS OF BREATH: 1
CONSTIPATION: 0
VOMITING: 0
DIARRHEA: 0
COLOR CHANGE: 0

## 2025-03-13 ASSESSMENT — PAIN SCALES - GENERAL: PAINLEVEL_OUTOF10: 3

## 2025-03-13 ASSESSMENT — PAIN DESCRIPTION - LOCATION: LOCATION: NECK

## 2025-03-13 ASSESSMENT — PAIN - FUNCTIONAL ASSESSMENT: PAIN_FUNCTIONAL_ASSESSMENT: 0-10

## 2025-03-13 NOTE — TELEPHONE ENCOUNTER
Pt was seen on 3-3-25 at [facility] by Dr Blair. She was put on Midodrine 10 mg 2 times daily. Pt states last night her heart rate started to skyrocket to the 130-145's resting heart rate. Pt's BP was 117/95. Pt states her heart rate is very fast and she is worried it is due to the Midodrine. Pt was advised to seek care at an ED for further eval, however pt would like to speak with Dr Uribe first. Please advise.

## 2025-03-13 NOTE — TELEPHONE ENCOUNTER
Md made aware  Informed pt per Dr Blair  Needs to be evaluated in ED for increase heart rate  She verbalized understanding

## 2025-03-13 NOTE — ED NOTES
Patient moved to room and when placed on cardiac monitor, noted to have converted back to a nsr. Rate 66.

## 2025-03-13 NOTE — ED PROVIDER NOTES
[facility] EMERGENCY DEPARTMENT  EMERGENCY DEPARTMENT ENCOUNTER      Pt Name: Odette Ng  MRN: [id number]  Birthdate 1970  Date of evaluation: 3/13/2025  Provider: Steven Menendez MD    CHIEF COMPLAINT       Chief Complaint   Patient presents with    Palpitations         HISTORY OF PRESENT ILLNESS   (Location/Symptom, Timing/Onset, Context/Setting, Quality, Duration, Modifying Factors, Severity)  Note limiting factors.   Odette Ng is a 54 y.o. female who presents to the emergency department      The history is provided by the patient. No  was used.   Palpitations  Palpitations quality:  Irregular  Onset quality:  Sudden  Timing:  Constant  Progression:  Unchanged  Chronicity:  New  Ineffective treatments:  None tried  Associated symptoms: malaise/fatigue and shortness of breath    Associated symptoms: no back pain, no chest pain, no dizziness, no nausea, no numbness, no vomiting and no weakness    Risk factors: hx of DVT, hx of PE and hx of thyroid disease        Nursing Notes were reviewed.    REVIEW OF SYSTEMS    (2-9 systems for level 4, 10 or more for level 5)     Review of Systems   Constitutional:  Positive for malaise/fatigue. Negative for activity change, chills and fever.   HENT:  Negative for nosebleeds.    Eyes:  Negative for visual disturbance.   Respiratory:  Positive for shortness of breath.    Cardiovascular:  Positive for palpitations. Negative for chest pain.   Gastrointestinal:  Negative for abdominal pain, constipation, diarrhea, nausea and vomiting.   Genitourinary:  Negative for difficulty urinating, dysuria, hematuria and urgency.   Musculoskeletal:  Negative for back pain, neck pain and neck stiffness.   Skin:  Negative for color change.   Allergic/Immunologic: Negative for immunocompromised state.   Neurological:  Positive for light-headedness. Negative for dizziness, seizures, syncope, weakness, numbness and headaches.

## 2025-03-13 NOTE — TELEPHONE ENCOUNTER
Spoke to pt & she will come in to the [facility] office tomorrow 3/14/25 3:30 to have a Zio applied.      ----- Message from Dr. Nancy Montague MD sent at 3/13/2025 12:40 PM EDT -----  Regardin week holter and appt  Susana - can you please call patient and let her know we will get her a 2 week holter -- was just in ED with Sue Richey - can you please get patient to see Sab next few weeks    Thanks  SK

## 2025-03-13 NOTE — ED TRIAGE NOTES
Patient arrives ambulatory to triage for complaints of high heart rate and palpitations which started last night and has been constant.    Denies chest pain, fever, chills.     History of PE, headache, HTN, Arthritis.

## 2025-03-14 ENCOUNTER — ANCILLARY PROCEDURE (OUTPATIENT)
Age: 55
End: 2025-03-14
Payer: MEDICAID

## 2025-03-14 DIAGNOSIS — I48.91 ATRIAL FIBRILLATION (HCC): ICD-10-CM

## 2025-03-14 PROCEDURE — 93248 EXT ECG>7D<15D REV&INTERPJ: CPT | Performed by: SPECIALIST

## 2025-03-14 PROCEDURE — 93246 EXT ECG>7D<15D RECORDING: CPT | Performed by: SPECIALIST

## 2025-03-15 LAB
EKG ATRIAL RATE: 59 BPM
EKG DIAGNOSIS: NORMAL
EKG DIAGNOSIS: NORMAL
EKG P AXIS: 24 DEGREES
EKG P-R INTERVAL: 186 MS
EKG Q-T INTERVAL: 340 MS
EKG Q-T INTERVAL: 420 MS
EKG QRS DURATION: 74 MS
EKG QRS DURATION: 78 MS
EKG QTC CALCULATION (BAZETT): 415 MS
EKG QTC CALCULATION (BAZETT): 506 MS
EKG R AXIS: -22 DEGREES
EKG R AXIS: -29 DEGREES
EKG T AXIS: -10 DEGREES
EKG T AXIS: 4 DEGREES
EKG VENTRICULAR RATE: 133 BPM
EKG VENTRICULAR RATE: 59 BPM

## 2025-03-15 PROCEDURE — 93010 ELECTROCARDIOGRAM REPORT: CPT | Performed by: SPECIALIST

## 2025-03-18 ENCOUNTER — OFFICE VISIT (OUTPATIENT)
Facility: CLINIC | Age: 55
End: 2025-03-18
Payer: MEDICAID

## 2025-03-18 VITALS
WEIGHT: 228 LBS | DIASTOLIC BLOOD PRESSURE: 90 MMHG | TEMPERATURE: 97.7 F | SYSTOLIC BLOOD PRESSURE: 152 MMHG | HEART RATE: 57 BPM | BODY MASS INDEX: 41.96 KG/M2 | HEIGHT: 62 IN | RESPIRATION RATE: 20 BRPM | OXYGEN SATURATION: 99 %

## 2025-03-18 DIAGNOSIS — I48.91 ATRIAL FIBRILLATION, UNSPECIFIED TYPE (HCC): ICD-10-CM

## 2025-03-18 DIAGNOSIS — Z09 FOLLOW-UP EXAMINATION: ICD-10-CM

## 2025-03-18 DIAGNOSIS — I95.2 HYPOTENSION DUE TO DRUGS: Primary | ICD-10-CM

## 2025-03-18 PROCEDURE — 99214 OFFICE O/P EST MOD 30 MIN: CPT

## 2025-03-18 PROCEDURE — 3080F DIAST BP >= 90 MM HG: CPT

## 2025-03-18 PROCEDURE — 3077F SYST BP >= 140 MM HG: CPT

## 2025-03-18 NOTE — PATIENT INSTRUCTIONS
[facility]  Affiliated with [address]  [address] [phone]    Monitor blood pressure outside the office several times weekly at different times during the day and evening.   Blood Pressure Record     Patient Name:  _____Odette Ng_________________ :  ________1970________    Date/Time BP Reading Pulse

## 2025-03-19 NOTE — PROGRESS NOTES
\"Have you been to the ER, urgent care clinic since your last visit?  Hospitalized since your last visit?\"    no    “Have you seen or consulted any other health care providers outside our system since your last visit?”    no    Have you had a mammogram?”   NO    No breast cancer screening on file       “Have you had a colorectal cancer screening such as a colonoscopy/FIT/Cologuard?    NO    No colonoscopy on file  No cologuard on file  No FIT/FOBT on file   No flexible sigmoidoscopy on file             Goals that were addressed and/or need to be completed during or after this appointment include   Health Maintenance Due   Topic Date Due    Hepatitis B vaccine (1 of 3 - 19+ 3-dose series) Never done    Breast cancer screen  Never done    Colorectal Cancer Screen  Never done    Shingles vaccine (1 of 2) Never done    Pneumococcal 50+ years Vaccine (1 of 1 - PCV) Never done    COVID-19 Vaccine (1 - 2024-25 season) Never done      
I saw and evaluated the patient, performing the key elements of the service.  I discussed the findings, assessment and plan with the resident and agree with the resident's findings and plan as documented in the resident's note.    
expressed understanding with the diagnosis(es) and plan.      Please note that this dictation may have been completed with Dragon, the computer voice recognition software.  Quite often unanticipated grammatical, syntax, homophones, and other interpretive errors are inadvertently transcribed by the computer software. Please disregard these errors.  Please excuse any errors that have escaped final proofreading.

## 2025-04-07 ENCOUNTER — OFFICE VISIT (OUTPATIENT)
Age: 55
End: 2025-04-07
Payer: MEDICAID

## 2025-04-07 VITALS
HEIGHT: 62 IN | BODY MASS INDEX: 41.96 KG/M2 | HEART RATE: 67 BPM | DIASTOLIC BLOOD PRESSURE: 90 MMHG | WEIGHT: 228 LBS | SYSTOLIC BLOOD PRESSURE: 138 MMHG | OXYGEN SATURATION: 99 %

## 2025-04-07 DIAGNOSIS — I48.0 PAF (PAROXYSMAL ATRIAL FIBRILLATION) (HCC): Primary | ICD-10-CM

## 2025-04-07 DIAGNOSIS — I10 HYPERTENSION, ESSENTIAL: ICD-10-CM

## 2025-04-07 DIAGNOSIS — I47.10 SVT (SUPRAVENTRICULAR TACHYCARDIA): ICD-10-CM

## 2025-04-07 DIAGNOSIS — D68.51 FACTOR V LEIDEN: ICD-10-CM

## 2025-04-07 DIAGNOSIS — R06.02 SOB (SHORTNESS OF BREATH): ICD-10-CM

## 2025-04-07 PROCEDURE — 3080F DIAST BP >= 90 MM HG: CPT | Performed by: SPECIALIST

## 2025-04-07 PROCEDURE — 99214 OFFICE O/P EST MOD 30 MIN: CPT | Performed by: SPECIALIST

## 2025-04-07 PROCEDURE — 3075F SYST BP GE 130 - 139MM HG: CPT | Performed by: SPECIALIST

## 2025-04-07 PROCEDURE — G2211 COMPLEX E/M VISIT ADD ON: HCPCS | Performed by: SPECIALIST

## 2025-04-07 RX ORDER — METOPROLOL TARTRATE 25 MG/1
TABLET, FILM COATED ORAL
Qty: 15 TABLET | Refills: 1 | Status: SHIPPED | OUTPATIENT
Start: 2025-04-07 | End: 2025-04-07

## 2025-04-07 RX ORDER — METOPROLOL TARTRATE 25 MG/1
TABLET, FILM COATED ORAL
Qty: 15 TABLET | Refills: 1 | Status: SHIPPED | OUTPATIENT
Start: 2025-04-07

## 2025-04-07 ASSESSMENT — PATIENT HEALTH QUESTIONNAIRE - PHQ9
SUM OF ALL RESPONSES TO PHQ QUESTIONS 1-9: 0
SUM OF ALL RESPONSES TO PHQ QUESTIONS 1-9: 0
2. FEELING DOWN, DEPRESSED OR HOPELESS: NOT AT ALL
SUM OF ALL RESPONSES TO PHQ QUESTIONS 1-9: 0
SUM OF ALL RESPONSES TO PHQ QUESTIONS 1-9: 0
1. LITTLE INTEREST OR PLEASURE IN DOING THINGS: NOT AT ALL

## 2025-04-07 NOTE — PROGRESS NOTES
Odette Ng     1970       Bree Lee MD, [facility]  Date of Visit-4/7/2025   PCP is Jc Roberto MD   [facility] [place]  [facility]  HPI:  Odette Ng is a 55 y.o. female   ER follow-up last seen approximately 1 year ago went to the ER 3/13/2025 with high heart rate palpitations  Previous admission 3/3/2025 with elevated creatinine to 1.99 from 0.63 with ALEXANDREA found to have possible prerenal syndrome and discontinued HCTZ and olmesartan patient was placed on midodrine  We ordered a Holter. Pending   In the ER she had A-fib and spontaneously converted.  Initial EKG 3/13/2025 at 1040 showed atrial fibrillation with RVR and EKG 3/13/2025 at 11:10 AM showed sinus bradycardia rate of 59  TSH was 6.5 creatinine was 1.03 hemoglobin 12.3 INR 1.2  Today  Patient returns today to discuss the above.  She has felt well since the ER hospitalization.  Has been adventurous March with the ICU stay for the ALEXANDREA and then the ER visit.  She says she was laying in bed tachycardia lasted for about an hour and then she went to the ER.  She is not on a beta-blocker or other current medication.  She had some fatigue with atenolol last year.  Her testing last year was normal.  In addition to the palpitations she notes some occasional swelling in the feet and dizziness though not recently.  She supposed to have surgery on the 15th with Dr. Hadfield for her right shoulder.  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

## 2025-04-07 NOTE — PATIENT INSTRUCTIONS
You have been scheduled for an echo.    You will see Dr. Eldridge the electrophysiologist.    You may take metoprolol 25mg once daily as needed for elevated heart rate lasting longer than 30 minutes.    Have sleep study completed.    We will see you back in about 2-3 months.

## 2025-04-07 NOTE — PROGRESS NOTES
1. Have you been to the ER, urgent care clinic since your last visit?  Hospitalized since your last visit?  3/13/25 [facility]  3/3/25 acute kidney injury, [facility]      2. Have you seen or consulted any other health care providers outside of the [facility] since your last visit?  Include any pap smears or colon screening.   [place], [facility]

## 2025-04-17 DIAGNOSIS — N17.9 AKI (ACUTE KIDNEY INJURY): Primary | ICD-10-CM

## 2025-04-17 DIAGNOSIS — N17.9 AKI (ACUTE KIDNEY INJURY): ICD-10-CM

## 2025-04-17 LAB — ECHO BSA: 2.13 M2

## 2025-04-19 LAB
BUN SERPL-MCNC: 14 MG/DL (ref 6–24)
BUN/CREAT SERPL: 17 (ref 9–23)
CALCIUM SERPL-MCNC: 9.5 MG/DL (ref 8.7–10.2)
CHLORIDE SERPL-SCNC: 106 MMOL/L (ref 96–106)
CO2 SERPL-SCNC: 21 MMOL/L (ref 20–29)
CREAT SERPL-MCNC: 0.83 MG/DL (ref 0.57–1)
EGFRCR SERPLBLD CKD-EPI 2021: 84 ML/MIN/1.73
GLUCOSE SERPL-MCNC: 85 MG/DL (ref 70–99)
POTASSIUM SERPL-SCNC: 4.3 MMOL/L (ref 3.5–5.2)
SODIUM SERPL-SCNC: 145 MMOL/L (ref 134–144)

## 2025-04-20 ENCOUNTER — RESULTS FOLLOW-UP (OUTPATIENT)
Facility: CLINIC | Age: 55
End: 2025-04-20

## 2025-04-22 NOTE — RESULT ENCOUNTER NOTE
Creatinine levels improved, apparent resolution of ALEXANDREA, although creatinine appears higher than check prior to injury.  Would benefit from annual check of creatinine levels.

## 2025-06-03 ENCOUNTER — TELEPHONE (OUTPATIENT)
Age: 55
End: 2025-06-03

## 2025-06-03 NOTE — TELEPHONE ENCOUNTER
Called patient to reschedule upcoming appointment with Dr. Eldridge. Patient was driving and requested to call back at a later time. Please send the call to me. Thanks.

## 2025-07-11 ENCOUNTER — OFFICE VISIT (OUTPATIENT)
Age: 55
End: 2025-07-11
Payer: MEDICAID

## 2025-07-11 VITALS
DIASTOLIC BLOOD PRESSURE: 84 MMHG | HEIGHT: 62 IN | HEART RATE: 69 BPM | OXYGEN SATURATION: 98 % | WEIGHT: 224 LBS | SYSTOLIC BLOOD PRESSURE: 138 MMHG | BODY MASS INDEX: 41.22 KG/M2

## 2025-07-11 DIAGNOSIS — R06.02 SOB (SHORTNESS OF BREATH): ICD-10-CM

## 2025-07-11 DIAGNOSIS — I47.10 SVT (SUPRAVENTRICULAR TACHYCARDIA): ICD-10-CM

## 2025-07-11 DIAGNOSIS — I95.9 HYPOTENSION, UNSPECIFIED HYPOTENSION TYPE: ICD-10-CM

## 2025-07-11 DIAGNOSIS — D68.51 FACTOR V LEIDEN: ICD-10-CM

## 2025-07-11 DIAGNOSIS — I48.0 PAF (PAROXYSMAL ATRIAL FIBRILLATION) (HCC): Primary | ICD-10-CM

## 2025-07-11 PROCEDURE — 99214 OFFICE O/P EST MOD 30 MIN: CPT | Performed by: SPECIALIST

## 2025-07-11 PROCEDURE — G2211 COMPLEX E/M VISIT ADD ON: HCPCS | Performed by: SPECIALIST

## 2025-07-11 PROCEDURE — 3079F DIAST BP 80-89 MM HG: CPT | Performed by: SPECIALIST

## 2025-07-11 PROCEDURE — 3075F SYST BP GE 130 - 139MM HG: CPT | Performed by: SPECIALIST

## 2025-07-11 NOTE — PROGRESS NOTES
Odette Ng     1970       Bree Lee MD, [facility]  Date of Visit-7/11/2025   PCP is Jc Roberto MD   [facility] [place]  [facility]  HPI:  Odette Ng is a 55 y.o. female   3 month follow up     Today-plan was for EP evaluation for AF and take metoprolol PRN elevated HR  Echo not yet done, she cancelled twice and did not schedule with EP yet  She is feeling pretty well, no complaints  Had her shoulder surgery without issue  Seeing PCP regularly including labs  Denies chest pain, edema, syncope or shortness of breath at rest -are stable  Has no tachycardia , palpitations or sense of arrythmia  -have now resolved      14-day Holter.  Dates 3/14/2025 - 3/25/2025.  Analysis time 10 days 4 hours.  Findings:  1.  Patient had a min HR of 40 bpm, max HR of 197 bpm, and avg HR of 63 bpm.   2.  Predominant underlying rhythm was Sinus Rhythm.   3.  SVT: 18 Supraventricular Tachycardia runs occurred, the run with the fastest interval lasting 16 beats with a max rate of 197 bpm, the longest lasting 13 beats with an avg rate of 121 bpm. Some episodes of Supraventricular Tachycardia may be possible Atrial Tachycardia with variable block. Some episodes of Supraventricular Tachycardia conducted with possible aberrancy. True duration of Supraventricular Tachycardia difficult to ascertain due to artifact. Supraventricular Tachycardia was detected within +/- 45 seconds of symptomatic patient event(s).   4.  Isolated SVEs were rare (<1.0%), SVE Couplets were rare (<1.0%), and SVE Triplets were rare (<1.0%).   5.  Isolated VEs were rare (<1.0%), VE Couplets were rare (<1.0%), and no VE Triplets were present.          ER 3/13/2025 with high heart rate palpitations-In the ER she had A-fib and spontaneously converted.  Initial EKG 3/13/2025 at 1040 showed atrial fibrillation with RVR and EKG 3/13/2025 at 11:10 AM showed sinus bradycardia rate of 59.TSH was 6.5

## 2025-07-11 NOTE — PROGRESS NOTES
Chief Complaint   Patient presents with    Atrial Fibrillation    Tachycardia    Hypertension    Shortness of Breath     Vitals:    07/11/25 1056   BP: 138/84   BP Site: Left Upper Arm   Patient Position: Sitting   BP Cuff Size: Medium Adult   Pulse: 69   SpO2: 98%   Weight: 101.6 kg (224 lb)   Height: 1.575 m (5' 2\")      /84 (BP Site: Left Upper Arm, Patient Position: Sitting, BP Cuff Size: Medium Adult)   Pulse 69   Ht 1.575 m (5' 2\")   Wt 101.6 kg (224 lb)   SpO2 98%   BMI 40.97 kg/m²

## 2025-08-19 ENCOUNTER — PATIENT MESSAGE (OUTPATIENT)
Facility: CLINIC | Age: 55
End: 2025-08-19

## 2025-08-19 DIAGNOSIS — K21.9 GASTROESOPHAGEAL REFLUX DISEASE, UNSPECIFIED WHETHER ESOPHAGITIS PRESENT: Primary | ICD-10-CM

## 2025-08-19 RX ORDER — OMEPRAZOLE 20 MG/1
20 CAPSULE, DELAYED RELEASE ORAL 2 TIMES DAILY
Qty: 180 CAPSULE | Refills: 1 | Status: SHIPPED | OUTPATIENT
Start: 2025-08-19

## 2025-08-20 RX ORDER — CETIRIZINE HYDROCHLORIDE 10 MG/1
10 TABLET ORAL DAILY
Qty: 90 TABLET | Refills: 1 | Status: SHIPPED | OUTPATIENT
Start: 2025-08-20

## (undated) DEVICE — DRAPE, EXTREMITY, BILATERAL, STERILE: Brand: MEDLINE

## (undated) DEVICE — 1010 S-DRAPE TOWEL DRAPE 10/BX: Brand: STERI-DRAPE™

## (undated) DEVICE — 1200 GUARD II KIT W/5MM TUBE W/O VAC TUBE: Brand: GUARDIAN

## (undated) DEVICE — DUAL IRRIGATION ADAPTOR

## (undated) DEVICE — Device

## (undated) DEVICE — NEEDLE HYPO 18GA L1.5IN PNK S STL HUB POLYPR SHLD REG BVL

## (undated) DEVICE — YANKAUER OPEN TIP, NO VENT: Brand: ARGYLE

## (undated) DEVICE — LIGHT HANDLE: Brand: DEVON

## (undated) DEVICE — STERILE POLYISOPRENE POWDER-FREE SURGICAL GLOVES WITH EMOLLIENT COATING: Brand: PROTEXIS

## (undated) DEVICE — SUTURE STRATAFIX SYMMETRIC SZ 1 L18IN ABSRB VLT CT1 L36CM SXPP1A404

## (undated) DEVICE — WORKING LENGTH 235CM, WORKING CHANNEL 2.8MM: Brand: RESOLUTION 360 CLIP

## (undated) DEVICE — T4 HOOD

## (undated) DEVICE — SIMPLICITY FLUFF UNDERPAD 23X36, MODERATE: Brand: SIMPLICITY

## (undated) DEVICE — SET ADMIN 16ML TBNG L100IN 2 Y INJ SITE IV PIGGY BK DISP

## (undated) DEVICE — BASIN EMSIS 16OZ GRAPHITE PLAS KID SHP MOLD GRAD FOR ORAL

## (undated) DEVICE — 3M™ STERI-DRAPE™ U-DRAPE 1015: Brand: STERI-DRAPE™

## (undated) DEVICE — 3M™ IOBAN™ 2 ANTIMICROBIAL INCISE DRAPE 6651EZ: Brand: IOBAN™ 2

## (undated) DEVICE — (D)PREP SKN CHLRAPRP APPL 26ML -- CONVERT TO ITEM 371833

## (undated) DEVICE — NDL PRT INJ NSAF BLNT 18GX1.5 --

## (undated) DEVICE — CANNULA CUSH AD W/ 14FT TBG

## (undated) DEVICE — STAPLER SKIN 35CT WD STRL DISP -- MULTIFIRE PREMIUM

## (undated) DEVICE — SYR 3ML LL TIP 1/10ML GRAD --

## (undated) DEVICE — ABDOMINAL PAD: Brand: DERMACEA

## (undated) DEVICE — SUTURE VCRL + SZ 1-0 L36IN ABSRB UD CTX 1/2 CIR TAPR PNT VCP977H

## (undated) DEVICE — STERILE HOOK LOCK ELASTIC BANDAGE 6IN X 10 YARD: Brand: HOOK LOCK™

## (undated) DEVICE — CONVERTORS STOCKINETTE: Brand: CONVERTORS

## (undated) DEVICE — Z DISCONTINUED USE 2744636  DRESSING AQUACEL 14 IN ALG W3.5XL14IN POLYUR FLM CVR W/ HYDRCOLL

## (undated) DEVICE — SUTURE STRATAFIX SPRL SZ 1 L14IN ABSRB VLT L48CM CTX 1/2 SXPD2B405

## (undated) DEVICE — PREP KIT PEEL PTCH POVIDONE IOD

## (undated) DEVICE — DRAPE,EXTREMITY,89X128,STERILE: Brand: MEDLINE

## (undated) DEVICE — REM POLYHESIVE ADULT PATIENT RETURN ELECTRODE: Brand: VALLEYLAB

## (undated) DEVICE — STRAP,POSITIONING,KNEE/BODY,FOAM,4X60": Brand: MEDLINE

## (undated) DEVICE — CLIP INT L235CM WRK CHAN DIA2.8MM OPN 11MM LCK MECHANISM MR

## (undated) DEVICE — SUTURE VCRL SZ 2-0 L36IN ABSRB UD L40MM CT 1/2 CIR J957H

## (undated) DEVICE — STERILE POLYISOPRENE POWDER-FREE SURGICAL GLOVES: Brand: PROTEXIS

## (undated) DEVICE — HANDPIECE SET WITH COAXIAL HIGH FLOW TIP AND SUCTION TUBE: Brand: INTERPULSE

## (undated) DEVICE — 3M™ IOBAN™ 2 ANTIMICROBIAL INCISE DRAPE 6648EZ: Brand: IOBAN™ 2

## (undated) DEVICE — INFECTION CONTROL KIT SYS

## (undated) DEVICE — SMOKE EVACUATION PENCIL: Brand: VALLEYLAB

## (undated) DEVICE — HEX-LOCKING BLADE ELECTRODE: Brand: EDGE

## (undated) DEVICE — SKIN MARKER,REGULAR TIP WITH RULER AND LABELS: Brand: DEVON

## (undated) DEVICE — SYR LR LCK 1ML GRAD NSAF 30ML --

## (undated) DEVICE — COVER,MAYO STAND,STERILE: Brand: MEDLINE

## (undated) DEVICE — FORCEPS BX L240CM JAW DIA2.8MM L CAP W/ NDL MIC MESH TOOTH

## (undated) DEVICE — DEVON™ KNEE AND BODY STRAP 60" X 3" (1.5 M X 7.6 CM): Brand: DEVON

## (undated) DEVICE — BITEBLOCK ENDOSCP 60FR MAXI WHT POLYETH STURDY W/ VELC WVN

## (undated) DEVICE — 3M™ CUROS™ DISINFECTING CAP FOR NEEDLELESS CONNECTORS 270/CARTON 20 CARTONS/CASE CFF1-270: Brand: CUROS™

## (undated) DEVICE — KIT COLON W/ 1.1OZ LUB AND 2 END

## (undated) DEVICE — KENDALL SCD EXPRESS SLEEVES, KNEE LENGTH, MEDIUM: Brand: KENDALL SCD

## (undated) DEVICE — INTENDED FOR TISSUE SEPARATION, AND OTHER PROCEDURES THAT REQUIRE A SHARP SURGICAL BLADE TO PUNCTURE OR CUT.: Brand: BARD-PARKER ® CARBON RIB-BACK BLADES

## (undated) DEVICE — GOWN,SIRUS,NONRNF,SETINSLV,2XL,18/CS: Brand: MEDLINE

## (undated) DEVICE — ZIMMER® STERILE DISPOSABLE TOURNIQUET CUFF WITH PROTECTIVE SLEEVE AND PLC, DUAL PORT, SINGLE BLADDER, 34 IN. (86 CM)

## (undated) DEVICE — SUTURE VCRL SZ 2-0 L36IN ABSRB UD L36MM CT-1 1/2 CIR J945H

## (undated) DEVICE — NEEDLE SCLERO 23GA L4MM CATH L240CM CNTRST SHTH DIA1.8MM

## (undated) DEVICE — KENDALL RADIOLUCENT FOAM MONITORING ELECTRODE -RECTANGULAR SHAPE: Brand: KENDALL

## (undated) DEVICE — STRYKER PERFORMANCE SERIES SAGITTAL BLADE: Brand: STRYKER PERFORMANCE SERIES

## (undated) DEVICE — BLADE SAW W9XL25MM THK051MM CUT THK074MM REPL S STL SAG

## (undated) DEVICE — Z DISCONTINUED USE (MFG CAT 7984-37) SOLUTION IV SODIUM CHL 0.9% 100 ML INJ

## (undated) DEVICE — MARKER,SKIN,WI/RULER AND LABELS: Brand: MEDLINE

## (undated) DEVICE — SUTURE ABSRB BRAID COAT UD CT NO 1 36IN VCRL J959H

## (undated) DEVICE — ELECTRODE BLDE L4IN NONINSULATED EDGE

## (undated) DEVICE — GAUZE SPONGES,12 PLY: Brand: CURITY

## (undated) DEVICE — PADDING CST 6IN STERILE --

## (undated) DEVICE — ESOPHAGEAL BALLOON DILATATION CATHETER: Brand: CRE FIXED WIRE

## (undated) DEVICE — NDL FLTR TIP 5 MIC 18GX1.5IN --

## (undated) DEVICE — SYR 5ML 1/5 GRAD LL NSAF LF --

## (undated) DEVICE — CEMENT MIXING SYSTEM WITH FEMORAL BREAKWAY NOZZLE: Brand: REVOLUTION

## (undated) DEVICE — SWAB CULT DBL W/O CHAR RAYON TIP AMIES GEL CLMN FOR COLL

## (undated) DEVICE — SOLUTION IRRIG 3000ML 0.9% SOD CHL FLX CONT 0797208] ICU MEDICAL INC]

## (undated) DEVICE — CONTAINER,SPECIMEN,3OZ,OR STRL: Brand: MEDLINE

## (undated) DEVICE — SPONGE GZ W4XL4IN COT 12 PLY TYP VII WVN C FLD DSGN

## (undated) DEVICE — DERMABOND SKIN ADH 0.7ML -- DERMABOND ADVANCED 12/BX

## (undated) DEVICE — (D)BNDG COHESIVE 6X5YD TAN LTX -- DUPE USE ITEM 357348

## (undated) DEVICE — COVER LT HNDL PLAS RIG 1 PER PK

## (undated) DEVICE — SOLIDIFIER MEDC 1200ML -- CONVERT TO 356117

## (undated) DEVICE — BAG SPEC BIOHZRD 10 X 10 IN --

## (undated) DEVICE — SUTURE MCRYL SZ 3-0 L27IN ABSRB UD L24MM PS-1 3/8 CIR PRIM Y936H

## (undated) DEVICE — CATH IV AUTOGRD BC BLU 22GA 25 -- INSYTE

## (undated) DEVICE — 4.0MM EGG

## (undated) DEVICE — PLUS HANDPIECE WITH SUCTION TUBING AND TRAUMA TIP WITH SMALL SOFT CONE: Brand: SURGILAV

## (undated) DEVICE — APPLICATOR BNDG 1MM ADH PREMIERPRO EXOFIN

## (undated) DEVICE — ROCKER SWITCH PENCIL BLADE ELECTRODE, HOLSTER: Brand: EDGE

## (undated) DEVICE — SYRINGE 50ML E/T

## (undated) DEVICE — SYR ASSEMB INFL BLLN 60ML --

## (undated) DEVICE — BANDAGE COMPR W6INXL10YD ST M E WHITE/BEIGE

## (undated) DEVICE — BAG BELONG PT PERS CLEAR HANDL